# Patient Record
Sex: MALE | Race: WHITE | NOT HISPANIC OR LATINO
[De-identification: names, ages, dates, MRNs, and addresses within clinical notes are randomized per-mention and may not be internally consistent; named-entity substitution may affect disease eponyms.]

---

## 2017-01-24 ENCOUNTER — APPOINTMENT (OUTPATIENT)
Dept: RADIOLOGY | Facility: CLINIC | Age: 47
End: 2017-01-24

## 2017-01-24 ENCOUNTER — OUTPATIENT (OUTPATIENT)
Dept: OUTPATIENT SERVICES | Facility: HOSPITAL | Age: 47
LOS: 1 days | End: 2017-01-24
Payer: COMMERCIAL

## 2017-01-24 DIAGNOSIS — Z00.8 ENCOUNTER FOR OTHER GENERAL EXAMINATION: ICD-10-CM

## 2017-01-24 PROCEDURE — 72070 X-RAY EXAM THORAC SPINE 2VWS: CPT

## 2017-01-24 PROCEDURE — 72040 X-RAY EXAM NECK SPINE 2-3 VW: CPT

## 2018-01-08 ENCOUNTER — RX RENEWAL (OUTPATIENT)
Age: 48
End: 2018-01-08

## 2018-02-01 ENCOUNTER — LABORATORY RESULT (OUTPATIENT)
Age: 48
End: 2018-02-01

## 2018-02-01 ENCOUNTER — NON-APPOINTMENT (OUTPATIENT)
Age: 48
End: 2018-02-01

## 2018-02-01 ENCOUNTER — APPOINTMENT (OUTPATIENT)
Dept: INTERNAL MEDICINE | Facility: CLINIC | Age: 48
End: 2018-02-01
Payer: COMMERCIAL

## 2018-02-01 VITALS — SYSTOLIC BLOOD PRESSURE: 120 MMHG | DIASTOLIC BLOOD PRESSURE: 70 MMHG

## 2018-02-01 VITALS — WEIGHT: 202 LBS | HEIGHT: 67.5 IN | BODY MASS INDEX: 31.34 KG/M2

## 2018-02-01 PROCEDURE — 99396 PREV VISIT EST AGE 40-64: CPT | Mod: 25

## 2018-02-01 PROCEDURE — 36415 COLL VENOUS BLD VENIPUNCTURE: CPT

## 2018-02-01 PROCEDURE — 81003 URINALYSIS AUTO W/O SCOPE: CPT | Mod: QW

## 2018-02-01 PROCEDURE — 93000 ELECTROCARDIOGRAM COMPLETE: CPT

## 2018-02-02 LAB
25(OH)D3 SERPL-MCNC: 15.1 NG/ML
ALBUMIN SERPL ELPH-MCNC: 4.4 G/DL
ALP BLD-CCNC: 48 U/L
ALT SERPL-CCNC: 44 U/L
ANION GAP SERPL CALC-SCNC: 16 MMOL/L
AST SERPL-CCNC: 34 U/L
BASOPHILS # BLD AUTO: 0.05 K/UL
BASOPHILS NFR BLD AUTO: 1 %
BILIRUB SERPL-MCNC: 0.2 MG/DL
BILIRUB UR QL STRIP: NORMAL
BUN SERPL-MCNC: 14 MG/DL
CALCIUM SERPL-MCNC: 9.9 MG/DL
CARDIOLIPIN AB SER IA-ACNC: POSITIVE
CHLORIDE SERPL-SCNC: 101 MMOL/L
CHOLEST SERPL-MCNC: 188 MG/DL
CHOLEST/HDLC SERPL: 7 RATIO
CLARITY UR: CLEAR
CO2 SERPL-SCNC: 23 MMOL/L
COLLECTION METHOD: NORMAL
CREAT SERPL-MCNC: 0.82 MG/DL
EOSINOPHIL # BLD AUTO: 0.1 K/UL
EOSINOPHIL NFR BLD AUTO: 1.9
GLUCOSE SERPL-MCNC: 84 MG/DL
GLUCOSE UR-MCNC: NORMAL
HBA1C MFR BLD HPLC: 5.5 %
HCG UR QL: 0.2 EU/DL
HCT VFR BLD CALC: 38 %
HDLC SERPL-MCNC: 27 MG/DL
HGB BLD-MCNC: 12.2 G/DL
HGB UR QL STRIP.AUTO: NORMAL
INR PPP: 2.35 RATIO
KETONES UR-MCNC: NORMAL
LDLC SERPL CALC-MCNC: NORMAL
LEUKOCYTE ESTERASE UR QL STRIP: NORMAL
LYMPHOCYTES # BLD AUTO: 2.53 K/UL
LYMPHOCYTES NFR BLD AUTO: 50.5 %
MAN DIFF?: NORMAL
MCHC RBC-ENTMCNC: 20.1 PG
MCHC RBC-ENTMCNC: 32.1 GM/DL
MCV RBC AUTO: 62.5 FL
MONOCYTES # BLD AUTO: 0.25 K/UL
MONOCYTES NFR BLD AUTO: 4.9 %
NEUTROPHILS # BLD AUTO: 1.94 K/UL
NEUTROPHILS NFR BLD AUTO: 38.8 %
NITRITE UR QL STRIP: NORMAL
PH UR STRIP: 7
PLATELET # BLD AUTO: 180 K/UL
POTASSIUM SERPL-SCNC: 4.2 MMOL/L
PROT SERPL-MCNC: 8.1 G/DL
PROT UR STRIP-MCNC: NORMAL
PSA SERPL-MCNC: 1.43 NG/ML
PT BLD: 27 SEC
RBC # BLD: 6.08 M/UL
RBC # FLD: 16.5 %
SAVE SPECIMEN: NORMAL
SODIUM SERPL-SCNC: 140 MMOL/L
SP GR UR STRIP: 1.1
T3RU NFR SERPL: 1.07 INDEX
TRIGL SERPL-MCNC: 514 MG/DL
TSH SERPL-ACNC: 1.6 UIU/ML
URATE SERPL-MCNC: 7 MG/DL
WBC # FLD AUTO: 5.01 K/UL

## 2018-02-07 LAB
DNA PLOIDY SPEC FC-IMP: NORMAL
PTR INTERP: NORMAL

## 2018-02-08 ENCOUNTER — FORM ENCOUNTER (OUTPATIENT)
Age: 48
End: 2018-02-08

## 2018-02-09 ENCOUNTER — OUTPATIENT (OUTPATIENT)
Dept: OUTPATIENT SERVICES | Facility: HOSPITAL | Age: 48
LOS: 1 days | End: 2018-02-09
Payer: COMMERCIAL

## 2018-02-09 ENCOUNTER — APPOINTMENT (OUTPATIENT)
Dept: RADIOLOGY | Facility: CLINIC | Age: 48
End: 2018-02-09

## 2018-02-09 ENCOUNTER — APPOINTMENT (OUTPATIENT)
Dept: CT IMAGING | Facility: CLINIC | Age: 48
End: 2018-02-09
Payer: COMMERCIAL

## 2018-02-09 ENCOUNTER — CLINICAL ADVICE (OUTPATIENT)
Age: 48
End: 2018-02-09

## 2018-02-09 DIAGNOSIS — Z00.8 ENCOUNTER FOR OTHER GENERAL EXAMINATION: ICD-10-CM

## 2018-02-09 DIAGNOSIS — R91.1 SOLITARY PULMONARY NODULE: ICD-10-CM

## 2018-02-09 PROCEDURE — 71250 CT THORAX DX C-: CPT | Mod: 26

## 2018-02-09 PROCEDURE — 71046 X-RAY EXAM CHEST 2 VIEWS: CPT

## 2018-02-09 PROCEDURE — 71250 CT THORAX DX C-: CPT

## 2018-02-09 PROCEDURE — 71046 X-RAY EXAM CHEST 2 VIEWS: CPT | Mod: 26

## 2018-02-28 ENCOUNTER — APPOINTMENT (OUTPATIENT)
Dept: INTERNAL MEDICINE | Facility: CLINIC | Age: 48
End: 2018-02-28
Payer: COMMERCIAL

## 2018-02-28 VITALS
RESPIRATION RATE: 16 BRPM | BODY MASS INDEX: 32.8 KG/M2 | TEMPERATURE: 98 F | HEIGHT: 67 IN | SYSTOLIC BLOOD PRESSURE: 122 MMHG | OXYGEN SATURATION: 99 % | DIASTOLIC BLOOD PRESSURE: 82 MMHG | WEIGHT: 209 LBS | HEART RATE: 62 BPM

## 2018-02-28 PROCEDURE — ZZZZZ: CPT

## 2018-02-28 PROCEDURE — 99204 OFFICE O/P NEW MOD 45 MIN: CPT | Mod: 25

## 2018-02-28 PROCEDURE — 94727 GAS DIL/WSHOT DETER LNG VOL: CPT

## 2018-02-28 PROCEDURE — 94729 DIFFUSING CAPACITY: CPT

## 2018-02-28 PROCEDURE — 94060 EVALUATION OF WHEEZING: CPT

## 2018-02-28 RX ORDER — AZITHROMYCIN 250 MG/1
250 TABLET, FILM COATED ORAL
Qty: 1 | Refills: 0 | Status: DISCONTINUED | COMMUNITY
Start: 2018-01-08 | End: 2018-02-28

## 2018-04-15 ENCOUNTER — TRANSCRIPTION ENCOUNTER (OUTPATIENT)
Age: 48
End: 2018-04-15

## 2018-06-10 ENCOUNTER — TRANSCRIPTION ENCOUNTER (OUTPATIENT)
Age: 48
End: 2018-06-10

## 2019-03-05 ENCOUNTER — APPOINTMENT (OUTPATIENT)
Dept: INTERNAL MEDICINE | Facility: CLINIC | Age: 49
End: 2019-03-05

## 2020-04-16 ENCOUNTER — APPOINTMENT (OUTPATIENT)
Dept: INTERNAL MEDICINE | Facility: CLINIC | Age: 50
End: 2020-04-16
Payer: COMMERCIAL

## 2020-04-16 VITALS — WEIGHT: 195 LBS | BODY MASS INDEX: 30.54 KG/M2

## 2020-04-16 PROCEDURE — 99213 OFFICE O/P EST LOW 20 MIN: CPT | Mod: 95

## 2020-04-16 RX ORDER — METHYLPREDNISOLONE 4 MG/1
4 TABLET ORAL
Qty: 21 | Refills: 0 | Status: DISCONTINUED | COMMUNITY
Start: 2020-03-11

## 2020-04-16 NOTE — ASSESSMENT
[FreeTextEntry1] : The patient's symptoms are compatible with a left otitis externa. He was treated with Cortisporin Rx solution t.i.d. And his wife to call if he develops any fever

## 2020-07-02 ENCOUNTER — LABORATORY RESULT (OUTPATIENT)
Age: 50
End: 2020-07-02

## 2020-07-02 ENCOUNTER — APPOINTMENT (OUTPATIENT)
Dept: INTERNAL MEDICINE | Facility: CLINIC | Age: 50
End: 2020-07-02
Payer: COMMERCIAL

## 2020-07-02 ENCOUNTER — NON-APPOINTMENT (OUTPATIENT)
Age: 50
End: 2020-07-02

## 2020-07-02 VITALS — TEMPERATURE: 97.9 F | WEIGHT: 200 LBS | HEIGHT: 67 IN | BODY MASS INDEX: 31.39 KG/M2

## 2020-07-02 VITALS — SYSTOLIC BLOOD PRESSURE: 120 MMHG | DIASTOLIC BLOOD PRESSURE: 80 MMHG

## 2020-07-02 LAB
25(OH)D3 SERPL-MCNC: 19.6 NG/ML
ALBUMIN SERPL ELPH-MCNC: 4.7 G/DL
ALP BLD-CCNC: 50 U/L
ALT SERPL-CCNC: 28 U/L
ANION GAP SERPL CALC-SCNC: 14 MMOL/L
APPEARANCE: CLEAR
AST SERPL-CCNC: 28 U/L
BACTERIA: NEGATIVE
BASOPHILS # BLD AUTO: 0.07 K/UL
BASOPHILS NFR BLD AUTO: 1.7 %
BILIRUB SERPL-MCNC: 0.3 MG/DL
BILIRUBIN URINE: NEGATIVE
BLOOD URINE: NEGATIVE
BUN SERPL-MCNC: 14 MG/DL
CALCIUM SERPL-MCNC: 9.4 MG/DL
CHLORIDE SERPL-SCNC: 105 MMOL/L
CHOLEST SERPL-MCNC: 147 MG/DL
CHOLEST/HDLC SERPL: 5 RATIO
CO2 SERPL-SCNC: 21 MMOL/L
COLOR: NORMAL
CREAT SERPL-MCNC: 0.86 MG/DL
EOSINOPHIL # BLD AUTO: 0.11 K/UL
EOSINOPHIL NFR BLD AUTO: 2.6 %
FERRITIN SERPL-MCNC: 306 NG/ML
FOLATE SERPL-MCNC: 13.8 NG/ML
GLUCOSE QUALITATIVE U: NEGATIVE
GLUCOSE SERPL-MCNC: 95 MG/DL
HCT VFR BLD CALC: 41.4 %
HDLC SERPL-MCNC: 30 MG/DL
HGB BLD-MCNC: 12.4 G/DL
HYALINE CASTS: 0 /LPF
KETONES URINE: NEGATIVE
LDLC SERPL CALC-MCNC: 84 MG/DL
LEUKOCYTE ESTERASE URINE: NEGATIVE
LYMPHOCYTES # BLD AUTO: 1.08 K/UL
LYMPHOCYTES NFR BLD AUTO: 25 %
MAN DIFF?: NORMAL
MCHC RBC-ENTMCNC: 19.7 PG
MCHC RBC-ENTMCNC: 30 GM/DL
MCV RBC AUTO: 65.6 FL
MICROSCOPIC-UA: NORMAL
MONOCYTES # BLD AUTO: 0.26 K/UL
MONOCYTES NFR BLD AUTO: 6 %
NEUTROPHILS # BLD AUTO: 2.78 K/UL
NEUTROPHILS NFR BLD AUTO: 64.7 %
NITRITE URINE: NEGATIVE
PH URINE: 7
PLATELET # BLD AUTO: 167 K/UL
POTASSIUM SERPL-SCNC: 4.4 MMOL/L
PROT SERPL-MCNC: 7.4 G/DL
PROTEIN URINE: NEGATIVE
RBC # BLD: 6.31 M/UL
RBC # FLD: 17.7 %
RED BLOOD CELLS URINE: 1 /HPF
SAVE SPECIMEN: NORMAL
SODIUM SERPL-SCNC: 140 MMOL/L
SPECIFIC GRAVITY URINE: 1.02
SQUAMOUS EPITHELIAL CELLS: 0 /HPF
T3RU NFR SERPL: 1.1 TBI
T4 SERPL-MCNC: 7.8 UG/DL
TRIGL SERPL-MCNC: 172 MG/DL
TSH SERPL-ACNC: 1.32 UIU/ML
URATE SERPL-MCNC: 6.7 MG/DL
UROBILINOGEN URINE: NORMAL
VIT B12 SERPL-MCNC: 370 PG/ML
WBC # FLD AUTO: 4.3 K/UL
WHITE BLOOD CELLS URINE: 0 /HPF

## 2020-07-02 PROCEDURE — 36415 COLL VENOUS BLD VENIPUNCTURE: CPT

## 2020-07-02 PROCEDURE — 93000 ELECTROCARDIOGRAM COMPLETE: CPT

## 2020-07-02 PROCEDURE — 99396 PREV VISIT EST AGE 40-64: CPT | Mod: 25

## 2020-07-02 NOTE — PHYSICAL EXAM
[Declined Rectal Exam] : declined rectal exam [Scrotum] : the scrotum was normal [Rectal Exam - Seminal Vesicles] : the seminal vesicles were normal [Epididymis] : the epididymides were normal [Testes Tenderness] : no tenderness of the testes [Testes Mass (___cm)] : there were no testicular masses [Normal] : affect was normal and insight and judgment were intact

## 2020-07-03 LAB
ESTIMATED AVERAGE GLUCOSE: 108 MG/DL
HBA1C MFR BLD HPLC: 5.4 %

## 2020-08-14 ENCOUNTER — TRANSCRIPTION ENCOUNTER (OUTPATIENT)
Age: 50
End: 2020-08-14

## 2020-09-03 ENCOUNTER — TRANSCRIPTION ENCOUNTER (OUTPATIENT)
Age: 50
End: 2020-09-03

## 2020-11-06 NOTE — ASSESSMENT
[FreeTextEntry1] : The patient's vital signs were stable. Complete physical examination was normal. And blood were obtained .\par Advised to repeat CT chest

## 2020-11-06 NOTE — HISTORY OF PRESENT ILLNESS
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 The patient is on long-term anticoagulation and doing quite well he is followed periodically by hematology. He states that for his annual examination

## 2021-01-07 ENCOUNTER — LABORATORY RESULT (OUTPATIENT)
Age: 51
End: 2021-01-07

## 2021-01-07 ENCOUNTER — APPOINTMENT (OUTPATIENT)
Dept: INTERNAL MEDICINE | Facility: CLINIC | Age: 51
End: 2021-01-07
Payer: COMMERCIAL

## 2021-01-07 VITALS — DIASTOLIC BLOOD PRESSURE: 70 MMHG | SYSTOLIC BLOOD PRESSURE: 120 MMHG

## 2021-01-07 VITALS — TEMPERATURE: 96.4 F | BODY MASS INDEX: 29.92 KG/M2 | HEIGHT: 69 IN | WEIGHT: 202 LBS

## 2021-01-07 DIAGNOSIS — Z95.828 PRESENCE OF OTHER VASCULAR IMPLANTS AND GRAFTS: ICD-10-CM

## 2021-01-07 PROCEDURE — 99214 OFFICE O/P EST MOD 30 MIN: CPT | Mod: 25

## 2021-01-07 PROCEDURE — 99072 ADDL SUPL MATRL&STAF TM PHE: CPT

## 2021-01-07 PROCEDURE — 36415 COLL VENOUS BLD VENIPUNCTURE: CPT

## 2021-01-07 NOTE — ASSESSMENT
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  Who in addition has a history of pulmonary nodules and mediastinal lymph nodes.  He occasionally complains of occasional cough but denies any shortness of breath sputum or any fever.  In view of his previous history I ordered a noncontrast CT of the chest.  In addition the patient is complaining of severe pain in his left fifth toe and also pain in the posterior aspect of his left foot to the point that he has trouble walking on it.  He recently saw podiatrist who x-rayed the foot and did not see any abnormalities.  Presently on examination his pulses are intact sensation is intact.  Vibration sense is intact.  I scheduled the patient for noncontrast MRI of the left foot

## 2021-01-07 NOTE — PHYSICAL EXAM
[No Varicosities] : no varicosities [Pedal Pulses Present] : the pedal pulses are present [Normal] : soft, non-tender, non-distended, no masses palpated, no HSM and normal bowel sounds

## 2021-01-07 NOTE — REVIEW OF SYSTEMS
[Cough] : cough [Joint Pain] : joint pain [Joint Stiffness] : joint stiffness [Joint Swelling] : joint swelling [Negative] : Neurological

## 2021-01-07 NOTE — HISTORY OF PRESENT ILLNESS
[de-identified] : This is a 50-year-old gentleman with a history of anticardiolipin antibody syndrome status post pulmonary embolus status post Gladys filter placement who is complaining of occasional cough and also severe pain in his left little toe and also pain in the bottom of his left foot he denies any injury.  Denies any fever or trauma

## 2021-01-08 LAB
25(OH)D3 SERPL-MCNC: 19.8 NG/ML
ALBUMIN SERPL ELPH-MCNC: 5.1 G/DL
ALP BLD-CCNC: 60 U/L
ALT SERPL-CCNC: 38 U/L
ANION GAP SERPL CALC-SCNC: 18 MMOL/L
AST SERPL-CCNC: 35 U/L
B BURGDOR IGG+IGM SER QL IB: NORMAL
BASOPHILS # BLD AUTO: 0.01 K/UL
BASOPHILS NFR BLD AUTO: 0.2 %
BILIRUB SERPL-MCNC: 0.4 MG/DL
BUN SERPL-MCNC: 20 MG/DL
CALCIUM SERPL-MCNC: 10.1 MG/DL
CHLORIDE SERPL-SCNC: 104 MMOL/L
CHOLEST SERPL-MCNC: 212 MG/DL
CO2 SERPL-SCNC: 20 MMOL/L
CREAT SERPL-MCNC: 0.97 MG/DL
EOSINOPHIL # BLD AUTO: 0.03 K/UL
EOSINOPHIL NFR BLD AUTO: 0.6 %
FOLATE SERPL-MCNC: >20 NG/ML
GLUCOSE SERPL-MCNC: 103 MG/DL
HCT VFR BLD CALC: 41.9 %
HDLC SERPL-MCNC: 33 MG/DL
HGB BLD-MCNC: 12.7 G/DL
IMM GRANULOCYTES NFR BLD AUTO: 0.6 %
LDLC SERPL CALC-MCNC: 144 MG/DL
LYMPHOCYTES # BLD AUTO: 1.3 K/UL
LYMPHOCYTES NFR BLD AUTO: 27.9 %
MAN DIFF?: NORMAL
MCHC RBC-ENTMCNC: 19.5 PG
MCHC RBC-ENTMCNC: 30.3 GM/DL
MCV RBC AUTO: 64.5 FL
MONOCYTES # BLD AUTO: 0.3 K/UL
MONOCYTES NFR BLD AUTO: 6.4 %
NEUTROPHILS # BLD AUTO: 2.99 K/UL
NEUTROPHILS NFR BLD AUTO: 64.3 %
NONHDLC SERPL-MCNC: 178 MG/DL
PLATELET # BLD AUTO: 166 K/UL
POTASSIUM SERPL-SCNC: 4.4 MMOL/L
PROT SERPL-MCNC: 8.3 G/DL
RBC # BLD: 6.5 M/UL
RBC # FLD: 17.2 %
SAVE SPECIMEN: NORMAL
SODIUM SERPL-SCNC: 142 MMOL/L
T3RU NFR SERPL: 1.1 TBI
T4 SERPL-MCNC: 5.2 UG/DL
TRIGL SERPL-MCNC: 169 MG/DL
TSH SERPL-ACNC: 1.41 UIU/ML
URATE SERPL-MCNC: 8.2 MG/DL
VIT B12 SERPL-MCNC: 390 PG/ML
WBC # FLD AUTO: 4.66 K/UL

## 2021-01-12 ENCOUNTER — LABORATORY RESULT (OUTPATIENT)
Age: 51
End: 2021-01-12

## 2021-01-12 ENCOUNTER — APPOINTMENT (OUTPATIENT)
Dept: INTERNAL MEDICINE | Facility: CLINIC | Age: 51
End: 2021-01-12
Payer: COMMERCIAL

## 2021-01-12 VITALS — DIASTOLIC BLOOD PRESSURE: 70 MMHG | SYSTOLIC BLOOD PRESSURE: 120 MMHG

## 2021-01-12 VITALS
TEMPERATURE: 97.2 F | HEIGHT: 69 IN | WEIGHT: 202 LBS | OXYGEN SATURATION: 98 % | HEART RATE: 71 BPM | BODY MASS INDEX: 29.92 KG/M2

## 2021-01-12 DIAGNOSIS — J45.30 MILD PERSISTENT ASTHMA, UNCOMPLICATED: ICD-10-CM

## 2021-01-12 DIAGNOSIS — Z87.09 PERSONAL HISTORY OF OTHER DISEASES OF THE RESPIRATORY SYSTEM: ICD-10-CM

## 2021-01-12 DIAGNOSIS — R70.0 ELEVATED ERYTHROCYTE SEDIMENTATION RATE: ICD-10-CM

## 2021-01-12 DIAGNOSIS — Z86.711 PERSONAL HISTORY OF PULMONARY EMBOLISM: ICD-10-CM

## 2021-01-12 LAB
BILIRUB UR QL STRIP: NEGATIVE
CLARITY UR: CLEAR
COLLECTION METHOD: NORMAL
GLUCOSE UR-MCNC: NEGATIVE
HCG UR QL: 0.2 EU/DL
HGB UR QL STRIP.AUTO: NEGATIVE
KETONES UR-MCNC: NEGATIVE
LEUKOCYTE ESTERASE UR QL STRIP: NEGATIVE
NITRITE UR QL STRIP: NEGATIVE
PH UR STRIP: 7
PROT UR STRIP-MCNC: NEGATIVE
SP GR UR STRIP: 1.01

## 2021-01-12 PROCEDURE — 36415 COLL VENOUS BLD VENIPUNCTURE: CPT

## 2021-01-12 PROCEDURE — 81003 URINALYSIS AUTO W/O SCOPE: CPT | Mod: NC,QW

## 2021-01-12 PROCEDURE — 99072 ADDL SUPL MATRL&STAF TM PHE: CPT

## 2021-01-12 PROCEDURE — 99214 OFFICE O/P EST MOD 30 MIN: CPT | Mod: 25

## 2021-01-12 NOTE — HISTORY OF PRESENT ILLNESS
[de-identified] : This is a 50-year-old patient who was back for reevaluation of a markedly elevated sedimentation rate to 90.  This is accompanied by severe pain and tenderness in the lateral aspect of his left foot and pain in his pinky toe.  He is also complaining of a rash developing in his right palm

## 2021-01-12 NOTE — PHYSICAL EXAM
[Normal] : no posterior cervical lymphadenopathy and no anterior cervical lymphadenopathy [de-identified] : Tenderness lateral aspect of left foot

## 2021-01-13 ENCOUNTER — NON-APPOINTMENT (OUTPATIENT)
Age: 51
End: 2021-01-13

## 2021-01-13 ENCOUNTER — APPOINTMENT (OUTPATIENT)
Dept: CT IMAGING | Facility: IMAGING CENTER | Age: 51
End: 2021-01-13
Payer: COMMERCIAL

## 2021-01-13 ENCOUNTER — APPOINTMENT (OUTPATIENT)
Dept: MRI IMAGING | Facility: IMAGING CENTER | Age: 51
End: 2021-01-13
Payer: COMMERCIAL

## 2021-01-13 ENCOUNTER — OUTPATIENT (OUTPATIENT)
Dept: OUTPATIENT SERVICES | Facility: HOSPITAL | Age: 51
LOS: 1 days | End: 2021-01-13
Payer: COMMERCIAL

## 2021-01-13 DIAGNOSIS — G57.90 UNSPECIFIED MONONEUROPATHY OF UNSPECIFIED LOWER LIMB: ICD-10-CM

## 2021-01-13 DIAGNOSIS — M79.2 NEURALGIA AND NEURITIS, UNSPECIFIED: ICD-10-CM

## 2021-01-13 DIAGNOSIS — Z00.8 ENCOUNTER FOR OTHER GENERAL EXAMINATION: ICD-10-CM

## 2021-01-13 LAB
BASOPHILS # BLD AUTO: 0 K/UL
BASOPHILS NFR BLD AUTO: 0 %
CH50 SERPL-MCNC: 35 U/ML
CRP SERPL-MCNC: 0.49 MG/DL
EOSINOPHIL # BLD AUTO: 0.04 K/UL
EOSINOPHIL NFR BLD AUTO: 0.9 %
ERYTHROCYTE [SEDIMENTATION RATE] IN BLOOD BY WESTERGREN METHOD: 72 MM/HR
HCT VFR BLD CALC: 42.1 %
HGB BLD-MCNC: 12.6 G/DL
INR PPP: 1.66 RATIO
LYMPHOCYTES # BLD AUTO: 0.9 K/UL
LYMPHOCYTES NFR BLD AUTO: 21 %
MAN DIFF?: NORMAL
MCHC RBC-ENTMCNC: 19.7 PG
MCHC RBC-ENTMCNC: 29.9 GM/DL
MCV RBC AUTO: 65.9 FL
MONOCYTES # BLD AUTO: 0.23 K/UL
MONOCYTES NFR BLD AUTO: 5.3 %
NEUTROPHILS # BLD AUTO: 2.96 K/UL
NEUTROPHILS NFR BLD AUTO: 69.3 %
PLATELET # BLD AUTO: 122 K/UL
PT BLD: 19.1 SEC
RBC # BLD: 6.39 M/UL
RBC # FLD: 17.7 %
WBC # FLD AUTO: 4.27 K/UL

## 2021-01-13 PROCEDURE — 71250 CT THORAX DX C-: CPT | Mod: 26

## 2021-01-13 PROCEDURE — 73718 MRI LOWER EXTREMITY W/O DYE: CPT | Mod: 26,LT

## 2021-01-13 PROCEDURE — 71250 CT THORAX DX C-: CPT

## 2021-01-13 PROCEDURE — 73718 MRI LOWER EXTREMITY W/O DYE: CPT

## 2021-01-14 LAB
ALBUMIN MFR SERPL ELPH: 56.9 %
ALBUMIN SERPL-MCNC: 4.7 G/DL
ALBUMIN/GLOB SERPL: 1.3 RATIO
ALPHA1 GLOB MFR SERPL ELPH: 3.3 %
ALPHA1 GLOB SERPL ELPH-MCNC: 0.3 G/DL
ALPHA2 GLOB MFR SERPL ELPH: 8 %
ALPHA2 GLOB SERPL ELPH-MCNC: 0.7 G/DL
ANA PAT FLD IF-IMP: ABNORMAL
ANA SER IF-ACNC: ABNORMAL
B-GLOBULIN MFR SERPL ELPH: 11.7 %
B-GLOBULIN SERPL ELPH-MCNC: 1 G/DL
C3 SERPL-MCNC: 97 MG/DL
C4 SERPL-MCNC: 7 MG/DL
DEPRECATED KAPPA LC FREE/LAMBDA SER: 0.93 RATIO
DSDNA AB SER-ACNC: 476 IU/ML
GAMMA GLOB FLD ELPH-MCNC: 1.7 G/DL
GAMMA GLOB MFR SERPL ELPH: 20.1 %
IGA SER QL IEP: 315 MG/DL
IGG SER QL IEP: 1615 MG/DL
IGM SER QL IEP: 297 MG/DL
INTERPRETATION SERPL IEP-IMP: NORMAL
KAPPA LC CSF-MCNC: 2.45 MG/DL
KAPPA LC SERPL-MCNC: 2.28 MG/DL
M PROTEIN SPEC IFE-MCNC: NORMAL
PROT SERPL-MCNC: 8.3 G/DL
PROT SERPL-MCNC: 8.3 G/DL
THYROGLOB AB SERPL-ACNC: <20 IU/ML
THYROPEROXIDASE AB SERPL IA-ACNC: <10 IU/ML

## 2021-02-08 ENCOUNTER — APPOINTMENT (OUTPATIENT)
Dept: RHEUMATOLOGY | Facility: CLINIC | Age: 51
End: 2021-02-08

## 2021-05-03 ENCOUNTER — APPOINTMENT (OUTPATIENT)
Dept: INTERNAL MEDICINE | Facility: CLINIC | Age: 51
End: 2021-05-03

## 2021-05-20 ENCOUNTER — TRANSCRIPTION ENCOUNTER (OUTPATIENT)
Age: 51
End: 2021-05-20

## 2021-05-20 ENCOUNTER — APPOINTMENT (OUTPATIENT)
Dept: INTERNAL MEDICINE | Facility: CLINIC | Age: 51
End: 2021-05-20
Payer: COMMERCIAL

## 2021-05-20 VITALS — SYSTOLIC BLOOD PRESSURE: 120 MMHG | DIASTOLIC BLOOD PRESSURE: 70 MMHG

## 2021-05-20 VITALS — BODY MASS INDEX: 30.36 KG/M2 | HEIGHT: 69 IN | WEIGHT: 205 LBS | TEMPERATURE: 98 F

## 2021-05-20 DIAGNOSIS — D68.9 COAGULATION DEFECT, UNSPECIFIED: ICD-10-CM

## 2021-05-20 PROCEDURE — 99072 ADDL SUPL MATRL&STAF TM PHE: CPT

## 2021-05-20 PROCEDURE — 99214 OFFICE O/P EST MOD 30 MIN: CPT

## 2021-05-20 NOTE — HISTORY OF PRESENT ILLNESS
[de-identified] : This is a 50-year-old gentleman with a history of anticardiolipin antibodies, coagulopathy, pulmonary embolus, sarcoid, lupus who is here today for chronic cough.  Of note is that on his last CAT scan there was a new infiltrate for which I referred him to pulmonary and he has not gone as of yet.

## 2021-05-20 NOTE — ASSESSMENT
[FreeTextEntry1] : Problems\par Chronic cough\par Sarcoid\par New pulmonary infiltrates\par SLE\par Anticardiolipin antibodies\par Assessment\par 1-chronic cough-this may be either allergic or may be related to his sarcoidosis.  I started the patient on a Medrol pack, Zyrtec, Flovent inhaler and albuterol.\par 3-stohelacpkn-S am concerned that the patient has not seen the pulmonary specialist as of yet I did refer him to new pulmonary doctor and told him it is important to see him as soon as possibl3-\par 3-SLE-he was seen by rheumatology and was started on Plaquenil 200mg twice a day.  I informed the patient that is important to have his eyes examined twice yearly because it can affect color vision\par 4-anticardiolipin antibodies-the patient continues on Coumadin therapy and is due for an INR next week

## 2021-05-24 ENCOUNTER — APPOINTMENT (OUTPATIENT)
Dept: PULMONOLOGY | Facility: CLINIC | Age: 51
End: 2021-05-24
Payer: COMMERCIAL

## 2021-05-24 VITALS
OXYGEN SATURATION: 98 % | BODY MASS INDEX: 29.62 KG/M2 | SYSTOLIC BLOOD PRESSURE: 127 MMHG | DIASTOLIC BLOOD PRESSURE: 85 MMHG | HEIGHT: 69 IN | WEIGHT: 200 LBS | TEMPERATURE: 97.6 F | HEART RATE: 61 BPM

## 2021-05-24 DIAGNOSIS — M32.9 SYSTEMIC LUPUS ERYTHEMATOSUS, UNSPECIFIED: ICD-10-CM

## 2021-05-24 PROCEDURE — 99072 ADDL SUPL MATRL&STAF TM PHE: CPT

## 2021-05-24 PROCEDURE — 99203 OFFICE O/P NEW LOW 30 MIN: CPT

## 2021-05-24 NOTE — REVIEW OF SYSTEMS
[Cough] : cough [Arthralgias] : arthralgias [Clotting Disorder/ Frequent bleeding] : clotting disorder/ frequent bleeding [Negative] : Dermatologic

## 2021-05-24 NOTE — ASSESSMENT
[FreeTextEntry1] : the patient's chest CT demonstrates multiple solid nodules in the right upper lobe with one abutting the right upper lobe bronchus. This does not appear to be related to his sarcoid or to his history of pulmonary emboli. There is a concern for a pulmonary neoplasm. I will arrange for him to have bronchoscopy and for him to undergo PET scanning.

## 2021-05-24 NOTE — PHYSICAL EXAM
[No Acute Distress] : no acute distress [Normal Appearance] : normal appearance [No Neck Mass] : no neck mass [Normal Rate/Rhythm] : normal rate/rhythm [Normal S1, S2] : normal s1, s2 [No Resp Distress] : no resp distress [Clear to Auscultation Bilaterally] : clear to auscultation bilaterally [Normal Gait] : normal gait [No Clubbing] : no clubbing [No Edema] : no edema [Oriented x3] : oriented x3

## 2021-05-24 NOTE — HISTORY OF PRESENT ILLNESS
[TextBox_4] : 50-year-old male with an abnormal chest CAT scan. The patient has a complex medical history having had a diagnosis of sarcoid made in 2002 via a mediastinoscopy. At that time he was noted to have several pulmonary nodules but no treatment was required. He also was found to have an anti-Cardiolipin antibody and subsequently found to have pulmonary emboli. He was anticoagulated and a IVC filter was placed. He is also suspected of having lupus for which she is on hydroxychloroquine. He has a history of bronchial reactivity but is currently not on any inhaled bronchodilators. He is a lifelong nonsmoker without occupational exposures. Other than a dry cough, he feels well. He denies any fever or, chest discomfort, dyspnea or peripheral edema.

## 2021-05-28 ENCOUNTER — APPOINTMENT (OUTPATIENT)
Dept: NUCLEAR MEDICINE | Facility: CLINIC | Age: 51
End: 2021-05-28
Payer: COMMERCIAL

## 2021-05-28 ENCOUNTER — OUTPATIENT (OUTPATIENT)
Dept: OUTPATIENT SERVICES | Facility: HOSPITAL | Age: 51
LOS: 1 days | End: 2021-05-28
Payer: COMMERCIAL

## 2021-05-28 DIAGNOSIS — D49.1 NEOPLASM OF UNSPECIFIED BEHAVIOR OF RESPIRATORY SYSTEM: ICD-10-CM

## 2021-05-28 PROCEDURE — A9552: CPT

## 2021-05-28 PROCEDURE — 78815 PET IMAGE W/CT SKULL-THIGH: CPT | Mod: 26,PI

## 2021-05-28 PROCEDURE — 78815 PET IMAGE W/CT SKULL-THIGH: CPT

## 2021-06-04 ENCOUNTER — RESULT REVIEW (OUTPATIENT)
Age: 51
End: 2021-06-04

## 2021-06-04 ENCOUNTER — OUTPATIENT (OUTPATIENT)
Dept: OUTPATIENT SERVICES | Facility: HOSPITAL | Age: 51
LOS: 1 days | End: 2021-06-04
Payer: COMMERCIAL

## 2021-06-04 ENCOUNTER — APPOINTMENT (OUTPATIENT)
Dept: ULTRASOUND IMAGING | Facility: IMAGING CENTER | Age: 51
End: 2021-06-04
Payer: COMMERCIAL

## 2021-06-04 DIAGNOSIS — Z00.8 ENCOUNTER FOR OTHER GENERAL EXAMINATION: ICD-10-CM

## 2021-06-04 DIAGNOSIS — D49.1 NEOPLASM OF UNSPECIFIED BEHAVIOR OF RESPIRATORY SYSTEM: ICD-10-CM

## 2021-06-04 PROCEDURE — 88305 TISSUE EXAM BY PATHOLOGIST: CPT | Mod: 26

## 2021-06-04 PROCEDURE — 88341 IMHCHEM/IMCYTCHM EA ADD ANTB: CPT

## 2021-06-04 PROCEDURE — 88360 TUMOR IMMUNOHISTOCHEM/MANUAL: CPT

## 2021-06-04 PROCEDURE — 88173 CYTOPATH EVAL FNA REPORT: CPT | Mod: 26

## 2021-06-04 PROCEDURE — 88341 IMHCHEM/IMCYTCHM EA ADD ANTB: CPT | Mod: 26,59

## 2021-06-04 PROCEDURE — 88342 IMHCHEM/IMCYTCHM 1ST ANTB: CPT | Mod: 26,59

## 2021-06-04 PROCEDURE — 20206 BIOPSY MUSCLE PERQ NEEDLE: CPT

## 2021-06-04 PROCEDURE — 76942 ECHO GUIDE FOR BIOPSY: CPT

## 2021-06-04 PROCEDURE — 88360 TUMOR IMMUNOHISTOCHEM/MANUAL: CPT | Mod: 26

## 2021-06-04 PROCEDURE — 76942 ECHO GUIDE FOR BIOPSY: CPT | Mod: 26

## 2021-06-04 PROCEDURE — 88342 IMHCHEM/IMCYTCHM 1ST ANTB: CPT

## 2021-06-04 PROCEDURE — 88173 CYTOPATH EVAL FNA REPORT: CPT

## 2021-06-04 PROCEDURE — 88172 CYTP DX EVAL FNA 1ST EA SITE: CPT

## 2021-06-04 PROCEDURE — 88305 TISSUE EXAM BY PATHOLOGIST: CPT

## 2021-06-07 ENCOUNTER — APPOINTMENT (OUTPATIENT)
Dept: PULMONOLOGY | Facility: CLINIC | Age: 51
End: 2021-06-07

## 2021-06-08 ENCOUNTER — APPOINTMENT (OUTPATIENT)
Dept: ULTRASOUND IMAGING | Facility: IMAGING CENTER | Age: 51
End: 2021-06-08
Payer: COMMERCIAL

## 2021-06-09 ENCOUNTER — OUTPATIENT (OUTPATIENT)
Dept: OUTPATIENT SERVICES | Facility: HOSPITAL | Age: 51
LOS: 1 days | Discharge: HOME | End: 2021-06-09

## 2021-06-09 DIAGNOSIS — D64.9 ANEMIA, UNSPECIFIED: ICD-10-CM

## 2021-06-10 ENCOUNTER — RESULT REVIEW (OUTPATIENT)
Age: 51
End: 2021-06-10

## 2021-06-10 ENCOUNTER — APPOINTMENT (OUTPATIENT)
Dept: HEMATOLOGY ONCOLOGY | Facility: CLINIC | Age: 51
End: 2021-06-10
Payer: COMMERCIAL

## 2021-06-10 ENCOUNTER — NON-APPOINTMENT (OUTPATIENT)
Age: 51
End: 2021-06-10

## 2021-06-10 VITALS
TEMPERATURE: 97 F | OXYGEN SATURATION: 93 % | WEIGHT: 202.8 LBS | DIASTOLIC BLOOD PRESSURE: 97 MMHG | RESPIRATION RATE: 16 BRPM | HEIGHT: 68.94 IN | SYSTOLIC BLOOD PRESSURE: 148 MMHG | HEART RATE: 74 BPM | BODY MASS INDEX: 30.04 KG/M2

## 2021-06-10 LAB
BASOPHILS # BLD AUTO: 0.05 K/UL — SIGNIFICANT CHANGE UP (ref 0–0.2)
BASOPHILS NFR BLD AUTO: 1 % — SIGNIFICANT CHANGE UP (ref 0–2)
EOSINOPHIL # BLD AUTO: 0 K/UL — SIGNIFICANT CHANGE UP (ref 0–0.5)
EOSINOPHIL NFR BLD AUTO: 0 % — SIGNIFICANT CHANGE UP (ref 0–6)
HCT VFR BLD CALC: 40.6 % — SIGNIFICANT CHANGE UP (ref 39–50)
HGB BLD-MCNC: 12.4 G/DL — LOW (ref 13–17)
LYMPHOCYTES # BLD AUTO: 1.12 K/UL — SIGNIFICANT CHANGE UP (ref 1–3.3)
LYMPHOCYTES # BLD AUTO: 24 % — SIGNIFICANT CHANGE UP (ref 13–44)
MCHC RBC-ENTMCNC: 19.9 PG — LOW (ref 27–34)
MCHC RBC-ENTMCNC: 30.5 G/DL — LOW (ref 32–36)
MCV RBC AUTO: 65.2 FL — LOW (ref 80–100)
MONOCYTES # BLD AUTO: 0.42 K/UL — SIGNIFICANT CHANGE UP (ref 0–0.9)
MONOCYTES NFR BLD AUTO: 9 % — SIGNIFICANT CHANGE UP (ref 2–14)
NEUTROPHILS # BLD AUTO: 3.08 K/UL — SIGNIFICANT CHANGE UP (ref 1.8–7.4)
NEUTROPHILS NFR BLD AUTO: 66 % — SIGNIFICANT CHANGE UP (ref 43–77)
NRBC # BLD: 0 /100 — SIGNIFICANT CHANGE UP (ref 0–0)
NRBC # BLD: SIGNIFICANT CHANGE UP /100 WBCS (ref 0–0)
PLAT MORPH BLD: NORMAL — SIGNIFICANT CHANGE UP
PLATELET # BLD AUTO: 119 K/UL — LOW (ref 150–400)
RBC # BLD: 6.23 M/UL — HIGH (ref 4.2–5.8)
RBC # FLD: 17.2 % — HIGH (ref 10.3–14.5)
RBC BLD AUTO: SIGNIFICANT CHANGE UP
WBC # BLD: 4.67 K/UL — SIGNIFICANT CHANGE UP (ref 3.8–10.5)
WBC # FLD AUTO: 4.67 K/UL — SIGNIFICANT CHANGE UP (ref 3.8–10.5)

## 2021-06-10 PROCEDURE — 99244 OFF/OP CNSLTJ NEW/EST MOD 40: CPT

## 2021-06-10 PROCEDURE — 99072 ADDL SUPL MATRL&STAF TM PHE: CPT

## 2021-06-10 NOTE — ASSESSMENT
[FreeTextEntry1] : 51 yo m, never-smoker with stage III lung adeno ca\par NGS and PDL1 pending\par 'Multiple comorbidities\par Discussed staging  and need for additional info- NGS\par BW including liquid bx\par Brain MRi for staging\par OV in 10 days\par Rad onc consult

## 2021-06-10 NOTE — CONSULT LETTER
[Dear  ___] : Dear  [unfilled], [Consult Letter:] : I had the pleasure of evaluating your patient, [unfilled]. [Please see my note below.] : Please see my note below. [Consult Closing:] : Thank you very much for allowing me to participate in the care of this patient.  If you have any questions, please do not hesitate to contact me. [Sincerely,] : Sincerely, [FreeTextEntry2] : Dr. Esther Mo [FreeTextEntry3] : Kyle Hart MD\par  [DrAbbi  ___] : Dr. HORNER

## 2021-06-10 NOTE — PHYSICAL EXAM
[Restricted in physically strenuous activity but ambulatory and able to carry out work of a light or sedentary nature] : Status 1- Restricted in physically strenuous activity but ambulatory and able to carry out work of a light or sedentary nature, e.g., light house work, office work [Normal] : affect appropriate [de-identified] : rt neck adenopathy [de-identified] : decreased BS rt upper

## 2021-06-10 NOTE — HISTORY OF PRESENT ILLNESS
[Disease: _____________________] : Disease: [unfilled] [T: ___] : T[unfilled] [N: ___] : N[unfilled] [M: ___] : M[unfilled] [AJCC Stage: ____] : AJCC Stage: [unfilled] [de-identified] : Mr. Mclaughlin with hx of sarcoidosis is a pleasant 50-year-old male who started having allergy-like symptoms (cough) 5 months ago. He saw his PCP, Dr. Mo- referred to Dr. Trujillo after he was noted to have an abnormal chest CAT scan. He was referred for PET/CT which was done on 5/23 which showed  FDG avid right upper lobe masslike consolidation, considerably increased in size and coalesced with adjacent right upper lobe nodules and new FDG avid supraclavicular, mediastinal and hilar lymphadenopathy, as compared to CT chest January 13, 2021 concerning for malignancy although progression of sarcoidosis is also a consideration. rt supra clav bx was c/w lung adeno ca. \par \par Of note, the patient has a complex medical history having had a diagnosis of sarcoid made in 2002 via a mediastinoscopy. At that time he was noted to have several pulmonary nodules but no treatment was required. 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 He was started on anticoagulation and a IVC filter was placed. He is also suspected of having lupus for which she is on hydroxychloroquine. He has a history of bronchial reactivity but is currently not on any inhaled bronchodilators. \par He is a lifelong nonsmoker without occupational exposures. Other than a dry cough, he feels well. He denies any fever or, chest discomfort, dyspnea or peripheral edema. \par  [de-identified] : adeno ca

## 2021-06-10 NOTE — REVIEW OF SYSTEMS
[Cough] : cough [SOB on Exertion] : shortness of breath during exertion [Negative] : Allergic/Immunologic [FreeTextEntry4] : rt neck mass [de-identified] : r

## 2021-06-11 LAB
ALBUMIN SERPL ELPH-MCNC: 5 G/DL
ALP BLD-CCNC: 65 U/L
ALT SERPL-CCNC: 213 U/L
ANION GAP SERPL CALC-SCNC: 13 MMOL/L
AST SERPL-CCNC: 108 U/L
BILIRUB SERPL-MCNC: 0.3 MG/DL
BUN SERPL-MCNC: 15 MG/DL
CALCIUM SERPL-MCNC: 10.3 MG/DL
CEA SERPL-MCNC: 12.8 NG/ML
CHLORIDE SERPL-SCNC: 104 MMOL/L
CO2 SERPL-SCNC: 22 MMOL/L
CREAT SERPL-MCNC: 0.95 MG/DL
GLUCOSE SERPL-MCNC: 88 MG/DL
MAGNESIUM SERPL-MCNC: 2.2 MG/DL
POTASSIUM SERPL-SCNC: 4.5 MMOL/L
PROT SERPL-MCNC: 8.4 G/DL
SODIUM SERPL-SCNC: 139 MMOL/L
TSH SERPL-ACNC: 1.72 UIU/ML

## 2021-06-18 ENCOUNTER — OUTPATIENT (OUTPATIENT)
Dept: OUTPATIENT SERVICES | Facility: HOSPITAL | Age: 51
LOS: 1 days | Discharge: HOME | End: 2021-06-18
Payer: COMMERCIAL

## 2021-06-18 ENCOUNTER — APPOINTMENT (OUTPATIENT)
Dept: RADIATION ONCOLOGY | Facility: CLINIC | Age: 51
End: 2021-06-18
Payer: COMMERCIAL

## 2021-06-18 ENCOUNTER — OUTPATIENT (OUTPATIENT)
Dept: OUTPATIENT SERVICES | Facility: HOSPITAL | Age: 51
LOS: 1 days | End: 2021-06-18
Payer: COMMERCIAL

## 2021-06-18 ENCOUNTER — APPOINTMENT (OUTPATIENT)
Dept: MRI IMAGING | Facility: CLINIC | Age: 51
End: 2021-06-18
Payer: COMMERCIAL

## 2021-06-18 VITALS
WEIGHT: 207.23 LBS | HEART RATE: 68 BPM | TEMPERATURE: 36.4 F | OXYGEN SATURATION: 97 % | SYSTOLIC BLOOD PRESSURE: 146 MMHG | RESPIRATION RATE: 18 BRPM | DIASTOLIC BLOOD PRESSURE: 90 MMHG | BODY MASS INDEX: 30.66 KG/M2

## 2021-06-18 DIAGNOSIS — Z78.9 OTHER SPECIFIED HEALTH STATUS: ICD-10-CM

## 2021-06-18 DIAGNOSIS — C34.90 MALIGNANT NEOPLASM OF UNSPECIFIED PART OF UNSPECIFIED BRONCHUS OR LUNG: ICD-10-CM

## 2021-06-18 DIAGNOSIS — Z00.8 ENCOUNTER FOR OTHER GENERAL EXAMINATION: ICD-10-CM

## 2021-06-18 PROCEDURE — 99204 OFFICE O/P NEW MOD 45 MIN: CPT | Mod: GC,25

## 2021-06-18 PROCEDURE — 70553 MRI BRAIN STEM W/O & W/DYE: CPT

## 2021-06-18 PROCEDURE — 70553 MRI BRAIN STEM W/O & W/DYE: CPT | Mod: 26

## 2021-06-18 PROCEDURE — 99072 ADDL SUPL MATRL&STAF TM PHE: CPT

## 2021-06-18 PROCEDURE — 77263 THER RADIOLOGY TX PLNG CPLX: CPT

## 2021-06-18 RX ORDER — NEOMYCIN SULFATE, POLYMYXIN B SULFATE, HYDROCORTISONE 3.5; 10000; 1 MG/ML; [USP'U]/ML; MG/ML
1 SOLUTION/ DROPS AURICULAR (OTIC) 4 TIMES DAILY
Qty: 1 | Refills: 1 | Status: DISCONTINUED | COMMUNITY
Start: 2020-04-16 | End: 2021-06-18

## 2021-06-22 ENCOUNTER — RESULT REVIEW (OUTPATIENT)
Age: 51
End: 2021-06-22

## 2021-06-22 ENCOUNTER — APPOINTMENT (OUTPATIENT)
Dept: HEMATOLOGY ONCOLOGY | Facility: CLINIC | Age: 51
End: 2021-06-22
Payer: COMMERCIAL

## 2021-06-22 VITALS
HEART RATE: 68 BPM | WEIGHT: 205.03 LBS | OXYGEN SATURATION: 98 % | DIASTOLIC BLOOD PRESSURE: 79 MMHG | TEMPERATURE: 98 F | BODY MASS INDEX: 30.33 KG/M2 | RESPIRATION RATE: 16 BRPM | SYSTOLIC BLOOD PRESSURE: 148 MMHG

## 2021-06-22 LAB
BASOPHILS # BLD AUTO: 0.05 K/UL — SIGNIFICANT CHANGE UP (ref 0–0.2)
BASOPHILS NFR BLD AUTO: 0.8 % — SIGNIFICANT CHANGE UP (ref 0–2)
EOSINOPHIL # BLD AUTO: 0.06 K/UL — SIGNIFICANT CHANGE UP (ref 0–0.5)
EOSINOPHIL NFR BLD AUTO: 0.9 % — SIGNIFICANT CHANGE UP (ref 0–6)
HCT VFR BLD CALC: 40.6 % — SIGNIFICANT CHANGE UP (ref 39–50)
HGB BLD-MCNC: 12.2 G/DL — LOW (ref 13–17)
IMM GRANULOCYTES NFR BLD AUTO: 0.6 % — SIGNIFICANT CHANGE UP (ref 0–1.5)
LYMPHOCYTES # BLD AUTO: 1.37 K/UL — SIGNIFICANT CHANGE UP (ref 1–3.3)
LYMPHOCYTES # BLD AUTO: 21.5 % — SIGNIFICANT CHANGE UP (ref 13–44)
MCHC RBC-ENTMCNC: 19.6 PG — LOW (ref 27–34)
MCHC RBC-ENTMCNC: 30 G/DL — LOW (ref 32–36)
MCV RBC AUTO: 65.1 FL — LOW (ref 80–100)
MONOCYTES # BLD AUTO: 0.39 K/UL — SIGNIFICANT CHANGE UP (ref 0–0.9)
MONOCYTES NFR BLD AUTO: 6.1 % — SIGNIFICANT CHANGE UP (ref 2–14)
NEUTROPHILS # BLD AUTO: 4.45 K/UL — SIGNIFICANT CHANGE UP (ref 1.8–7.4)
NEUTROPHILS NFR BLD AUTO: 70.1 % — SIGNIFICANT CHANGE UP (ref 43–77)
NRBC # BLD: 0 /100 WBCS — SIGNIFICANT CHANGE UP (ref 0–0)
PLATELET # BLD AUTO: 267 K/UL — SIGNIFICANT CHANGE UP (ref 150–400)
RBC # BLD: 6.24 M/UL — HIGH (ref 4.2–5.8)
RBC # FLD: 17 % — HIGH (ref 10.3–14.5)
WBC # BLD: 6.36 K/UL — SIGNIFICANT CHANGE UP (ref 3.8–10.5)
WBC # FLD AUTO: 6.36 K/UL — SIGNIFICANT CHANGE UP (ref 3.8–10.5)

## 2021-06-22 PROCEDURE — 99072 ADDL SUPL MATRL&STAF TM PHE: CPT

## 2021-06-22 PROCEDURE — 99215 OFFICE O/P EST HI 40 MIN: CPT

## 2021-06-23 LAB
ALBUMIN SERPL ELPH-MCNC: 4.9 G/DL
ALP BLD-CCNC: 63 U/L
ALT SERPL-CCNC: 52 U/L
ANION GAP SERPL CALC-SCNC: 16 MMOL/L
AST SERPL-CCNC: 29 U/L
BILIRUB SERPL-MCNC: 0.3 MG/DL
BUN SERPL-MCNC: 17 MG/DL
CALCIUM SERPL-MCNC: 10.2 MG/DL
CEA SERPL-MCNC: 14.8 NG/ML
CHLORIDE SERPL-SCNC: 106 MMOL/L
CO2 SERPL-SCNC: 20 MMOL/L
CREAT SERPL-MCNC: 0.93 MG/DL
GLUCOSE SERPL-MCNC: 89 MG/DL
HAV IGM SER QL: NONREACTIVE
HBV CORE IGM SER QL: NONREACTIVE
HBV SURFACE AG SER QL: NONREACTIVE
HCV AB SER QL: NONREACTIVE
HCV S/CO RATIO: 0.12 S/CO
POTASSIUM SERPL-SCNC: 4.7 MMOL/L
PROT SERPL-MCNC: 8.5 G/DL
SODIUM SERPL-SCNC: 142 MMOL/L

## 2021-06-25 NOTE — REVIEW OF SYSTEMS
[Cough] : cough [Negative] : Heme/Lymph [Constipation: Grade 0] : Constipation: Grade 0 [Diarrhea: Grade 0] : Diarrhea: Grade 0 [Nausea: Grade 0] : Nausea: Grade 0 [Vomiting: Grade 0] : Vomiting: Grade 0 [Fatigue: Grade 0] : Fatigue: Grade 0 [Cough: Grade 1 - Mild symptoms; nonprescription intervention indicated] : Cough: Grade 1 - Mild symptoms; nonprescription intervention indicated [FreeTextEntry6] : dry

## 2021-06-25 NOTE — HISTORY OF PRESENT ILLNESS
[FreeTextEntry1] : Mr. Mclaughlin is a 50-year-old with sarcoid and possibly lupus hx who is presenting for Rad Onc consultation.\par \par His onc history began when he started having allergy-like symptoms (cough) 5 months ago. He saw his PCP, Dr. Mo- referred to Dr. Trujillo after he was noted to have an abnormal chest CAT scan. He was referred for PET/CT which was done on 5/23 which showed FDG avid right upper lobe masslike consolidation, considerably increased in size and coalesced with adjacent right upper lobe nodules and new FDG avid supraclavicular, mediastinal and hilar lymphadenopathy, as compared to CT chest January 13, 2021 concerning for malignancy although progression of sarcoidosis is also a consideration. rt supra clav bx was c/w lung adeno ca. \par \par He saw Dr. Hart 6/10/21. He has an MRI ordered by Dr. Hart for 6/18/21. no complaints of pain noted. No SOB but has a dry cough. Eating well,no dysphagia.\par \par Of note, the patient has a complex medical history having had a diagnosis of sarcoid and hypercoagulability due to anticardiolipin abs made in 2002.  At that time he was noted to have several pulmonary nodules but no treatment was required. 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 He was started on anticoagulation and a IVC filter was placed. \par \par Today in clinic he feels very well with no significant symptoms from disease, denying SOB, cough, weightloss. He has an excellent kps.

## 2021-06-25 NOTE — PHYSICAL EXAM
[Sclera] : the sclera and conjunctiva were normal [Normal] : oriented to person, place and time, the affect was normal, the mood was normal and not anxious [de-identified] : R scv bx site with residual swelling, no bleeding, nontender [de-identified] : R scv bx site with residual swelling, no bleeding, nontender

## 2021-06-29 ENCOUNTER — APPOINTMENT (OUTPATIENT)
Dept: PULMONOLOGY | Facility: CLINIC | Age: 51
End: 2021-06-29

## 2021-06-30 ENCOUNTER — NON-APPOINTMENT (OUTPATIENT)
Age: 51
End: 2021-06-30

## 2021-06-30 NOTE — REASON FOR VISIT
[Consideration for Non-Curative Therapy] : consideration for non-curative therapy for [Brain Metastasis] : brain metastasis [Lung Cancer] : lung cancer

## 2021-07-01 ENCOUNTER — APPOINTMENT (OUTPATIENT)
Dept: RADIATION ONCOLOGY | Facility: CLINIC | Age: 51
End: 2021-07-01
Payer: COMMERCIAL

## 2021-07-01 VITALS
HEART RATE: 63 BPM | OXYGEN SATURATION: 98 % | BODY MASS INDEX: 30.33 KG/M2 | DIASTOLIC BLOOD PRESSURE: 80 MMHG | RESPIRATION RATE: 18 BRPM | SYSTOLIC BLOOD PRESSURE: 141 MMHG | WEIGHT: 205.03 LBS

## 2021-07-01 PROCEDURE — 99215 OFFICE O/P EST HI 40 MIN: CPT

## 2021-07-01 PROCEDURE — 99072 ADDL SUPL MATRL&STAF TM PHE: CPT

## 2021-07-01 NOTE — VITALS
[Maximal Pain Intensity: 3/10] : 3/10 [Least Pain Intensity: 0/10] : 0/10 [90: Able to carry normal activity; minor signs or symptoms of disease.] : 90: Able to carry normal activity; minor signs or symptoms of disease.

## 2021-07-03 NOTE — PHYSICAL EXAM
[Restricted in physically strenuous activity but ambulatory and able to carry out work of a light or sedentary nature] : Status 1- Restricted in physically strenuous activity but ambulatory and able to carry out work of a light or sedentary nature, e.g., light house work, office work [Normal] : affect appropriate [de-identified] : rt neck adenopathy [de-identified] : decreased BS rt upper

## 2021-07-03 NOTE — ASSESSMENT
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 Findings are suspicious for the presence of intracranial metastases. Will refer to rad onc but favor watchful waiting given targetable mutation. \par 'Multiple comorbidities but excellent PS\par Discussed implications of EGFR mutation and favorable prognosis despite stage IV status\par Discussed that tx goal is no longer curative but rather disease control, reduction of tumor burden, prolonging PS and OS. \par Thoracic RT would not be considered at this time given presence of brain mets. \par Clinical phamacist, Dr. Rao and I educated patient about osimertinib which is what I would recommend based on FLAURA study. We discussed administration details, potential AEs, reviewed con meds and emphasized the importance of timely reporting of any AEs.

## 2021-07-03 NOTE — HISTORY OF PRESENT ILLNESS
[FreeTextEntry1] : Mr. Mclaughlin is a 50 year old man with sarcoid and possible lupus history who has been monitoring pulmonary nodules and mediastinal adenopathy since 12/2002. His onc history began when he started having allergy-like symptoms (cough) 5 months ago. He saw his PCP, Dr. Mo- referred to Dr. Trujillo after he was noted to have an abnormal chest CAT scan. He was referred for PET/CT which was done on 5/23 which showed FDG avid right upper lobe masslike consolidation, considerably increased in size and coalesced with adjacent right upper lobe nodules and new FDG avid supraclavicular, mediastinal and hilar lymphadenopathy, as compared to CT chest January 13, 2021 concerning for malignancy although progression of sarcoidosis is also a consideration.\par \par Right SCV node biopsy 6/4/21 demonstrated EGFR+ metastatic adenocarcinoma of pulmonary origin, PD-L1 TPS < 1%. He consulted with Dr. Hart 6/10 and Dr. Wedlon 6/18 for evaluation for definitive chemoradiation.\par \par However, brain MRI 6/18/21 demonstrated the following:\par 6 x 5 mm enhancing parenchymal lesion with surrounding edema in the left parietal lobe ().\par \par 4 x 4 mm enhancing parenchymal lesion with surrounding edema in the right frontal pericardium region (12-99).\par \par 6 x 4 mm enhancing parenchymal lesion with surrounding edema in the right superior cerebellar hemisphere (12-54).\par \par 2 mm enhancing parenchymal lesion with surrounding edema in the left cerebellar hemisphere (12-40).\par \par Today he is feeling well. Denies headaches, focal weakness, nausea, vomiting. Started taking Tagrisso 80mg 1 week ago and tolerating it well. \par \par Follows regularly with Dr. Magaña of rheumatology.

## 2021-07-03 NOTE — HISTORY OF PRESENT ILLNESS
[Disease: _____________________] : Disease: [unfilled] [T: ___] : T[unfilled] [N: ___] : N[unfilled] [M: ___] : M[unfilled] [AJCC Stage: ____] : AJCC Stage: [unfilled] [de-identified] : Mr. Mclaughlin with hx of sarcoidosis is a pleasant 50-year-old male who started having allergy-like symptoms (cough) 5 months ago. He saw his PCP, Dr. Mo- referred to Dr. Trujillo after he was noted to have an abnormal chest CAT scan. He was referred for PET/CT which was done on 5/23 which showed  FDG avid right upper lobe masslike consolidation, considerably increased in size and coalesced with adjacent right upper lobe nodules and new FDG avid supraclavicular, mediastinal and hilar lymphadenopathy, as compared to CT chest January 13, 2021 concerning for malignancy although progression of sarcoidosis is also a consideration. rt supra clav bx was c/w lung adeno ca. \par \par Of note, the patient has a complex medical history having had a diagnosis of sarcoid made in 2002 via a mediastinoscopy. At that time he was noted to have several pulmonary nodules but no treatment was required. 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 He was started on anticoagulation and a IVC filter was placed. He is also suspected of having lupus for which she is on hydroxychloroquine. He has a history of bronchial reactivity but is currently not on any inhaled bronchodilators. \par He is a lifelong nonsmoker without occupational exposures. Other than a dry cough, he feels well. He denies any fever or, chest discomfort, dyspnea or peripheral edema. \par \par 6/22/21: Here for follow up. No complaints. \par  [de-identified] : adeno ca [facility]

## 2021-07-03 NOTE — REVIEW OF SYSTEMS
[Cough] : cough [SOB on Exertion] : shortness of breath during exertion [Negative] : Allergic/Immunologic [FreeTextEntry4] : rt neck mass

## 2021-07-11 ENCOUNTER — OUTPATIENT (OUTPATIENT)
Dept: OUTPATIENT SERVICES | Facility: HOSPITAL | Age: 51
LOS: 1 days | Discharge: HOME | End: 2021-07-11

## 2021-07-11 DIAGNOSIS — D64.9 ANEMIA, UNSPECIFIED: ICD-10-CM

## 2021-07-14 ENCOUNTER — APPOINTMENT (OUTPATIENT)
Dept: HEMATOLOGY ONCOLOGY | Facility: CLINIC | Age: 51
End: 2021-07-14
Payer: COMMERCIAL

## 2021-07-14 ENCOUNTER — RESULT REVIEW (OUTPATIENT)
Age: 51
End: 2021-07-14

## 2021-07-14 VITALS
TEMPERATURE: 98 F | HEART RATE: 65 BPM | BODY MASS INDEX: 30 KG/M2 | WEIGHT: 202.83 LBS | OXYGEN SATURATION: 97 % | SYSTOLIC BLOOD PRESSURE: 135 MMHG | RESPIRATION RATE: 16 BRPM | DIASTOLIC BLOOD PRESSURE: 89 MMHG

## 2021-07-14 LAB
BASOPHILS # BLD AUTO: 0.03 K/UL — SIGNIFICANT CHANGE UP (ref 0–0.2)
BASOPHILS NFR BLD AUTO: 0.7 % — SIGNIFICANT CHANGE UP (ref 0–2)
EOSINOPHIL # BLD AUTO: 0.15 K/UL — SIGNIFICANT CHANGE UP (ref 0–0.5)
EOSINOPHIL NFR BLD AUTO: 3.3 % — SIGNIFICANT CHANGE UP (ref 0–6)
HCT VFR BLD CALC: 38 % — LOW (ref 39–50)
HGB BLD-MCNC: 11.7 G/DL — LOW (ref 13–17)
IMM GRANULOCYTES NFR BLD AUTO: 0.7 % — SIGNIFICANT CHANGE UP (ref 0–1.5)
LYMPHOCYTES # BLD AUTO: 0.99 K/UL — LOW (ref 1–3.3)
LYMPHOCYTES # BLD AUTO: 21.6 % — SIGNIFICANT CHANGE UP (ref 13–44)
MCHC RBC-ENTMCNC: 19.6 PG — LOW (ref 27–34)
MCHC RBC-ENTMCNC: 30.8 G/DL — LOW (ref 32–36)
MCV RBC AUTO: 63.7 FL — LOW (ref 80–100)
MONOCYTES # BLD AUTO: 0.43 K/UL — SIGNIFICANT CHANGE UP (ref 0–0.9)
MONOCYTES NFR BLD AUTO: 9.4 % — SIGNIFICANT CHANGE UP (ref 2–14)
NEUTROPHILS # BLD AUTO: 2.95 K/UL — SIGNIFICANT CHANGE UP (ref 1.8–7.4)
NEUTROPHILS NFR BLD AUTO: 64.3 % — SIGNIFICANT CHANGE UP (ref 43–77)
NRBC # BLD: 0 /100 WBCS — SIGNIFICANT CHANGE UP (ref 0–0)
PLATELET # BLD AUTO: 139 K/UL — LOW (ref 150–400)
RBC # BLD: 5.97 M/UL — HIGH (ref 4.2–5.8)
RBC # FLD: 16 % — HIGH (ref 10.3–14.5)
WBC # BLD: 4.58 K/UL — SIGNIFICANT CHANGE UP (ref 3.8–10.5)
WBC # FLD AUTO: 4.58 K/UL — SIGNIFICANT CHANGE UP (ref 3.8–10.5)

## 2021-07-14 PROCEDURE — 99214 OFFICE O/P EST MOD 30 MIN: CPT

## 2021-07-14 PROCEDURE — 99072 ADDL SUPL MATRL&STAF TM PHE: CPT

## 2021-07-14 NOTE — CONSULT LETTER
[Dear  ___] : Dear  [unfilled], [Consult Letter:] : I had the pleasure of evaluating your patient, [unfilled]. [Please see my note below.] : Please see my note below. [Consult Closing:] : Thank you very much for allowing me to participate in the care of this patient.  If you have any questions, please do not hesitate to contact me. [Sincerely,] : Sincerely, [DrAbbi  ___] : Dr. HORNER [FreeTextEntry2] : Dr. sEther Mo [FreeTextEntry3] : Kyle Hart MD\par

## 2021-07-14 NOTE — ASSESSMENT
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 Findings are suspicious for the presence of intracranial metastases. Will refer to rad onc but favor watchful waiting given targetable mutation. \par 'Multiple comorbidities but excellent PS\par Discussed implications of EGFR mutation and favorable prognosis despite stage IV status\par Discussed that tx goal is no longer curative but rather disease control, reduction of tumor burden, prolonging PS and OS. \par Thoracic RT would not be considered at this time given presence of brain mets. \par Clinical phamacist, Dr. Rao and I educated patient about osimertinib which we recommended based on FLAURA study. He started on 80mg daily\par -Discussed pain management regimen\par -Pt requested psy consult, information provided \par -B/W ordered\par -F/u with Dr. Boudreaux as scheduled for brain mets. \par -CT imaging ordered in 6 weeks in order to assess response to treatment regimen\par -OV in 6 weeks

## 2021-07-14 NOTE — HISTORY OF PRESENT ILLNESS
[Disease: _____________________] : Disease: [unfilled] [T: ___] : T[unfilled] [N: ___] : N[unfilled] [M: ___] : M[unfilled] [AJCC Stage: ____] : AJCC Stage: [unfilled] [de-identified] : Mr. Mclaughlin with hx of sarcoidosis is a pleasant 50-year-old male who started having allergy-like symptoms (cough) 5 months ago. He saw his PCP, Dr. Mo- referred to Dr. Trujillo after he was noted to have an abnormal chest CAT scan. He was referred for PET/CT which was done on 5/23 which showed  FDG avid right upper lobe masslike consolidation, considerably increased in size and coalesced with adjacent right upper lobe nodules and new FDG avid supraclavicular, mediastinal and hilar lymphadenopathy, as compared to CT chest January 13, 2021 concerning for malignancy although progression of sarcoidosis is also a consideration. rt supra clav bx was c/w lung adeno ca. \par \par Of note, the patient has a complex medical history having had a diagnosis of sarcoid made in 2002 via a mediastinoscopy. At that time he was noted to have several pulmonary nodules but no treatment was required. 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 He was started on anticoagulation and a IVC filter was placed. He is also suspected of having lupus for which she is on hydroxychloroquine. He has a history of bronchial reactivity but is currently not on any inhaled bronchodilators. \par He is a lifelong nonsmoker without occupational exposures. Other than a dry cough, he feels well. He denies any fever or, chest discomfort, dyspnea or peripheral edema. \par \par 6/22/21: Here for follow up. No complaints. \par \par  7/14/21: Patient seen for routine follow-up.  Patient is currently getting treatment for lupus and remains on hydrochorloquine and coumadin for anti-Cardiolipin antibod.\par Patient verbalized pain in the left foot that radiated from the small toe to the sole of feet, he takes advil with satisfactory pain control.  Patient also verbalized joint pain \par that is related to lupus flare up.  Pt recently started on Tagrisso 80mg daily which he is tolerating well. Denies rash/ diarrhea. Pt met with Dr. Boudreaux for consideration of GK to brain mets.  [de-identified] : adeno ca

## 2021-07-14 NOTE — PHYSICAL EXAM
[Restricted in physically strenuous activity but ambulatory and able to carry out work of a light or sedentary nature] : Status 1- Restricted in physically strenuous activity but ambulatory and able to carry out work of a light or sedentary nature, e.g., light house work, office work [Normal] : affect appropriate [de-identified] : rt neck adenopathy much smaller

## 2021-07-14 NOTE — REVIEW OF SYSTEMS
[Cough] : cough [SOB on Exertion] : shortness of breath during exertion [Negative] : Allergic/Immunologic [Night Sweats] : night sweats [FreeTextEntry4] : rt neck mass

## 2021-07-15 LAB
ALBUMIN SERPL ELPH-MCNC: 4.6 G/DL
ALP BLD-CCNC: 58 U/L
ALT SERPL-CCNC: 27 U/L
ANION GAP SERPL CALC-SCNC: 17 MMOL/L
AST SERPL-CCNC: 25 U/L
BILIRUB SERPL-MCNC: 0.3 MG/DL
BUN SERPL-MCNC: 17 MG/DL
CA-I SERPL-SCNC: 1.21 MMOL/L
CALCIUM SERPL-MCNC: 10 MG/DL
CEA SERPL-MCNC: 9 NG/ML
CHLORIDE SERPL-SCNC: 104 MMOL/L
CO2 SERPL-SCNC: 20 MMOL/L
CREAT SERPL-MCNC: 1.11 MG/DL
GLUCOSE SERPL-MCNC: 95 MG/DL
LDH SERPL-CCNC: 212 U/L
MAGNESIUM SERPL-MCNC: 2.3 MG/DL
POTASSIUM SERPL-SCNC: 5 MMOL/L
PROT SERPL-MCNC: 8 G/DL
SODIUM SERPL-SCNC: 141 MMOL/L

## 2021-08-02 ENCOUNTER — RESULT REVIEW (OUTPATIENT)
Age: 51
End: 2021-08-02

## 2021-08-02 ENCOUNTER — NON-APPOINTMENT (OUTPATIENT)
Age: 51
End: 2021-08-02

## 2021-09-01 ENCOUNTER — APPOINTMENT (OUTPATIENT)
Dept: CT IMAGING | Facility: IMAGING CENTER | Age: 51
End: 2021-09-01
Payer: COMMERCIAL

## 2021-09-01 ENCOUNTER — APPOINTMENT (OUTPATIENT)
Dept: MRI IMAGING | Facility: IMAGING CENTER | Age: 51
End: 2021-09-01
Payer: COMMERCIAL

## 2021-09-01 ENCOUNTER — OUTPATIENT (OUTPATIENT)
Dept: OUTPATIENT SERVICES | Facility: HOSPITAL | Age: 51
LOS: 1 days | End: 2021-09-01
Payer: COMMERCIAL

## 2021-09-01 ENCOUNTER — OUTPATIENT (OUTPATIENT)
Dept: OUTPATIENT SERVICES | Facility: HOSPITAL | Age: 51
LOS: 1 days | Discharge: HOME | End: 2021-09-01

## 2021-09-01 DIAGNOSIS — Z00.8 ENCOUNTER FOR OTHER GENERAL EXAMINATION: ICD-10-CM

## 2021-09-01 DIAGNOSIS — C34.90 MALIGNANT NEOPLASM OF UNSPECIFIED PART OF UNSPECIFIED BRONCHUS OR LUNG: ICD-10-CM

## 2021-09-01 DIAGNOSIS — D64.9 ANEMIA, UNSPECIFIED: ICD-10-CM

## 2021-09-01 PROCEDURE — 71260 CT THORAX DX C+: CPT

## 2021-09-01 PROCEDURE — 70553 MRI BRAIN STEM W/O & W/DYE: CPT

## 2021-09-01 PROCEDURE — 70553 MRI BRAIN STEM W/O & W/DYE: CPT | Mod: 26

## 2021-09-01 PROCEDURE — 74160 CT ABDOMEN W/CONTRAST: CPT

## 2021-09-01 PROCEDURE — 74160 CT ABDOMEN W/CONTRAST: CPT | Mod: 26

## 2021-09-01 PROCEDURE — 71260 CT THORAX DX C+: CPT | Mod: 26

## 2021-09-01 PROCEDURE — A9585: CPT

## 2021-09-03 ENCOUNTER — RESULT REVIEW (OUTPATIENT)
Age: 51
End: 2021-09-03

## 2021-09-03 ENCOUNTER — APPOINTMENT (OUTPATIENT)
Dept: HEMATOLOGY ONCOLOGY | Facility: CLINIC | Age: 51
End: 2021-09-03
Payer: COMMERCIAL

## 2021-09-03 VITALS
HEART RATE: 74 BPM | DIASTOLIC BLOOD PRESSURE: 75 MMHG | SYSTOLIC BLOOD PRESSURE: 155 MMHG | BODY MASS INDEX: 30.98 KG/M2 | TEMPERATURE: 98 F | WEIGHT: 209.44 LBS | OXYGEN SATURATION: 98 % | RESPIRATION RATE: 16 BRPM

## 2021-09-03 LAB
BASOPHILS # BLD AUTO: 0.02 K/UL — SIGNIFICANT CHANGE UP (ref 0–0.2)
BASOPHILS NFR BLD AUTO: 0.4 % — SIGNIFICANT CHANGE UP (ref 0–2)
EOSINOPHIL # BLD AUTO: 0.03 K/UL — SIGNIFICANT CHANGE UP (ref 0–0.5)
EOSINOPHIL NFR BLD AUTO: 0.6 % — SIGNIFICANT CHANGE UP (ref 0–6)
HCT VFR BLD CALC: 38.5 % — LOW (ref 39–50)
HGB BLD-MCNC: 11.6 G/DL — LOW (ref 13–17)
IMM GRANULOCYTES NFR BLD AUTO: 0.4 % — SIGNIFICANT CHANGE UP (ref 0–1.5)
LYMPHOCYTES # BLD AUTO: 1.04 K/UL — SIGNIFICANT CHANGE UP (ref 1–3.3)
LYMPHOCYTES # BLD AUTO: 21.5 % — SIGNIFICANT CHANGE UP (ref 13–44)
MCHC RBC-ENTMCNC: 19.6 PG — LOW (ref 27–34)
MCHC RBC-ENTMCNC: 30.1 G/DL — LOW (ref 32–36)
MCV RBC AUTO: 65 FL — LOW (ref 80–100)
MONOCYTES # BLD AUTO: 0.45 K/UL — SIGNIFICANT CHANGE UP (ref 0–0.9)
MONOCYTES NFR BLD AUTO: 9.3 % — SIGNIFICANT CHANGE UP (ref 2–14)
NEUTROPHILS # BLD AUTO: 3.27 K/UL — SIGNIFICANT CHANGE UP (ref 1.8–7.4)
NEUTROPHILS NFR BLD AUTO: 67.8 % — SIGNIFICANT CHANGE UP (ref 43–77)
NRBC # BLD: 0 /100 WBCS — SIGNIFICANT CHANGE UP (ref 0–0)
PLATELET # BLD AUTO: 146 K/UL — LOW (ref 150–400)
RBC # BLD: 5.92 M/UL — HIGH (ref 4.2–5.8)
RBC # FLD: 18.7 % — HIGH (ref 10.3–14.5)
WBC # BLD: 4.83 K/UL — SIGNIFICANT CHANGE UP (ref 3.8–10.5)
WBC # FLD AUTO: 4.83 K/UL — SIGNIFICANT CHANGE UP (ref 3.8–10.5)

## 2021-09-03 PROCEDURE — 99215 OFFICE O/P EST HI 40 MIN: CPT

## 2021-09-03 NOTE — HISTORY OF PRESENT ILLNESS
[Disease: _____________________] : Disease: [unfilled] [T: ___] : T[unfilled] [N: ___] : N[unfilled] [M: ___] : M[unfilled] [AJCC Stage: ____] : AJCC Stage: [unfilled] [de-identified] : Mr. Mclaughlin with hx of sarcoidosis is a pleasant 50-year-old male who started having allergy-like symptoms (cough) 5 months ago. He saw his PCP, Dr. Mo- referred to Dr. Trujillo after he was noted to have an abnormal chest CAT scan. He was referred for PET/CT which was done on 5/23 which showed  FDG avid right upper lobe masslike consolidation, considerably increased in size and coalesced with adjacent right upper lobe nodules and new FDG avid supraclavicular, mediastinal and hilar lymphadenopathy, as compared to CT chest January 13, 2021 concerning for malignancy although progression of sarcoidosis is also a consideration. rt supra clav bx was c/w lung adeno ca. \par \par Of note, the patient has a complex medical history having had a diagnosis of sarcoid made in 2002 via a mediastinoscopy. At that time he was noted to have several pulmonary nodules but no treatment was required. 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 He was started on anticoagulation and a IVC filter was placed. He is also suspected of having lupus for which she is on hydroxychloroquine. He has a history of bronchial reactivity but is currently not on any inhaled bronchodilators. \par He is a lifelong nonsmoker without occupational exposures. Other than a dry cough, he feels well. He denies any fever or, chest discomfort, dyspnea or peripheral edema. \par \par 6/22/21: Here for follow up. No complaints. \par \par  7/14/21: Patient seen for routine follow-up.  Patient is currently getting treatment for lupus and remains on hydrochorloquine and coumadin for anti-Cardiolipin antibod.\par Patient verbalized pain in the left foot that radiated from the small toe to the sole of feet, he takes advil with satisfactory pain control.  Patient also verbalized joint pain \par that is related to lupus flare up.  Pt recently started on Tagrisso 80mg daily which he is tolerating well. Denies rash/ diarrhea. Pt met with Dr. Boudreaux for consideration of GK to brain mets. \par \par 9/3/21: Facial rash, gained weight, migrating joint pains better after starting steroids (prednisone 5 mg daily). Has had CT scans and MRI yesterday.  [de-identified] : adeno ca

## 2021-09-03 NOTE — ASSESSMENT
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 Findings are suspicious for the presence of intracranial metastases. \par Started osimertinib 2 months ago. \par Scan personally reviewed- excellent response in the CNS as well as lungs. \par B/W ordered. CEA trending down\par F/u with Dr. Boudreaux as scheduled for brain mets. \par OV in 6 weeks

## 2021-09-03 NOTE — REVIEW OF SYSTEMS
[Negative] : Allergic/Immunologic [Night Sweats] : no night sweats [Cough] : no cough [FreeTextEntry4] : rt neck mass resolved

## 2021-09-03 NOTE — CONSULT LETTER
[Dear  ___] : Dear  [unfilled], [Consult Letter:] : I had the pleasure of evaluating your patient, [unfilled]. [Please see my note below.] : Please see my note below. [Consult Closing:] : Thank you very much for allowing me to participate in the care of this patient.  If you have any questions, please do not hesitate to contact me. [Sincerely,] : Sincerely, [DrAbbi  ___] : Dr. HORNER [FreeTextEntry2] : Dr. Esther Mo [FreeTextEntry3] : Kyle Hart MD\par

## 2021-09-03 NOTE — PHYSICAL EXAM
[Restricted in physically strenuous activity but ambulatory and able to carry out work of a light or sedentary nature] : Status 1- Restricted in physically strenuous activity but ambulatory and able to carry out work of a light or sedentary nature, e.g., light house work, office work [Normal] : affect appropriate [de-identified] : rt neck adenopathy resolved

## 2021-09-06 ENCOUNTER — TRANSCRIPTION ENCOUNTER (OUTPATIENT)
Age: 51
End: 2021-09-06

## 2021-09-07 ENCOUNTER — APPOINTMENT (OUTPATIENT)
Dept: HEMATOLOGY ONCOLOGY | Facility: CLINIC | Age: 51
End: 2021-09-07

## 2021-09-08 LAB
ALBUMIN SERPL ELPH-MCNC: 4.7 G/DL
ALP BLD-CCNC: 39 U/L
ALT SERPL-CCNC: 29 U/L
ANION GAP SERPL CALC-SCNC: 18 MMOL/L
AST SERPL-CCNC: 22 U/L
BILIRUB SERPL-MCNC: 0.2 MG/DL
BUN SERPL-MCNC: 20 MG/DL
CALCIUM SERPL-MCNC: 9.6 MG/DL
CEA SERPL-MCNC: 3.1 NG/ML
CHLORIDE SERPL-SCNC: 106 MMOL/L
CO2 SERPL-SCNC: 19 MMOL/L
CREAT SERPL-MCNC: 1.12 MG/DL
GLUCOSE SERPL-MCNC: 85 MG/DL
INR PPP: 2.08 RATIO
MAGNESIUM SERPL-MCNC: 2.1 MG/DL
POTASSIUM SERPL-SCNC: 4.3 MMOL/L
PROT SERPL-MCNC: 7.6 G/DL
PT BLD: 23.7 SEC
SODIUM SERPL-SCNC: 143 MMOL/L
TSH SERPL-ACNC: 1.95 UIU/ML

## 2021-09-23 RX ORDER — HYDROXYCHLOROQUINE SULFATE 200 MG/1
200 TABLET, FILM COATED ORAL
Qty: 180 | Refills: 1 | Status: DISCONTINUED | COMMUNITY
Start: 2021-02-04 | End: 2021-09-23

## 2021-10-05 ENCOUNTER — APPOINTMENT (OUTPATIENT)
Dept: RADIATION ONCOLOGY | Facility: CLINIC | Age: 51
End: 2021-10-05
Payer: COMMERCIAL

## 2021-10-05 PROCEDURE — 99213 OFFICE O/P EST LOW 20 MIN: CPT | Mod: 95

## 2021-10-05 RX ORDER — BENZONATATE 100 MG/1
100 CAPSULE ORAL 3 TIMES DAILY
Qty: 30 | Refills: 4 | Status: DISCONTINUED | COMMUNITY
Start: 2021-06-08 | End: 2021-10-05

## 2021-10-05 RX ORDER — BECLOMETHASONE DIPROPIONATE HFA 40 UG/1
40 AEROSOL, METERED RESPIRATORY (INHALATION) TWICE DAILY
Qty: 1 | Refills: 3 | Status: DISCONTINUED | COMMUNITY
Start: 2021-05-20 | End: 2021-10-05

## 2021-10-08 NOTE — PHYSICAL EXAM
[Normal] : well developed, well nourished, in no acute distress [Oriented To Time, Place, And Person] : oriented to person, place, and time [de-identified] : PHYSICAL EXAM LIMITED DUE TO TELEHEALTH

## 2021-10-08 NOTE — HISTORY OF PRESENT ILLNESS
[Home] : at home, [unfilled] , at the time of the visit. [Medical Office: ([facility])___] : at the medical office located in  [Verbal consent obtained from patient] : the patient, [unfilled] [FreeTextEntry1] : \par ONCOLOGY HISTORY\par Mr. Mclaughlin is a 50 year old man with sarcoid and possible lupus history who has been monitoring pulmonary nodules and mediastinal adenopathy since 12/2002. His onc history began when he started having allergy-like symptoms (cough) in February 2021. He saw his PCP, Dr. Mo- referred to Dr. Trujillo after he was noted to have an abnormal chest CAT scan. He was referred for PET/CT which was done on 5/23 which showed FDG avid right upper lobe masslike consolidation, considerably increased in size and coalesced with adjacent right upper lobe nodules and new FDG avid supraclavicular, mediastinal and hilar lymphadenopathy, as compared to CT chest January 13, 2021 concerning for malignancy although progression of sarcoidosis is also a consideration.\par \par Right SCV node biopsy 6/4/21 demonstrated EGFR+ metastatic adenocarcinoma of pulmonary origin, PD-L1 TPS < 1%. He consulted with Dr. Hart 6/10 and Dr. Weldon 6/18 for evaluation for definitive chemoradiation.\par \par However, brain MRI 6/18/21 demonstrated the following:\par 6 x 5 mm enhancing parenchymal lesion with surrounding edema in the left parietal lobe ().\par \par 4 x 4 mm enhancing parenchymal lesion with surrounding edema in the right frontal pericardium region (12-99).\par \par 6 x 4 mm enhancing parenchymal lesion with surrounding edema in the right superior cerebellar hemisphere (12-54).\par \par 2 mm enhancing parenchymal lesion with surrounding edema in the left cerebellar hemisphere (12-40).\par \par At the time of initial consult on 7/1/2021 he felt well. Denied headaches, focal weakness, nausea, vomiting. Started taking Tagrisso 80mg in late june and was tolerating it well. \par follows with Dr. Magaña of neurology\par \par \par 10/5/2021- Mr. Mclaughlin presents today for follow up. Seen through TELEHEALTH for which he provides verbal consent on 10/5/2021 at 3:11 PM.\par MRI brain done 9/1/2021 showed Previously noted lesions involving the posterior fossa and supratentorial region are either no longer seen or decrease in size. This is likely compatible with response to therapy. Continue close interval follow up is recommended.\par \par CT CAP 9/1/2021 showed Right upper lobe lung mass has significantly decreased in size since 5/28/2021.\par Overall he is feeling well. He denies headaches, nausea, vomiting, focal weakness. having some skin issues on his toes while on tagrisso. joint pain has been better recently. \par \par Continues on tagrisso with Dr. Hart.

## 2021-10-12 ENCOUNTER — APPOINTMENT (OUTPATIENT)
Dept: INTERNAL MEDICINE | Facility: CLINIC | Age: 51
End: 2021-10-12

## 2021-10-15 ENCOUNTER — NON-APPOINTMENT (OUTPATIENT)
Age: 51
End: 2021-10-15

## 2021-10-18 ENCOUNTER — OUTPATIENT (OUTPATIENT)
Dept: OUTPATIENT SERVICES | Facility: HOSPITAL | Age: 51
LOS: 1 days | Discharge: HOME | End: 2021-10-18

## 2021-10-18 DIAGNOSIS — D64.9 ANEMIA, UNSPECIFIED: ICD-10-CM

## 2021-10-19 ENCOUNTER — RESULT REVIEW (OUTPATIENT)
Age: 51
End: 2021-10-19

## 2021-10-19 ENCOUNTER — APPOINTMENT (OUTPATIENT)
Dept: HEMATOLOGY ONCOLOGY | Facility: CLINIC | Age: 51
End: 2021-10-19
Payer: COMMERCIAL

## 2021-10-19 VITALS
WEIGHT: 216.05 LBS | BODY MASS INDEX: 32 KG/M2 | SYSTOLIC BLOOD PRESSURE: 124 MMHG | DIASTOLIC BLOOD PRESSURE: 78 MMHG | TEMPERATURE: 97.8 F | HEIGHT: 68.98 IN | OXYGEN SATURATION: 95 % | HEART RATE: 86 BPM | RESPIRATION RATE: 16 BRPM

## 2021-10-19 LAB
BASOPHILS # BLD AUTO: 0.02 K/UL — SIGNIFICANT CHANGE UP (ref 0–0.2)
BASOPHILS NFR BLD AUTO: 0.4 % — SIGNIFICANT CHANGE UP (ref 0–2)
EOSINOPHIL # BLD AUTO: 0.09 K/UL — SIGNIFICANT CHANGE UP (ref 0–0.5)
EOSINOPHIL NFR BLD AUTO: 2 % — SIGNIFICANT CHANGE UP (ref 0–6)
HCT VFR BLD CALC: 40.7 % — SIGNIFICANT CHANGE UP (ref 39–50)
HGB BLD-MCNC: 12.1 G/DL — LOW (ref 13–17)
IMM GRANULOCYTES NFR BLD AUTO: 0.4 % — SIGNIFICANT CHANGE UP (ref 0–1.5)
LYMPHOCYTES # BLD AUTO: 1.07 K/UL — SIGNIFICANT CHANGE UP (ref 1–3.3)
LYMPHOCYTES # BLD AUTO: 24 % — SIGNIFICANT CHANGE UP (ref 13–44)
MCHC RBC-ENTMCNC: 19.5 PG — LOW (ref 27–34)
MCHC RBC-ENTMCNC: 29.7 G/DL — LOW (ref 32–36)
MCV RBC AUTO: 65.8 FL — LOW (ref 80–100)
MONOCYTES # BLD AUTO: 0.43 K/UL — SIGNIFICANT CHANGE UP (ref 0–0.9)
MONOCYTES NFR BLD AUTO: 9.6 % — SIGNIFICANT CHANGE UP (ref 2–14)
NEUTROPHILS # BLD AUTO: 2.83 K/UL — SIGNIFICANT CHANGE UP (ref 1.8–7.4)
NEUTROPHILS NFR BLD AUTO: 63.6 % — SIGNIFICANT CHANGE UP (ref 43–77)
NRBC # BLD: 0 /100 WBCS — SIGNIFICANT CHANGE UP (ref 0–0)
PLATELET # BLD AUTO: 152 K/UL — SIGNIFICANT CHANGE UP (ref 150–400)
RBC # BLD: 6.19 M/UL — HIGH (ref 4.2–5.8)
RBC # FLD: 18.5 % — HIGH (ref 10.3–14.5)
WBC # BLD: 4.46 K/UL — SIGNIFICANT CHANGE UP (ref 3.8–10.5)
WBC # FLD AUTO: 4.46 K/UL — SIGNIFICANT CHANGE UP (ref 3.8–10.5)

## 2021-10-19 PROCEDURE — 99214 OFFICE O/P EST MOD 30 MIN: CPT

## 2021-10-28 LAB
ALBUMIN SERPL ELPH-MCNC: 4.7 G/DL
ALP BLD-CCNC: 43 U/L
ALT SERPL-CCNC: 24 U/L
ANION GAP SERPL CALC-SCNC: 13 MMOL/L
AST SERPL-CCNC: 22 U/L
BILIRUB SERPL-MCNC: 0.2 MG/DL
BUN SERPL-MCNC: 17 MG/DL
CALCIUM SERPL-MCNC: 9.5 MG/DL
CEA SERPL-MCNC: 2 NG/ML
CHLORIDE SERPL-SCNC: 103 MMOL/L
CO2 SERPL-SCNC: 24 MMOL/L
CREAT SERPL-MCNC: 1.01 MG/DL
GLUCOSE SERPL-MCNC: 72 MG/DL
INR PPP: 2.09 RATIO
MAGNESIUM SERPL-MCNC: 2.2 MG/DL
POTASSIUM SERPL-SCNC: 4.6 MMOL/L
PROT SERPL-MCNC: 7.5 G/DL
PT BLD: 23.8 SEC
SODIUM SERPL-SCNC: 140 MMOL/L

## 2021-11-08 ENCOUNTER — RESULT REVIEW (OUTPATIENT)
Age: 51
End: 2021-11-08

## 2021-12-01 ENCOUNTER — OUTPATIENT (OUTPATIENT)
Dept: OUTPATIENT SERVICES | Facility: HOSPITAL | Age: 51
LOS: 1 days | End: 2021-12-01
Payer: COMMERCIAL

## 2021-12-01 ENCOUNTER — APPOINTMENT (OUTPATIENT)
Dept: MRI IMAGING | Facility: IMAGING CENTER | Age: 51
End: 2021-12-01
Payer: COMMERCIAL

## 2021-12-01 ENCOUNTER — APPOINTMENT (OUTPATIENT)
Dept: CT IMAGING | Facility: IMAGING CENTER | Age: 51
End: 2021-12-01
Payer: COMMERCIAL

## 2021-12-01 DIAGNOSIS — Z00.8 ENCOUNTER FOR OTHER GENERAL EXAMINATION: ICD-10-CM

## 2021-12-01 PROCEDURE — 74177 CT ABD & PELVIS W/CONTRAST: CPT

## 2021-12-01 PROCEDURE — 71260 CT THORAX DX C+: CPT

## 2021-12-01 PROCEDURE — 71260 CT THORAX DX C+: CPT | Mod: 26

## 2021-12-01 PROCEDURE — 74177 CT ABD & PELVIS W/CONTRAST: CPT | Mod: 26

## 2021-12-01 PROCEDURE — 70553 MRI BRAIN STEM W/O & W/DYE: CPT

## 2021-12-01 PROCEDURE — 70553 MRI BRAIN STEM W/O & W/DYE: CPT | Mod: 26

## 2021-12-02 ENCOUNTER — APPOINTMENT (OUTPATIENT)
Dept: RADIATION ONCOLOGY | Facility: CLINIC | Age: 51
End: 2021-12-02
Payer: COMMERCIAL

## 2021-12-02 PROCEDURE — 99024 POSTOP FOLLOW-UP VISIT: CPT

## 2021-12-05 ENCOUNTER — OUTPATIENT (OUTPATIENT)
Dept: OUTPATIENT SERVICES | Facility: HOSPITAL | Age: 51
LOS: 1 days | Discharge: HOME | End: 2021-12-05
Payer: COMMERCIAL

## 2021-12-05 DIAGNOSIS — D64.9 ANEMIA, UNSPECIFIED: ICD-10-CM

## 2021-12-07 ENCOUNTER — RESULT REVIEW (OUTPATIENT)
Age: 51
End: 2021-12-07

## 2021-12-07 ENCOUNTER — APPOINTMENT (OUTPATIENT)
Dept: HEMATOLOGY ONCOLOGY | Facility: CLINIC | Age: 51
End: 2021-12-07
Payer: COMMERCIAL

## 2021-12-07 VITALS
SYSTOLIC BLOOD PRESSURE: 125 MMHG | OXYGEN SATURATION: 99 % | BODY MASS INDEX: 31.93 KG/M2 | WEIGHT: 216.05 LBS | HEART RATE: 59 BPM | DIASTOLIC BLOOD PRESSURE: 79 MMHG | RESPIRATION RATE: 16 BRPM | TEMPERATURE: 97.9 F

## 2021-12-07 LAB
BASOPHILS # BLD AUTO: 0.03 K/UL — SIGNIFICANT CHANGE UP (ref 0–0.2)
BASOPHILS NFR BLD AUTO: 0.7 % — SIGNIFICANT CHANGE UP (ref 0–2)
EOSINOPHIL # BLD AUTO: 0.05 K/UL — SIGNIFICANT CHANGE UP (ref 0–0.5)
EOSINOPHIL NFR BLD AUTO: 1.1 % — SIGNIFICANT CHANGE UP (ref 0–6)
HCT VFR BLD CALC: 41.4 % — SIGNIFICANT CHANGE UP (ref 39–50)
HGB BLD-MCNC: 12.5 G/DL — LOW (ref 13–17)
IMM GRANULOCYTES NFR BLD AUTO: 0.2 % — SIGNIFICANT CHANGE UP (ref 0–1.5)
LYMPHOCYTES # BLD AUTO: 1.2 K/UL — SIGNIFICANT CHANGE UP (ref 1–3.3)
LYMPHOCYTES # BLD AUTO: 27.4 % — SIGNIFICANT CHANGE UP (ref 13–44)
MCHC RBC-ENTMCNC: 19.6 PG — LOW (ref 27–34)
MCHC RBC-ENTMCNC: 30.2 G/DL — LOW (ref 32–36)
MCV RBC AUTO: 64.8 FL — LOW (ref 80–100)
MONOCYTES # BLD AUTO: 0.37 K/UL — SIGNIFICANT CHANGE UP (ref 0–0.9)
MONOCYTES NFR BLD AUTO: 8.4 % — SIGNIFICANT CHANGE UP (ref 2–14)
NEUTROPHILS # BLD AUTO: 2.72 K/UL — SIGNIFICANT CHANGE UP (ref 1.8–7.4)
NEUTROPHILS NFR BLD AUTO: 62.2 % — SIGNIFICANT CHANGE UP (ref 43–77)
NRBC # BLD: 0 /100 WBCS — SIGNIFICANT CHANGE UP (ref 0–0)
PLATELET # BLD AUTO: 142 K/UL — LOW (ref 150–400)
RBC # BLD: 6.39 M/UL — HIGH (ref 4.2–5.8)
RBC # FLD: 17.6 % — HIGH (ref 10.3–14.5)
WBC # BLD: 4.38 K/UL — SIGNIFICANT CHANGE UP (ref 3.8–10.5)
WBC # FLD AUTO: 4.38 K/UL — SIGNIFICANT CHANGE UP (ref 3.8–10.5)

## 2021-12-07 PROCEDURE — 93010 ELECTROCARDIOGRAM REPORT: CPT

## 2021-12-07 PROCEDURE — 93000 ELECTROCARDIOGRAM COMPLETE: CPT

## 2021-12-07 NOTE — HISTORY OF PRESENT ILLNESS
[Disease: _____________________] : Disease: [unfilled] [T: ___] : T[unfilled] [N: ___] : N[unfilled] [M: ___] : M[unfilled] [AJCC Stage: ____] : AJCC Stage: [unfilled] [de-identified] : Mr. Mclaughlin with hx of sarcoidosis is a pleasant 50-year-old male who started having allergy-like symptoms (cough) 5 months ago. He saw his PCP, Dr. Mo- referred to Dr. Trujillo after he was noted to have an abnormal chest CAT scan. He was referred for PET/CT which was done on 5/23 which showed  FDG avid right upper lobe masslike consolidation, considerably increased in size and coalesced with adjacent right upper lobe nodules and new FDG avid supraclavicular, mediastinal and hilar lymphadenopathy, as compared to CT chest January 13, 2021 concerning for malignancy although progression of sarcoidosis is also a consideration. rt supra clav bx was c/w lung adeno ca. \par \par Of note, the patient has a complex medical history having had a diagnosis of sarcoid made in 2002 via a mediastinoscopy. At that time he was noted to have several pulmonary nodules but no treatment was required. 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 He was started on anticoagulation and a IVC filter was placed. He is also suspected of having lupus for which she is on hydroxychloroquine. He has a history of bronchial reactivity but is currently not on any inhaled bronchodilators. \par He is a lifelong nonsmoker without occupational exposures. Other than a dry cough, he feels well. He denies any fever or, chest discomfort, dyspnea or peripheral edema. \par \par 6/22/21: Here for follow up. No complaints. \par \par  7/14/21: Patient seen for routine follow-up.  Patient is currently getting treatment for lupus and remains on hydrochorloquine and coumadin for anti-Cardiolipin antibod.\par Patient verbalized pain in the left foot that radiated from the small toe to the sole of feet, he takes advil with satisfactory pain control.  Patient also verbalized joint pain \par that is related to lupus flare up.  Pt recently started on Tagrisso 80mg daily which he is tolerating well. Denies rash/ diarrhea. Pt met with Dr. Boudreaux for consideration of GK to brain mets. \par \par 9/3/21: Facial rash, gained weight, migrating joint pains better after starting steroids (prednisone 5 mg daily). Has had CT scans and MRI yesterday. \par \par 10/19/21: Feeling well. Mild facial rash, gained more weight. Restarted hydroxychloroquine for lupus with worsening joint pain in his wrists and fingers;  continues coumadin; plans f/u with his rheumatologist. Reports toe infection, on abx as per his podiatrist with improvement. Has f/u MRI Brain on 12/1.  [de-identified] : adeno ca [de-identified] : Since August has been having toenail infection of R and L 1st and 2nd toes. Infection is painful, non pruritic. Has seen podiatrist who resected some of the toenails/skin. Using topical antibiotics with some improvement. \par

## 2021-12-07 NOTE — PHYSICAL EXAM
[Restricted in physically strenuous activity but ambulatory and able to carry out work of a light or sedentary nature] : Status 1- Restricted in physically strenuous activity but ambulatory and able to carry out work of a light or sedentary nature, e.g., light house work, office work [Normal] : affect appropriate [de-identified] : rt neck adenopathy resolved [de-identified] : paronychia of 1st and 2nd R and L toes

## 2021-12-07 NOTE — ASSESSMENT
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 Findings are suspicious for the presence of intracranial metastases. \par - Started osimertinib in June 2021. \par - 2 month scan on osi with excellent response in the CNS as well as lungs. \par \par - CT C/A/P 12/02/21 with dec RUL mass size\par - Brain MRI read pending but per my read ANNALISE. F/u official report\par - c/w osimertinib\par - bloodwork and EKG (on osimertinib) today\par - f/u podiatrist for paronychia, consider doxycycline if no improvement\par - repeat imaging in 3mo

## 2021-12-07 NOTE — REVIEW OF SYSTEMS
[Negative] : Allergic/Immunologic [Night Sweats] : no night sweats [Cough] : no cough [FreeTextEntry4] : rt neck mass resolved [de-identified] : as above

## 2021-12-07 NOTE — CONSULT LETTER
[Please see my note below.] : Please see my note below. [Consult Closing:] : Thank you very much for allowing me to participate in the care of this patient.  If you have any questions, please do not hesitate to contact me. [Sincerely,] : Sincerely, [FreeTextEntry3] : Kyle Hart MD\par

## 2021-12-08 LAB
ALBUMIN SERPL ELPH-MCNC: 4.8 G/DL
ALP BLD-CCNC: 43 U/L
ALT SERPL-CCNC: 25 U/L
ANION GAP SERPL CALC-SCNC: 12 MMOL/L
AST SERPL-CCNC: 28 U/L
BILIRUB SERPL-MCNC: 0.3 MG/DL
BUN SERPL-MCNC: 15 MG/DL
CALCIUM SERPL-MCNC: 9.8 MG/DL
CEA SERPL-MCNC: 2.2 NG/ML
CHLORIDE SERPL-SCNC: 102 MMOL/L
CO2 SERPL-SCNC: 24 MMOL/L
CREAT SERPL-MCNC: 1.06 MG/DL
GLUCOSE SERPL-MCNC: 86 MG/DL
INR PPP: 1.94 RATIO
MAGNESIUM SERPL-MCNC: 2.1 MG/DL
POTASSIUM SERPL-SCNC: 4.4 MMOL/L
PROT SERPL-MCNC: 7.9 G/DL
PT BLD: 22.2 SEC
SODIUM SERPL-SCNC: 138 MMOL/L

## 2021-12-16 ENCOUNTER — APPOINTMENT (OUTPATIENT)
Dept: RADIATION ONCOLOGY | Facility: CLINIC | Age: 51
End: 2021-12-16
Payer: COMMERCIAL

## 2021-12-16 PROCEDURE — 99213 OFFICE O/P EST LOW 20 MIN: CPT | Mod: 95

## 2021-12-16 NOTE — VITALS
[Maximal Pain Intensity: 2/10] : 2/10 [NSAID/Non-Opioid] : NSAID/Non-Opioid [100: Normal, no complaints, no evidence of disease.] : 100: Normal, no complaints, no evidence of disease. [ECOG Performance Status: 0 - Fully active, able to carry on all pre-disease performance without restriction] : Performance Status: 0 - Fully active, able to carry on all pre-disease performance without restriction

## 2021-12-16 NOTE — REVIEW OF SYSTEMS
[Cognitive Disturbance: Grade 0] : Cognitive Disturbance: Grade 0 [Concentration Impairment: Grade 0] : Concentration Impairment: Grade 0 [Dizziness: Grade 0] : Dizziness: Grade 0  [Facial Muscle Weakness: Grade 0] : Facial Muscle Weakness: Grade 0 [Headache: Grade 0] : Headache: Grade 0 [Lethargy: Grade 0] : Lethargy: Grade 0 [Meningismus: Grade 0] : Meningismus: Grade 0 [Peripheral Motor Neuropathy: Grade 0] : Peripheral Motor Neuropathy: Grade 0 [Peripheral Sensory Neuropathy: Grade 0] : Peripheral Sensory Neuropathy: Grade 0 [Somnolence: Grade 0] : Somnolence: Grade 0 [Anxiety: Grade 0] : Anxiety: Grade 0  [Depression: Grade 0] : Depression: Grade 0

## 2021-12-16 NOTE — PHYSICAL EXAM
[General Appearance - Well Developed] : well developed [General Appearance - Well Nourished] : well nourished [Normal] : no focal deficits

## 2022-01-03 ENCOUNTER — TRANSCRIPTION ENCOUNTER (OUTPATIENT)
Age: 52
End: 2022-01-03

## 2022-01-05 ENCOUNTER — OUTPATIENT (OUTPATIENT)
Dept: OUTPATIENT SERVICES | Facility: HOSPITAL | Age: 52
LOS: 1 days | End: 2022-01-05
Payer: COMMERCIAL

## 2022-01-05 ENCOUNTER — APPOINTMENT (OUTPATIENT)
Dept: DISASTER EMERGENCY | Facility: HOSPITAL | Age: 52
End: 2022-01-05

## 2022-01-05 VITALS — WEIGHT: 216.05 LBS | HEIGHT: 70 IN

## 2022-01-05 VITALS
TEMPERATURE: 98 F | DIASTOLIC BLOOD PRESSURE: 80 MMHG | SYSTOLIC BLOOD PRESSURE: 132 MMHG | OXYGEN SATURATION: 96 % | RESPIRATION RATE: 18 BRPM | HEART RATE: 67 BPM

## 2022-01-05 DIAGNOSIS — U07.1 COVID-19: ICD-10-CM

## 2022-01-05 PROCEDURE — M0247: CPT

## 2022-01-05 RX ORDER — SOTROVIMAB 62.5 MG/ML
500 INJECTION, SOLUTION, CONCENTRATE INTRAVENOUS ONCE
Refills: 0 | Status: COMPLETED | OUTPATIENT
Start: 2022-01-05 | End: 2022-01-05

## 2022-01-05 RX ORDER — SODIUM CHLORIDE 9 MG/ML
250 INJECTION INTRAMUSCULAR; INTRAVENOUS; SUBCUTANEOUS
Refills: 0 | Status: DISCONTINUED | OUTPATIENT
Start: 2022-01-05 | End: 2022-01-19

## 2022-01-05 RX ADMIN — SODIUM CHLORIDE 25 MILLILITER(S): 9 INJECTION INTRAMUSCULAR; INTRAVENOUS; SUBCUTANEOUS at 11:37

## 2022-01-05 RX ADMIN — SOTROVIMAB 116 MILLIGRAM(S): 62.5 INJECTION, SOLUTION, CONCENTRATE INTRAVENOUS at 11:37

## 2022-01-05 NOTE — CHART NOTE - NSCHARTNOTEFT_GEN_A_CORE
CC: Monoclonal Antibody Infusion - COVID 19 Positive or significant COVID exposure       History: Patient presents for infusion of Sotrovimab monoclonal antibody infusion. Patient has been screened and was deemed to be a candidate.    Date tested COVID positive: 1/3/22      COVID Symptoms: Yes  Date of onset: 1/1/22  * Sore throat  * Headache-  * Taste / Smell changes-     Risk Profile includes:   * Cancer-  Lung   * Obesity-   * OTHER: Lupus, anticardiolipin antibody      Vaccination Status: Yes  Date received 2nd dose: Documented by RN  Booster Vaccine:       No Known Allergies      sodium chloride 0.9%. 250 milliLiter(s) IV Continuous <Continuous>        PMHx:  Infection due to severe acute respiratory syndrome coronavirus 2 (SARS-CoV-2)    MEWS Score          T(C): 36.4 (01-05-22 @ 12:05), Max: 36.4 (01-05-22 @ 11:18)  HR: 63 (01-05-22 @ 12:05) (63 - 68)  BP: 139/85 (01-05-22 @ 12:05) (132/83 - 139/85)  RR: 18 (01-05-22 @ 12:05) (18 - 18)  SpO2: 94% (01-05-22 @ 12:05) (94% - 96%)      PE- Well developed, well-nourish, resting comfortably in NAD.   Neuro- A&Ox3, no gross sensory deficits to light touch or motor weaknesses.   Vasc- No peripheral edema or venous stasis noted.  Skin- No ecchymosis or bleeding.  MS- No bony tenderness       ASSESSMENT:  Pt is a 51y year old Male COVID positive and symptomatic who was referred to the infusion center for Monoclonal antibody infusion (Sotrovimab).      PLAN:  - infusion procedure explained to patient   - Consent for monoclonal antibody infusion obtained   - Risk & benefits discussed/all questions answered  - infusion of Regeneron   - will observe patient for one hour post infusion  and then if stable discharge home with outpatient follow up as planned by PMD.    POST INFUSION ASSESSMENT:   DISCHARGE at approximately 1330  hours    - Patient tolerated infusion well denies complaints of chest pain, SOB, dizziness or palpitations  - VSS for discharge home  - D/C instructions given/ fact sheet included.  - Patient to follow-up with PCP as needed.

## 2022-02-01 ENCOUNTER — APPOINTMENT (OUTPATIENT)
Dept: CT IMAGING | Facility: CLINIC | Age: 52
End: 2022-02-01

## 2022-03-02 ENCOUNTER — RESULT REVIEW (OUTPATIENT)
Age: 52
End: 2022-03-02

## 2022-03-02 ENCOUNTER — OUTPATIENT (OUTPATIENT)
Dept: OUTPATIENT SERVICES | Facility: HOSPITAL | Age: 52
LOS: 1 days | End: 2022-03-02
Payer: COMMERCIAL

## 2022-03-02 ENCOUNTER — APPOINTMENT (OUTPATIENT)
Dept: CT IMAGING | Facility: CLINIC | Age: 52
End: 2022-03-02
Payer: COMMERCIAL

## 2022-03-02 DIAGNOSIS — C34.90 MALIGNANT NEOPLASM OF UNSPECIFIED PART OF UNSPECIFIED BRONCHUS OR LUNG: ICD-10-CM

## 2022-03-02 PROCEDURE — 71260 CT THORAX DX C+: CPT

## 2022-03-02 PROCEDURE — 74177 CT ABD & PELVIS W/CONTRAST: CPT

## 2022-03-02 PROCEDURE — 71260 CT THORAX DX C+: CPT | Mod: 26

## 2022-03-02 PROCEDURE — 74177 CT ABD & PELVIS W/CONTRAST: CPT | Mod: 26

## 2022-03-09 ENCOUNTER — OUTPATIENT (OUTPATIENT)
Dept: OUTPATIENT SERVICES | Facility: HOSPITAL | Age: 52
LOS: 1 days | Discharge: HOME | End: 2022-03-09

## 2022-03-09 DIAGNOSIS — D64.9 ANEMIA, UNSPECIFIED: ICD-10-CM

## 2022-03-10 ENCOUNTER — RESULT REVIEW (OUTPATIENT)
Age: 52
End: 2022-03-10

## 2022-03-10 ENCOUNTER — APPOINTMENT (OUTPATIENT)
Dept: HEMATOLOGY ONCOLOGY | Facility: CLINIC | Age: 52
End: 2022-03-10
Payer: COMMERCIAL

## 2022-03-10 VITALS
BODY MASS INDEX: 32 KG/M2 | DIASTOLIC BLOOD PRESSURE: 77 MMHG | HEIGHT: 68.98 IN | WEIGHT: 216.05 LBS | OXYGEN SATURATION: 97 % | RESPIRATION RATE: 16 BRPM | SYSTOLIC BLOOD PRESSURE: 131 MMHG | HEART RATE: 59 BPM | TEMPERATURE: 97 F

## 2022-03-10 LAB
BASOPHILS # BLD AUTO: 0.03 K/UL — SIGNIFICANT CHANGE UP (ref 0–0.2)
BASOPHILS NFR BLD AUTO: 0.6 % — SIGNIFICANT CHANGE UP (ref 0–2)
EOSINOPHIL # BLD AUTO: 0.06 K/UL — SIGNIFICANT CHANGE UP (ref 0–0.5)
EOSINOPHIL NFR BLD AUTO: 1.2 % — SIGNIFICANT CHANGE UP (ref 0–6)
HCT VFR BLD CALC: 42.1 % — SIGNIFICANT CHANGE UP (ref 39–50)
HGB BLD-MCNC: 12.6 G/DL — LOW (ref 13–17)
IMM GRANULOCYTES NFR BLD AUTO: 0.6 % — SIGNIFICANT CHANGE UP (ref 0–1.5)
LYMPHOCYTES # BLD AUTO: 1.3 K/UL — SIGNIFICANT CHANGE UP (ref 1–3.3)
LYMPHOCYTES # BLD AUTO: 25.4 % — SIGNIFICANT CHANGE UP (ref 13–44)
MCHC RBC-ENTMCNC: 19.6 PG — LOW (ref 27–34)
MCHC RBC-ENTMCNC: 29.9 G/DL — LOW (ref 32–36)
MCV RBC AUTO: 65.5 FL — LOW (ref 80–100)
MONOCYTES # BLD AUTO: 0.47 K/UL — SIGNIFICANT CHANGE UP (ref 0–0.9)
MONOCYTES NFR BLD AUTO: 9.2 % — SIGNIFICANT CHANGE UP (ref 2–14)
NEUTROPHILS # BLD AUTO: 3.23 K/UL — SIGNIFICANT CHANGE UP (ref 1.8–7.4)
NEUTROPHILS NFR BLD AUTO: 63 % — SIGNIFICANT CHANGE UP (ref 43–77)
NRBC # BLD: 0 /100 WBCS — SIGNIFICANT CHANGE UP (ref 0–0)
PLATELET # BLD AUTO: 169 K/UL — SIGNIFICANT CHANGE UP (ref 150–400)
RBC # BLD: 6.43 M/UL — HIGH (ref 4.2–5.8)
RBC # FLD: 19.1 % — HIGH (ref 10.3–14.5)
WBC # BLD: 5.12 K/UL — SIGNIFICANT CHANGE UP (ref 3.8–10.5)
WBC # FLD AUTO: 5.12 K/UL — SIGNIFICANT CHANGE UP (ref 3.8–10.5)

## 2022-03-10 PROCEDURE — 99214 OFFICE O/P EST MOD 30 MIN: CPT

## 2022-03-11 NOTE — PHYSICAL EXAM
[Restricted in physically strenuous activity but ambulatory and able to carry out work of a light or sedentary nature] : Status 1- Restricted in physically strenuous activity but ambulatory and able to carry out work of a light or sedentary nature, e.g., light house work, office work [Normal] : affect appropriate [de-identified] : rt neck adenopathy resolved [de-identified] : mild nail changes

## 2022-03-11 NOTE — ASSESSMENT
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 Findings were suspicious for the presence of intracranial metastases. \par -Started osimertinib in June 2021. 2 month scan on osi with excellent response in the CNS as well as lungs. \par CT C/A/P 12/02/21 with dec RUL mass size. Scans from March 2022, reviewed personally- completely stable findings- sustained SD. \par Brain MRI read pending ANNALISE in December 2021. Repeat scans scheduled on 3/16 after which pt will follow up with Dr. Boudreaux. \par c/w osimertinib at 80 mg daily\par Bloodwork from last visit reviewed\par Will get new set of labs today. Pt requests INR for coumadin dosing. Will place that order as well. \par Follow up with PCP for health care maintenance\par repeat imaging in 3mo. Orders placed today

## 2022-03-11 NOTE — REVIEW OF SYSTEMS
[Night Sweats] : no night sweats [Cough] : no cough [Negative] : Allergic/Immunologic [FreeTextEntry4] : rt neck mass resolved [de-identified] : as above

## 2022-03-11 NOTE — HISTORY OF PRESENT ILLNESS
[Disease: _____________________] : Disease: [unfilled] [T: ___] : T[unfilled] [N: ___] : N[unfilled] [M: ___] : M[unfilled] [AJCC Stage: ____] : AJCC Stage: [unfilled] [de-identified] : Mr. Mclaughlin with hx of sarcoidosis is a pleasant 50-year-old male who started having allergy-like symptoms (cough) 5 months ago. He saw his PCP, Dr. Mo- referred to Dr. Trujillo after he was noted to have an abnormal chest CAT scan. He was referred for PET/CT which was done on 5/23 which showed  FDG avid right upper lobe masslike consolidation, considerably increased in size and coalesced with adjacent right upper lobe nodules and new FDG avid supraclavicular, mediastinal and hilar lymphadenopathy, as compared to CT chest January 13, 2021 concerning for malignancy although progression of sarcoidosis is also a consideration. rt supra clav bx was c/w lung adeno ca. \par \par Of note, the patient has a complex medical history having had a diagnosis of sarcoid made in 2002 via a mediastinoscopy. At that time he was noted to have several pulmonary nodules but no treatment was required. 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 He was started on anticoagulation and a IVC filter was placed. He is also suspected of having lupus for which she is on hydroxychloroquine. He has a history of bronchial reactivity but is currently not on any inhaled bronchodilators. \par He is a lifelong nonsmoker without occupational exposures. Other than a dry cough, he feels well. He denies any fever or, chest discomfort, dyspnea or peripheral edema. \par \par 6/22/21: Here for follow up. No complaints. \par \par  7/14/21: Patient seen for routine follow-up.  Patient is currently getting treatment for lupus and remains on hydrochorloquine and coumadin for anti-Cardiolipin antibod.\par Patient verbalized pain in the left foot that radiated from the small toe to the sole of feet, he takes advil with satisfactory pain control.  Patient also verbalized joint pain \par that is related to lupus flare up.  Pt recently started on Tagrisso 80mg daily which he is tolerating well. Denies rash/ diarrhea. Pt met with Dr. Boudreaux for consideration of GK to brain mets. \par \par 9/3/21: Facial rash, gained weight, migrating joint pains better after starting steroids (prednisone 5 mg daily). Has had CT scans and MRI yesterday. \par \par 10/19/21: Feeling well. Mild facial rash, gained more weight. Restarted hydroxychloroquine for lupus with worsening joint pain in his wrists and fingers;  continues coumadin; plans f/u with his rheumatologist. Reports toe infection, on abx as per his podiatrist with improvement. Has f/u MRI Brain on 12/1. \par \par 12/7/21: Since August has been having toenail infection of R and L 1st and 2nd toes. Infection is painful, non pruritic. Has seen podiatrist who resected some of the toenails/skin. Using topical antibiotics with some improvement. \par \par 3/10/22: doing well. no new symptoms to report. Has some nail changes but all other AEs have improved. He does have episodic worsening of joint pains. Has gained some weight.  [de-identified] : adeno ca

## 2022-03-13 LAB
ALBUMIN SERPL ELPH-MCNC: 5 G/DL
ALP BLD-CCNC: 50 U/L
ALT SERPL-CCNC: 33 U/L
ANION GAP SERPL CALC-SCNC: 19 MMOL/L
AST SERPL-CCNC: 27 U/L
BILIRUB SERPL-MCNC: 0.2 MG/DL
BUN SERPL-MCNC: 13 MG/DL
CALCIUM SERPL-MCNC: 9.8 MG/DL
CEA SERPL-MCNC: 1.8 NG/ML
CHLORIDE SERPL-SCNC: 102 MMOL/L
CO2 SERPL-SCNC: 20 MMOL/L
CREAT SERPL-MCNC: 1.05 MG/DL
EGFR: 87 ML/MIN/1.73M2
GLUCOSE SERPL-MCNC: 93 MG/DL
INR PPP: 2.02 RATIO
MAGNESIUM SERPL-MCNC: 2.1 MG/DL
POTASSIUM SERPL-SCNC: 4.8 MMOL/L
PROT SERPL-MCNC: 7.9 G/DL
PT BLD: 23.6 SEC
SODIUM SERPL-SCNC: 141 MMOL/L
TSH SERPL-ACNC: 2.35 UIU/ML

## 2022-03-16 ENCOUNTER — OUTPATIENT (OUTPATIENT)
Dept: OUTPATIENT SERVICES | Facility: HOSPITAL | Age: 52
LOS: 1 days | End: 2022-03-16
Payer: COMMERCIAL

## 2022-03-16 ENCOUNTER — APPOINTMENT (OUTPATIENT)
Dept: MRI IMAGING | Facility: CLINIC | Age: 52
End: 2022-03-16
Payer: COMMERCIAL

## 2022-03-16 DIAGNOSIS — C79.31 SECONDARY MALIGNANT NEOPLASM OF BRAIN: ICD-10-CM

## 2022-03-16 DIAGNOSIS — Z00.8 ENCOUNTER FOR OTHER GENERAL EXAMINATION: ICD-10-CM

## 2022-03-16 PROCEDURE — 70553 MRI BRAIN STEM W/O & W/DYE: CPT | Mod: 26

## 2022-03-16 PROCEDURE — A9585: CPT

## 2022-03-16 PROCEDURE — 70553 MRI BRAIN STEM W/O & W/DYE: CPT

## 2022-03-22 ENCOUNTER — APPOINTMENT (OUTPATIENT)
Dept: RADIATION ONCOLOGY | Facility: CLINIC | Age: 52
End: 2022-03-22
Payer: COMMERCIAL

## 2022-03-22 PROCEDURE — 99213 OFFICE O/P EST LOW 20 MIN: CPT | Mod: 95

## 2022-03-22 NOTE — HISTORY OF PRESENT ILLNESS
[Home] : at home, [unfilled] , at the time of the visit. [Medical Office: ([facility])___] : at the medical office located in  [Verbal consent obtained from patient] : the patient, [unfilled] [FreeTextEntry1] : \par ONCOLOGY HISTORY\par Mr. Mclaughlin presents with h/o sarcoid and possible lupus who has been monitoring pulmonary nodules and mediastinal adenopathy since 12/2002. His onc history began when he started having allergy-like symptoms (cough) in February 2021. He saw his PCP, Dr. Mo- referred to Dr. Trujillo after he was noted to have an abnormal chest CAT scan. He was referred for PET/CT which was done on 5/23 which showed FDG avid right upper lobe masslike consolidation, considerably increased in size and coalesced with adjacent right upper lobe nodules and new FDG avid supraclavicular, mediastinal and hilar lymphadenopathy, as compared to CT chest January 13, 2021 concerning for malignancy although progression of sarcoidosis is also a consideration.\par \par Right SCV node biopsy 6/4/21 demonstrated EGFR+ metastatic adenocarcinoma of pulmonary origin, PD-L1 TPS < 1%. He consulted with Dr. Hart 6/10 and Dr. Weldon 6/18 for evaluation for definitive chemoradiation.\par \par However, brain MRI 6/18/21 demonstrated the following:\par 6 x 5 mm enhancing parenchymal lesion with surrounding edema in the left parietal lobe ().\par \par 4 x 4 mm enhancing parenchymal lesion with surrounding edema in the right frontal pericardium region (12-99).\par \par 6 x 4 mm enhancing parenchymal lesion with surrounding edema in the right superior cerebellar hemisphere (12-54).\par \par 2 mm enhancing parenchymal lesion with surrounding edema in the left cerebellar hemisphere (12-40).\par \par At the time of initial consult on 7/1/2021 he felt well. Denied headaches, focal weakness, nausea, vomiting. Started taking Tagrisso 80mg in late june and was tolerating it well. \par follows with Dr. Magaña of neurology\par \par \par 10/5/2021- Mr. Mclaughlin presents today for follow up. Seen through TELEHEALTH for which he provides verbal consent on 10/5/2021 at 3:11 PM.\par MRI brain done 9/1/2021 showed Previously noted lesions involving the posterior fossa and supratentorial region are either no longer seen or decrease in size. This is likely compatible with response to therapy. Continue close interval follow up is recommended.\par \par CT CAP 9/1/2021 showed Right upper lobe lung mass has significantly decreased in size since 5/28/2021.\par Overall he is feeling well. He denies headaches, nausea, vomiting, focal weakness. having some skin issues on his toes while on tagrisso. joint pain has been better recently. \par \par 12/01/21 MRI Head IMPRESSION:  Previously noted residual neoplastic lesion in the LEFT parietal lobe to be stable measuring 2 mm. Previously noted metastatic lesion in the RIGHT caudate nucleus has resolved. The previously noted metastatic lesion in the medial RIGHT cerebellum now measures 3 mm. Incidental developmental venous anomaly seen in the RIGHT parietal lobe.\par \par 03/22/22: Pt presents for follow-up. MR Head (3/16/22) showing 2 mm lesion now seen in lateral LEFT cerebellum. Feeling well overall. Denies HA, numbness, dizziness, hearing/vision changes, cough, or chest pain. Remains active in everyday life. Continues on Tagrisso 80 mg with Dr. Hart with some GI upset, but overall tolerating well.

## 2022-03-22 NOTE — VITALS
[90: Able to carry normal activity; minor signs or symptoms of disease.] : 90: Able to carry normal activity; minor signs or symptoms of disease.  [Maximal Pain Intensity: 0/10] : 0/10 [NoTreatment Scheduled] : no treatment scheduled [ECOG Performance Status: 0 - Fully active, able to carry on all pre-disease performance without restriction] : Performance Status: 0 - Fully active, able to carry on all pre-disease performance without restriction

## 2022-03-22 NOTE — PHYSICAL EXAM
[General Appearance - Well Developed] : well developed [Normal] : no focal deficits [de-identified] : Telehealth

## 2022-06-01 ENCOUNTER — APPOINTMENT (OUTPATIENT)
Dept: CT IMAGING | Facility: CLINIC | Age: 52
End: 2022-06-01
Payer: COMMERCIAL

## 2022-06-01 ENCOUNTER — RESULT REVIEW (OUTPATIENT)
Age: 52
End: 2022-06-01

## 2022-06-01 ENCOUNTER — EMERGENCY (EMERGENCY)
Facility: HOSPITAL | Age: 52
LOS: 1 days | Discharge: HOME | End: 2022-06-01
Attending: EMERGENCY MEDICINE
Payer: COMMERCIAL

## 2022-06-01 ENCOUNTER — OUTPATIENT (OUTPATIENT)
Dept: OUTPATIENT SERVICES | Facility: HOSPITAL | Age: 52
LOS: 1 days | End: 2022-06-01
Payer: COMMERCIAL

## 2022-06-01 VITALS
RESPIRATION RATE: 16 BRPM | TEMPERATURE: 98 F | HEART RATE: 71 BPM | DIASTOLIC BLOOD PRESSURE: 96 MMHG | OXYGEN SATURATION: 98 % | HEIGHT: 69 IN | SYSTOLIC BLOOD PRESSURE: 157 MMHG | WEIGHT: 160.06 LBS

## 2022-06-01 DIAGNOSIS — C79.31 SECONDARY MALIGNANT NEOPLASM OF BRAIN: ICD-10-CM

## 2022-06-01 DIAGNOSIS — C34.90 MALIGNANT NEOPLASM OF UNSPECIFIED PART OF UNSPECIFIED BRONCHUS OR LUNG: ICD-10-CM

## 2022-06-01 DIAGNOSIS — Z00.8 ENCOUNTER FOR OTHER GENERAL EXAMINATION: ICD-10-CM

## 2022-06-01 LAB
ALBUMIN SERPL ELPH-MCNC: 4.7 G/DL — SIGNIFICANT CHANGE UP (ref 3.3–5)
ALP SERPL-CCNC: 50 U/L — SIGNIFICANT CHANGE UP (ref 40–120)
ALT FLD-CCNC: 25 U/L — SIGNIFICANT CHANGE UP (ref 10–45)
ANION GAP SERPL CALC-SCNC: 14 MMOL/L — SIGNIFICANT CHANGE UP (ref 5–17)
ANISOCYTOSIS BLD QL: SLIGHT — SIGNIFICANT CHANGE UP
APTT BLD: 101.3 SEC — HIGH (ref 27.5–35.5)
AST SERPL-CCNC: 26 U/L — SIGNIFICANT CHANGE UP (ref 10–40)
BASOPHILS # BLD AUTO: 0.05 K/UL — SIGNIFICANT CHANGE UP (ref 0–0.2)
BASOPHILS NFR BLD AUTO: 0.9 % — SIGNIFICANT CHANGE UP (ref 0–2)
BILIRUB SERPL-MCNC: 0.2 MG/DL — SIGNIFICANT CHANGE UP (ref 0.2–1.2)
BUN SERPL-MCNC: 15 MG/DL — SIGNIFICANT CHANGE UP (ref 7–23)
CALCIUM SERPL-MCNC: 9.8 MG/DL — SIGNIFICANT CHANGE UP (ref 8.4–10.5)
CHLORIDE SERPL-SCNC: 104 MMOL/L — SIGNIFICANT CHANGE UP (ref 96–108)
CO2 SERPL-SCNC: 24 MMOL/L — SIGNIFICANT CHANGE UP (ref 22–31)
CREAT SERPL-MCNC: 1.09 MG/DL — SIGNIFICANT CHANGE UP (ref 0.5–1.3)
DACRYOCYTES BLD QL SMEAR: SLIGHT — SIGNIFICANT CHANGE UP
EGFR: 83 ML/MIN/1.73M2 — SIGNIFICANT CHANGE UP
ELLIPTOCYTES BLD QL SMEAR: SLIGHT — SIGNIFICANT CHANGE UP
EOSINOPHIL # BLD AUTO: 0 K/UL — SIGNIFICANT CHANGE UP (ref 0–0.5)
EOSINOPHIL NFR BLD AUTO: 0 % — SIGNIFICANT CHANGE UP (ref 0–6)
GIANT PLATELETS BLD QL SMEAR: PRESENT — SIGNIFICANT CHANGE UP
GLUCOSE SERPL-MCNC: 95 MG/DL — SIGNIFICANT CHANGE UP (ref 70–99)
HCT VFR BLD CALC: 40.4 % — SIGNIFICANT CHANGE UP (ref 39–50)
HGB BLD-MCNC: 11.9 G/DL — LOW (ref 13–17)
INR BLD: 2.23 RATIO — HIGH (ref 0.88–1.16)
LYMPHOCYTES # BLD AUTO: 1.02 K/UL — SIGNIFICANT CHANGE UP (ref 1–3.3)
LYMPHOCYTES # BLD AUTO: 19.1 % — SIGNIFICANT CHANGE UP (ref 13–44)
MACROCYTES BLD QL: SLIGHT — SIGNIFICANT CHANGE UP
MANUAL SMEAR VERIFICATION: SIGNIFICANT CHANGE UP
MCHC RBC-ENTMCNC: 19 PG — LOW (ref 27–34)
MCHC RBC-ENTMCNC: 29.5 GM/DL — LOW (ref 32–36)
MCV RBC AUTO: 64.5 FL — LOW (ref 80–100)
MICROCYTES BLD QL: SIGNIFICANT CHANGE UP
MONOCYTES # BLD AUTO: 0.19 K/UL — SIGNIFICANT CHANGE UP (ref 0–0.9)
MONOCYTES NFR BLD AUTO: 3.5 % — SIGNIFICANT CHANGE UP (ref 2–14)
NEUTROPHILS # BLD AUTO: 4.1 K/UL — SIGNIFICANT CHANGE UP (ref 1.8–7.4)
NEUTROPHILS NFR BLD AUTO: 76.5 % — SIGNIFICANT CHANGE UP (ref 43–77)
PLAT MORPH BLD: ABNORMAL
PLATELET # BLD AUTO: 146 K/UL — LOW (ref 150–400)
POLYCHROMASIA BLD QL SMEAR: SLIGHT — SIGNIFICANT CHANGE UP
POTASSIUM SERPL-MCNC: 4.2 MMOL/L — SIGNIFICANT CHANGE UP (ref 3.5–5.3)
POTASSIUM SERPL-SCNC: 4.2 MMOL/L — SIGNIFICANT CHANGE UP (ref 3.5–5.3)
PROT SERPL-MCNC: 7.5 G/DL — SIGNIFICANT CHANGE UP (ref 6–8.3)
PROTHROM AB SERPL-ACNC: 26.1 SEC — HIGH (ref 10.5–13.4)
RBC # BLD: 6.26 M/UL — HIGH (ref 4.2–5.8)
RBC # FLD: 17.4 % — HIGH (ref 10.3–14.5)
RBC BLD AUTO: ABNORMAL
SODIUM SERPL-SCNC: 142 MMOL/L — SIGNIFICANT CHANGE UP (ref 135–145)
TARGETS BLD QL SMEAR: SLIGHT — SIGNIFICANT CHANGE UP
WBC # BLD: 5.36 K/UL — SIGNIFICANT CHANGE UP (ref 3.8–10.5)
WBC # FLD AUTO: 5.36 K/UL — SIGNIFICANT CHANGE UP (ref 3.8–10.5)

## 2022-06-01 PROCEDURE — 93971 EXTREMITY STUDY: CPT

## 2022-06-01 PROCEDURE — 73610 X-RAY EXAM OF ANKLE: CPT

## 2022-06-01 PROCEDURE — 71260 CT THORAX DX C+: CPT | Mod: 26

## 2022-06-01 PROCEDURE — 85610 PROTHROMBIN TIME: CPT

## 2022-06-01 PROCEDURE — 85025 COMPLETE CBC W/AUTO DIFF WBC: CPT

## 2022-06-01 PROCEDURE — 73610 X-RAY EXAM OF ANKLE: CPT | Mod: 26,RT

## 2022-06-01 PROCEDURE — 99285 EMERGENCY DEPT VISIT HI MDM: CPT

## 2022-06-01 PROCEDURE — 80053 COMPREHEN METABOLIC PANEL: CPT

## 2022-06-01 PROCEDURE — 99284 EMERGENCY DEPT VISIT MOD MDM: CPT | Mod: 25

## 2022-06-01 PROCEDURE — 71260 CT THORAX DX C+: CPT

## 2022-06-01 PROCEDURE — 74160 CT ABDOMEN W/CONTRAST: CPT | Mod: 26

## 2022-06-01 PROCEDURE — 74160 CT ABDOMEN W/CONTRAST: CPT

## 2022-06-01 PROCEDURE — 85730 THROMBOPLASTIN TIME PARTIAL: CPT

## 2022-06-01 PROCEDURE — 93971 EXTREMITY STUDY: CPT | Mod: 26,RT

## 2022-06-01 NOTE — ED PROVIDER NOTE - PATIENT PORTAL LINK FT
You can access the FollowMyHealth Patient Portal offered by [facility] by registering at the following website: [email or web address]. By joining [employer] [employer]’s FollowMyHealth portal, you will also be able to view your health information using other applications (apps) compatible with our system.

## 2022-06-01 NOTE — ED PROVIDER NOTE - PROGRESS NOTE DETAILS
Nadir Purcell MD: pt found to have evidence of chronic DVT, but pt is therapeutic with INR. I discussed with Heme-Onc fellow who recommends continuing coumadin and f/u with Heme as outpatient and repeat US in 1 week to make sure no progression. Pending Vasc surgery recommendations. Nadir Purcell MD: spoke with Vascular surgery team, rec'd no acute interventions, agreed with Heme-Onc recs for continuing coumadin. No indication for heparin or admission at this time. Called Dr. Mo's office and spoke with physician, will be able to f/u as outpatient. Pt has appointment w/ Dr. Dereje Lubin (heme-onc) this week, and will call Dr. Vázquez (vasc surg), and f/u PMD and Rheum (already has appointment for tomorrow). Patient reassessed and reports improved symptoms. Repeat MSK examination shows normal ROM, soft compartments, and NVI. Ambulatory in the ED with (+) mild antalgic gait but no assistance. Stable for discharge with close follow-up and strict return precautions. The patient has been informed of all concerning signs and symptoms to return to Emergency Department, the necessity to follow up with PMD and Specialists within 3-5 days was explained, and the patient reports understanding of above with capacity and insight. Nadir Purcell MD: pt found to have evidence of chronic DVT, but pt is therapeutic with INR. I discussed with Heme-Onc fellow who recommends continuing coumadin and f/u with Heme as outpatient and repeat US in 1 week to make sure no progression. Pending Vasc surgery recommendations

## 2022-06-01 NOTE — ED ADULT NURSE NOTE - OBJECTIVE STATEMENT
52 y/o male PMH lung cancer s/p treatment, clotting disorder presents to ED from home c/o R calf pain since yesterday. Pt states he noticed calf to be tight, swollen, reddened, tender to touch. Denying SOB, chest pain, n/v/d. Pt is A&O x 4. Breathing even and unlabored. Skin, warm, dry, intact. Gross motor and neuro intact. R calf swollen and tender. Safety and comfort provided.

## 2022-06-01 NOTE — ED ADULT NURSE NOTE - NSIMPLEMENTINTERV_GEN_ALL_ED
Implemented All Fall with Harm Risk Interventions:  [place] to call system. Call bell, personal items and telephone within reach. Instruct patient to call for assistance. Room bathroom lighting operational. Non-slip footwear when patient is off stretcher. Physically safe environment: no spills, clutter or unnecessary equipment. Stretcher in lowest position, wheels locked, appropriate side rails in place. Provide visual cue, wrist band, yellow gown, etc. Monitor gait and stability. Monitor for mental status changes and reorient to person, place, and time. Review medications for side effects contributing to fall risk. Reinforce activity limits and safety measures with patient and family. Provide visual clues: red socks.

## 2022-06-01 NOTE — ED PROVIDER NOTE - PHYSICAL EXAMINATION
Physical Exam:   GEN: in no acute distress, AAOx3  HENT: NCAT, MMM, no stridor  EYES: PERRLA, EOMI  RESP: CTAB, no respiratory distress  CV: normal rate and regular rhythm, S1 S2  ABD: soft and NTND  EXT: (+) mild R calf tenderness, (+) R medial malleolus mild swelling but no warmth or erythema.   SKIN: No skin breaks, skin color normal for race  NEURO: CN grossly intact, No focal motor or sensory deficits.

## 2022-06-01 NOTE — ED ADULT NURSE NOTE - NSFALLRSKASSESSDT_ED_ALL_ED
Pt called stating that she is in severe pain. Its all over her body not in one generalized area. She stated that the pain is almost unbearable. Please advise. Thanks!   01-Jun-2022 21:45

## 2022-06-01 NOTE — ED PROVIDER NOTE - CLINICAL SUMMARY MEDICAL DECISION MAKING FREE TEXT BOX
Nadir Purcell MD: patient with atraumatic R calf and medial ankle pain and swelling. Elevated risk for being hypercoagulable. Will eval for DVT with US. Patient with no symptoms of PE, stable vitals. XR to eval for any acute bony pathology.

## 2022-06-01 NOTE — ED PROVIDER NOTE - OBJECTIVE STATEMENT
51 M w/ Hx of SCLC on immunotherapy, sarcoidosis, antiphospholipid syndrome, DVT 20 yrs ago, on Coumadin and s/p IVCF, who p/w R leg pain, tightness and swelling in the calf and medial ankle. Onset was 1-2 days ago. Denies any acute injuries. Pt without any numbness, weakness, CP, SOB, dizziness, fevers, or chills.

## 2022-06-01 NOTE — ED PROVIDER NOTE - NS ED ROS FT
CONST: no fevers, no chills  EYES: no redness, no vision changes   HENT: no throat pain, no epistaxis  CV: no chest pain, no palpitations     RESP: no shortness of breath, no cough  ABD: no abdominal pain, no vomiting     : no dysuria, no hematuria   MSK: (+) RLE swelling   NEURO: no headache, no focal weakness or loss of sensation     SKIN:  no rash, no lacerations.

## 2022-06-01 NOTE — ED PROVIDER NOTE - NSFOLLOWUPINSTRUCTIONS_ED_ALL_ED_FT
1) Please follow-up with your Primary Medical Doctor and Specialists in 3-5 days. If you do not have a primary doctor, please call the Physician Referral Service. If you have trouble making an appointment inform the office that you were recently seen in the Emergency Department and it is an urgent matter. Bring a copy of your results with you to your appointment.  2) Return to the Emergency Department for persistent, worsening, or new symptoms, or if you experience fever, chills, chest pain, shortness of breath, dizziness, fainting, abdominal pain, nausea or vomiting, inability to eat or drink, difficulty with urination, numbness, weakness, or inability to walk safely.   3) To alleviate the pain, please rest and take Tylenol 650 mg or Motrin 400 mg once every 6 hours as needed.

## 2022-06-02 ENCOUNTER — OUTPATIENT (OUTPATIENT)
Dept: OUTPATIENT SERVICES | Facility: HOSPITAL | Age: 52
LOS: 1 days | Discharge: HOME | End: 2022-06-02

## 2022-06-02 VITALS
RESPIRATION RATE: 15 BRPM | OXYGEN SATURATION: 100 % | SYSTOLIC BLOOD PRESSURE: 122 MMHG | HEART RATE: 59 BPM | DIASTOLIC BLOOD PRESSURE: 81 MMHG | TEMPERATURE: 98 F

## 2022-06-02 DIAGNOSIS — D64.9 ANEMIA, UNSPECIFIED: ICD-10-CM

## 2022-06-09 ENCOUNTER — APPOINTMENT (OUTPATIENT)
Dept: HEMATOLOGY ONCOLOGY | Facility: CLINIC | Age: 52
End: 2022-06-09
Payer: COMMERCIAL

## 2022-06-09 VITALS
BODY MASS INDEX: 31.67 KG/M2 | HEIGHT: 68.98 IN | RESPIRATION RATE: 16 BRPM | SYSTOLIC BLOOD PRESSURE: 124 MMHG | HEART RATE: 68 BPM | OXYGEN SATURATION: 96 % | WEIGHT: 213.85 LBS | TEMPERATURE: 97.6 F | DIASTOLIC BLOOD PRESSURE: 79 MMHG

## 2022-06-09 DIAGNOSIS — Z86.2 PERSONAL HISTORY OF DISEASES OF THE BLOOD AND BLOOD-FORMING ORGANS AND CERTAIN DISORDERS INVOLVING THE IMMUNE MECHANISM: ICD-10-CM

## 2022-06-09 PROCEDURE — 99215 OFFICE O/P EST HI 40 MIN: CPT

## 2022-06-14 PROBLEM — Z86.2 H/O THALASSEMIA MINOR: Status: ACTIVE | Noted: 2018-02-28

## 2022-06-14 NOTE — PHYSICAL EXAM
[Restricted in physically strenuous activity but ambulatory and able to carry out work of a light or sedentary nature] : Status 1- Restricted in physically strenuous activity but ambulatory and able to carry out work of a light or sedentary nature, e.g., light house work, office work [Normal] : affect appropriate [de-identified] : rt neck adenopathy resolved [de-identified] : mild nail changes

## 2022-06-14 NOTE — HISTORY OF PRESENT ILLNESS
[Disease: _____________________] : Disease: [unfilled] [T: ___] : T[unfilled] [N: ___] : N[unfilled] [M: ___] : M[unfilled] [AJCC Stage: ____] : AJCC Stage: [unfilled] [de-identified] : Mr. Mclaughlin with hx of sarcoidosis is a pleasant 51-year-old male who started having allergy-like symptoms (cough) 5 months ago. He saw his PCP, Dr. Mo- referred to Dr. Trujillo after he was noted to have an abnormal chest CAT scan. He was referred for PET/CT which was done on 5/23 which showed  FDG avid right upper lobe masslike consolidation, considerably increased in size and coalesced with adjacent right upper lobe nodules and new FDG avid supraclavicular, mediastinal and hilar lymphadenopathy, as compared to CT chest January 13, 2021 concerning for malignancy although progression of sarcoidosis is also a consideration. rt supra clav bx was c/w lung adeno ca. \par \par Of note, the patient has a complex medical history having had a diagnosis of sarcoid made in 2002 via a mediastinoscopy. At that time he was noted to have several pulmonary nodules but no treatment was required. 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 He was started on anticoagulation and a IVC filter was placed. He is also suspected of having lupus for which she is on hydroxychloroquine. He has a history of bronchial reactivity but is currently not on any inhaled bronchodilators. \par He is a lifelong nonsmoker without occupational exposures. Other than a dry cough, he feels well. He denies any fever or, chest discomfort, dyspnea or peripheral edema. \par \par 6/22/21: Here for follow up. No complaints. \par \par  7/14/21: Patient seen for routine follow-up.  Patient is currently getting treatment for lupus and remains on hydrochorloquine and coumadin for anti-Cardiolipin antibod.\par Patient verbalized pain in the left foot that radiated from the small toe to the sole of feet, he takes advil with satisfactory pain control.  Patient also verbalized joint pain \par that is related to lupus flare up.  Pt recently started on Tagrisso 80mg daily which he is tolerating well. Denies rash/ diarrhea. Pt met with Dr. Boudreaux for consideration of GK to brain mets. \par \par 9/3/21: Facial rash, gained weight, migrating joint pains better after starting steroids (prednisone 5 mg daily). Has had CT scans and MRI yesterday. \par \par 10/19/21: Feeling well. Mild facial rash, gained more weight. Restarted hydroxychloroquine for lupus with worsening joint pain in his wrists and fingers;  continues coumadin; plans f/u with his rheumatologist. Reports toe infection, on abx as per his podiatrist with improvement. Has f/u MRI Brain on 12/1. \par \par 12/7/21: Since August has been having toenail infection of R and L 1st and 2nd toes. Infection is painful, non pruritic. Has seen podiatrist who resected some of the toenails/skin. Using topical antibiotics with some improvement. \par \par 3/10/22: doing well. no new symptoms to report. Has some nail changes but all other AEs have improved. He does have episodic worsening of joint pains. Has gained some weight. \par \par 6/9/22: had a recent ER visit for acute swelling of rt knee and ankle- possibly related to occult trauma. Of note, pt is on Coumadin for VTE with optimal maintenance of INR. He has no other symptoms  [de-identified] : adeno ca

## 2022-06-14 NOTE — ASSESSMENT
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 Findings were suspicious for the presence of intracranial metastases. \par -Started osimertinib in June 2021. 2 month scan on osi with excellent response in the CNS as well as lungs. \par CT C/A/P 12/02/21 with decreased RUL mass size. Scans from March 2022, reviewed personally- completely stable findings- sustained ME. \par Brain MRI read pending ANNALISE in December 2021. Repeat scans scheduled on 3/16 after which pt will follow up with Dr. Boudreaux. \par c/w osimertinib at 80 mg daily\par Scans done on 6/1 reviewed independently- sustained ME noted. Stable 1.1 cm right upper lobe nodule along a bandlike opacity. Stable mild tree-in-bud opacity within the lingula. The lungs are otherwise clear. No new or enlarging nodule. Numerous subcentimeter retroperitoneal lymph nodes are unchanged. Previously mentioned enlarged external iliac lymph nodes are not imaged on this exam.\par I also reviewed US doppler of LE which shows chronic DVT in rt LE\par Xray was unremarkable\par ER notes reviewed as well as recent labs\par Knee swelling and pain has subsided- suggesting that this is likely trauma-related or possible RA. Pt was started on low dose prednsione (5 mg) by Dr. Magaña, his rheumatologist. \par repeat imaging in 3mo. Orders placed today\par CEA 12.8 to 1.8\par Of note, pt has microcytosis with mild anemia. He has a hx of thalassemia minor but is not sure what type. Will check hemoglobin electrophoresis next time. \par Plt count slightly low- and PS shows giant platelets. pt likely has chronic ITP which is commonly associated with lupus\par

## 2022-06-27 ENCOUNTER — APPOINTMENT (OUTPATIENT)
Dept: MRI IMAGING | Facility: CLINIC | Age: 52
End: 2022-06-27

## 2022-06-27 ENCOUNTER — OUTPATIENT (OUTPATIENT)
Dept: OUTPATIENT SERVICES | Facility: HOSPITAL | Age: 52
LOS: 1 days | End: 2022-06-27
Payer: COMMERCIAL

## 2022-06-27 ENCOUNTER — RESULT REVIEW (OUTPATIENT)
Age: 52
End: 2022-06-27

## 2022-06-27 DIAGNOSIS — C79.31 SECONDARY MALIGNANT NEOPLASM OF BRAIN: ICD-10-CM

## 2022-06-27 PROCEDURE — 70553 MRI BRAIN STEM W/O & W/DYE: CPT

## 2022-06-27 PROCEDURE — A9585: CPT

## 2022-06-27 PROCEDURE — 70553 MRI BRAIN STEM W/O & W/DYE: CPT | Mod: 26

## 2022-06-29 ENCOUNTER — APPOINTMENT (OUTPATIENT)
Dept: RADIATION ONCOLOGY | Facility: CLINIC | Age: 52
End: 2022-06-29

## 2022-06-29 VITALS
BODY MASS INDEX: 32.18 KG/M2 | HEIGHT: 68 IN | HEART RATE: 65 BPM | RESPIRATION RATE: 18 BRPM | SYSTOLIC BLOOD PRESSURE: 147 MMHG | OXYGEN SATURATION: 97 % | DIASTOLIC BLOOD PRESSURE: 84 MMHG | WEIGHT: 212.3 LBS | TEMPERATURE: 96.98 F

## 2022-06-29 PROCEDURE — 99215 OFFICE O/P EST HI 40 MIN: CPT

## 2022-06-29 NOTE — REVIEW OF SYSTEMS
[Negative] : Neurological [Confused] : no confusion [Dizziness] : no dizziness [Fainting] : no fainting [Difficulty Walking] : no difficulty walking

## 2022-06-29 NOTE — VITALS
[Maximal Pain Intensity: 0/10] : 0/10 [NoTreatment Scheduled] : no treatment scheduled [90: Able to carry normal activity; minor signs or symptoms of disease.] : 90: Able to carry normal activity; minor signs or symptoms of disease.  [ECOG Performance Status: 0 - Fully active, able to carry on all pre-disease performance without restriction] : Performance Status: 0 - Fully active, able to carry on all pre-disease performance without restriction

## 2022-06-29 NOTE — HISTORY OF PRESENT ILLNESS
[FreeTextEntry1] : \par ONCOLOGY HISTORY\par Mr. Mclaughlin presents with h/o sarcoid and possible lupus who has been monitoring pulmonary nodules and mediastinal adenopathy since 12/2002. His onc history began when he started having allergy-like symptoms (cough) in February 2021. He saw his PCP, Dr. Mo- referred to Dr. Trujillo after he was noted to have an abnormal chest CAT scan. He was referred for PET/CT which was done on 5/23 which showed FDG avid right upper lobe masslike consolidation, considerably increased in size and coalesced with adjacent right upper lobe nodules and new FDG avid supraclavicular, mediastinal and hilar lymphadenopathy, as compared to CT chest January 13, 2021 concerning for malignancy although progression of sarcoidosis is also a consideration.\par \par Right SCV node biopsy 6/4/21 demonstrated EGFR+ metastatic adenocarcinoma of pulmonary origin, PD-L1 TPS < 1%. He consulted with Dr. Hart 6/10 and Dr. Weldon 6/18 for evaluation for definitive chemoradiation.\par \par However, brain MRI 6/18/21 demonstrated the following:\par 6 x 5 mm enhancing parenchymal lesion with surrounding edema in the left parietal lobe ().\par 4 x 4 mm enhancing parenchymal lesion with surrounding edema in the right frontal pericardium region (12-99). \par 6 x 4 mm enhancing parenchymal lesion with surrounding edema in the right superior cerebellar hemisphere (12-54). \par 2 mm enhancing parenchymal lesion with surrounding edema in the left cerebellar hemisphere (12-40). \par At the time of initial consult on 7/1/2021 he felt well. Denied headaches, focal weakness, nausea, vomiting. Started taking Tagrisso 80mg in late june and was tolerating it well. Follows with Dr. Magaña of neurology\par \par 10/5/2021- Mr. Mclaughlin presents today for follow up. Seen through TELEHEALTH for which he provides verbal consent on 10/5/2021 at 3:11 PM.\par MRI brain done 9/1/2021 showed Previously noted lesions involving the posterior fossa and supratentorial region are either no longer seen or decrease in size. This is likely compatible with response to therapy. Continue close interval follow up is recommended.\par \par CT CAP 9/1/2021 showed Right upper lobe lung mass has significantly decreased in size since 5/28/2021.\par Overall he is feeling well. He denies headaches, nausea, vomiting, focal weakness. having some skin issues on his toes while on tagrisso. joint pain has been better recently. \par \par 12/01/21 MRI Head IMPRESSION:  Previously noted residual neoplastic lesion in the LEFT parietal lobe to be stable measuring 2 mm. Previously noted metastatic lesion in the RIGHT caudate nucleus has resolved. The previously noted metastatic lesion in the medial RIGHT cerebellum now measures 3 mm. Incidental developmental venous anomaly seen in the RIGHT parietal lobe.\par \par 03/22/22: Pt presents for follow-up. MR Head (3/16/22) showing 2 mm lesion now seen in lateral LEFT cerebellum. Feeling well overall. Denies HA, numbness, dizziness, hearing/vision changes, cough, or chest pain. Remains active in everyday life. Continues on Tagrisso 80 mg with Dr. Hart with some GI upset, but overall tolerating well. \par \par 6/29/2022- Mr. Mclaughlin presents today for follow up. \par MRI brain 6/27/2022 showed Multiple new metastatic lesions measuring approximately 3 to 4 mm predominantly at the gray-white junction in the BILATERAL frontal, parietal and occipital lobes as well as the LEFT caudate nucleus, RIGHT basal ganglia and BILATERAL cerebellum. No significant edema is noted.\par \par 6/1 body imaging with Dr. Hart was stable. continues on osimertinib. notes a flare in joint pain recently. following with rheumatology. denies headaches, nausea, vomiting, focal weakness, numbness, tingling.

## 2022-06-29 NOTE — PHYSICAL EXAM
[General Appearance - Well Developed] : well developed [] : no respiratory distress [Normal] : oriented to person, place and time, the affect was normal, the mood was normal and not anxious

## 2022-06-30 ENCOUNTER — RESULT REVIEW (OUTPATIENT)
Age: 52
End: 2022-06-30

## 2022-06-30 ENCOUNTER — APPOINTMENT (OUTPATIENT)
Dept: HEMATOLOGY ONCOLOGY | Facility: CLINIC | Age: 52
End: 2022-06-30

## 2022-06-30 LAB
BASOPHILS # BLD AUTO: 0.02 K/UL — SIGNIFICANT CHANGE UP (ref 0–0.2)
BASOPHILS NFR BLD AUTO: 0.5 % — SIGNIFICANT CHANGE UP (ref 0–2)
EOSINOPHIL # BLD AUTO: 0.04 K/UL — SIGNIFICANT CHANGE UP (ref 0–0.5)
EOSINOPHIL NFR BLD AUTO: 0.9 % — SIGNIFICANT CHANGE UP (ref 0–6)
HCT VFR BLD CALC: 40.7 % — SIGNIFICANT CHANGE UP (ref 39–50)
HGB BLD-MCNC: 12.3 G/DL — LOW (ref 13–17)
IMM GRANULOCYTES NFR BLD AUTO: 0.5 % — SIGNIFICANT CHANGE UP (ref 0–1.5)
LYMPHOCYTES # BLD AUTO: 1.11 K/UL — SIGNIFICANT CHANGE UP (ref 1–3.3)
LYMPHOCYTES # BLD AUTO: 25.5 % — SIGNIFICANT CHANGE UP (ref 13–44)
MCHC RBC-ENTMCNC: 19.6 PG — LOW (ref 27–34)
MCHC RBC-ENTMCNC: 30.2 G/DL — LOW (ref 32–36)
MCV RBC AUTO: 64.7 FL — LOW (ref 80–100)
MONOCYTES # BLD AUTO: 0.38 K/UL — SIGNIFICANT CHANGE UP (ref 0–0.9)
MONOCYTES NFR BLD AUTO: 8.7 % — SIGNIFICANT CHANGE UP (ref 2–14)
NEUTROPHILS # BLD AUTO: 2.78 K/UL — SIGNIFICANT CHANGE UP (ref 1.8–7.4)
NEUTROPHILS NFR BLD AUTO: 63.9 % — SIGNIFICANT CHANGE UP (ref 43–77)
NRBC # BLD: 0 /100 WBCS — SIGNIFICANT CHANGE UP (ref 0–0)
PLATELET # BLD AUTO: 152 K/UL — SIGNIFICANT CHANGE UP (ref 150–400)
RBC # BLD: 6.29 M/UL — HIGH (ref 4.2–5.8)
RBC # FLD: 18.3 % — HIGH (ref 10.3–14.5)
WBC # BLD: 4.35 K/UL — SIGNIFICANT CHANGE UP (ref 3.8–10.5)
WBC # FLD AUTO: 4.35 K/UL — SIGNIFICANT CHANGE UP (ref 3.8–10.5)

## 2022-07-05 ENCOUNTER — APPOINTMENT (OUTPATIENT)
Dept: VASCULAR SURGERY | Facility: CLINIC | Age: 52
End: 2022-07-05

## 2022-07-05 VITALS
BODY MASS INDEX: 29.62 KG/M2 | HEIGHT: 69 IN | DIASTOLIC BLOOD PRESSURE: 87 MMHG | SYSTOLIC BLOOD PRESSURE: 150 MMHG | HEART RATE: 60 BPM | TEMPERATURE: 96.8 F | WEIGHT: 200 LBS

## 2022-07-05 DIAGNOSIS — M79.604 PAIN IN RIGHT LEG: ICD-10-CM

## 2022-07-05 DIAGNOSIS — I87.2 VENOUS INSUFFICIENCY (CHRONIC) (PERIPHERAL): ICD-10-CM

## 2022-07-05 DIAGNOSIS — I83.893 VARICOSE VEINS OF BILATERAL LOWER EXTREMITIES WITH OTHER COMPLICATIONS: ICD-10-CM

## 2022-07-05 DIAGNOSIS — I77.1 STRICTURE OF ARTERY: ICD-10-CM

## 2022-07-05 DIAGNOSIS — M79.605 PAIN IN RIGHT LEG: ICD-10-CM

## 2022-07-05 LAB
ALBUMIN SERPL ELPH-MCNC: 4.8 G/DL
ALP BLD-CCNC: 50 U/L
ALT SERPL-CCNC: 28 U/L
ANION GAP SERPL CALC-SCNC: 15 MMOL/L
AST SERPL-CCNC: 25 U/L
BILIRUB SERPL-MCNC: 0.3 MG/DL
BUN SERPL-MCNC: 15 MG/DL
CALCIUM SERPL-MCNC: 10 MG/DL
CEA SERPL-MCNC: 1.9 NG/ML
CHLORIDE SERPL-SCNC: 103 MMOL/L
CO2 SERPL-SCNC: 21 MMOL/L
CREAT SERPL-MCNC: 0.96 MG/DL
EGFR: 97 ML/MIN/1.73M2
GLUCOSE SERPL-MCNC: 97 MG/DL
MAGNESIUM SERPL-MCNC: 2.2 MG/DL
POTASSIUM SERPL-SCNC: 4.9 MMOL/L
PROT SERPL-MCNC: 7.6 G/DL
SODIUM SERPL-SCNC: 140 MMOL/L
TSH SERPL-ACNC: 1.37 UIU/ML

## 2022-07-05 PROCEDURE — 93970 EXTREMITY STUDY: CPT

## 2022-07-05 PROCEDURE — 99204 OFFICE O/P NEW MOD 45 MIN: CPT

## 2022-07-05 PROCEDURE — 93979 VASCULAR STUDY: CPT

## 2022-07-05 NOTE — PHYSICAL EXAM
[1+] : left 1+ [2+] : left 2+ [Ankle Swelling (On Exam)] : present [Ankle Swelling Bilaterally] : bilaterally  [Varicose Veins Of Lower Extremities] : bilaterally [] : bilaterally [Ankle Swelling On The Right] : mild [No HSM] : no hepatosplenomegaly [No Rash or Lesion] : No rash or lesion [Alert] : alert [Oriented to Person] : oriented to person [Oriented to Time] : oriented to time [Oriented to Place] : oriented to place [Calm] : calm [JVD] : no jugular venous distention  [Abdomen Masses] : No abdominal masses [Tender] : was nontender [de-identified] : nad  [de-identified] : wnl [FreeTextEntry1] : Mild arterial insufficiency w mild  trophic skin changes \par Mild bilateral  leg venous insufficiency \par w mild  bilateral leg stasis dermatitis\par and mild  bilateral leg edema \par Multiple  bilateral  leg small  varicose  veins and spider veins  ant post medial calf and shin \par RLE Varicose veins measuring 1-2  mm in size on the calf /shin \par LLE Varicose veins measuring 1-2  mm in size on the calf /shin \par no wounds/ulcers\par  [de-identified] : wnl [de-identified] : Raj Cranial nerves 2-12 raj grossly intact [de-identified] : cooperative

## 2022-07-05 NOTE — PHYSICAL EXAM
[1+] : left 1+ [2+] : left 2+ [Ankle Swelling (On Exam)] : present [Ankle Swelling Bilaterally] : bilaterally  [Varicose Veins Of Lower Extremities] : bilaterally [] : bilaterally [Ankle Swelling On The Right] : mild [No HSM] : no hepatosplenomegaly [No Rash or Lesion] : No rash or lesion [Alert] : alert [Oriented to Person] : oriented to person [Oriented to Place] : oriented to place [Oriented to Time] : oriented to time [Calm] : calm [JVD] : no jugular venous distention  [Abdomen Masses] : No abdominal masses [Tender] : was nontender [de-identified] : nad  [de-identified] : wnl [FreeTextEntry1] : Mild arterial insufficiency w mild  trophic skin changes \par Mild bilateral  leg venous insufficiency \par w mild  bilateral leg stasis dermatitis\par and mild  bilateral leg edema \par Multiple  bilateral  leg small  varicose  veins and spider veins  ant post medial calf and shin \par RLE Varicose veins measuring 1-2  mm in size on the calf /shin \par LLE Varicose veins measuring 1-2  mm in size on the calf /shin \par no wounds/ulcers\par  [de-identified] : wnl [de-identified] : Raj Cranial nerves 2-12 raj grossly intact [de-identified] : cooperative

## 2022-07-05 NOTE — ASSESSMENT
[Arterial/Venous Disease] : arterial/venous disease [Other: _____] : [unfilled] [FreeTextEntry1] : Impression symptomatic arterial insuff , symptomatic venous insuff  and rle post phlebitic syndrome \par \par \par Plan Med Conservative management leg elevation, knee high compression stockings 15-20mm Hg (rx given), wt loss, diet control, exercise program, protective measures\par rto next avail for lucina/pvr s/o art insuff then telehealth \par d/w pt nutritional supplements \par

## 2022-07-05 NOTE — DATA REVIEWED
[FreeTextEntry1] : 7/5/2022 Venous Doppler Raj LE  no acute dvt svt \par                            RLE  chronic dvt  recannalized pop v \par                            LLE GSV  insuff at  [facility]  only \par \par 7/5/2022  Abdominal Venous Duplex   Bilateral Iliac veins   no s/o  ivc or ivc filter thrombosis based \par                                on flow, ivc not viz due to  bowel gas \par

## 2022-07-05 NOTE — DATA REVIEWED
Progress Notes by Kolb-Phalen, Laurie, RN at 02/01/17 03:50 PM     Author:  Kolb-Phalen, Laurie, RN Service:  (none) Author Type:  Registered Nurse     Filed:  02/01/17 03:50 PM Encounter Date:  2/1/2017 Status:  Signed     :  Kolb-Phalen, Laurie, RN (Registered Nurse)              Mady Funez received Hydroxyprogesterone Caproate  mg/ml IM given in[LK1.1T] left gluteus[LK1.1M] which was administered uneventfully.    Lot #[LK1.1T] [id number][LK1.1M].   Expires[LK1.1T] 3/2019[LK1.1M].[LK1.1T]      [facility][LK1.1M] pharmacy.    Electronically Signed by:    Laurie Kolb-Phalen, RN , 2/1/2017[LK1.1T]         Revision History        User Key Date/Time User Provider Type Action    > LK1.1 02/01/17 03:50 PM Kolb-Phalen, Laurie, RN Registered Nurse Sign    M - Manual, T - Template             [FreeTextEntry1] : 7/5/2022 Venous Doppler Raj LE  no acute dvt svt \par                            RLE  chronic dvt  recannalized pop v \par                            LLE GSV  insuff at  [facility]  only \par \par 7/5/2022  Abdominal Venous Duplex   Bilateral Iliac veins   no s/o  ivc or ivc filter thrombosis based \par                                on flow, ivc not viz due to  bowel gas \par

## 2022-07-05 NOTE — HISTORY OF PRESENT ILLNESS
[FreeTextEntry1] : as per pt 2002  pt is s/p ivc filter for  pe\par pt dx 2021 w lung  ca non small cell lung ca and is currently on  chemo \par \par Pt c/o rbilateral leg pain swelling heaviness and discomfort worse w activity\par Onset sev weeks ago \par Intensity mild to moderate \par Pt denies recent injury, travel or thrombophilic event\par \par Pt c/o nightly jo leg and foot nocturnal cramps\par Onset sev weeks ago \par Intensity mild to moderate \par \par Pt denies any recent  cardiac c/o , SOB, Chest Pain or recent heart attacks \par \par Pt c/o  sudden onset right leg swelling \par pt was eval at [facility]  [place] \par dx w rle chronic dvt\par pt is on coumadin since 2002\par pt was dx in 2002 w anticardiolipin ab and sarcoidosis\par \par \par \par \par

## 2022-07-05 NOTE — HISTORY OF PRESENT ILLNESS
[FreeTextEntry1] : as per pt 2002  pt is s/p ivc filter for  pe\par pt dx 2021 w lung  ca non small cell lung ca and is currently on  chemo \par \par Pt c/o rbilateral leg pain swelling heaviness and discomfort worse w activity\par Onset sev weeks ago \par Intensity mild to moderate \par Pt denies recent injury, travel or thrombophilic event\par \par Pt c/o nightly jo leg and foot nocturnal cramps\par Onset sev weeks ago \par Intensity mild to moderate \par \par Pt denies any recent  cardiac c/o , SOB, Chest Pain or recent heart attacks \par \par Pt c/o  sudden onset right leg swelling \par pt was eval at [facility]  [place] \par dx w rle chronic dvt\par pt is on coumadin since 2002\par pt was dx in 2002 w anticardiolipin ab and sarcoidosis\par \par \par \par \par

## 2022-08-16 ENCOUNTER — RESULT REVIEW (OUTPATIENT)
Age: 52
End: 2022-08-16

## 2022-08-25 ENCOUNTER — APPOINTMENT (OUTPATIENT)
Dept: MRI IMAGING | Facility: CLINIC | Age: 52
End: 2022-08-25

## 2022-08-25 ENCOUNTER — OUTPATIENT (OUTPATIENT)
Dept: OUTPATIENT SERVICES | Facility: HOSPITAL | Age: 52
LOS: 1 days | End: 2022-08-25
Payer: COMMERCIAL

## 2022-08-25 DIAGNOSIS — C79.31 SECONDARY MALIGNANT NEOPLASM OF BRAIN: ICD-10-CM

## 2022-08-25 PROCEDURE — 70553 MRI BRAIN STEM W/O & W/DYE: CPT

## 2022-08-31 ENCOUNTER — APPOINTMENT (OUTPATIENT)
Dept: RADIATION ONCOLOGY | Facility: CLINIC | Age: 52
End: 2022-08-31

## 2022-08-31 VITALS
HEIGHT: 69 IN | TEMPERATURE: 96.98 F | OXYGEN SATURATION: 97 % | RESPIRATION RATE: 18 BRPM | SYSTOLIC BLOOD PRESSURE: 149 MMHG | DIASTOLIC BLOOD PRESSURE: 80 MMHG | HEART RATE: 62 BPM | WEIGHT: 209.99 LBS | BODY MASS INDEX: 31.1 KG/M2

## 2022-08-31 PROCEDURE — 99213 OFFICE O/P EST LOW 20 MIN: CPT

## 2022-08-31 RX ORDER — PREDNISONE 5 MG/1
5 TABLET ORAL
Refills: 0 | Status: DISCONTINUED | COMMUNITY
Start: 2022-06-29 | End: 2022-08-31

## 2022-08-31 RX ORDER — HYDROXYCHLOROQUINE SULFATE 200 MG/1
200 TABLET, FILM COATED ORAL TWICE DAILY
Refills: 0 | Status: DISCONTINUED | COMMUNITY
Start: 2021-12-07 | End: 2022-08-31

## 2022-08-31 NOTE — HISTORY OF PRESENT ILLNESS
[FreeTextEntry1] : \par ONCOLOGY HISTORY\par Mr. Mclaughlin presents with h/o sarcoid and possible lupus who has been monitoring pulmonary nodules and mediastinal adenopathy since 12/2002. His onc history began when he started having allergy-like symptoms (cough) in February 2021. He saw his PCP, Dr. Mo- referred to Dr. Trujillo after he was noted to have an abnormal chest CAT scan. He was referred for PET/CT which was done on 5/23 which showed FDG avid right upper lobe masslike consolidation, considerably increased in size and coalesced with adjacent right upper lobe nodules and new FDG avid supraclavicular, mediastinal and hilar lymphadenopathy, as compared to CT chest January 13, 2021 concerning for malignancy although progression of sarcoidosis is also a consideration.\par \par Right SCV node biopsy 6/4/21 demonstrated EGFR+ metastatic adenocarcinoma of pulmonary origin, PD-L1 TPS < 1%. He consulted with Dr. Hart 6/10 and Dr. Weldon 6/18 for evaluation for definitive chemoradiation.\par \par However, brain MRI 6/18/21 demonstrated the following:\par 6 x 5 mm enhancing parenchymal lesion with surrounding edema in the left parietal lobe ().\par 4 x 4 mm enhancing parenchymal lesion with surrounding edema in the right frontal pericardium region (12-99). \par 6 x 4 mm enhancing parenchymal lesion with surrounding edema in the right superior cerebellar hemisphere (12-54). \par 2 mm enhancing parenchymal lesion with surrounding edema in the left cerebellar hemisphere (12-40). \par At the time of initial consult on 7/1/2021 he felt well. Denied headaches, focal weakness, nausea, vomiting. Started taking Tagrisso 80mg in late june and was tolerating it well. Follows with Dr. Magaña of neurology\par \par 10/5/2021- Mr. Mclaughlin presents today for follow up. Seen through TELEHEALTH for which he provides verbal consent on 10/5/2021 at 3:11 PM.\par MRI brain done 9/1/2021 showed Previously noted lesions involving the posterior fossa and supratentorial region are either no longer seen or decrease in size. This is likely compatible with response to therapy. Continue close interval follow up is recommended.\par \par CT CAP 9/1/2021 showed Right upper lobe lung mass has significantly decreased in size since 5/28/2021.\par Overall he is feeling well. He denies headaches, nausea, vomiting, focal weakness. having some skin issues on his toes while on tagrisso. joint pain has been better recently. \par \par 12/01/21 MRI Head IMPRESSION:  Previously noted residual neoplastic lesion in the LEFT parietal lobe to be stable measuring 2 mm. Previously noted metastatic lesion in the RIGHT caudate nucleus has resolved. The previously noted metastatic lesion in the medial RIGHT cerebellum now measures 3 mm. Incidental developmental venous anomaly seen in the RIGHT parietal lobe.\par \par 03/22/22: Pt presents for follow-up. MR Head (3/16/22) showing 2 mm lesion now seen in lateral LEFT cerebellum. Feeling well overall. Denies HA, numbness, dizziness, hearing/vision changes, cough, or chest pain. Remains active in everyday life. Continues on Tagrisso 80 mg with Dr. Hart with some GI upset, but overall tolerating well. \par \par 6/29/2022- Mr. Mclaughlin presents today for follow up. \par MRI brain 6/27/2022 showed Multiple new metastatic lesions measuring approximately 3 to 4 mm predominantly at the gray-white junction in the BILATERAL frontal, parietal and occipital lobes as well as the LEFT caudate nucleus, RIGHT basal ganglia and BILATERAL cerebellum. No significant edema is noted.\par 6/1 body imaging with Dr. Hart was stable. continues on osimertinib. notes a flare in joint pain recently. following with rheumatology. denies headaches, nausea, vomiting, focal weakness, numbness, tingling.\par \par 8/31/2022: Mr. Mclaughlin presents for follow up today. Repeat MRI head completed on 8/25/2022. No official read yet, but overall looks stable/improved. He continues to follow with Dr. Hart and continues on osimertinib. Feeling well overall. Tolerating Tagrisso without complaint. Denies HA, N/V, dizziness, loss of balance, or changes to hearing/vision.

## 2022-09-12 ENCOUNTER — OUTPATIENT (OUTPATIENT)
Dept: OUTPATIENT SERVICES | Facility: HOSPITAL | Age: 52
LOS: 1 days | Discharge: HOME | End: 2022-09-12

## 2022-09-12 ENCOUNTER — OUTPATIENT (OUTPATIENT)
Dept: OUTPATIENT SERVICES | Facility: HOSPITAL | Age: 52
LOS: 1 days | End: 2022-09-12
Payer: COMMERCIAL

## 2022-09-12 ENCOUNTER — APPOINTMENT (OUTPATIENT)
Dept: CT IMAGING | Facility: CLINIC | Age: 52
End: 2022-09-12

## 2022-09-12 DIAGNOSIS — C34.90 MALIGNANT NEOPLASM OF UNSPECIFIED PART OF UNSPECIFIED BRONCHUS OR LUNG: ICD-10-CM

## 2022-09-12 DIAGNOSIS — D64.9 ANEMIA, UNSPECIFIED: ICD-10-CM

## 2022-09-12 PROCEDURE — 74177 CT ABD & PELVIS W/CONTRAST: CPT

## 2022-09-12 PROCEDURE — 71260 CT THORAX DX C+: CPT

## 2022-09-12 PROCEDURE — 74177 CT ABD & PELVIS W/CONTRAST: CPT | Mod: 26

## 2022-09-12 PROCEDURE — 71260 CT THORAX DX C+: CPT | Mod: 26

## 2022-09-15 ENCOUNTER — RESULT REVIEW (OUTPATIENT)
Age: 52
End: 2022-09-15

## 2022-09-15 ENCOUNTER — APPOINTMENT (OUTPATIENT)
Dept: HEMATOLOGY ONCOLOGY | Facility: CLINIC | Age: 52
End: 2022-09-15

## 2022-09-15 VITALS
OXYGEN SATURATION: 99 % | BODY MASS INDEX: 31.22 KG/M2 | DIASTOLIC BLOOD PRESSURE: 83 MMHG | TEMPERATURE: 97.2 F | RESPIRATION RATE: 16 BRPM | SYSTOLIC BLOOD PRESSURE: 147 MMHG | WEIGHT: 210.76 LBS | HEART RATE: 75 BPM | HEIGHT: 69.02 IN

## 2022-09-15 LAB
BASOPHILS # BLD AUTO: 0.02 K/UL — SIGNIFICANT CHANGE UP (ref 0–0.2)
BASOPHILS NFR BLD AUTO: 0.4 % — SIGNIFICANT CHANGE UP (ref 0–2)
EOSINOPHIL # BLD AUTO: 0.04 K/UL — SIGNIFICANT CHANGE UP (ref 0–0.5)
EOSINOPHIL NFR BLD AUTO: 0.9 % — SIGNIFICANT CHANGE UP (ref 0–6)
HCT VFR BLD CALC: 39 % — SIGNIFICANT CHANGE UP (ref 39–50)
HGB BLD-MCNC: 11.7 G/DL — LOW (ref 13–17)
IMM GRANULOCYTES NFR BLD AUTO: 0.4 % — SIGNIFICANT CHANGE UP (ref 0–0.9)
LYMPHOCYTES # BLD AUTO: 1.03 K/UL — SIGNIFICANT CHANGE UP (ref 1–3.3)
LYMPHOCYTES # BLD AUTO: 22.8 % — SIGNIFICANT CHANGE UP (ref 13–44)
MCHC RBC-ENTMCNC: 19.4 PG — LOW (ref 27–34)
MCHC RBC-ENTMCNC: 30 G/DL — LOW (ref 32–36)
MCV RBC AUTO: 64.8 FL — LOW (ref 80–100)
MONOCYTES # BLD AUTO: 0.42 K/UL — SIGNIFICANT CHANGE UP (ref 0–0.9)
MONOCYTES NFR BLD AUTO: 9.3 % — SIGNIFICANT CHANGE UP (ref 2–14)
NEUTROPHILS # BLD AUTO: 2.98 K/UL — SIGNIFICANT CHANGE UP (ref 1.8–7.4)
NEUTROPHILS NFR BLD AUTO: 66.2 % — SIGNIFICANT CHANGE UP (ref 43–77)
NRBC # BLD: 0 /100 WBCS — SIGNIFICANT CHANGE UP (ref 0–0)
PLATELET # BLD AUTO: 151 K/UL — SIGNIFICANT CHANGE UP (ref 150–400)
RBC # BLD: 6.02 M/UL — HIGH (ref 4.2–5.8)
RBC # FLD: 18.2 % — HIGH (ref 10.3–14.5)
WBC # BLD: 4.51 K/UL — SIGNIFICANT CHANGE UP (ref 3.8–10.5)
WBC # FLD AUTO: 4.51 K/UL — SIGNIFICANT CHANGE UP (ref 3.8–10.5)

## 2022-09-15 PROCEDURE — 99215 OFFICE O/P EST HI 40 MIN: CPT

## 2022-09-16 LAB
ALBUMIN SERPL ELPH-MCNC: 4.7 G/DL
ALP BLD-CCNC: 46 U/L
ALT SERPL-CCNC: 27 U/L
ANION GAP SERPL CALC-SCNC: 16 MMOL/L
AST SERPL-CCNC: 29 U/L
BILIRUB SERPL-MCNC: 0.2 MG/DL
BUN SERPL-MCNC: 16 MG/DL
CALCIUM SERPL-MCNC: 9.7 MG/DL
CEA SERPL-MCNC: 1.5 NG/ML
CHLORIDE SERPL-SCNC: 106 MMOL/L
CO2 SERPL-SCNC: 20 MMOL/L
CREAT SERPL-MCNC: 0.95 MG/DL
EGFR: 98 ML/MIN/1.73M2
GLUCOSE SERPL-MCNC: 85 MG/DL
INR PPP: 2.63 RATIO
MAGNESIUM SERPL-MCNC: 2.2 MG/DL
POTASSIUM SERPL-SCNC: 4.6 MMOL/L
PROT SERPL-MCNC: 7.5 G/DL
PT BLD: 30.8 SEC
SODIUM SERPL-SCNC: 143 MMOL/L
TSH SERPL-ACNC: 1.96 UIU/ML

## 2022-09-17 NOTE — PHYSICAL EXAM
[Restricted in physically strenuous activity but ambulatory and able to carry out work of a light or sedentary nature] : Status 1- Restricted in physically strenuous activity but ambulatory and able to carry out work of a light or sedentary nature, e.g., light house work, office work [Normal] : affect appropriate [de-identified] : paronychia on left 2nd toe. Inflammation on skin surrounding nails and toenails

## 2022-09-17 NOTE — HISTORY OF PRESENT ILLNESS
[Disease: _____________________] : Disease: [unfilled] [T: ___] : T[unfilled] [N: ___] : N[unfilled] [M: ___] : M[unfilled] [AJCC Stage: ____] : AJCC Stage: [unfilled] [de-identified] : Mr. Mclaughlin with hx of sarcoidosis is a pleasant 51-year-old male who started having allergy-like symptoms (cough) 5 months ago. He saw his PCP, Dr. Mo- referred to Dr. Trujillo after he was noted to have an abnormal chest CAT scan. He was referred for PET/CT which was done on 5/23 which showed  FDG avid right upper lobe masslike consolidation, considerably increased in size and coalesced with adjacent right upper lobe nodules and new FDG avid supraclavicular, mediastinal and hilar lymphadenopathy, as compared to CT chest January 13, 2021 concerning for malignancy although progression of sarcoidosis is also a consideration. rt supra clav bx was c/w lung adeno ca. \par \par Of note, the patient has a complex medical history having had a diagnosis of sarcoid made in 2002 via a mediastinoscopy. At that time he was noted to have several pulmonary nodules but no treatment was required. 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 He was started on anticoagulation and a IVC filter was placed. He is also suspected of having lupus for which she is on hydroxychloroquine. He has a history of bronchial reactivity but is currently not on any inhaled bronchodilators. \par He is a lifelong nonsmoker without occupational exposures. Other than a dry cough, he feels well. He denies any fever or, chest discomfort, dyspnea or peripheral edema. \par \par 6/22/21: Here for follow up. No complaints. \par \par  7/14/21: Patient seen for routine follow-up.  Patient is currently getting treatment for lupus and remains on hydrochorloquine and coumadin for anti-Cardiolipin antibod.\par Patient verbalized pain in the left foot that radiated from the small toe to the sole of feet, he takes advil with satisfactory pain control.  Patient also verbalized joint pain \par that is related to lupus flare up.  Pt recently started on Tagrisso 80mg daily which he is tolerating well. Denies rash/ diarrhea. Pt met with Dr. Boudreaux for consideration of GK to brain mets. \par \par 9/3/21: Facial rash, gained weight, migrating joint pains better after starting steroids (prednisone 5 mg daily). Has had CT scans and MRI yesterday. \par \par 10/19/21: Feeling well. Mild facial rash, gained more weight. Restarted hydroxychloroquine for lupus with worsening joint pain in his wrists and fingers;  continues coumadin; plans f/u with his rheumatologist. Reports toe infection, on abx as per his podiatrist with improvement. Has f/u MRI Brain on 12/1. \par \par 12/7/21: Since August has been having toenail infection of R and L 1st and 2nd toes. Infection is painful, non pruritic. Has seen podiatrist who resected some of the toenails/skin. Using topical antibiotics with some improvement. \par \par 3/10/22: doing well. no new symptoms to report. Has some nail changes but all other AEs have improved. He does have episodic worsening of joint pains. Has gained some weight. \par \par 6/9/22: had a recent ER visit for acute swelling of rt knee and ankle- possibly related to occult trauma. Of note, pt is on Coumadin for VTE with optimal maintenance of INR. He has no other symptoms \par \par 9/15/22: Pt seem today for follow up. Continues on Tagrisso. Noted to have inflammation of the skin around nails and toe nails. Otherwise not having any additional AEs. Had CT scans which showed stable right upper lobe scarring at site of primary neoplasm. Stable 1.2 cm lingular nodule which could be scarring/atelectasis however as it is larger compared to more remote studies, 3 month follow-up is recommended. New subcentimeter right lower lobe groundglass nodule which can be reassessed on follow-up. Unchanged numerous predominantly subcentimeter retroperitoneal/pelvic lymph nodes.\par \par \par  [de-identified] : adeno ca

## 2022-09-17 NOTE — REVIEW OF SYSTEMS
[Negative] : Allergic/Immunologic [Fever] : no fever [Night Sweats] : no night sweats [Cough] : no cough [de-identified] : changes to skin around nails as above

## 2022-09-17 NOTE — ASSESSMENT
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 MRI at baseline shows four subcentimeter parenchymal enhancing lesions in the cerebral and cerebellar hemispheres as detailed in the body the report. Findings were suspicious for the presence of intracranial metastases. Started osimertinib in June 2021. 2 month scan on osi with excellent response in the CNS as well as lungs. CT C/A/P 12/02/21 with decreased RUL mass size. Scans from March 2022, reviewed personally- completely stable findings- sustained ND. Brain MRI read pending ANNALISE in December 2021. \par Scans done on 6/1 reviewed independently- sustained ND noted. Stable 1.1 cm right upper lobe nodule along a bandlike opacity. Stable mild tree-in-bud opacity within the lingula. The lungs are otherwise clear. No new or enlarging nodule. Numerous subcentimeter retroperitoneal lymph nodes are unchanged. Previously mentioned enlarged external iliac lymph nodes are not imaged on this exam. I also reviewed US doppler of LE which shows chronic DVT in rt LE. \par Of note, pt has microcytosis with mild anemia. He has a hx of thalassemia minor but is not sure what type. Plt count slightly low and PS shows giant platelets. pt likely has chronic ITP which is commonly associated with lupus. \par \par Scans from September 2022 reviewed and showed stable right upper lobe scarring, stable 1.2 cm lingular nodule which could be scarring/atelectasis. Unchanged retroperitoneal/pelvic lymph nodes.New subcentimeter right lower lobe groundglass nodule which can be reassessed on follow-up\par Educated on proper care of nails and skin surrounding nails. Informed to soak hands and feet in one to one ratio of water to vinegar.  Instructed to follow up with podiatry to manage left 2nd toe paronychia and other nail changes. \par Noted to have elevated blood pressure. Echo ordered to further evaluate. \par Labs today \par \par follow up in 6 weeks

## 2022-09-22 ENCOUNTER — NON-APPOINTMENT (OUTPATIENT)
Age: 52
End: 2022-09-22

## 2022-09-27 ENCOUNTER — APPOINTMENT (OUTPATIENT)
Dept: CARDIOLOGY | Facility: CLINIC | Age: 52
End: 2022-09-27

## 2022-09-27 PROCEDURE — 93356 MYOCRD STRAIN IMG SPCKL TRCK: CPT

## 2022-09-27 PROCEDURE — 93306 TTE W/DOPPLER COMPLETE: CPT

## 2022-10-17 ENCOUNTER — NON-APPOINTMENT (OUTPATIENT)
Age: 52
End: 2022-10-17

## 2022-11-02 ENCOUNTER — RX RENEWAL (OUTPATIENT)
Age: 52
End: 2022-11-02

## 2022-11-02 RX ORDER — SILDENAFIL 100 MG/1
100 TABLET, FILM COATED ORAL
Qty: 6 | Refills: 2 | Status: ACTIVE | COMMUNITY
Start: 2021-05-20 | End: 1900-01-01

## 2022-11-22 ENCOUNTER — OFFICE (OUTPATIENT)
Dept: URBAN - METROPOLITAN AREA CLINIC 109 | Facility: CLINIC | Age: 52
Setting detail: OPHTHALMOLOGY
End: 2022-11-22
Payer: COMMERCIAL

## 2022-11-22 DIAGNOSIS — H43.392: ICD-10-CM

## 2022-11-22 DIAGNOSIS — H02.401: ICD-10-CM

## 2022-11-22 DIAGNOSIS — Z79.899: ICD-10-CM

## 2022-11-22 PROCEDURE — 92083 EXTENDED VISUAL FIELD XM: CPT | Performed by: OPHTHALMOLOGY

## 2022-11-22 PROCEDURE — 92014 COMPRE OPH EXAM EST PT 1/>: CPT | Performed by: OPHTHALMOLOGY

## 2022-11-22 ASSESSMENT — SPHEQUIV_DERIVED
OS_SPHEQUIV: -2.375
OS_SPHEQUIV: -2.125
OD_SPHEQUIV: -2

## 2022-11-22 ASSESSMENT — CONFRONTATIONAL VISUAL FIELD TEST (CVF)
OS_FINDINGS: FULL
OD_FINDINGS: FULL

## 2022-11-22 ASSESSMENT — REFRACTION_MANIFEST
OS_SPHERE: -0.25
OS_CYLINDER: -3.75
OS_VA1: 20/20
OD_AXIS: 90
OD_SPHERE: PLANO
OD_VA1: 20/20
OS_AXIS: 85
OD_CYLINDER: -4.00

## 2022-11-22 ASSESSMENT — VISUAL ACUITY
OD_BCVA: 20/25-1
OS_BCVA: 20/25-1

## 2022-11-22 ASSESSMENT — REFRACTION_AUTOREFRACTION
OS_SPHERE: -0.50
OD_SPHERE: 0.00
OS_CYLINDER: -3.75
OS_AXIS: 085
OD_CYLINDER: -4.00
OD_AXIS: 085

## 2022-11-22 ASSESSMENT — TONOMETRY
OD_IOP_MMHG: 12
OS_IOP_MMHG: 14

## 2022-12-02 ENCOUNTER — OUTPATIENT (OUTPATIENT)
Dept: OUTPATIENT SERVICES | Facility: HOSPITAL | Age: 52
LOS: 1 days | End: 2022-12-02
Payer: COMMERCIAL

## 2022-12-02 ENCOUNTER — APPOINTMENT (OUTPATIENT)
Dept: MRI IMAGING | Facility: CLINIC | Age: 52
End: 2022-12-02

## 2022-12-02 DIAGNOSIS — C79.31 SECONDARY MALIGNANT NEOPLASM OF BRAIN: ICD-10-CM

## 2022-12-02 PROCEDURE — A9585: CPT

## 2022-12-02 PROCEDURE — 70553 MRI BRAIN STEM W/O & W/DYE: CPT | Mod: 26

## 2022-12-02 PROCEDURE — 70553 MRI BRAIN STEM W/O & W/DYE: CPT

## 2022-12-07 ENCOUNTER — APPOINTMENT (OUTPATIENT)
Dept: RADIATION ONCOLOGY | Facility: CLINIC | Age: 52
End: 2022-12-07

## 2022-12-07 PROCEDURE — 99443: CPT | Mod: 95

## 2022-12-07 NOTE — PHYSICAL EXAM
[Normal] : normoactive bowel sounds, soft and nontender, no hepatosplenomegaly or masses appreciated [de-identified] : seen through TELEPHONE, no exam

## 2022-12-07 NOTE — REVIEW OF SYSTEMS
[Negative] : Heme/Lymph [Confused] : no confusion [Dizziness] : no dizziness [Fainting] : no fainting [Difficulty Walking] : no difficulty walking

## 2022-12-09 ENCOUNTER — RESULT REVIEW (OUTPATIENT)
Age: 52
End: 2022-12-09

## 2022-12-09 ENCOUNTER — OUTPATIENT (OUTPATIENT)
Dept: OUTPATIENT SERVICES | Facility: HOSPITAL | Age: 52
LOS: 1 days | End: 2022-12-09
Payer: COMMERCIAL

## 2022-12-09 ENCOUNTER — APPOINTMENT (OUTPATIENT)
Dept: CT IMAGING | Facility: CLINIC | Age: 52
End: 2022-12-09

## 2022-12-09 DIAGNOSIS — C34.90 MALIGNANT NEOPLASM OF UNSPECIFIED PART OF UNSPECIFIED BRONCHUS OR LUNG: ICD-10-CM

## 2022-12-09 DIAGNOSIS — Z00.8 ENCOUNTER FOR OTHER GENERAL EXAMINATION: ICD-10-CM

## 2022-12-09 PROCEDURE — 74177 CT ABD & PELVIS W/CONTRAST: CPT | Mod: 26

## 2022-12-09 PROCEDURE — 74177 CT ABD & PELVIS W/CONTRAST: CPT

## 2022-12-09 PROCEDURE — 71260 CT THORAX DX C+: CPT

## 2022-12-09 PROCEDURE — 71260 CT THORAX DX C+: CPT | Mod: 26

## 2022-12-12 ENCOUNTER — OUTPATIENT (OUTPATIENT)
Dept: OUTPATIENT SERVICES | Facility: HOSPITAL | Age: 52
LOS: 1 days | Discharge: HOME | End: 2022-12-12

## 2022-12-12 ENCOUNTER — RX RENEWAL (OUTPATIENT)
Age: 52
End: 2022-12-12

## 2022-12-12 DIAGNOSIS — D64.9 ANEMIA, UNSPECIFIED: ICD-10-CM

## 2022-12-15 ENCOUNTER — APPOINTMENT (OUTPATIENT)
Dept: HEMATOLOGY ONCOLOGY | Facility: CLINIC | Age: 52
End: 2022-12-15
Payer: COMMERCIAL

## 2022-12-15 ENCOUNTER — RESULT REVIEW (OUTPATIENT)
Age: 52
End: 2022-12-15

## 2022-12-15 VITALS
RESPIRATION RATE: 16 BRPM | OXYGEN SATURATION: 98 % | BODY MASS INDEX: 30.95 KG/M2 | DIASTOLIC BLOOD PRESSURE: 82 MMHG | SYSTOLIC BLOOD PRESSURE: 141 MMHG | WEIGHT: 208.98 LBS | TEMPERATURE: 97.4 F | HEIGHT: 69 IN | HEART RATE: 76 BPM

## 2022-12-15 LAB
BASOPHILS # BLD AUTO: 0.02 K/UL — SIGNIFICANT CHANGE UP (ref 0–0.2)
BASOPHILS NFR BLD AUTO: 0.5 % — SIGNIFICANT CHANGE UP (ref 0–2)
CEA SERPL-MCNC: 1.7 NG/ML
EOSINOPHIL # BLD AUTO: 0.08 K/UL — SIGNIFICANT CHANGE UP (ref 0–0.5)
EOSINOPHIL NFR BLD AUTO: 2 % — SIGNIFICANT CHANGE UP (ref 0–6)
HCT VFR BLD CALC: 39 % — SIGNIFICANT CHANGE UP (ref 39–50)
HGB BLD-MCNC: 11.7 G/DL — LOW (ref 13–17)
IMM GRANULOCYTES NFR BLD AUTO: 0.2 % — SIGNIFICANT CHANGE UP (ref 0–0.9)
INR PPP: 2.63 RATIO
LYMPHOCYTES # BLD AUTO: 0.84 K/UL — LOW (ref 1–3.3)
LYMPHOCYTES # BLD AUTO: 20.6 % — SIGNIFICANT CHANGE UP (ref 13–44)
MCHC RBC-ENTMCNC: 19.3 PG — LOW (ref 27–34)
MCHC RBC-ENTMCNC: 30 G/DL — LOW (ref 32–36)
MCV RBC AUTO: 64.3 FL — LOW (ref 80–100)
MONOCYTES # BLD AUTO: 0.53 K/UL — SIGNIFICANT CHANGE UP (ref 0–0.9)
MONOCYTES NFR BLD AUTO: 13 % — SIGNIFICANT CHANGE UP (ref 2–14)
NEUTROPHILS # BLD AUTO: 2.59 K/UL — SIGNIFICANT CHANGE UP (ref 1.8–7.4)
NEUTROPHILS NFR BLD AUTO: 63.7 % — SIGNIFICANT CHANGE UP (ref 43–77)
NRBC # BLD: 0 /100 WBCS — SIGNIFICANT CHANGE UP (ref 0–0)
PLATELET # BLD AUTO: 154 K/UL — SIGNIFICANT CHANGE UP (ref 150–400)
PT BLD: 30.8 SEC
RBC # BLD: 6.07 M/UL — HIGH (ref 4.2–5.8)
RBC # FLD: 18.5 % — HIGH (ref 10.3–14.5)
WBC # BLD: 4.07 K/UL — SIGNIFICANT CHANGE UP (ref 3.8–10.5)
WBC # FLD AUTO: 4.07 K/UL — SIGNIFICANT CHANGE UP (ref 3.8–10.5)

## 2022-12-15 PROCEDURE — 99215 OFFICE O/P EST HI 40 MIN: CPT

## 2022-12-15 NOTE — PHYSICAL EXAM
[Restricted in physically strenuous activity but ambulatory and able to carry out work of a light or sedentary nature] : Status 1- Restricted in physically strenuous activity but ambulatory and able to carry out work of a light or sedentary nature, e.g., light house work, office work [Normal] : affect appropriate [de-identified] : paronychia on left 2nd toe. Inflammation on skin surrounding nails and toenails

## 2022-12-15 NOTE — HISTORY OF PRESENT ILLNESS
[Disease: _____________________] : Disease: [unfilled] [T: ___] : T[unfilled] [N: ___] : N[unfilled] [M: ___] : M[unfilled] [AJCC Stage: ____] : AJCC Stage: [unfilled] [de-identified] : Mr. Mclaughlin with hx of sarcoidosis is a pleasant 52-year-old male who started having allergy-like symptoms (cough) 5 months ago. He saw his PCP, Dr. Mo- referred to Dr. Trujillo after he was noted to have an abnormal chest CAT scan. He was referred for PET/CT which was done on 5/23 which showed  FDG avid right upper lobe masslike consolidation, considerably increased in size and coalesced with adjacent right upper lobe nodules and new FDG avid supraclavicular, mediastinal and hilar lymphadenopathy, as compared to CT chest January 13, 2021 concerning for malignancy although progression of sarcoidosis is also a consideration. rt supra clav bx was c/w lung adeno ca. \par \par Of note, the patient has a complex medical history having had a diagnosis of sarcoid made in 2002 via a mediastinoscopy. At that time he was noted to have several pulmonary nodules but no treatment was required. 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 He was started on anticoagulation and a IVC filter was placed. He is also suspected of having lupus for which she is on hydroxychloroquine. He has a history of bronchial reactivity but is currently not on any inhaled bronchodilators. \par He is a lifelong nonsmoker without occupational exposures. Other than a dry cough, he feels well. He denies any fever or, chest discomfort, dyspnea or peripheral edema. \par \par 6/22/21: Here for follow up. No complaints. \par \par  7/14/21: Patient seen for routine follow-up.  Patient is currently getting treatment for lupus and remains on hydrochorloquine and coumadin for anti-Cardiolipin antibod.\par Patient verbalized pain in the left foot that radiated from the small toe to the sole of feet, he takes advil with satisfactory pain control.  Patient also verbalized joint pain \par that is related to lupus flare up.  Pt recently started on Tagrisso 80mg daily which he is tolerating well. Denies rash/ diarrhea. Pt met with Dr. Boudreaux for consideration of GK to brain mets. \par \par 9/3/21: Facial rash, gained weight, migrating joint pains better after starting steroids (prednisone 5 mg daily). Has had CT scans and MRI yesterday. \par \par 10/19/21: Feeling well. Mild facial rash, gained more weight. Restarted hydroxychloroquine for lupus with worsening joint pain in his wrists and fingers;  continues coumadin; plans f/u with his rheumatologist. Reports toe infection, on abx as per his podiatrist with improvement. Has f/u MRI Brain on 12/1. \par \par 12/7/21: Since August has been having toenail infection of R and L 1st and 2nd toes. Infection is painful, non pruritic. Has seen podiatrist who resected some of the toenails/skin. Using topical antibiotics with some improvement. \par \par 3/10/22: doing well. no new symptoms to report. Has some nail changes but all other AEs have improved. He does have episodic worsening of joint pains. Has gained some weight. \par \par 6/9/22: had a recent ER visit for acute swelling of rt knee and ankle- possibly related to occult trauma. Of note, pt is on Coumadin for VTE with optimal maintenance of INR. He has no other symptoms \par \par 9/15/22: Pt seem today for follow up. Continues on Tagrisso. Noted to have inflammation of the skin around nails and toe nails. Otherwise not having any additional AEs. Had CT scans which showed stable right upper lobe scarring at site of primary neoplasm. Stable 1.2 cm lingular nodule which could be scarring/atelectasis however as it is larger compared to more remote studies, 3 month follow-up is recommended. New subcentimeter right lower lobe groundglass nodule which can be reassessed on follow-up. Unchanged numerous predominantly subcentimeter retroperitoneal/pelvic lymph nodes.\par \par 12/15/22: Here for follow up.Continues on Tagrisso at 160 mg. Feet improved. cracked skin around finger nails. no diarrhea. Taking coumadin \par \par  [de-identified] : adeno ca

## 2022-12-15 NOTE — ASSESSMENT
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 MRI at baseline shows four subcentimeter parenchymal enhancing lesions in the cerebral and cerebellar hemispheres as detailed in the body the report. Findings were suspicious for the presence of intracranial metastases. Started Osimertinib in June 2021. 2 month scan on osi with excellent response in the CNS as well as lungs. CT C/A/P 12/02/21 with decreased RUL mass size. Scans from March 2022, reviewed personally- completely stable findings- sustained TN. Brain MRI read pending ANNALISE in December 2021. \par Scans done on 6/1 reviewed independently- sustained TN noted. Stable 1.1 cm right upper lobe nodule along a bandlike opacity. Stable mild tree-in-bud opacity within the lingula. The lungs are otherwise clear. No new or enlarging nodule. Numerous subcentimeter retroperitoneal lymph nodes are unchanged. Previously mentioned enlarged external iliac lymph nodes are not imaged on this exam. I also reviewed US doppler of LE which shows chronic DVT in rt LE. \par Of note, pt has microcytosis with mild anemia. He has a hx of thalassemia minor but is not sure what type. Plt count slightly low and PS shows giant platelets. pt likely has chronic ITP which is commonly associated with lupus. \par \par Scans from September 2022 reviewed and showed stable right upper lobe scarring, stable 1.2 cm lingular nodule which could be scarring/atelectasis. Unchanged retroperitoneal/pelvic lymph nodes.New subcentimeter right lower lobe ground glass nodule which can be reassessed on follow-up\par I reviewed the scans from December 2022 which no new or enlarging lung nodule. Mild patchy ground glass opacity within lateral segment of right middle lobe likely inflammatory. Stable scarring within right upper lobe.Stable subcentimeter retroperitoneal lymph nodes. Pt states he had URI symptoms and other members sick at home. will check COVID swab. No tx needed\par MRI brain also reviewed. Excellent response. \par Educated on proper care of nails and skin surrounding nails. Informed to soak hands and feet in one to one ratio of water to vinegar.  Instructed to follow up with podiatry to manage left 2nd toe paronychia and other nail changes. \par Noted to have elevated blood pressure. Echo ordered to further evaluate. \par Labs today \par follow up in 6 weeks

## 2022-12-15 NOTE — REVIEW OF SYSTEMS
[Fever] : no fever [Night Sweats] : no night sweats [Cough] : no cough [Negative] : Allergic/Immunologic [de-identified] : changes to skin around nails as above

## 2022-12-16 ENCOUNTER — NON-APPOINTMENT (OUTPATIENT)
Age: 52
End: 2022-12-16

## 2022-12-16 LAB
ALBUMIN SERPL ELPH-MCNC: 4.7 G/DL
ALP BLD-CCNC: 47 U/L
ALT SERPL-CCNC: 32 U/L
ANION GAP SERPL CALC-SCNC: 13 MMOL/L
AST SERPL-CCNC: 29 U/L
BILIRUB SERPL-MCNC: 0.2 MG/DL
BUN SERPL-MCNC: 16 MG/DL
CALCIUM SERPL-MCNC: 9.8 MG/DL
CHLORIDE SERPL-SCNC: 105 MMOL/L
CO2 SERPL-SCNC: 22 MMOL/L
CREAT SERPL-MCNC: 0.99 MG/DL
EGFR: 93 ML/MIN/1.73M2
GLUCOSE SERPL-MCNC: 91 MG/DL
MAGNESIUM SERPL-MCNC: 2.2 MG/DL
POTASSIUM SERPL-SCNC: 4.7 MMOL/L
PROT SERPL-MCNC: 7.5 G/DL
SARS-COV-2 N GENE NPH QL NAA+PROBE: DETECTED
SODIUM SERPL-SCNC: 140 MMOL/L
TSH SERPL-ACNC: 1.76 UIU/ML

## 2023-01-02 ENCOUNTER — NON-APPOINTMENT (OUTPATIENT)
Age: 53
End: 2023-01-02

## 2023-01-30 RX ORDER — OSIMERTINIB 80 1/1
80 TABLET, FILM COATED ORAL DAILY
Qty: 60 | Refills: 8 | Status: DISCONTINUED | COMMUNITY
Start: 2022-06-29 | End: 2023-01-30

## 2023-02-01 ENCOUNTER — RX RENEWAL (OUTPATIENT)
Age: 53
End: 2023-02-01

## 2023-02-02 ENCOUNTER — RX RENEWAL (OUTPATIENT)
Age: 53
End: 2023-02-02

## 2023-03-01 ENCOUNTER — RESULT REVIEW (OUTPATIENT)
Age: 53
End: 2023-03-01

## 2023-03-01 ENCOUNTER — OUTPATIENT (OUTPATIENT)
Dept: OUTPATIENT SERVICES | Facility: HOSPITAL | Age: 53
LOS: 1 days | Discharge: HOME | End: 2023-03-01

## 2023-03-01 DIAGNOSIS — D64.9 ANEMIA, UNSPECIFIED: ICD-10-CM

## 2023-03-06 ENCOUNTER — OUTPATIENT (OUTPATIENT)
Dept: OUTPATIENT SERVICES | Facility: HOSPITAL | Age: 53
LOS: 1 days | End: 2023-03-06
Payer: COMMERCIAL

## 2023-03-06 ENCOUNTER — APPOINTMENT (OUTPATIENT)
Dept: CT IMAGING | Facility: CLINIC | Age: 53
End: 2023-03-06
Payer: COMMERCIAL

## 2023-03-06 DIAGNOSIS — C34.90 MALIGNANT NEOPLASM OF UNSPECIFIED PART OF UNSPECIFIED BRONCHUS OR LUNG: ICD-10-CM

## 2023-03-06 DIAGNOSIS — C79.31 SECONDARY MALIGNANT NEOPLASM OF BRAIN: ICD-10-CM

## 2023-03-06 PROCEDURE — 74160 CT ABDOMEN W/CONTRAST: CPT | Mod: 26

## 2023-03-06 PROCEDURE — 71260 CT THORAX DX C+: CPT | Mod: 26

## 2023-03-06 PROCEDURE — 74160 CT ABDOMEN W/CONTRAST: CPT

## 2023-03-06 PROCEDURE — 71260 CT THORAX DX C+: CPT

## 2023-03-09 ENCOUNTER — RESULT REVIEW (OUTPATIENT)
Age: 53
End: 2023-03-09

## 2023-03-09 ENCOUNTER — APPOINTMENT (OUTPATIENT)
Dept: HEMATOLOGY ONCOLOGY | Facility: CLINIC | Age: 53
End: 2023-03-09
Payer: COMMERCIAL

## 2023-03-09 ENCOUNTER — APPOINTMENT (OUTPATIENT)
Dept: HEMATOLOGY ONCOLOGY | Facility: CLINIC | Age: 53
End: 2023-03-09

## 2023-03-09 VITALS
TEMPERATURE: 97.7 F | OXYGEN SATURATION: 98 % | BODY MASS INDEX: 30.34 KG/M2 | RESPIRATION RATE: 16 BRPM | SYSTOLIC BLOOD PRESSURE: 123 MMHG | DIASTOLIC BLOOD PRESSURE: 85 MMHG | WEIGHT: 205.47 LBS | HEART RATE: 68 BPM

## 2023-03-09 DIAGNOSIS — R59.9 ENLARGED LYMPH NODES, UNSPECIFIED: ICD-10-CM

## 2023-03-09 LAB
BASOPHILS # BLD AUTO: 0.03 K/UL — SIGNIFICANT CHANGE UP (ref 0–0.2)
BASOPHILS NFR BLD AUTO: 0.5 % — SIGNIFICANT CHANGE UP (ref 0–2)
EOSINOPHIL # BLD AUTO: 0.09 K/UL — SIGNIFICANT CHANGE UP (ref 0–0.5)
EOSINOPHIL NFR BLD AUTO: 1.5 % — SIGNIFICANT CHANGE UP (ref 0–6)
HCT VFR BLD CALC: 41.2 % — SIGNIFICANT CHANGE UP (ref 39–50)
HGB BLD-MCNC: 12.3 G/DL — LOW (ref 13–17)
IMM GRANULOCYTES NFR BLD AUTO: 0.7 % — SIGNIFICANT CHANGE UP (ref 0–0.9)
LYMPHOCYTES # BLD AUTO: 1.18 K/UL — SIGNIFICANT CHANGE UP (ref 1–3.3)
LYMPHOCYTES # BLD AUTO: 19.3 % — SIGNIFICANT CHANGE UP (ref 13–44)
MCHC RBC-ENTMCNC: 19.2 PG — LOW (ref 27–34)
MCHC RBC-ENTMCNC: 29.9 G/DL — LOW (ref 32–36)
MCV RBC AUTO: 64.5 FL — LOW (ref 80–100)
MONOCYTES # BLD AUTO: 0.6 K/UL — SIGNIFICANT CHANGE UP (ref 0–0.9)
MONOCYTES NFR BLD AUTO: 9.8 % — SIGNIFICANT CHANGE UP (ref 2–14)
NEUTROPHILS # BLD AUTO: 4.16 K/UL — SIGNIFICANT CHANGE UP (ref 1.8–7.4)
NEUTROPHILS NFR BLD AUTO: 68.2 % — SIGNIFICANT CHANGE UP (ref 43–77)
NRBC # BLD: 0 /100 WBCS — SIGNIFICANT CHANGE UP (ref 0–0)
PLATELET # BLD AUTO: 174 K/UL — SIGNIFICANT CHANGE UP (ref 150–400)
RBC # BLD: 6.39 M/UL — HIGH (ref 4.2–5.8)
RBC # FLD: 19.9 % — HIGH (ref 10.3–14.5)
WBC # BLD: 6.1 K/UL — SIGNIFICANT CHANGE UP (ref 3.8–10.5)
WBC # FLD AUTO: 6.1 K/UL — SIGNIFICANT CHANGE UP (ref 3.8–10.5)

## 2023-03-09 PROCEDURE — 99215 OFFICE O/P EST HI 40 MIN: CPT

## 2023-03-09 NOTE — PHYSICAL EXAM
[Restricted in physically strenuous activity but ambulatory and able to carry out work of a light or sedentary nature] : Status 1- Restricted in physically strenuous activity but ambulatory and able to carry out work of a light or sedentary nature, e.g., light house work, office work [Normal] : full range of motion and no deformities appreciated [de-identified] : paronychia on left 2nd toe. Inflammation on skin surrounding nails and toenails

## 2023-03-09 NOTE — ASSESSMENT
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 MRI at baseline shows four subcentimeter parenchymal enhancing lesions in the cerebral and cerebellar hemispheres as detailed in the body the report. Findings were suspicious for the presence of intracranial metastases. Started Osimertinib in June 2021. 2 month scan on osi with excellent response in the CNS as well as lungs. CT C/A/P 12/02/21 with decreased RUL mass size. Scans from March 2022, reviewed personally- completely stable findings- sustained WA. Brain MRI read pending ANNALISE in December 2021. \par Scans done on 6/1 reviewed independently- sustained WA noted. Stable 1.1 cm right upper lobe nodule along a bandlike opacity. Stable mild tree-in-bud opacity within the lingula. The lungs are otherwise clear. No new or enlarging nodule. Numerous subcentimeter retroperitoneal lymph nodes are unchanged. Previously mentioned enlarged external iliac lymph nodes are not imaged on this exam. I also reviewed US doppler of LE which shows chronic DVT in rt LE. \par Of note, pt has microcytosis with mild anemia. He has a hx of thalassemia minor but is not sure what type. Plt count slightly low and PS shows giant platelets. pt likely has chronic ITP which is commonly associated with lupus. \par \par Scans from September 2022 reviewed and showed stable right upper lobe scarring, stable 1.2 cm lingular nodule which could be scarring/atelectasis. Unchanged retroperitoneal/pelvic lymph nodes.New subcentimeter right lower lobe ground glass nodule which can be reassessed on follow-up\par I reviewed the scans from December 2022 which no new or enlarging lung nodule. Mild patchy ground glass opacity within lateral segment of right middle lobe likely inflammatory. Stable scarring within right upper lobe.Stable subcentimeter retroperitoneal lymph nodes. Pt states he had URI symptoms and other members sick at home. will check COVID swab. No tx needed\par MRI brain also reviewed. Excellent response. \par Educated on proper care of nails and skin surrounding nails. Informed to soak hands and feet in one to one ratio of water to vinegar.  Instructed to follow up with podiatry to manage left 2nd toe paronychia and other nail changes. \par Noted to have elevated blood pressure. Echo ordered to further evaluate. \par Labs today \par follow up in 6 weeks

## 2023-03-09 NOTE — HISTORY OF PRESENT ILLNESS
[Disease: _____________________] : Disease: [unfilled] [T: ___] : T[unfilled] [N: ___] : N[unfilled] [M: ___] : M[unfilled] [AJCC Stage: ____] : AJCC Stage: [unfilled] [de-identified] : Mr. Mclaughlin with hx of sarcoidosis is a pleasant 52-year-old male who started having allergy-like symptoms (cough) 5 months ago. He saw his PCP, Dr. Mo- referred to Dr. Trujillo after he was noted to have an abnormal chest CAT scan. He was referred for PET/CT which was done on 5/23 which showed  FDG avid right upper lobe masslike consolidation, considerably increased in size and coalesced with adjacent right upper lobe nodules and new FDG avid supraclavicular, mediastinal and hilar lymphadenopathy, as compared to CT chest January 13, 2021 concerning for malignancy although progression of sarcoidosis is also a consideration. rt supra clav bx was c/w lung adeno ca. \par \par Of note, the patient has a complex medical history having had a diagnosis of sarcoid made in 2002 via a mediastinoscopy. At that time he was noted to have several pulmonary nodules but no treatment was required. 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 He was started on anticoagulation and a IVC filter was placed. He is also suspected of having lupus for which she is on hydroxychloroquine. He has a history of bronchial reactivity but is currently not on any inhaled bronchodilators. \par He is a lifelong nonsmoker without occupational exposures. Other than a dry cough, he feels well. He denies any fever or, chest discomfort, dyspnea or peripheral edema. \par \par 6/22/21: Here for follow up. No complaints. \par \par  7/14/21: Patient seen for routine follow-up.  Patient is currently getting treatment for lupus and remains on hydrochorloquine and coumadin for anti-Cardiolipin antibod.\par Patient verbalized pain in the left foot that radiated from the small toe to the sole of feet, he takes advil with satisfactory pain control.  Patient also verbalized joint pain \par that is related to lupus flare up.  Pt recently started on Tagrisso 80mg daily which he is tolerating well. Denies rash/ diarrhea. Pt met with Dr. Boudreaux for consideration of GK to brain mets. \par \par 9/3/21: Facial rash, gained weight, migrating joint pains better after starting steroids (prednisone 5 mg daily). Has had CT scans and MRI yesterday. \par \par 10/19/21: Feeling well. Mild facial rash, gained more weight. Restarted hydroxychloroquine for lupus with worsening joint pain in his wrists and fingers;  continues coumadin; plans f/u with his rheumatologist. Reports toe infection, on abx as per his podiatrist with improvement. Has f/u MRI Brain on 12/1. \par \par 12/7/21: Since August has been having toenail infection of R and L 1st and 2nd toes. Infection is painful, non pruritic. Has seen podiatrist who resected some of the toenails/skin. Using topical antibiotics with some improvement. \par \par 3/10/22: doing well. no new symptoms to report. Has some nail changes but all other AEs have improved. He does have episodic worsening of joint pains. Has gained some weight. \par \par 6/9/22: had a recent ER visit for acute swelling of rt knee and ankle- possibly related to occult trauma. Of note, pt is on Coumadin for VTE with optimal maintenance of INR. He has no other symptoms \par \par 9/15/22: Pt seem today for follow up. Continues on Tagrisso. Noted to have inflammation of the skin around nails and toe nails. Otherwise not having any additional AEs. Had CT scans which showed stable right upper lobe scarring at site of primary neoplasm. Stable 1.2 cm lingular nodule which could be scarring/atelectasis however as it is larger compared to more remote studies, 3 month follow-up is recommended. New subcentimeter right lower lobe groundglass nodule which can be reassessed on follow-up. Unchanged numerous predominantly subcentimeter retroperitoneal/pelvic lymph nodes.\par \par 12/15/22: Here for follow up.Continues on Tagrisso at 160 mg. Feet improved. cracked skin around finger nails. no diarrhea. Taking coumadin \par \par  [de-identified] : adeno ca

## 2023-03-09 NOTE — ASSESSMENT
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 MRI at baseline shows four subcentimeter parenchymal enhancing lesions in the cerebral and cerebellar hemispheres as detailed in the body the report. Findings were suspicious for the presence of intracranial metastases. Started Osimertinib in June 2021. 2 month scan on osi with excellent response in the CNS as well as lungs. CT C/A/P 12/02/21 with decreased RUL mass size. Scans from March 2022, reviewed personally- completely stable findings- sustained KY. Brain MRI read pending ANNALISE in December 2021. \par Scans done on 6/1 reviewed independently- sustained KY noted. Stable 1.1 cm right upper lobe nodule along a bandlike opacity. Stable mild tree-in-bud opacity within the lingula. The lungs are otherwise clear. No new or enlarging nodule. Numerous subcentimeter retroperitoneal lymph nodes are unchanged. Previously mentioned enlarged external iliac lymph nodes are not imaged on this exam. I also reviewed US doppler of LE which shows chronic DVT in rt LE. \par Of note, pt has microcytosis with mild anemia. He has a hx of thalassemia minor but is not sure what type. Plt count slightly low and PS shows giant platelets. pt likely has chronic ITP which is commonly associated with lupus. \par \par Scans from September 2022 reviewed and showed stable right upper lobe scarring, stable 1.2 cm lingular nodule which could be scarring/atelectasis. Unchanged retroperitoneal/pelvic lymph nodes.New subcentimeter right lower lobe ground glass nodule which can be reassessed on follow-up\par I reviewed the scans from December 2022 which no new or enlarging lung nodule. Mild patchy ground glass opacity within lateral segment of right middle lobe likely inflammatory. Stable scarring within right upper lobe.Stable subcentimeter retroperitoneal lymph nodes. Pt states he had URI symptoms and other members sick at home. will check COVID swab. No tx needed\par MRI brain also reviewed. Excellent response. \par Educated on proper care of nails and skin surrounding nails. Informed to soak hands and feet in one to one ratio of water to vinegar.  Instructed to follow up with podiatry to manage left 2nd toe paronychia and other nail changes. \par Noted to have elevated blood pressure. Echo ordered to further evaluate. \par Labs today \par follow up in 6 weeks

## 2023-03-09 NOTE — HISTORY OF PRESENT ILLNESS
[Disease: _____________________] : Disease: [unfilled] [T: ___] : T[unfilled] [N: ___] : N[unfilled] [M: ___] : M[unfilled] [AJCC Stage: ____] : AJCC Stage: [unfilled] [de-identified] : Mr. Mclaughlin with hx of sarcoidosis is a pleasant 52-year-old male who started having allergy-like symptoms (cough) 5 months ago. He saw his PCP, Dr. Mo- referred to Dr. Trujillo after he was noted to have an abnormal chest CAT scan. He was referred for PET/CT which was done on 5/23 which showed  FDG avid right upper lobe masslike consolidation, considerably increased in size and coalesced with adjacent right upper lobe nodules and new FDG avid supraclavicular, mediastinal and hilar lymphadenopathy, as compared to CT chest January 13, 2021 concerning for malignancy although progression of sarcoidosis is also a consideration. rt supra clav bx was c/w lung adeno ca. \par \par Of note, the patient has a complex medical history having had a diagnosis of sarcoid made in 2002 via a mediastinoscopy. At that time he was noted to have several pulmonary nodules but no treatment was required. 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 He was started on anticoagulation and a IVC filter was placed. He is also suspected of having lupus for which she is on hydroxychloroquine. He has a history of bronchial reactivity but is currently not on any inhaled bronchodilators. \par He is a lifelong nonsmoker without occupational exposures. Other than a dry cough, he feels well. He denies any fever or, chest discomfort, dyspnea or peripheral edema. \par \par 6/22/21: Here for follow up. No complaints. \par \par  7/14/21: Patient seen for routine follow-up.  Patient is currently getting treatment for lupus and remains on hydrochorloquine and coumadin for anti-Cardiolipin antibod.\par Patient verbalized pain in the left foot that radiated from the small toe to the sole of feet, he takes advil with satisfactory pain control.  Patient also verbalized joint pain \par that is related to lupus flare up.  Pt recently started on Tagrisso 80mg daily which he is tolerating well. Denies rash/ diarrhea. Pt met with Dr. Boudreaux for consideration of GK to brain mets. \par \par 9/3/21: Facial rash, gained weight, migrating joint pains better after starting steroids (prednisone 5 mg daily). Has had CT scans and MRI yesterday. \par \par 10/19/21: Feeling well. Mild facial rash, gained more weight. Restarted hydroxychloroquine for lupus with worsening joint pain in his wrists and fingers;  continues coumadin; plans f/u with his rheumatologist. Reports toe infection, on abx as per his podiatrist with improvement. Has f/u MRI Brain on 12/1. \par \par 12/7/21: Since August has been having toenail infection of R and L 1st and 2nd toes. Infection is painful, non pruritic. Has seen podiatrist who resected some of the toenails/skin. Using topical antibiotics with some improvement. \par \par 3/10/22: doing well. no new symptoms to report. Has some nail changes but all other AEs have improved. He does have episodic worsening of joint pains. Has gained some weight. \par \par 6/9/22: had a recent ER visit for acute swelling of rt knee and ankle- possibly related to occult trauma. Of note, pt is on Coumadin for VTE with optimal maintenance of INR. He has no other symptoms \par \par 9/15/22: Pt seem today for follow up. Continues on Tagrisso. Noted to have inflammation of the skin around nails and toe nails. Otherwise not having any additional AEs. Had CT scans which showed stable right upper lobe scarring at site of primary neoplasm. Stable 1.2 cm lingular nodule which could be scarring/atelectasis however as it is larger compared to more remote studies, 3 month follow-up is recommended. New subcentimeter right lower lobe groundglass nodule which can be reassessed on follow-up. Unchanged numerous predominantly subcentimeter retroperitoneal/pelvic lymph nodes.\par \par 12/15/22: Here for follow up.Continues on Tagrisso at 160 mg. Feet improved. cracked skin around finger nails. no diarrhea. Taking coumadin \par \par  [de-identified] : adeno ca

## 2023-03-09 NOTE — REVIEW OF SYSTEMS
[Negative] : Allergic/Immunologic [FreeTextEntry9] : as above [Fever] : no fever [Night Sweats] : no night sweats [Cough] : no cough [de-identified] : changes to skin around nails as above

## 2023-03-09 NOTE — PHYSICAL EXAM
[Restricted in physically strenuous activity but ambulatory and able to carry out work of a light or sedentary nature] : Status 1- Restricted in physically strenuous activity but ambulatory and able to carry out work of a light or sedentary nature, e.g., light house work, office work [Normal] : full range of motion and no deformities appreciated [de-identified] : paronychia on left 2nd toe. Inflammation on skin surrounding nails and toenails

## 2023-03-09 NOTE — REVIEW OF SYSTEMS
[Negative] : Allergic/Immunologic [FreeTextEntry9] : as above [Fever] : no fever [Night Sweats] : no night sweats [Cough] : no cough [de-identified] : changes to skin around nails as above

## 2023-03-10 LAB
ALBUMIN SERPL ELPH-MCNC: 4.9 G/DL
ALP BLD-CCNC: 45 U/L
ALT SERPL-CCNC: 27 U/L
ANION GAP SERPL CALC-SCNC: 20 MMOL/L
AST SERPL-CCNC: 22 U/L
BILIRUB SERPL-MCNC: 0.2 MG/DL
BUN SERPL-MCNC: 23 MG/DL
CALCIUM SERPL-MCNC: 10.1 MG/DL
CEA SERPL-MCNC: 1.5 NG/ML
CHLORIDE SERPL-SCNC: 105 MMOL/L
CO2 SERPL-SCNC: 18 MMOL/L
CREAT SERPL-MCNC: 1.07 MG/DL
EGFR: 85 ML/MIN/1.73M2
GLUCOSE SERPL-MCNC: 94 MG/DL
INR PPP: 2.66 RATIO
MAGNESIUM SERPL-MCNC: 2.3 MG/DL
POTASSIUM SERPL-SCNC: 4.6 MMOL/L
PROT SERPL-MCNC: 7.6 G/DL
PT BLD: 31.2 SEC
SODIUM SERPL-SCNC: 143 MMOL/L
TSH SERPL-ACNC: 2.09 UIU/ML

## 2023-03-11 ENCOUNTER — APPOINTMENT (OUTPATIENT)
Dept: MRI IMAGING | Facility: IMAGING CENTER | Age: 53
End: 2023-03-11

## 2023-03-13 ENCOUNTER — APPOINTMENT (OUTPATIENT)
Dept: ORTHOPEDIC SURGERY | Facility: CLINIC | Age: 53
End: 2023-03-13
Payer: COMMERCIAL

## 2023-03-13 VITALS — WEIGHT: 200 LBS | BODY MASS INDEX: 29.62 KG/M2 | HEIGHT: 69 IN

## 2023-03-13 DIAGNOSIS — M51.36 OTHER INTERVERTEBRAL DISC DEGENERATION, LUMBAR REGION: ICD-10-CM

## 2023-03-13 PROCEDURE — 99204 OFFICE O/P NEW MOD 45 MIN: CPT

## 2023-03-13 PROCEDURE — 72100 X-RAY EXAM L-S SPINE 2/3 VWS: CPT

## 2023-03-13 NOTE — PHYSICAL EXAM
[Normal] : Gait: normal [Heller's Sign] : negative Heller's sign [Pronator Drift] : negative pronator drift [SLR] : negative straight leg raise [de-identified] : 5 out of 5 motor strength,sensation is intact and symmetrical full range of motion flexion extension and rotation, no palpatory tenderness full range of motion of hips knees shoulders and elbows (all four extremities), no atrophy, negative straight leg raise, no pathological reflexes, no swelling, normal ambulation, no apparent distress skin intact, no palpable lymph nodes, no upper or lower extremity instability, alert and oriented x 3 and normal mood. Normal finger-to nose test.\par Pain over bilateral SI joints. [de-identified] : XR AP Lat Lumbar 03/13/2023 -Bilateral hip arthritis R>L, mild lumbar disc degenerative disease- reviewed with patient.

## 2023-03-13 NOTE — HISTORY OF PRESENT ILLNESS
[Improving] : improving [de-identified] : Pt is a 52 year old male who presents for evaluation of lower back pain x 2 weeks. \par He was pushing snow with a shovel and felt the pain.\par Denies radiation of pain down the legs.\par Denies numbness/tingling. \par Standing, walking, sitting and bending aggravates the pain.\par Had taken advil and was prescribed MDP by his PCP and had some relief. \par Had also tried chiropractic care for the past two weeks which also helped up until this past weekend. \par Had received trigger point injections last week by his neurologist Dr. Nissenbaum which helped but pain returned this past weekend. \par History of lung cancer, on tagrisso. \par Also on coumadin for antiphospholipid syndrome. \par On Coumadin.\par Going to [place] to play golf.\par No fever, chills, sweats, nausea/vomiting. No bowel or bladder dysfunction, no recent weight loss or gain. No night pain. This history is in addition to the intake form that I personally reviewed.\par

## 2023-03-13 NOTE — DISCUSSION/SUMMARY
[de-identified] : Left sacroiliitis.\par Bilateral hip arthritis, R>L.\par Getting better.\par On warfarin.\par Discussed all options. \par Cyclobenzaprine PRN.\par On Coumadin.\par Voltaren gel. \par If no better MRI. \par All options discussed including rest,medicine,home exercise, acupuncture, Chiropractic care, physical therapy, pain management and last resort surgery. All questions were answered, all alternatives discussed and the patient is in complete agreement with the plan. Follow up appointment as instructed. If any issues arise, the patient will call or come in sooner.\par

## 2023-03-13 NOTE — ADDENDUM
[FreeTextEntry1] : This note was written by Satnam Fernando on 03/13/2023 acting as scribe for Dr. Lonnie Morales M.D.\par \par I, Lonnie Morales MD, have read and attest that all the information, medical decision making and discharge instructions within are true and accurate.

## 2023-03-14 ENCOUNTER — NON-APPOINTMENT (OUTPATIENT)
Age: 53
End: 2023-03-14

## 2023-04-10 ENCOUNTER — RESULT REVIEW (OUTPATIENT)
Age: 53
End: 2023-04-10

## 2023-04-10 ENCOUNTER — OUTPATIENT (OUTPATIENT)
Dept: OUTPATIENT SERVICES | Facility: HOSPITAL | Age: 53
LOS: 1 days | End: 2023-04-10
Payer: COMMERCIAL

## 2023-04-10 ENCOUNTER — APPOINTMENT (OUTPATIENT)
Dept: MRI IMAGING | Facility: CLINIC | Age: 53
End: 2023-04-10
Payer: COMMERCIAL

## 2023-04-10 ENCOUNTER — RX RENEWAL (OUTPATIENT)
Age: 53
End: 2023-04-10

## 2023-04-10 DIAGNOSIS — C79.31 SECONDARY MALIGNANT NEOPLASM OF BRAIN: ICD-10-CM

## 2023-04-10 PROCEDURE — 70553 MRI BRAIN STEM W/O & W/DYE: CPT | Mod: 26

## 2023-04-10 PROCEDURE — 70553 MRI BRAIN STEM W/O & W/DYE: CPT

## 2023-04-14 ENCOUNTER — EMERGENCY (EMERGENCY)
Facility: HOSPITAL | Age: 53
LOS: 0 days | Discharge: HOME | End: 2023-04-14
Attending: STUDENT IN AN ORGANIZED HEALTH CARE EDUCATION/TRAINING PROGRAM
Payer: COMMERCIAL

## 2023-04-14 VITALS
SYSTOLIC BLOOD PRESSURE: 133 MMHG | WEIGHT: 199.96 LBS | RESPIRATION RATE: 18 BRPM | HEART RATE: 73 BPM | TEMPERATURE: 98 F | HEIGHT: 69 IN | OXYGEN SATURATION: 98 % | DIASTOLIC BLOOD PRESSURE: 80 MMHG

## 2023-04-14 VITALS
RESPIRATION RATE: 18 BRPM | DIASTOLIC BLOOD PRESSURE: 79 MMHG | SYSTOLIC BLOOD PRESSURE: 131 MMHG | OXYGEN SATURATION: 99 % | TEMPERATURE: 98 F | HEART RATE: 77 BPM
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
## 2023-04-14 DIAGNOSIS — S20.219A CONTUSION OF UNSPECIFIED FRONT WALL OF THORAX, INITIAL ENCOUNTER: ICD-10-CM

## 2023-04-14 DIAGNOSIS — Z79.01 LONG TERM (CURRENT) USE OF ANTICOAGULANTS: ICD-10-CM

## 2023-04-14 DIAGNOSIS — Z85.118 PERSONAL HISTORY OF OTHER MALIGNANT NEOPLASM OF BRONCHUS AND LUNG: ICD-10-CM

## 2023-04-14 DIAGNOSIS — M54.9 DORSALGIA, UNSPECIFIED: ICD-10-CM
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
## 2023-04-14 DIAGNOSIS — Z20.822 CONTACT WITH AND (SUSPECTED) EXPOSURE TO COVID-19: ICD-10-CM

## 2023-04-14 DIAGNOSIS — D68.61 ANTIPHOSPHOLIPID SYNDROME: ICD-10-CM

## 2023-04-14 LAB
ABO RH CONFIRMATION: SIGNIFICANT CHANGE UP
ALBUMIN SERPL ELPH-MCNC: 4.4 G/DL — SIGNIFICANT CHANGE UP (ref 3.3–5)
ALP SERPL-CCNC: 43 U/L — SIGNIFICANT CHANGE UP (ref 40–120)
ALT FLD-CCNC: 38 U/L — SIGNIFICANT CHANGE UP (ref 12–78)
ANION GAP SERPL CALC-SCNC: 2 MMOL/L — LOW (ref 5–17)
ANISOCYTOSIS BLD QL: SLIGHT — SIGNIFICANT CHANGE UP
APTT BLD: 95.8 SEC — HIGH (ref 27.5–35.5)
AST SERPL-CCNC: 25 U/L — SIGNIFICANT CHANGE UP (ref 15–37)
BASO STIPL BLD QL SMEAR: PRESENT — SIGNIFICANT CHANGE UP
BASOPHILS # BLD AUTO: 0.02 K/UL — SIGNIFICANT CHANGE UP (ref 0–0.2)
BASOPHILS NFR BLD AUTO: 0.4 % — SIGNIFICANT CHANGE UP (ref 0–2)
BILIRUB SERPL-MCNC: 0.4 MG/DL — SIGNIFICANT CHANGE UP (ref 0.2–1.2)
BLD GP AB SCN SERPL QL: SIGNIFICANT CHANGE UP
BUN SERPL-MCNC: 18 MG/DL — SIGNIFICANT CHANGE UP (ref 7–23)
CALCIUM SERPL-MCNC: 9.5 MG/DL — SIGNIFICANT CHANGE UP (ref 8.5–10.1)
CHLORIDE SERPL-SCNC: 110 MMOL/L — HIGH (ref 96–108)
CK SERPL-CCNC: 150 U/L — SIGNIFICANT CHANGE UP (ref 26–308)
CO2 SERPL-SCNC: 25 MMOL/L — SIGNIFICANT CHANGE UP (ref 22–31)
CREAT SERPL-MCNC: 0.97 MG/DL — SIGNIFICANT CHANGE UP (ref 0.5–1.3)
EGFR: 95 ML/MIN/1.73M2 — SIGNIFICANT CHANGE UP
ELLIPTOCYTES BLD QL SMEAR: SLIGHT — SIGNIFICANT CHANGE UP
EOSINOPHIL # BLD AUTO: 0.08 K/UL — SIGNIFICANT CHANGE UP (ref 0–0.5)
EOSINOPHIL NFR BLD AUTO: 1.6 % — SIGNIFICANT CHANGE UP (ref 0–6)
FLUAV AG NPH QL: SIGNIFICANT CHANGE UP
FLUBV AG NPH QL: SIGNIFICANT CHANGE UP
GLUCOSE BLDC GLUCOMTR-MCNC: 87 MG/DL — SIGNIFICANT CHANGE UP (ref 70–99)
GLUCOSE SERPL-MCNC: 106 MG/DL — HIGH (ref 70–99)
HCT VFR BLD CALC: 39.6 % — SIGNIFICANT CHANGE UP (ref 39–50)
HGB BLD-MCNC: 12 G/DL — LOW (ref 13–17)
HYPOCHROMIA BLD QL: SLIGHT — SIGNIFICANT CHANGE UP
IMM GRANULOCYTES NFR BLD AUTO: 0.6 % — SIGNIFICANT CHANGE UP (ref 0–0.9)
INR BLD: 2.32 RATIO — HIGH (ref 0.88–1.16)
LYMPHOCYTES # BLD AUTO: 0.76 K/UL — LOW (ref 1–3.3)
LYMPHOCYTES # BLD AUTO: 15.1 % — SIGNIFICANT CHANGE UP (ref 13–44)
MANUAL SMEAR VERIFICATION: SIGNIFICANT CHANGE UP
MCHC RBC-ENTMCNC: 19.5 PG — LOW (ref 27–34)
MCHC RBC-ENTMCNC: 30.3 GM/DL — LOW (ref 32–36)
MCV RBC AUTO: 64.3 FL — LOW (ref 80–100)
MICROCYTES BLD QL: SIGNIFICANT CHANGE UP
MONOCYTES # BLD AUTO: 0.44 K/UL — SIGNIFICANT CHANGE UP (ref 0–0.9)
MONOCYTES NFR BLD AUTO: 8.8 % — SIGNIFICANT CHANGE UP (ref 2–14)
NEUTROPHILS # BLD AUTO: 3.69 K/UL — SIGNIFICANT CHANGE UP (ref 1.8–7.4)
NEUTROPHILS NFR BLD AUTO: 73.5 % — SIGNIFICANT CHANGE UP (ref 43–77)
PLAT MORPH BLD: NORMAL — SIGNIFICANT CHANGE UP
PLATELET # BLD AUTO: 173 K/UL — SIGNIFICANT CHANGE UP (ref 150–400)
POIKILOCYTOSIS BLD QL AUTO: SIGNIFICANT CHANGE UP
POTASSIUM SERPL-MCNC: 4.3 MMOL/L — SIGNIFICANT CHANGE UP (ref 3.5–5.3)
POTASSIUM SERPL-SCNC: 4.3 MMOL/L — SIGNIFICANT CHANGE UP (ref 3.5–5.3)
PROT SERPL-MCNC: 8.1 GM/DL — SIGNIFICANT CHANGE UP (ref 6–8.3)
PROTHROM AB SERPL-ACNC: 27.2 SEC — HIGH (ref 10.5–13.4)
RBC # BLD: 6.16 M/UL — HIGH (ref 4.2–5.8)
RBC # FLD: 18.7 % — HIGH (ref 10.3–14.5)
RBC BLD AUTO: ABNORMAL
RSV RNA NPH QL NAA+NON-PROBE: SIGNIFICANT CHANGE UP
SARS-COV-2 RNA SPEC QL NAA+PROBE: SIGNIFICANT CHANGE UP
SCHISTOCYTES BLD QL AUTO: SLIGHT — SIGNIFICANT CHANGE UP
SODIUM SERPL-SCNC: 137 MMOL/L — SIGNIFICANT CHANGE UP (ref 135–145)
WBC # BLD: 5.02 K/UL — SIGNIFICANT CHANGE UP (ref 3.8–10.5)
WBC # FLD AUTO: 5.02 K/UL — SIGNIFICANT CHANGE UP (ref 3.8–10.5)

## 2023-04-14 PROCEDURE — 74177 CT ABD & PELVIS W/CONTRAST: CPT | Mod: 26,MA

## 2023-04-14 PROCEDURE — 96374 THER/PROPH/DIAG INJ IV PUSH: CPT | Mod: XU

## 2023-04-14 PROCEDURE — 99284 EMERGENCY DEPT VISIT MOD MDM: CPT | Mod: 25

## 2023-04-14 PROCEDURE — 71260 CT THORAX DX C+: CPT | Mod: MA

## 2023-04-14 PROCEDURE — 86901 BLOOD TYPING SEROLOGIC RH(D): CPT

## 2023-04-14 PROCEDURE — 70450 CT HEAD/BRAIN W/O DYE: CPT | Mod: MA

## 2023-04-14 PROCEDURE — 85730 THROMBOPLASTIN TIME PARTIAL: CPT

## 2023-04-14 PROCEDURE — 99284 EMERGENCY DEPT VISIT MOD MDM: CPT

## 2023-04-14 PROCEDURE — 72125 CT NECK SPINE W/O DYE: CPT | Mod: MA

## 2023-04-14 PROCEDURE — 85025 COMPLETE CBC W/AUTO DIFF WBC: CPT

## 2023-04-14 PROCEDURE — 86900 BLOOD TYPING SEROLOGIC ABO: CPT

## 2023-04-14 PROCEDURE — 70450 CT HEAD/BRAIN W/O DYE: CPT | Mod: 26,MA

## 2023-04-14 PROCEDURE — 0241U: CPT

## 2023-04-14 PROCEDURE — 82962 GLUCOSE BLOOD TEST: CPT

## 2023-04-14 PROCEDURE — 86850 RBC ANTIBODY SCREEN: CPT

## 2023-04-14 PROCEDURE — 36415 COLL VENOUS BLD VENIPUNCTURE: CPT

## 2023-04-14 PROCEDURE — 80053 COMPREHEN METABOLIC PANEL: CPT

## 2023-04-14 PROCEDURE — 85610 PROTHROMBIN TIME: CPT

## 2023-04-14 PROCEDURE — 72125 CT NECK SPINE W/O DYE: CPT | Mod: 26,MA

## 2023-04-14 PROCEDURE — 74177 CT ABD & PELVIS W/CONTRAST: CPT | Mod: MA

## 2023-04-14 PROCEDURE — 82550 ASSAY OF CK (CPK): CPT

## 2023-04-14 PROCEDURE — 71260 CT THORAX DX C+: CPT | Mod: 26,MA

## 2023-04-14 RX ORDER — ACETAMINOPHEN 500 MG
1000 TABLET ORAL ONCE
Refills: 0 | Status: COMPLETED | OUTPATIENT
Start: 2023-04-14 | End: 2023-04-14

## 2023-04-14 RX ADMIN — Medication 400 MILLIGRAM(S): at 12:02

## 2023-04-14 NOTE — ED PROVIDER NOTE - PROGRESS NOTE DETAILS
Patient reevaluated, no new trauma seen on exam.  Patient knows about the linear finding in his lungs from prior lung cancer.  Will discharge.  Plan to discharge patient. Return to ED precautions were discussed with the patient/family. All questions were answered. Wisam Mendoza MD.

## 2023-04-14 NOTE — ED PROVIDER NOTE - NSFOLLOWUPINSTRUCTIONS_ED_ALL_ED_FT
Rib Contusion    WHAT YOU NEED TO KNOW:    A rib contusion is a bruise on one or more of your ribs.  Rib Cage    DISCHARGE INSTRUCTIONS:    Return to the emergency department if:    You have increased chest pain.    You have shortness of breath.    You start to cough up blood.    Your pain does not improve with pain medicine.  Contact your healthcare provider if:    You have a cough.    You have a fever.    You have questions or concerns about your condition or care.  Medicines: You may need any of the following:    NSAIDs, such as ibuprofen, help decrease swelling, pain, and fever. This medicine is available with or without a doctor's order. NSAIDs can cause stomach bleeding or kidney problems in certain people. If you take blood thinner medicine, always ask if NSAIDs are safe for you. Always read the medicine label and follow directions. Do not give these medicines to children younger than 6 months without direction from a healthcare provider.    Prescription pain medicine may be given. Ask how to take this medicine safely.    Take your medicine as directed. Contact your healthcare provider if you think your medicine is not helping or if you have side effects. Tell your provider if you are allergic to any medicine. Keep a list of the medicines, vitamins, and herbs you take. Include the amounts, and when and why you take them. Bring the list or the pill bottles to follow-up visits. Carry your medicine list with you in case of an emergency.  Deep breathing:    To help prevent pneumonia, take 10 deep breaths every hour, even when you wake up during the night. Brace your ribs with your hands or a pillow while you take deep breaths or cough. This will help decrease your pain.    You may need to use an incentive spirometer to help you take deeper breaths. Put the plastic piece into your mouth and take a very deep breath. Hold your breath as long as you can. Then let out your breath. Do this 10 times in a row every hour while you are awake.  Rest: Rest your ribs to decrease swelling and allow the injury to heal faster. Avoid activities that may cause more pain or damage to your ribs. As your pain decreases, begin movements slowly.    Ice: Ice helps decrease swelling and pain. Ice may also help prevent tissue damage. Use an ice pack or put crushed ice in a plastic bag. Cover it with a towel and place it on your bruised area for 15 to 20 minutes every hour as directed.    Follow up with your doctor as directed: Write down your questions so you remember to ask them during your visits.

## 2023-04-14 NOTE — ED PROVIDER NOTE - PATIENT PORTAL LINK FT
You can access the FollowMyHealth Patient Portal offered by [facility] by registering at the following website: [email or web address]. By joining [facility]’s FollowMyHealth portal, you will also be able to view your health information using other applications (apps) compatible with our system.

## 2023-04-14 NOTE — ED PROVIDER NOTE - IV ALTEPLASE EXCL ABS HIDDEN
[facility]  [address], [address]  [place] [address]  Phone: [phone]                  Carlos Rivera   2017 9:00 AM   Office Visit    Description:  Male : 1986   Provider:  Sarath Wheat Jr., MD   Department:  [facility]           Reason for Visit     Headache           Diagnoses this Visit        Comments    Nonintractable episodic headache, unspecified headache type    -  Primary     History of migraine         Obesity, unspecified obesity severity, unspecified obesity type         Weight gain         Encounter for routine eye and vision examination                To Do List           Future Appointments        Provider Department Dept Phone    2017 10:40 AM Shira Marti, OD [facility] [phone]      Goals (5 Years of Data)     None       These Medications        Disp Refills Start End    butalbital-acetaminophen-caffeine -40 mg (FIORICET, ESGIC) -40 mg per tablet 20 tablet 0 2017     Take 1 tablet by mouth every 6 (six) hours as needed for Headaches. - Oral    Pharmacy: [id number] - [place] [address] AT [facility] [place] Ph #: [phone]         [facility] On Call     [facility] On Call Nurse Care Line - 24/7 Assistance  Unless otherwise directed by your provider, please contact [facility], our nurse care line that is available for 24/7 assistance.     Registered nurses in the [facility] On Call Center provide: appointment scheduling, clinical advisement, health education, and other advisory services.  Call: [phone] (toll free)               Medications           Message regarding Medications     Verify the changes and/or additions to your medication regime listed below are the same as discussed with your clinician today.  If any of these changes or additions are incorrect, please notify your healthcare provider.        START taking these NEW  "medications        Refills    butalbital-acetaminophen-caffeine -40 mg (FIORICET, ESGIC) -40 mg per tablet 0    Sig: Take 1 tablet by mouth every 6 (six) hours as needed for Headaches.    Class: Normal    Route: Oral           Verify that the below list of medications is an accurate representation of the medications you are currently taking.  If none reported, the list may be blank. If incorrect, please contact your healthcare provider. Carry this list with you in case of emergency.           Current Medications     butalbital-acetaminophen-caffeine -40 mg (FIORICET, ESGIC) -40 mg per tablet Take 1 tablet by mouth every 6 (six) hours as needed for Headaches.           Clinical Reference Information           Your Vitals Were     BP Pulse Height Weight SpO2 BMI    106/66 (BP Location: Left arm, Patient Position: Sitting, BP Method: Manual) 99 5' 11" (1.803 m) 123.4 kg (272 lb) 98% 37.94 kg/m2      Blood Pressure          Most Recent Value    BP  106/66      Allergies as of 5/17/2017     No Known Allergies      Immunizations Administered on Date of Encounter - 5/17/2017     Name Date Dose VIS Date Route    TDAP 5/17/2017 0.5 mL 2/24/2015 Intramuscular      Orders Placed During Today's Visit      Normal Orders This Visit    Ambulatory referral to Ophthalmology     Tdap Vaccine     Future Labs/Procedures Expected by Expires    Insulin, random  5/17/2017 5/17/2018    T3, free  5/17/2017 7/16/2018    T3  5/17/2017 7/16/2018    T4, free  5/17/2017 5/17/2018    T4  5/17/2017 7/16/2018    TSH  5/17/2017 5/17/2018      Language Assistance Services     ATTENTION: Language assistance services are available, free of charge. Please call [phone].      ATENCIÓN: Si habla sonidoañol, tiene a polk disposición servicios gratuitos de asistencia lingüística. Llame al [phone].     CHÚ Ý: N?u b?n nói Ti?ng Vi?t, có các d?ch v? h? tr? ngôn ng? mi?n phí dành cho b?n. G?i s? [phone].         [facility] complies with applicable Federal civil rights laws and does not discriminate on the basis of race, color, national origin, age, disability, or sex.         show

## 2023-04-14 NOTE — ED PROVIDER NOTE - CLINICAL SUMMARY MEDICAL DECISION MAKING FREE TEXT BOX
Concern for retroperitoneal bleed in the setting of trauma on Warfarin. Possible rib fracture. No head trauma though on blood thinners so will CT head. Pt declines pain meds. Will check labs and scan.

## 2023-04-14 NOTE — ED PROVIDER NOTE - PHYSICAL EXAMINATION
Constitutional: Awake, Alert, non-toxic. No acute distress.  HEAD: No hematomas, contusions, lacerations, or signs of trauma seen on head/face/scalp.   EYES: PERRL, EOM intact, conjunctiva and sclera are clear bilaterally.  ENT: External ears normal. No rhinorrhea, no tracheal deviation   NECK: Supple, non-tender  CARDIOVASCULAR: regular rate and rhythm.  RESPIRATORY: Normal respiratory effort; breath sounds CTAB, no wheezes, rhonchi, or rales. Speaking in full sentences. No accessory muscle use.   ABDOMEN: Soft; non-tender, non-distended. No rebound or guarding.   MSK:  no lower extremity edema, no deformities. Left flank tenderness with no hematoma noted. No C, T, or L spine tenderness or obvious step-offs.   SKIN: Warm, dry  NEURO: A&O x3. Sensory and motor functions are grossly intact. Speech is normal. No facial droop.  PSYCH: Appearance and judgement seem appropriate for gender and age.

## 2023-04-14 NOTE — ED PROVIDER NOTE - OBJECTIVE STATEMENT
51 y/o male with a PMHx of antiphospholipid syndrome on Coumadin, non small cell lung cancer presents to the ED s/p fall this morning. Pt was walking down the stairs, slipped and fell backwards. Pt landed on his back. No head trauma. No LOC. Pt c/o left sided back pain. Denies n/v, neck pain, abd pain. NKDA. No other complaints at this time.

## 2023-04-14 NOTE — ED ADULT TRIAGE NOTE - CHIEF COMPLAINT QUOTE
pt presents to the ED after a fall down 2 stairs this AM where pt landed on his back. on coumadin. large red monica noted to back. no other complaints at this time. A&Ox4. ambulatory in triage.

## 2023-04-14 NOTE — ED ADULT NURSE NOTE - NSIMPLEMENTINTERV_GEN_ALL_ED
Implemented All Fall with Harm Risk Interventions:  [place] to call system. Call bell, personal items and telephone within reach. Instruct patient to call for assistance. Room bathroom lighting operational. Non-slip footwear when patient is off stretcher. Physically safe environment: no spills, clutter or unnecessary equipment. Stretcher in lowest position, wheels locked, appropriate side rails in place. Provide visual cue, wrist band, yellow gown, etc. Monitor gait and stability. Monitor for mental status changes and reorient to person, place, and time. Review medications for side effects contributing to fall risk. Reinforce activity limits and safety measures with patient and family. Provide visual clues: red socks.

## 2023-04-20 ENCOUNTER — APPOINTMENT (OUTPATIENT)
Dept: RADIATION ONCOLOGY | Facility: CLINIC | Age: 53
End: 2023-04-20
Payer: COMMERCIAL

## 2023-04-20 PROCEDURE — 99213 OFFICE O/P EST LOW 20 MIN: CPT | Mod: 95

## 2023-04-23 ENCOUNTER — NON-APPOINTMENT (OUTPATIENT)
Age: 53
End: 2023-04-23

## 2023-04-25 NOTE — PHYSICAL EXAM
[Normal] : normoactive bowel sounds, soft and nontender, no hepatosplenomegaly or masses appreciated [de-identified] : seen through TELEPHONE, no exam

## 2023-04-25 NOTE — HISTORY OF PRESENT ILLNESS
[Home] : at home, [unfilled] , at the time of the visit. [Medical Office: ([facility])___] : at the medical office located in  [Verbal consent obtained from patient] : the patient, [unfilled] [FreeTextEntry1] : \par ONCOLOGY HISTORY\par Mr. Mclaughlin presents with h/o sarcoid and possible lupus who has been monitoring pulmonary nodules and mediastinal adenopathy since 12/2002. His onc history began when he started having allergy-like symptoms (cough) in February 2021. He saw his PCP, Dr. Mo- referred to Dr. Trujillo after he was noted to have an abnormal chest CAT scan. He was referred for PET/CT which was done on 5/23 which showed FDG avid right upper lobe masslike consolidation, considerably increased in size and coalesced with adjacent right upper lobe nodules and new FDG avid supraclavicular, mediastinal and hilar lymphadenopathy, as compared to CT chest January 13, 2021 concerning for malignancy although progression of sarcoidosis is also a consideration.\par \par Right SCV node biopsy 6/4/21 demonstrated EGFR+ metastatic adenocarcinoma of pulmonary origin, PD-L1 TPS < 1%. He consulted with Dr. Hart 6/10 and Dr. Weldon 6/18 for evaluation for definitive chemoradiation.\par \par However, brain MRI 6/18/21 demonstrated the following:\par 6 x 5 mm enhancing parenchymal lesion with surrounding edema in the left parietal lobe ().\par 4 x 4 mm enhancing parenchymal lesion with surrounding edema in the right frontal pericardium region (12-99). \par 6 x 4 mm enhancing parenchymal lesion with surrounding edema in the right superior cerebellar hemisphere (12-54). \par 2 mm enhancing parenchymal lesion with surrounding edema in the left cerebellar hemisphere (12-40). \par At the time of initial consult on 7/1/2021 he felt well. Denied headaches, focal weakness, nausea, vomiting. Started taking Tagrisso 80mg in late june and was tolerating it well. Follows with Dr. Magaña of neurology\par \par 10/5/2021- Mr. Mclaughlin presents today for follow up. Seen through TELEHEALTH \par MRI brain done 9/1/2021 showed Previously noted lesions involving the posterior fossa and supratentorial region are either no longer seen or decrease in size. This is likely compatible with response to therapy. Continue close interval follow up is recommended.\par \par CT CAP 9/1/2021 showed Right upper lobe lung mass has significantly decreased in size since 5/28/2021.\par Overall he is feeling well. He denies headaches, nausea, vomiting, focal weakness. having some skin issues on his toes while on tagrisso. joint pain has been better recently. \par \par 12/01/21 MRI Head IMPRESSION:  Previously noted residual neoplastic lesion in the LEFT parietal lobe to be stable measuring 2 mm. Previously noted metastatic lesion in the RIGHT caudate nucleus has resolved. The previously noted metastatic lesion in the medial RIGHT cerebellum now measures 3 mm. Incidental developmental venous anomaly seen in the RIGHT parietal lobe.\par \par 03/22/22: Pt presents for follow-up. MR Head (3/16/22) showing 2 mm lesion now seen in lateral LEFT cerebellum. Feeling well overall. Denies HA, numbness, dizziness, hearing/vision changes, cough, or chest pain. Remains active in everyday life. Continues on Tagrisso 80 mg with Dr. Hart with some GI upset, but overall tolerating well. \par \par 6/29/2022- Mr. Mclaughlin presents today for follow up. \par MRI brain 6/27/2022 showed Multiple new metastatic lesions measuring approximately 3 to 4 mm predominantly at the gray-white junction in the BILATERAL frontal, parietal and occipital lobes as well as the LEFT caudate nucleus, RIGHT basal ganglia and BILATERAL cerebellum. No significant edema is noted.\par 6/1 body imaging with Dr. Hart was stable. continues on osimertinib. notes a flare in joint pain recently. following with rheumatology. denies headaches, nausea, vomiting, focal weakness, numbness, tingling.\par \par 8/31/2022: Mr. Mclaughlin presents for follow up today. Repeat MRI head completed on 8/25/2022. No official read yet, but overall looks stable/improved. He continues to follow with Dr. Hart and continues on osimertinib. Feeling well overall. Tolerating Tagrisso without complaint. Denies HA, N/V, dizziness, loss of balance, or changes to hearing/vision.\par \par 12/7/2022- Mr. Mclaughlin presents today for follow up. seen through telephone, for which he consents.\par MRI brain 12/2/2022 showed \par Multiple punctate enhancing lesions when compared to 8/25/2022. For example, the left anterior insular region lesion is unchanged (12:87), and the right cerebellar lesion (12:67) is slightly more conspicuous. It is unclear whether this is due to differences in technique or progression of patient's underlying disease process. No new or enlarging lesions are identified.\par \par CT CAP 9/12/2022 showed Since 6/1/2022:\par Stable right upper lobe scarring at site of primary neoplasm.\par Stable 1.2 cm lingular nodule which could be scarring/atelectasis however\par as it is larger compared to more remote studies, 3 month follow-up is recommended.\par New subcentimeter right lower lobe groundglass nodule which can be reassessed on follow-up.\par Unchanged numerous predominantly subcentimeter retroperitoneal/pelvic lymph nodes.\par \par continues to follow with Dr. Hart. \par \par feeling well overall today. no headaches, no nausea, no vomiting, no focal weakness, no numbness or tingling. \par \par 4/20/2023- Mr. Mclaughlin presents today for follow up. Continues to follow with Dr. Hart. continues on osimertinib 160 mg daily. \par \par Clem MRI 4/10/2023 showed IMPRESSION:\par No new or worsening metastases.\par Slightly decreased size of subcentimeter right cerebellar lesion. Stable subcentimeter left cerebellar lesion.\par \par CT CAP 3/6/2023 showed Stable ill-defined linear opacity in the right upper lobe when compared to previous exam.\par Previously noted patchy groundglass opacity in the right middle lobe is no longer present and has resolved.\par \par Today no neuro or respiratory symptoms. He has history of clotting disorder and continues on Coumadin, also has inflammatory autoimmune disorder, denies recent flares with joint pains. Over all feeling well.

## 2023-05-17 ENCOUNTER — APPOINTMENT (OUTPATIENT)
Dept: GERIATRICS | Facility: CLINIC | Age: 53
End: 2023-05-17
Payer: COMMERCIAL

## 2023-05-17 VITALS
HEIGHT: 68.19 IN | DIASTOLIC BLOOD PRESSURE: 83 MMHG | SYSTOLIC BLOOD PRESSURE: 134 MMHG | TEMPERATURE: 97.2 F | HEART RATE: 74 BPM | WEIGHT: 206.13 LBS | RESPIRATION RATE: 16 BRPM | BODY MASS INDEX: 31.24 KG/M2 | OXYGEN SATURATION: 98 %

## 2023-05-17 DIAGNOSIS — G47.62 SLEEP RELATED LEG CRAMPS: ICD-10-CM

## 2023-05-17 DIAGNOSIS — Z01.818 ENCOUNTER FOR OTHER PREPROCEDURAL EXAMINATION: ICD-10-CM

## 2023-05-17 DIAGNOSIS — R25.2 CRAMP AND SPASM: ICD-10-CM

## 2023-05-17 DIAGNOSIS — S99.929A UNSPECIFIED INJURY OF UNSPECIFIED FOOT, INITIAL ENCOUNTER: ICD-10-CM

## 2023-05-17 DIAGNOSIS — Z87.2 PERSONAL HISTORY OF DISEASES OF THE SKIN AND SUBCUTANEOUS TISSUE: ICD-10-CM

## 2023-05-17 DIAGNOSIS — R09.82 POSTNASAL DRIP: ICD-10-CM

## 2023-05-17 DIAGNOSIS — Z86.19 PERSONAL HISTORY OF OTHER INFECTIOUS AND PARASITIC DISEASES: ICD-10-CM

## 2023-05-17 DIAGNOSIS — H60.312 DIFFUSE OTITIS EXTERNA, LEFT EAR: ICD-10-CM

## 2023-05-17 DIAGNOSIS — J98.4 OTHER DISORDERS OF LUNG: ICD-10-CM

## 2023-05-17 DIAGNOSIS — Z87.898 PERSONAL HISTORY OF OTHER SPECIFIED CONDITIONS: ICD-10-CM

## 2023-05-17 DIAGNOSIS — R91.1 SOLITARY PULMONARY NODULE: ICD-10-CM

## 2023-05-17 DIAGNOSIS — M79.2 NEURALGIA AND NEURITIS, UNSPECIFIED: ICD-10-CM

## 2023-05-17 DIAGNOSIS — Z51.5 ENCOUNTER FOR PALLIATIVE CARE: ICD-10-CM

## 2023-05-17 DIAGNOSIS — I87.009 POSTTHROMBOTIC SYNDROME W/OUT COMPLICATIONS OF UNSPECIFIED EXTREMITY: ICD-10-CM

## 2023-05-17 DIAGNOSIS — M25.50 PAIN IN UNSPECIFIED JOINT: ICD-10-CM

## 2023-05-17 DIAGNOSIS — Z86.69 PERSONAL HISTORY OF OTHER DISEASES OF THE NERVOUS SYSTEM AND SENSE ORGANS: ICD-10-CM

## 2023-05-17 DIAGNOSIS — R93.89 ABNORMAL FINDINGS ON DIAGNOSTIC IMAGING OF OTHER SPECIFIED BODY STRUCTURES: ICD-10-CM

## 2023-05-17 DIAGNOSIS — Z20.822 CONTACT WITH AND (SUSPECTED) EXPOSURE TO COVID-19: ICD-10-CM

## 2023-05-17 DIAGNOSIS — G57.90 UNSPECIFIED MONONEUROPATHY OF UNSPECIFIED LOWER LIMB: ICD-10-CM

## 2023-05-17 DIAGNOSIS — Z91.81 HISTORY OF FALLING: ICD-10-CM

## 2023-05-17 DIAGNOSIS — E78.1 PURE HYPERGLYCERIDEMIA: ICD-10-CM

## 2023-05-17 DIAGNOSIS — Z87.39 PERSONAL HISTORY OF OTHER DISEASES OF THE MUSCULOSKELETAL SYSTEM AND CONNECTIVE TISSUE: ICD-10-CM

## 2023-05-17 DIAGNOSIS — R09.89 OTHER SPECIFIED SYMPTOMS AND SIGNS INVOLVING THE CIRCULATORY AND RESPIRATORY SYSTEMS: ICD-10-CM

## 2023-05-17 PROCEDURE — 99203 OFFICE O/P NEW LOW 30 MIN: CPT

## 2023-05-17 RX ORDER — CYCLOBENZAPRINE HYDROCHLORIDE 5 MG/1
5 TABLET, FILM COATED ORAL 3 TIMES DAILY
Qty: 90 | Refills: 0 | Status: DISCONTINUED | COMMUNITY
Start: 2023-03-09 | End: 2023-05-17

## 2023-05-17 RX ORDER — METHYLPREDNISOLONE 4 MG/1
4 TABLET ORAL
Qty: 1 | Refills: 0 | Status: DISCONTINUED | COMMUNITY
Start: 2023-03-01 | End: 2023-05-17

## 2023-05-17 RX ORDER — AZITHROMYCIN 250 MG/1
250 TABLET, FILM COATED ORAL
Qty: 1 | Refills: 0 | Status: DISCONTINUED | COMMUNITY
Start: 2023-01-09 | End: 2023-05-17

## 2023-05-30 PROBLEM — M25.50 ARTHRALGIA OF MULTIPLE JOINTS: Status: ACTIVE | Noted: 2023-05-30

## 2023-05-30 PROBLEM — G57.90 NEUROPATHY OF FOOT: Status: ACTIVE | Noted: 2021-01-07

## 2023-05-30 PROBLEM — Z51.5 ENCOUNTER FOR PALLIATIVE CARE: Status: ACTIVE | Noted: 2023-05-30

## 2023-05-30 NOTE — DATA REVIEWED
[FreeTextEntry1] : MR BRAIN WAW    (04/10/2023): \par \par COMPARISON: MRI brain 12/2/2022.\par \par FINDINGS:\par Subcentimeter lesion in the right superior cerebellum is slightly decreased in size since comparison study (12-62). Subcentimeter lesion in the left cerebellum is grossly stable (12-59). No new enhancing lesions.\par \par No acute infarction or hemorrhage.\par \par No hydrocephalus. No extra-axial fluid collections. The skull base flow voids are present.\par \par The visualized intraorbital contents are normal. The imaged portions of the paranasal sinuses are clear. The mastoid air cells are clear. The visualized osseous structures, soft tissues and partially visualized parotid glands appear normal.\par \par IMPRESSION:\par No new or worsening metastases.\par \par Slightly decreased size of subcentimeter right cerebellar lesion. Stable subcentimeter left cerebellar lesion.\par -----\par CT C/A/P  (03/06/2023): \par \par INTERPRETATION:  Clinical information: Lung cancer. Exam is compared to previous study of 12/9/2022.\par \par CT scan of the chest and abdomen was obtained following administration of both oral and intravenous contrast. Approximately 90 cc of Omnipaque 350 was administered and 10 cc was discarded.\par \par No hilar and or mediastinal adenopathy is noted.\par \par Heart is enlarged in size. No pericardial effusion is noted.\par \par No endobronchial lesions are noted. Once again, ill-defined linear opacity is noted in the right upper lobe. Appearance is unchanged when compared to previous exam. No nodules are noted. Previously noted patchy groundglass opacity in the right middle lobe is no longer present. No pleural effusions are noted.\par \par Liver, spleen, pancreas and both adrenal glands are unremarkable. Stone is noted within the gallbladder.\par \par Both kidneys enhance with contrast. No hydronephrosis is noted. Low-attenuation lesions in both kidneys are too small to be adequately characterized on this exam.\par \par No retroperitoneal adenopathy is noted. An IVC filter is noted in place.\par \par Visualized bowel demonstrates stool within the large bowel.\par \par Degenerative changes of the spine are noted.\par \par IMPRESSION: Stable ill-defined linear opacity in the right upper lobe when compared to previous exam.\par \par Previously noted patchy groundglass opacity in the right middle lobe is no longer present and has resolved.

## 2023-05-30 NOTE — ASSESSMENT
[FreeTextEntry1] : 52yoM with:\par \par # Metastatic lung cancer - On Osimertinib. Med onc follow up. \par \par # Neuropathy - Medical cannabis certification completed today. Provided cannabis education, overview of state program, and discussed adverse effects in detail. Counseled that vaporized cannabis is not the preferred route of administration due to the fact that both short-term and long-term risks associated with vaporizing oils are not yet fully understood. Recommend starting with 1:1 THC:CBD formulation at low dose of THC (~2mg/dose). \par Explained that medical cannabis can affect warfarin metabolism, should have INR checked  on more frequent basis while he is titrating up the dose and then try to keep consistent dosing thereafter. \par \par # Chronic arhtralgias 2/2 SLE - Discussed how medicinal cannabis may be of benefit. \par \par # Encounter for palliative care- Emotional support provided \par Explained the role of palliative care in enhancing quality of life in the setting of serious illness.\par \par Follow up as needed, call with questions/issues

## 2023-05-30 NOTE — HISTORY OF PRESENT ILLNESS
[FreeTextEntry1] : 52yoM with metastatic lung cancer presents for initial palliative care visit, referred by oncology.\par PMH significant for sarcoidosis, anti-cardiolipin on Coumadin 5mg daily. \par \par Patient started having having allergy-like symptoms (cough) early 2021. He saw his PCP, Dr. Mo- referred to Dr. Trujillo after he was noted to have an abnormal chest CAT scan. He was referred for PET/CT which was done on 5/23 which showed FDG avid right upper lobe masslike consolidation, considerably increased in size and coalesced with adjacent right upper lobe nodules and new FDG avid supraclavicular, mediastinal and hilar lymphadenopathy, as compared to CT chest January 13, 2021 concerning for malignancy although progression of sarcoidosis is also a consideration. rt supra clav bx was c/w lung adeno ca. \par \par Of note, the patient has a complex medical history having had a diagnosis of sarcoid made in 2002 via a mediastinoscopy. At that time he was noted to have several pulmonary nodules but no treatment was required. 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 He was started on anticoagulation and a IVC filter was placed. \par \par Main reason for which patient is referred is symptom management. \par \par He reports shooting pains in his feet, the nail beds are particularly affected. \par His hands will cramp up at times and he experiences shooting pains. Is interested in using medicinal cannabis for his pain and neuropathy. \par \par ROS:\par +fatigue \par +chronic joint pains - uses advil PRN\par Denies diarrhea, weight loss\par Remainder of ROS non-contributory\par \par Patient is , lives with his wife. \par He works from home. Drives himself, independent in ADLs. \par \par PCP: Dr. Palomo Mo \par \par I-STOP Ref#: [id number]

## 2023-06-01 ENCOUNTER — OFFICE (OUTPATIENT)
Dept: URBAN - METROPOLITAN AREA CLINIC 109 | Facility: CLINIC | Age: 53
Setting detail: OPHTHALMOLOGY
End: 2023-06-01
Payer: COMMERCIAL

## 2023-06-01 DIAGNOSIS — H00.024: ICD-10-CM

## 2023-06-01 PROCEDURE — 99213 OFFICE O/P EST LOW 20 MIN: CPT | Performed by: OPHTHALMOLOGY

## 2023-06-01 ASSESSMENT — REFRACTION_MANIFEST
OS_SPHERE: -0.25
OS_VA1: 20/20
OD_SPHERE: PLANO
OD_CYLINDER: -4.00
OD_AXIS: 90
OS_CYLINDER: -3.75
OD_VA1: 20/20
OS_AXIS: 85

## 2023-06-01 ASSESSMENT — SPHEQUIV_DERIVED
OS_SPHEQUIV: -2.125
OS_SPHEQUIV: -2.375
OD_SPHEQUIV: -2

## 2023-06-01 ASSESSMENT — REFRACTION_AUTOREFRACTION
OD_CYLINDER: -4.00
OS_AXIS: 085
OS_SPHERE: -0.50
OS_CYLINDER: -3.75
OD_SPHERE: 0.00
OD_AXIS: 085

## 2023-06-01 ASSESSMENT — VISUAL ACUITY
OS_BCVA: 20/25-1
OD_BCVA: 20/30-3

## 2023-06-02 ENCOUNTER — OUTPATIENT (OUTPATIENT)
Dept: OUTPATIENT SERVICES | Facility: HOSPITAL | Age: 53
LOS: 1 days | Discharge: HOME | End: 2023-06-02

## 2023-06-02 ENCOUNTER — OFFICE (OUTPATIENT)
Dept: URBAN - METROPOLITAN AREA CLINIC 109 | Facility: CLINIC | Age: 53
Setting detail: OPHTHALMOLOGY
End: 2023-06-02
Payer: COMMERCIAL

## 2023-06-02 ENCOUNTER — RX ONLY (RX ONLY)
Age: 53
End: 2023-06-02

## 2023-06-02 DIAGNOSIS — H00.024: ICD-10-CM

## 2023-06-02 DIAGNOSIS — D64.9 ANEMIA, UNSPECIFIED: ICD-10-CM

## 2023-06-02 PROCEDURE — 99213 OFFICE O/P EST LOW 20 MIN: CPT | Performed by: OPHTHALMOLOGY

## 2023-06-02 ASSESSMENT — TONOMETRY: OS_IOP_MMHG: 10

## 2023-06-02 ASSESSMENT — REFRACTION_AUTOREFRACTION
OS_SPHERE: -0.50
OD_SPHERE: 0.00
OD_AXIS: 085
OD_CYLINDER: -4.00
OS_AXIS: 085
OS_CYLINDER: -3.75

## 2023-06-02 ASSESSMENT — CONFRONTATIONAL VISUAL FIELD TEST (CVF)
OD_FINDINGS: FULL
OS_FINDINGS: FULL

## 2023-06-02 ASSESSMENT — SPHEQUIV_DERIVED
OS_SPHEQUIV: -2.375
OD_SPHEQUIV: -2
OS_SPHEQUIV: -2.125

## 2023-06-02 ASSESSMENT — VISUAL ACUITY
OD_BCVA: 20/30-3
OS_BCVA: 20/25-1

## 2023-06-02 ASSESSMENT — REFRACTION_MANIFEST
OS_CYLINDER: -3.75
OS_SPHERE: -0.25
OS_VA1: 20/20
OD_CYLINDER: -4.00
OD_VA1: 20/20
OD_SPHERE: PLANO
OS_AXIS: 85
OD_AXIS: 90

## 2023-06-02 ASSESSMENT — LID EXAM ASSESSMENTS: OS_COMMENTS: NO FOREIGN BODY WITH LID EVERTED

## 2023-06-03 PROBLEM — D68.61 ANTIPHOSPHOLIPID SYNDROME: Chronic | Status: ACTIVE | Noted: 2023-04-14

## 2023-06-04 ENCOUNTER — RX RENEWAL (OUTPATIENT)
Age: 53
End: 2023-06-04

## 2023-06-08 ENCOUNTER — OFFICE (OUTPATIENT)
Dept: URBAN - METROPOLITAN AREA CLINIC 109 | Facility: CLINIC | Age: 53
Setting detail: OPHTHALMOLOGY
End: 2023-06-08
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
## 2023-06-08 PROCEDURE — NO SHOW FE NO SHOW FEE: Performed by: OPHTHALMOLOGY

## 2023-06-12 ENCOUNTER — OUTPATIENT (OUTPATIENT)
Dept: OUTPATIENT SERVICES | Facility: HOSPITAL | Age: 53
LOS: 1 days | End: 2023-06-12
Payer: COMMERCIAL

## 2023-06-12 ENCOUNTER — APPOINTMENT (OUTPATIENT)
Dept: CT IMAGING | Facility: CLINIC | Age: 53
End: 2023-06-12
Payer: COMMERCIAL

## 2023-06-12 DIAGNOSIS — C34.90 MALIGNANT NEOPLASM OF UNSPECIFIED PART OF UNSPECIFIED BRONCHUS OR LUNG: ICD-10-CM

## 2023-06-12 PROCEDURE — 74177 CT ABD & PELVIS W/CONTRAST: CPT | Mod: 26

## 2023-06-12 PROCEDURE — 71260 CT THORAX DX C+: CPT | Mod: 26

## 2023-06-12 PROCEDURE — 74177 CT ABD & PELVIS W/CONTRAST: CPT

## 2023-06-12 PROCEDURE — 71260 CT THORAX DX C+: CPT

## 2023-06-15 ENCOUNTER — RESULT REVIEW (OUTPATIENT)
Age: 53
End: 2023-06-15

## 2023-06-15 ENCOUNTER — APPOINTMENT (OUTPATIENT)
Dept: HEMATOLOGY ONCOLOGY | Facility: CLINIC | Age: 53
End: 2023-06-15
Payer: COMMERCIAL

## 2023-06-15 VITALS
TEMPERATURE: 98.2 F | SYSTOLIC BLOOD PRESSURE: 126 MMHG | DIASTOLIC BLOOD PRESSURE: 77 MMHG | OXYGEN SATURATION: 97 % | HEIGHT: 68.19 IN | BODY MASS INDEX: 30.74 KG/M2 | HEART RATE: 66 BPM | WEIGHT: 202.82 LBS | RESPIRATION RATE: 16 BRPM

## 2023-06-15 LAB
BASOPHILS # BLD AUTO: 0.02 K/UL — SIGNIFICANT CHANGE UP (ref 0–0.2)
BASOPHILS NFR BLD AUTO: 0.5 % — SIGNIFICANT CHANGE UP (ref 0–2)
EOSINOPHIL # BLD AUTO: 0.06 K/UL — SIGNIFICANT CHANGE UP (ref 0–0.5)
EOSINOPHIL NFR BLD AUTO: 1.5 % — SIGNIFICANT CHANGE UP (ref 0–6)
HCT VFR BLD CALC: 37.1 % — LOW (ref 39–50)
HGB BLD-MCNC: 11.4 G/DL — LOW (ref 13–17)
IMM GRANULOCYTES NFR BLD AUTO: 0.3 % — SIGNIFICANT CHANGE UP (ref 0–0.9)
LYMPHOCYTES # BLD AUTO: 0.84 K/UL — LOW (ref 1–3.3)
LYMPHOCYTES # BLD AUTO: 21.3 % — SIGNIFICANT CHANGE UP (ref 13–44)
MCHC RBC-ENTMCNC: 19.9 PG — LOW (ref 27–34)
MCHC RBC-ENTMCNC: 30.7 G/DL — LOW (ref 32–36)
MCV RBC AUTO: 64.7 FL — LOW (ref 80–100)
MONOCYTES # BLD AUTO: 0.39 K/UL — SIGNIFICANT CHANGE UP (ref 0–0.9)
MONOCYTES NFR BLD AUTO: 9.9 % — SIGNIFICANT CHANGE UP (ref 2–14)
NEUTROPHILS # BLD AUTO: 2.62 K/UL — SIGNIFICANT CHANGE UP (ref 1.8–7.4)
NEUTROPHILS NFR BLD AUTO: 66.5 % — SIGNIFICANT CHANGE UP (ref 43–77)
NRBC # BLD: 0 /100 WBCS — SIGNIFICANT CHANGE UP (ref 0–0)
PLATELET # BLD AUTO: 157 K/UL — SIGNIFICANT CHANGE UP (ref 150–400)
RBC # BLD: 5.73 M/UL — SIGNIFICANT CHANGE UP (ref 4.2–5.8)
RBC # FLD: 18.4 % — HIGH (ref 10.3–14.5)
WBC # BLD: 3.94 K/UL — SIGNIFICANT CHANGE UP (ref 3.8–10.5)
WBC # FLD AUTO: 3.94 K/UL — SIGNIFICANT CHANGE UP (ref 3.8–10.5)

## 2023-06-15 PROCEDURE — 99215 OFFICE O/P EST HI 40 MIN: CPT

## 2023-06-16 LAB
ALBUMIN SERPL ELPH-MCNC: 4.8 G/DL
ALP BLD-CCNC: 59 U/L
ALT SERPL-CCNC: 29 U/L
ANION GAP SERPL CALC-SCNC: 15 MMOL/L
APTT BLD: 88.3 SEC
AST SERPL-CCNC: 24 U/L
BILIRUB SERPL-MCNC: 0.2 MG/DL
BUN SERPL-MCNC: 15 MG/DL
CALCIUM SERPL-MCNC: 9.6 MG/DL
CEA SERPL-MCNC: 1.5 NG/ML
CHLORIDE SERPL-SCNC: 106 MMOL/L
CO2 SERPL-SCNC: 23 MMOL/L
CREAT SERPL-MCNC: 0.97 MG/DL
EGFR: 95 ML/MIN/1.73M2
GLUCOSE SERPL-MCNC: 93 MG/DL
INR PPP: 2.71 RATIO
MAGNESIUM SERPL-MCNC: 2.2 MG/DL
POTASSIUM SERPL-SCNC: 4.5 MMOL/L
PROT SERPL-MCNC: 7.3 G/DL
PT BLD: 31.8 SEC
SODIUM SERPL-SCNC: 144 MMOL/L
TSH SERPL-ACNC: 1.31 UIU/ML

## 2023-06-29 NOTE — ASSESSMENT
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 MRI at baseline shows four subcentimeter parenchymal enhancing lesions in the cerebral and cerebellar hemispheres as detailed in the body the report. Findings were suspicious for the presence of intracranial metastases. Started Osimertinib in June 2021. 2 month scan on osi with excellent response in the CNS as well as lungs. CT C/A/P 12/02/21 with decreased RUL mass size. Scans from March 2022, reviewed personally- completely stable findings- sustained ME. Brain MRI read pending ANNALISE in December 2021. \par Scans done on 6/1/22 reviewed independently- sustained ME noted. Stable 1.1 cm right upper lobe nodule along a bandlike opacity. Stable mild tree-in-bud opacity within the lingula. The lungs are otherwise clear. No new or enlarging nodule. Numerous subcentimeter retroperitoneal lymph nodes are unchanged. Previously mentioned enlarged external iliac lymph nodes are not imaged on this exam. US doppler of LE which showed chronic DVT in rt LE. While systemic scans were stable as noted above, brain MRI showed POD. Therefore, decision was made to increase osimertinib dose to BID (160 mg) since this dosing is more effective in CNS/leptomeningeal disease. \par Scans from September 2022 reviewed and showed stable right upper lobe scarring, stable 1.2 cm lingular nodule which could be scarring/atelectasis. Unchanged retroperitoneal/pelvic lymph nodes.New subcentimeter right lower lobe ground glass nodule which was thought to be inflammatory.\par Scans from December 2022 which no new or enlarging lung nodule. Mild patchy ground glass opacity within lateral segment of right middle lobe likely inflammatory. Stable scarring within right upper lobe.Stable subcentimeter retroperitoneal lymph nodes. Pt had COVID around this time which can explain these changes. \par MRI brain at this time showed an excellent response.\par Reviewed scans done since last visit independently\par MRI brain and C spine (done for tingling and cramps UE)- MRI brain showed continued further decrease decrease in previously noted lesions. MRI C spine showed no acute pathology\par CT scans done in June has not been read by radiology yet, but to my eye- looks great with no new findings and resolution of GGOs. Will update pt if official read is different than what was discussed today\par Of note, pt has microcytosis with mild anemia. He has a hx of thalassemia minor. Plt count slightly low and PS shows giant platelets. pt likely has chronic ITP which is commonly associated with lupus anticoagulant. He has not needed any treatment for this since his plt count is normal\par Educated on proper care of nails and skin surrounding nails. Informed to soak hands and feet in one to one ratio of water to vinegar.  Instructed to follow up with podiatry to manage left 2nd toe paronychia and other nail changes. \par Labs today including CBC, CMP, mag (for muscle cramps), CEA (previously elevated and now decreased to normal range), and INR (for coumadin monitoring- this has been controlled perfectly in the 2.5-3 range)\par All questions answered\par OV in 3 months after repeat scans

## 2023-06-29 NOTE — HISTORY OF PRESENT ILLNESS
[Disease: _____________________] : Disease: [unfilled] [T: ___] : T[unfilled] [N: ___] : N[unfilled] [M: ___] : M[unfilled] [AJCC Stage: ____] : AJCC Stage: [unfilled] [de-identified] : Mr. Mclaughlin with hx of sarcoidosis is a pleasant 52-year-old male who started having allergy-like symptoms (cough) 5 months ago. He saw his PCP, Dr. Mo- referred to Dr. Trujillo after he was noted to have an abnormal chest CAT scan. He was referred for PET/CT which was done on 5/23 which showed  FDG avid right upper lobe masslike consolidation, considerably increased in size and coalesced with adjacent right upper lobe nodules and new FDG avid supraclavicular, mediastinal and hilar lymphadenopathy, as compared to CT chest January 13, 2021 concerning for malignancy although progression of sarcoidosis is also a consideration. rt supra clav bx was c/w lung adeno ca. \par \par Of note, the patient has a complex medical history having had a diagnosis of sarcoid made in 2002 via a mediastinoscopy. At that time he was noted to have several pulmonary nodules but no treatment was required. 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 He was started on anticoagulation and a IVC filter was placed. He is also suspected of having lupus for which she is on hydroxychloroquine. He has a history of bronchial reactivity but is currently not on any inhaled bronchodilators. \par He is a lifelong nonsmoker without occupational exposures. Other than a dry cough, he feels well. He denies any fever or, chest discomfort, dyspnea or peripheral edema. \par \par 6/22/21: Here for follow up. No complaints. \par \par  7/14/21: Patient seen for routine follow-up.  Patient is currently getting treatment for lupus and remains on hydrochorloquine and coumadin for anti-Cardiolipin antibod.\par Patient verbalized pain in the left foot that radiated from the small toe to the sole of feet, he takes advil with satisfactory pain control.  Patient also verbalized joint pain \par that is related to lupus flare up.  Pt recently started on Tagrisso 80mg daily which he is tolerating well. Denies rash/ diarrhea. Pt met with Dr. Boudreaux for consideration of GK to brain mets. \par \par 9/3/21: Facial rash, gained weight, migrating joint pains better after starting steroids (prednisone 5 mg daily). Has had CT scans and MRI yesterday. \par \par 10/19/21: Feeling well. Mild facial rash, gained more weight. Restarted hydroxychloroquine for lupus with worsening joint pain in his wrists and fingers;  continues coumadin; plans f/u with his rheumatologist. Reports toe infection, on abx as per his podiatrist with improvement. Has f/u MRI Brain on 12/1. \par \par 12/7/21: Since August has been having toenail infection of R and L 1st and 2nd toes. Infection is painful, non pruritic. Has seen podiatrist who resected some of the toenails/skin. Using topical antibiotics with some improvement. \par \par 3/10/22: doing well. no new symptoms to report. Has some nail changes but all other AEs have improved. He does have episodic worsening of joint pains. Has gained some weight. \par \par 6/9/22: had a recent ER visit for acute swelling of rt knee and ankle- possibly related to occult trauma. Of note, pt is on Coumadin for VTE with optimal maintenance of INR. He has no other symptoms \par \par 9/15/22: Pt seem today for follow up. Continues on Tagrisso. Noted to have inflammation of the skin around nails and toe nails. Otherwise not having any additional AEs. Had CT scans which showed stable right upper lobe scarring at site of primary neoplasm. Stable 1.2 cm lingular nodule which could be scarring/atelectasis however as it is larger compared to more remote studies, 3 month follow-up is recommended. New subcentimeter right lower lobe groundglass nodule which can be reassessed on follow-up. Unchanged numerous predominantly subcentimeter retroperitoneal/pelvic lymph nodes.\par \par 12/15/22: Here for follow up.Continues on Tagrisso at 160 mg. Feet improved. cracked skin around finger nails. no diarrhea. Taking coumadin \par \par 6/15/23: Patient seen in clinic to follow up on his Osimertinib therapy. Patient is experiencing some nail toxicity on and off and muscle cramps, which he is using tonic water for, Denies diarrhea. He has fatigue. He notes that the texture of his scalp hair is different but he is not bothered by it. He has no mucositis. Pt denies any neurological symptoms. \par \par  [de-identified] : adeno ca

## 2023-06-29 NOTE — REVIEW OF SYSTEMS
[Negative] : Allergic/Immunologic [Fever] : no fever [Night Sweats] : no night sweats [Cough] : no cough [de-identified] : changes to skin around nails as above

## 2023-06-29 NOTE — PHYSICAL EXAM
[Restricted in physically strenuous activity but ambulatory and able to carry out work of a light or sedentary nature] : Status 1- Restricted in physically strenuous activity but ambulatory and able to carry out work of a light or sedentary nature, e.g., light house work, office work [Normal] : affect appropriate [de-identified] : paronychia on left 2nd toe. Inflammation on skin surrounding nails and toenails

## 2023-07-19 ENCOUNTER — APPOINTMENT (OUTPATIENT)
Dept: MRI IMAGING | Facility: CLINIC | Age: 53
End: 2023-07-19
Payer: COMMERCIAL

## 2023-07-19 ENCOUNTER — OUTPATIENT (OUTPATIENT)
Dept: OUTPATIENT SERVICES | Facility: HOSPITAL | Age: 53
LOS: 1 days | End: 2023-07-19
Payer: COMMERCIAL

## 2023-07-19 DIAGNOSIS — Z00.8 ENCOUNTER FOR OTHER GENERAL EXAMINATION: ICD-10-CM

## 2023-07-19 DIAGNOSIS — C79.31 SECONDARY MALIGNANT NEOPLASM OF BRAIN: ICD-10-CM

## 2023-07-19 PROCEDURE — A9585: CPT

## 2023-07-19 PROCEDURE — 70553 MRI BRAIN STEM W/O & W/DYE: CPT | Mod: 26

## 2023-07-19 PROCEDURE — 70553 MRI BRAIN STEM W/O & W/DYE: CPT

## 2023-07-26 NOTE — END OF VISIT
Problem: Respiratory Impairment - Respiratory Therapy 253  Intervention: Inhaled medication delivery  Intervention Status  Done         [Time Spent: ___ minutes] : I have spent [unfilled] minutes of time on the encounter.

## 2023-07-26 NOTE — ASSESSMENT
[No evidence of disease] : No evidence of disease Diabetes mellitus of other type without complication

## 2023-08-01 ENCOUNTER — APPOINTMENT (OUTPATIENT)
Dept: RADIATION ONCOLOGY | Facility: CLINIC | Age: 53
End: 2023-08-01
Payer: COMMERCIAL

## 2023-08-01 PROCEDURE — 99213 OFFICE O/P EST LOW 20 MIN: CPT | Mod: 95

## 2023-08-02 NOTE — PHYSICAL EXAM
[Normal] : normoactive bowel sounds, soft and nontender, no hepatosplenomegaly or masses appreciated [de-identified] : seen through TELEPHONE, no exam

## 2023-08-02 NOTE — HISTORY OF PRESENT ILLNESS
[Home] : at home, [unfilled] , at the time of the visit. [Medical Office: ([facility])___] : at the medical office located in  [Verbal consent obtained from patient] : the patient, [unfilled] [FreeTextEntry1] : ONCOLOGY HISTORY Mr. Mclaughlin presents with h/o sarcoid and possible lupus who has been monitoring pulmonary nodules and mediastinal adenopathy since 12/2002. His onc history began when he started having allergy-like symptoms (cough) in February 2021. He saw his PCP, Dr. Mo- referred to Dr. Trujillo after he was noted to have an abnormal chest CAT scan. He was referred for PET/CT which was done on 5/23 which showed FDG avid right upper lobe masslike consolidation, considerably increased in size and coalesced with adjacent right upper lobe nodules and new FDG avid supraclavicular, mediastinal and hilar lymphadenopathy, as compared to CT chest January 13, 2021 concerning for malignancy although progression of sarcoidosis is also a consideration.  Right SCV node biopsy 6/4/21 demonstrated EGFR+ metastatic adenocarcinoma of pulmonary origin, PD-L1 TPS < 1%. He consulted with Dr. Hart 6/10 and Dr. Weldon 6/18 for evaluation for definitive chemoradiation.  However, brain MRI 6/18/21 demonstrated the following: 6 x 5 mm enhancing parenchymal lesion with surrounding edema in the left parietal lobe (). 4 x 4 mm enhancing parenchymal lesion with surrounding edema in the right frontal pericardium region (12-99).  6 x 4 mm enhancing parenchymal lesion with surrounding edema in the right superior cerebellar hemisphere (12-54).  2 mm enhancing parenchymal lesion with surrounding edema in the left cerebellar hemisphere (12-40).  At the time of initial consult on 7/1/2021 he felt well. Denied headaches, focal weakness, nausea, vomiting. Started taking Tagrisso 80mg in late june and was tolerating it well. Follows with Dr. Magaña of neurology  10/5/2021- Mr. Mclaughlin presents today for follow up. Seen through TELEHEALTH  MRI brain done 9/1/2021 showed Previously noted lesions involving the posterior fossa and supratentorial region are either no longer seen or decrease in size. This is likely compatible with response to therapy. Continue close interval follow up is recommended.  CT CAP 9/1/2021 showed Right upper lobe lung mass has significantly decreased in size since 5/28/2021. Overall he is feeling well. He denies headaches, nausea, vomiting, focal weakness. having some skin issues on his toes while on tagrisso. joint pain has been better recently.   12/01/21 MRI Head IMPRESSION:  Previously noted residual neoplastic lesion in the LEFT parietal lobe to be stable measuring 2 mm. Previously noted metastatic lesion in the RIGHT caudate nucleus has resolved. The previously noted metastatic lesion in the medial RIGHT cerebellum now measures 3 mm. Incidental developmental venous anomaly seen in the RIGHT parietal lobe.  03/22/22: Pt presents for follow-up. MR Head (3/16/22) showing 2 mm lesion now seen in lateral LEFT cerebellum. Feeling well overall. Denies HA, numbness, dizziness, hearing/vision changes, cough, or chest pain. Remains active in everyday life. Continues on Tagrisso 80 mg with Dr. Hart with some GI upset, but overall tolerating well.   6/29/2022- Mr. Mclaughlin presents today for follow up.  MRI brain 6/27/2022 showed Multiple new metastatic lesions measuring approximately 3 to 4 mm predominantly at the gray-white junction in the BILATERAL frontal, parietal and occipital lobes as well as the LEFT caudate nucleus, RIGHT basal ganglia and BILATERAL cerebellum. No significant edema is noted. 6/1 body imaging with Dr. Hart was stable. continues on osimertinib. notes a flare in joint pain recently. following with rheumatology. denies headaches, nausea, vomiting, focal weakness, numbness, tingling.  8/31/2022: Mr. Mclaughlin presents for follow up today. Repeat MRI head completed on 8/25/2022. No official read yet, but overall looks stable/improved. He continues to follow with Dr. Hart and continues on osimertinib. Feeling well overall. Tolerating Tagrisso without complaint. Denies HA, N/V, dizziness, loss of balance, or changes to hearing/vision.  12/7/2022- Mr. Mclaughlin presents today for follow up. seen through telephone, for which he consents. MRI brain 12/2/2022 showed  Multiple punctate enhancing lesions when compared to 8/25/2022. For example, the left anterior insular region lesion is unchanged (12:87), and the right cerebellar lesion (12:67) is slightly more conspicuous. It is unclear whether this is due to differences in technique or progression of patient's underlying disease process. No new or enlarging lesions are identified.  CT CAP 9/12/2022 showed Since 6/1/2022: Stable right upper lobe scarring at site of primary neoplasm. Stable 1.2 cm lingular nodule which could be scarring/atelectasis however as it is larger compared to more remote studies, 3 month follow-up is recommended. New subcentimeter right lower lobe groundglass nodule which can be reassessed on follow-up. Unchanged numerous predominantly subcentimeter retroperitoneal/pelvic lymph nodes.  continues to follow with Dr. Hart.   feeling well overall today. no headaches, no nausea, no vomiting, no focal weakness, no numbness or tingling.   4/20/2023- Mr. Mclaughlin presents today for follow up. Continues to follow with Dr. Hart. continues on osimertinib 160 mg daily.   Clem MRI 4/10/2023 showed IMPRESSION: No new or worsening metastases. Slightly decreased size of subcentimeter right cerebellar lesion. Stable subcentimeter left cerebellar lesion.  CT CAP 3/6/2023 showed Stable ill-defined linear opacity in the right upper lobe when compared to previous exam. Previously noted patchy groundglass opacity in the right middle lobe is no longer present and has resolved.  Today no neuro or respiratory symptoms. He has history of clotting disorder and continues on Coumadin, also has inflammatory autoimmune disorder, denies recent flares with joint pains. Over all feeling well.   VISIT dated 8/1/2023 Patient returns for routine f/u with images for review Denies any weakness, numbness,tingling, nausea, vomiting, headaches or other neurologic symptoms. No issues with speech or comprehension. Endorses painful sensation in digits and fatigue but this does not limit his participation in activity.  Continues to follow with Dr. Hart on Osimertinib 160mg Established care with Dr. Louis on medical Cannibis for neuropathy/arthralgias  MRI brain w w/o contrast 7/19/2023 IMPRESSION: 1. Redemonstrated nodules of enhancement in the bilateral cerebellar hemispheres, generally unchanged in size and appearance compared to prior imaging. 2. No evidence of new enhancing lesions in the brain parenchyma.  CT C/A/P 6/12/2023 IMPRESSION: No new disease in the chest, abdomen, or pelvis.

## 2023-08-04 NOTE — END OF VISIT
[Time Spent: ___ minutes] : I have spent [unfilled] minutes of time on the encounter. Cr 1.46 on admission, up from baseline of 0.8. FeNa 0.2% suggested pre-renal etiology, likely secondary to sepsis and poor PO intake. S/p 1L NS, now back to baseline (0.98 7/31, 0.89 on 8/1).  - continue to monitor Cr  - avoid nephrotoxins, renally dose meds Patient with hx of chronic hyponatremia. Na 124 previously due to polydipsia. Also recently on bactrim, now discontinued. S/p 1 L NS in ED. Na 132 on 8/1 and patient reports poor PO intake over the past week. Uosm 342, Farrukh <20.  - Continue to monitor

## 2023-08-05 ENCOUNTER — RX RENEWAL (OUTPATIENT)
Age: 53
End: 2023-08-05

## 2023-09-01 ENCOUNTER — RESULT REVIEW (OUTPATIENT)
Age: 53
End: 2023-09-01

## 2023-09-12 ENCOUNTER — APPOINTMENT (OUTPATIENT)
Dept: CT IMAGING | Facility: CLINIC | Age: 53
End: 2023-09-12
Payer: COMMERCIAL

## 2023-09-12 ENCOUNTER — OUTPATIENT (OUTPATIENT)
Dept: OUTPATIENT SERVICES | Facility: HOSPITAL | Age: 53
LOS: 1 days | Discharge: HOME | End: 2023-09-12

## 2023-09-12 ENCOUNTER — OUTPATIENT (OUTPATIENT)
Dept: OUTPATIENT SERVICES | Facility: HOSPITAL | Age: 53
LOS: 1 days | End: 2023-09-12
Payer: COMMERCIAL

## 2023-09-12 DIAGNOSIS — D64.9 ANEMIA, UNSPECIFIED: ICD-10-CM

## 2023-09-12 DIAGNOSIS — C34.90 MALIGNANT NEOPLASM OF UNSPECIFIED PART OF UNSPECIFIED BRONCHUS OR LUNG: ICD-10-CM

## 2023-09-12 PROCEDURE — 71260 CT THORAX DX C+: CPT | Mod: 26

## 2023-09-12 PROCEDURE — 74177 CT ABD & PELVIS W/CONTRAST: CPT

## 2023-09-12 PROCEDURE — 71260 CT THORAX DX C+: CPT

## 2023-09-12 PROCEDURE — 74177 CT ABD & PELVIS W/CONTRAST: CPT | Mod: 26

## 2023-09-20 ENCOUNTER — APPOINTMENT (OUTPATIENT)
Dept: HEMATOLOGY ONCOLOGY | Facility: CLINIC | Age: 53
End: 2023-09-20
Payer: COMMERCIAL

## 2023-09-20 ENCOUNTER — RESULT REVIEW (OUTPATIENT)
Age: 53
End: 2023-09-20

## 2023-09-20 VITALS
BODY MASS INDEX: 29.7 KG/M2 | HEART RATE: 66 BPM | RESPIRATION RATE: 16 BRPM | SYSTOLIC BLOOD PRESSURE: 133 MMHG | TEMPERATURE: 97.3 F | HEIGHT: 68.19 IN | OXYGEN SATURATION: 97 % | DIASTOLIC BLOOD PRESSURE: 86 MMHG | WEIGHT: 196 LBS

## 2023-09-20 LAB
BASOPHILS # BLD AUTO: 0.02 K/UL — SIGNIFICANT CHANGE UP (ref 0–0.2)
BASOPHILS NFR BLD AUTO: 0.4 % — SIGNIFICANT CHANGE UP (ref 0–2)
EOSINOPHIL # BLD AUTO: 0.06 K/UL — SIGNIFICANT CHANGE UP (ref 0–0.5)
EOSINOPHIL NFR BLD AUTO: 1.3 % — SIGNIFICANT CHANGE UP (ref 0–6)
HCT VFR BLD CALC: 39.1 % — SIGNIFICANT CHANGE UP (ref 39–50)
HGB BLD-MCNC: 11.7 G/DL — LOW (ref 13–17)
IMM GRANULOCYTES NFR BLD AUTO: 0.4 % — SIGNIFICANT CHANGE UP (ref 0–0.9)
LYMPHOCYTES # BLD AUTO: 0.89 K/UL — LOW (ref 1–3.3)
LYMPHOCYTES # BLD AUTO: 19.8 % — SIGNIFICANT CHANGE UP (ref 13–44)
MCHC RBC-ENTMCNC: 19.3 PG — LOW (ref 27–34)
MCHC RBC-ENTMCNC: 29.9 G/DL — LOW (ref 32–36)
MCV RBC AUTO: 64.6 FL — LOW (ref 80–100)
MONOCYTES # BLD AUTO: 0.43 K/UL — SIGNIFICANT CHANGE UP (ref 0–0.9)
MONOCYTES NFR BLD AUTO: 9.6 % — SIGNIFICANT CHANGE UP (ref 2–14)
NEUTROPHILS # BLD AUTO: 3.08 K/UL — SIGNIFICANT CHANGE UP (ref 1.8–7.4)
NEUTROPHILS NFR BLD AUTO: 68.5 % — SIGNIFICANT CHANGE UP (ref 43–77)
NRBC # BLD: 0 /100 WBCS — SIGNIFICANT CHANGE UP (ref 0–0)
PLATELET # BLD AUTO: 170 K/UL — SIGNIFICANT CHANGE UP (ref 150–400)
RBC # BLD: 6.05 M/UL — HIGH (ref 4.2–5.8)
RBC # FLD: 18.6 % — HIGH (ref 10.3–14.5)
WBC # BLD: 4.5 K/UL — SIGNIFICANT CHANGE UP (ref 3.8–10.5)
WBC # FLD AUTO: 4.5 K/UL — SIGNIFICANT CHANGE UP (ref 3.8–10.5)

## 2023-09-20 PROCEDURE — 99214 OFFICE O/P EST MOD 30 MIN: CPT

## 2023-09-22 ENCOUNTER — RX RENEWAL (OUTPATIENT)
Age: 53
End: 2023-09-22

## 2023-09-22 LAB
ALBUMIN SERPL ELPH-MCNC: 4.7 G/DL
ALP BLD-CCNC: 56 U/L
ALT SERPL-CCNC: 28 U/L
ANION GAP SERPL CALC-SCNC: 12 MMOL/L
AST SERPL-CCNC: 23 U/L
BILIRUB SERPL-MCNC: 0.3 MG/DL
BUN SERPL-MCNC: 13 MG/DL
CALCIUM SERPL-MCNC: 9.9 MG/DL
CEA SERPL-MCNC: 1.5 NG/ML
CHLORIDE SERPL-SCNC: 103 MMOL/L
CO2 SERPL-SCNC: 24 MMOL/L
CREAT SERPL-MCNC: 0.93 MG/DL
EGFR: 100 ML/MIN/1.73M2
GLUCOSE SERPL-MCNC: 97 MG/DL
INR PPP: 1.83 RATIO
MAGNESIUM SERPL-MCNC: 2.1 MG/DL
POTASSIUM SERPL-SCNC: 4.4 MMOL/L
PROT SERPL-MCNC: 7.5 G/DL
PT BLD: 20.5 SEC
SODIUM SERPL-SCNC: 139 MMOL/L
TSH SERPL-ACNC: 1.99 UIU/ML

## 2023-09-28 ENCOUNTER — NON-APPOINTMENT (OUTPATIENT)
Age: 53
End: 2023-09-28

## 2023-10-03 ENCOUNTER — APPOINTMENT (OUTPATIENT)
Dept: UROLOGY | Facility: CLINIC | Age: 53
End: 2023-10-03
Payer: COMMERCIAL

## 2023-10-03 DIAGNOSIS — R35.1 NOCTURIA: ICD-10-CM

## 2023-10-03 PROCEDURE — 99204 OFFICE O/P NEW MOD 45 MIN: CPT

## 2023-10-03 RX ORDER — TADALAFIL 10 MG/1
10 TABLET, FILM COATED ORAL
Qty: 30 | Refills: 1 | Status: ACTIVE | COMMUNITY
Start: 2023-10-03 | End: 1900-01-01

## 2023-10-05 DIAGNOSIS — Z12.5 ENCOUNTER FOR SCREENING FOR MALIGNANT NEOPLASM OF PROSTATE: ICD-10-CM

## 2023-10-05 DIAGNOSIS — N52.9 MALE ERECTILE DYSFUNCTION, UNSPECIFIED: ICD-10-CM

## 2023-10-30 ENCOUNTER — OUTPATIENT (OUTPATIENT)
Dept: OUTPATIENT SERVICES | Facility: HOSPITAL | Age: 53
LOS: 1 days | End: 2023-10-30
Payer: COMMERCIAL

## 2023-10-30 ENCOUNTER — APPOINTMENT (OUTPATIENT)
Dept: MRI IMAGING | Facility: CLINIC | Age: 53
End: 2023-10-30
Payer: COMMERCIAL

## 2023-10-30 DIAGNOSIS — Z00.8 ENCOUNTER FOR OTHER GENERAL EXAMINATION: ICD-10-CM

## 2023-10-30 PROCEDURE — 70553 MRI BRAIN STEM W/O & W/DYE: CPT | Mod: 26

## 2023-10-30 PROCEDURE — A9585: CPT

## 2023-10-30 PROCEDURE — 70553 MRI BRAIN STEM W/O & W/DYE: CPT

## 2023-11-07 ENCOUNTER — OUTPATIENT (OUTPATIENT)
Dept: OUTPATIENT SERVICES | Facility: HOSPITAL | Age: 53
LOS: 1 days | Discharge: HOME | End: 2023-11-07
Payer: COMMERCIAL

## 2023-11-07 ENCOUNTER — APPOINTMENT (OUTPATIENT)
Dept: RADIATION ONCOLOGY | Facility: CLINIC | Age: 53
End: 2023-11-07
Payer: COMMERCIAL

## 2023-11-07 PROCEDURE — 77263 THER RADIOLOGY TX PLNG CPLX: CPT

## 2023-11-07 PROCEDURE — 99214 OFFICE O/P EST MOD 30 MIN: CPT | Mod: 95,25

## 2023-11-16 NOTE — VITALS
Outpatient Medications Marked as Taking for the 11/19/19 encounter (Telephone) with Harjinder Mojica DO   Medication Sig Dispense Refill   • amphetamine-dextroamphetamine (ADDERALL) 20 MG tablet Do not start before October 28, 2019. Take 2 tablets by mouth every morning and 1 tablet by mouth every afternoon. 90 tablet 0     Current Facility-Administered Medications for the 11/19/19 encounter (Telephone) with Harjinder Mojica DO   Medication Dose Route Frequency Provider Last Rate Last Dose   • [COMPLETED] triamcinolone acetonide (KENALOG-40) 40 MG/ML injection 80 mg  80 mg Intramuscular Once Jesus Sutton PA-C   80 mg at 11/19/19 1212        Ok to leave detailed Message: Yes  Informed caller of refill policy- 24-48 hours: Yes  No call back needed unless nurse has questions.     Pharmacy: [facility] on [address]   Pt stating would like it early because it falls on [facility], pt is going  Out of town   Problem: Pain  Goal: Verbalizes/displays adequate comfort level or baseline comfort level  11/16/2023 0809 by Simona Arias RN  Outcome: Progressing   Patient educated on acute pain. Taught patient to use call light to ask for pain medication. PRN pain medication given for acute pain. Will continue to monitor pain per unit protocol. Problem: Safety - Adult  Goal: Free from fall injury  11/16/2023 0809 by Simona Arias RN  Outcome: Progressing   Will remain free from falls. Fall precautions are in place. Call light, telephone and bedside table are within reach. Problem: Skin/Tissue Integrity  Goal: Absence of new skin breakdown  Description: 1. Monitor for areas of redness and/or skin breakdown  2. Assess vascular access sites hourly  3. Every 4-6 hours minimum:  Change oxygen saturation probe site  4. Every 4-6 hours:  If on nasal continuous positive airway pressure, respiratory therapy assess nares and determine need for appliance change or resting period. 11/16/2023 0809 by Simona Arias RN  Outcome: Progressing   Will monitor skin and mucous members. Will turn patient every 2 hours, monitor for friction and sheering, and change dressings as needed. Will preform skin assessment every shift. Problem: Discharge Planning  Goal: Discharge to home or other facility with appropriate resources  11/16/2023 0809 by Simona Arias RN  Outcome: Progressing   Assessed patients knowledge of discharge. Will continue to work with patient on discharge planning and answer patient questions. Will consult case management and  as necessary.    Problem: ABCDS Injury Assessment  Goal: Absence of physical injury  11/16/2023 0809 by Simona Arias RN  Outcome: Progressing [Maximal Pain Intensity: 0/10] : 0/10 [NoTreatment Scheduled] : no treatment scheduled [90: Able to carry normal activity; minor signs or symptoms of disease.] : 90: Able to carry normal activity; minor signs or symptoms of disease.  [ECOG Performance Status: 0 - Fully active, able to carry on all pre-disease performance without restriction] : Performance Status: 0 - Fully active, able to carry on all pre-disease performance without restriction

## 2023-11-22 ENCOUNTER — NON-APPOINTMENT (OUTPATIENT)
Age: 53
End: 2023-11-22

## 2023-11-27 ENCOUNTER — APPOINTMENT (OUTPATIENT)
Dept: MRI IMAGING | Facility: IMAGING CENTER | Age: 53
End: 2023-11-27

## 2023-11-27 ENCOUNTER — OUTPATIENT (OUTPATIENT)
Dept: OUTPATIENT SERVICES | Facility: HOSPITAL | Age: 53
LOS: 1 days | End: 2023-11-27
Payer: COMMERCIAL

## 2023-11-27 DIAGNOSIS — C79.31 SECONDARY MALIGNANT NEOPLASM OF BRAIN: ICD-10-CM

## 2023-11-27 DIAGNOSIS — Z00.8 ENCOUNTER FOR OTHER GENERAL EXAMINATION: ICD-10-CM

## 2023-11-27 PROCEDURE — 77300 RADIATION THERAPY DOSE PLAN: CPT | Mod: 26

## 2023-11-27 PROCEDURE — 77295 3-D RADIOTHERAPY PLAN: CPT | Mod: 26

## 2023-11-27 PROCEDURE — 77290 THER RAD SIMULAJ FIELD CPLX: CPT | Mod: 26

## 2023-11-27 PROCEDURE — 76498 UNLISTED MR PROCEDURE: CPT

## 2023-11-27 PROCEDURE — 77334 RADIATION TREATMENT AID(S): CPT | Mod: 26

## 2023-11-27 PROCEDURE — A9585: CPT

## 2023-11-28 ENCOUNTER — OFFICE (OUTPATIENT)
Dept: URBAN - METROPOLITAN AREA CLINIC 109 | Facility: CLINIC | Age: 53
Setting detail: OPHTHALMOLOGY
End: 2023-11-28
Payer: COMMERCIAL

## 2023-11-28 ENCOUNTER — RX RENEWAL (OUTPATIENT)
Age: 53
End: 2023-11-28

## 2023-11-28 DIAGNOSIS — H43.392: ICD-10-CM

## 2023-11-28 DIAGNOSIS — H02.401: ICD-10-CM

## 2023-11-28 PROCEDURE — 92201 OPSCPY EXTND RTA DRAW UNI/BI: CPT | Performed by: OPHTHALMOLOGY

## 2023-11-28 PROCEDURE — 92014 COMPRE OPH EXAM EST PT 1/>: CPT | Performed by: OPHTHALMOLOGY

## 2023-11-28 ASSESSMENT — REFRACTION_MANIFEST
OD_CYLINDER: -4.00
OS_CYLINDER: -3.75
OS_AXIS: 85
OS_SPHERE: PLANO
OD_AXIS: 90
OD_VA1: 20/20
OS_VA1: 20/20
OD_SPHERE: +0.25

## 2023-11-28 ASSESSMENT — SPHEQUIV_DERIVED
OD_SPHEQUIV: -1.75
OD_SPHEQUIV: -1.75
OS_SPHEQUIV: -1.875

## 2023-11-28 ASSESSMENT — REFRACTION_AUTOREFRACTION
OD_CYLINDER: -4.00
OS_SPHERE: 0.00
OS_CYLINDER: -3.75
OD_AXIS: 88
OS_AXIS: 085
OD_SPHERE: +0.25

## 2023-11-28 ASSESSMENT — CONFRONTATIONAL VISUAL FIELD TEST (CVF)
OD_FINDINGS: FULL
OS_FINDINGS: FULL

## 2023-11-29 ENCOUNTER — APPOINTMENT (OUTPATIENT)
Dept: NEUROSURGERY | Facility: CLINIC | Age: 53
End: 2023-11-29
Payer: COMMERCIAL

## 2023-11-29 PROCEDURE — 77432 STEREOTACTIC RADIATION TRMT: CPT

## 2023-11-29 PROCEDURE — 61796 SRS CRANIAL LESION SIMPLE: CPT

## 2023-12-04 RX ORDER — CLOTRIMAZOLE AND BETAMETHASONE DIPROPIONATE 10; .5 MG/G; MG/G
1-0.05 CREAM TOPICAL TWICE DAILY
Qty: 1 | Refills: 2 | Status: ACTIVE | COMMUNITY
Start: 2023-06-15 | End: 1900-01-01

## 2023-12-07 ENCOUNTER — OUTPATIENT (OUTPATIENT)
Dept: OUTPATIENT SERVICES | Facility: HOSPITAL | Age: 53
LOS: 1 days | Discharge: HOME | End: 2023-12-07

## 2023-12-07 DIAGNOSIS — D64.9 ANEMIA, UNSPECIFIED: ICD-10-CM

## 2023-12-14 ENCOUNTER — RESULT REVIEW (OUTPATIENT)
Age: 53
End: 2023-12-14

## 2023-12-18 ENCOUNTER — OUTPATIENT (OUTPATIENT)
Dept: OUTPATIENT SERVICES | Facility: HOSPITAL | Age: 53
LOS: 1 days | End: 2023-12-18
Payer: COMMERCIAL

## 2023-12-18 ENCOUNTER — APPOINTMENT (OUTPATIENT)
Dept: CT IMAGING | Facility: CLINIC | Age: 53
End: 2023-12-18
Payer: COMMERCIAL

## 2023-12-18 DIAGNOSIS — C34.90 MALIGNANT NEOPLASM OF UNSPECIFIED PART OF UNSPECIFIED BRONCHUS OR LUNG: ICD-10-CM

## 2023-12-18 PROCEDURE — 74177 CT ABD & PELVIS W/CONTRAST: CPT

## 2023-12-18 PROCEDURE — 71260 CT THORAX DX C+: CPT | Mod: 26

## 2023-12-18 PROCEDURE — 74177 CT ABD & PELVIS W/CONTRAST: CPT | Mod: 26

## 2023-12-18 PROCEDURE — 71260 CT THORAX DX C+: CPT

## 2023-12-22 ENCOUNTER — APPOINTMENT (OUTPATIENT)
Dept: HEMATOLOGY ONCOLOGY | Facility: CLINIC | Age: 53
End: 2023-12-22
Payer: COMMERCIAL

## 2023-12-22 ENCOUNTER — RESULT REVIEW (OUTPATIENT)
Age: 53
End: 2023-12-22

## 2023-12-22 ENCOUNTER — OUTPATIENT (OUTPATIENT)
Dept: OUTPATIENT SERVICES | Facility: HOSPITAL | Age: 53
LOS: 1 days | End: 2023-12-22
Payer: COMMERCIAL

## 2023-12-22 VITALS
HEIGHT: 68.19 IN | RESPIRATION RATE: 16 BRPM | HEART RATE: 64 BPM | OXYGEN SATURATION: 98 % | TEMPERATURE: 98.4 F | DIASTOLIC BLOOD PRESSURE: 83 MMHG | WEIGHT: 198 LBS | BODY MASS INDEX: 30.01 KG/M2 | SYSTOLIC BLOOD PRESSURE: 130 MMHG

## 2023-12-22 DIAGNOSIS — C34.90 MALIGNANT NEOPLASM OF UNSPECIFIED PART OF UNSPECIFIED BRONCHUS OR LUNG: ICD-10-CM

## 2023-12-22 LAB
ANISOCYTOSIS BLD QL: SLIGHT — SIGNIFICANT CHANGE UP
ANISOCYTOSIS BLD QL: SLIGHT — SIGNIFICANT CHANGE UP
BASOPHILS # BLD AUTO: 0 K/UL — SIGNIFICANT CHANGE UP (ref 0–0.2)
BASOPHILS # BLD AUTO: 0 K/UL — SIGNIFICANT CHANGE UP (ref 0–0.2)
BASOPHILS NFR BLD AUTO: 0 % — SIGNIFICANT CHANGE UP (ref 0–2)
BASOPHILS NFR BLD AUTO: 0 % — SIGNIFICANT CHANGE UP (ref 0–2)
ELLIPTOCYTES BLD QL SMEAR: SLIGHT — SIGNIFICANT CHANGE UP
ELLIPTOCYTES BLD QL SMEAR: SLIGHT — SIGNIFICANT CHANGE UP
EOSINOPHIL # BLD AUTO: 0.14 K/UL — SIGNIFICANT CHANGE UP (ref 0–0.5)
EOSINOPHIL # BLD AUTO: 0.14 K/UL — SIGNIFICANT CHANGE UP (ref 0–0.5)
EOSINOPHIL NFR BLD AUTO: 3 % — SIGNIFICANT CHANGE UP (ref 0–6)
EOSINOPHIL NFR BLD AUTO: 3 % — SIGNIFICANT CHANGE UP (ref 0–6)
HCT VFR BLD CALC: 37.9 % — LOW (ref 39–50)
HCT VFR BLD CALC: 37.9 % — LOW (ref 39–50)
HGB BLD-MCNC: 11.7 G/DL — LOW (ref 13–17)
HGB BLD-MCNC: 11.7 G/DL — LOW (ref 13–17)
LG PLATELETS BLD QL AUTO: SLIGHT — SIGNIFICANT CHANGE UP
LG PLATELETS BLD QL AUTO: SLIGHT — SIGNIFICANT CHANGE UP
LYMPHOCYTES # BLD AUTO: 1.08 K/UL — SIGNIFICANT CHANGE UP (ref 1–3.3)
LYMPHOCYTES # BLD AUTO: 1.08 K/UL — SIGNIFICANT CHANGE UP (ref 1–3.3)
LYMPHOCYTES # BLD AUTO: 24 % — SIGNIFICANT CHANGE UP (ref 13–44)
LYMPHOCYTES # BLD AUTO: 24 % — SIGNIFICANT CHANGE UP (ref 13–44)
MCHC RBC-ENTMCNC: 19.9 PG — LOW (ref 27–34)
MCHC RBC-ENTMCNC: 19.9 PG — LOW (ref 27–34)
MCHC RBC-ENTMCNC: 30.9 G/DL — LOW (ref 32–36)
MCHC RBC-ENTMCNC: 30.9 G/DL — LOW (ref 32–36)
MCV RBC AUTO: 64.6 FL — LOW (ref 80–100)
MCV RBC AUTO: 64.6 FL — LOW (ref 80–100)
MICROCYTES BLD QL: SLIGHT — SIGNIFICANT CHANGE UP
MICROCYTES BLD QL: SLIGHT — SIGNIFICANT CHANGE UP
MONOCYTES # BLD AUTO: 0.32 K/UL — SIGNIFICANT CHANGE UP (ref 0–0.9)
MONOCYTES # BLD AUTO: 0.32 K/UL — SIGNIFICANT CHANGE UP (ref 0–0.9)
MONOCYTES NFR BLD AUTO: 7 % — SIGNIFICANT CHANGE UP (ref 2–14)
MONOCYTES NFR BLD AUTO: 7 % — SIGNIFICANT CHANGE UP (ref 2–14)
NEUTROPHILS # BLD AUTO: 2.98 K/UL — SIGNIFICANT CHANGE UP (ref 1.8–7.4)
NEUTROPHILS # BLD AUTO: 2.98 K/UL — SIGNIFICANT CHANGE UP (ref 1.8–7.4)
NEUTROPHILS NFR BLD AUTO: 66 % — SIGNIFICANT CHANGE UP (ref 43–77)
NEUTROPHILS NFR BLD AUTO: 66 % — SIGNIFICANT CHANGE UP (ref 43–77)
NRBC # BLD: 0 /100 — SIGNIFICANT CHANGE UP (ref 0–0)
NRBC # BLD: 0 /100 — SIGNIFICANT CHANGE UP (ref 0–0)
NRBC # BLD: SIGNIFICANT CHANGE UP /100 WBCS (ref 0–0)
NRBC # BLD: SIGNIFICANT CHANGE UP /100 WBCS (ref 0–0)
PLAT MORPH BLD: ABNORMAL
PLAT MORPH BLD: ABNORMAL
PLATELET # BLD AUTO: 202 K/UL — SIGNIFICANT CHANGE UP (ref 150–400)
PLATELET # BLD AUTO: 202 K/UL — SIGNIFICANT CHANGE UP (ref 150–400)
POIKILOCYTOSIS BLD QL AUTO: SLIGHT — SIGNIFICANT CHANGE UP
POIKILOCYTOSIS BLD QL AUTO: SLIGHT — SIGNIFICANT CHANGE UP
RBC # BLD: 5.87 M/UL — HIGH (ref 4.2–5.8)
RBC # BLD: 5.87 M/UL — HIGH (ref 4.2–5.8)
RBC # FLD: 17.9 % — HIGH (ref 10.3–14.5)
RBC # FLD: 17.9 % — HIGH (ref 10.3–14.5)
RBC BLD AUTO: ABNORMAL
RBC BLD AUTO: ABNORMAL
TARGETS BLD QL SMEAR: SLIGHT — SIGNIFICANT CHANGE UP
TARGETS BLD QL SMEAR: SLIGHT — SIGNIFICANT CHANGE UP
WBC # BLD: 4.51 K/UL — SIGNIFICANT CHANGE UP (ref 3.8–10.5)
WBC # BLD: 4.51 K/UL — SIGNIFICANT CHANGE UP (ref 3.8–10.5)
WBC # FLD AUTO: 4.51 K/UL — SIGNIFICANT CHANGE UP (ref 3.8–10.5)
WBC # FLD AUTO: 4.51 K/UL — SIGNIFICANT CHANGE UP (ref 3.8–10.5)

## 2023-12-22 PROCEDURE — 86850 RBC ANTIBODY SCREEN: CPT

## 2023-12-22 PROCEDURE — 99214 OFFICE O/P EST MOD 30 MIN: CPT

## 2023-12-22 PROCEDURE — 86901 BLOOD TYPING SEROLOGIC RH(D): CPT

## 2023-12-22 PROCEDURE — 86900 BLOOD TYPING SEROLOGIC ABO: CPT

## 2023-12-23 LAB — MAGNESIUM SERPL-MCNC: 2.3 MG/DL

## 2023-12-26 LAB
ALBUMIN SERPL ELPH-MCNC: 5 G/DL
ALP BLD-CCNC: 71 U/L
ALT SERPL-CCNC: 29 U/L
ANION GAP SERPL CALC-SCNC: 18 MMOL/L
AST SERPL-CCNC: 28 U/L
BILIRUB SERPL-MCNC: 0.2 MG/DL
BUN SERPL-MCNC: 14 MG/DL
CALCIUM SERPL-MCNC: 9.7 MG/DL
CEA SERPL-MCNC: 2.2 NG/ML
CHLORIDE SERPL-SCNC: 103 MMOL/L
CO2 SERPL-SCNC: 20 MMOL/L
CREAT SERPL-MCNC: 0.95 MG/DL
EGFR: 97 ML/MIN/1.73M2
GLUCOSE SERPL-MCNC: 89 MG/DL
INR PPP: 2.22 RATIO
POTASSIUM SERPL-SCNC: 4.5 MMOL/L
PROT SERPL-MCNC: 7.7 G/DL
PT BLD: 24.5 SEC
SODIUM SERPL-SCNC: 142 MMOL/L
TSH SERPL-ACNC: 1.5 UIU/ML

## 2024-01-08 NOTE — PHYSICAL EXAM
[Restricted in physically strenuous activity but ambulatory and able to carry out work of a light or sedentary nature] : Status 1- Restricted in physically strenuous activity but ambulatory and able to carry out work of a light or sedentary nature, e.g., light house work, office work [Normal] : affect appropriate [de-identified] : paronychia on left 2nd toe. Inflammation on skin surrounding nails and toenails

## 2024-01-08 NOTE — HISTORY OF PRESENT ILLNESS
[Disease: _____________________] : Disease: [unfilled] [T: ___] : T[unfilled] [N: ___] : N[unfilled] [M: ___] : M[unfilled] [AJCC Stage: ____] : AJCC Stage: [unfilled] [de-identified] : Mr. Mclaughlin with hx of sarcoidosis is a pleasant 52-year-old male who started having allergy-like symptoms (cough) 5 months ago. He saw his PCP, Dr. Mo- referred to Dr. Trujillo after he was noted to have an abnormal chest CAT scan. He was referred for PET/CT which was done on 5/23 which showed  FDG avid right upper lobe masslike consolidation, considerably increased in size and coalesced with adjacent right upper lobe nodules and new FDG avid supraclavicular, mediastinal and hilar lymphadenopathy, as compared to CT chest January 13, 2021 concerning for malignancy although progression of sarcoidosis is also a consideration. rt supra clav bx was c/w lung adeno ca.   Of note, the patient has a complex medical history having had a diagnosis of sarcoid made in 2002 via a mediastinoscopy. At that time he was noted to have several pulmonary nodules but no treatment was required. 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 He was started on anticoagulation and a IVC filter was placed. He is also suspected of having lupus for which she is on hydroxychloroquine. He has a history of bronchial reactivity but is currently not on any inhaled bronchodilators.  He is a lifelong nonsmoker without occupational exposures. Other than a dry cough, he feels well. He denies any fever or, chest discomfort, dyspnea or peripheral edema.   6/22/21: Here for follow up. No complaints.    7/14/21: Patient seen for routine follow-up.  Patient is currently getting treatment for lupus and remains on hydrochorloquine and coumadin for anti-Cardiolipin antibod. Patient verbalized pain in the left foot that radiated from the small toe to the sole of feet, he takes advil with satisfactory pain control.  Patient also verbalized joint pain  that is related to lupus flare up.  Pt recently started on Tagrisso 80mg daily which he is tolerating well. Denies rash/ diarrhea. Pt met with Dr. Boudreaux for consideration of GK to brain mets.   9/3/21: Facial rash, gained weight, migrating joint pains better after starting steroids (prednisone 5 mg daily). Has had CT scans and MRI yesterday.   10/19/21: Feeling well. Mild facial rash, gained more weight. Restarted hydroxychloroquine for lupus with worsening joint pain in his wrists and fingers;  continues coumadin; plans f/u with his rheumatologist. Reports toe infection, on abx as per his podiatrist with improvement. Has f/u MRI Brain on 12/1.   12/7/21: Since August has been having toenail infection of R and L 1st and 2nd toes. Infection is painful, non pruritic. Has seen podiatrist who resected some of the toenails/skin. Using topical antibiotics with some improvement.   3/10/22: doing well. no new symptoms to report. Has some nail changes but all other AEs have improved. He does have episodic worsening of joint pains. Has gained some weight.   6/9/22: had a recent ER visit for acute swelling of rt knee and ankle- possibly related to occult trauma. Of note, pt is on Coumadin for VTE with optimal maintenance of INR. He has no other symptoms   9/15/22: Pt seem today for follow up. Continues on Tagrisso. Noted to have inflammation of the skin around nails and toe nails. Otherwise not having any additional AEs. Had CT scans which showed stable right upper lobe scarring at site of primary neoplasm. Stable 1.2 cm lingular nodule which could be scarring/atelectasis however as it is larger compared to more remote studies, 3 month follow-up is recommended. New subcentimeter right lower lobe groundglass nodule which can be reassessed on follow-up. Unchanged numerous predominantly subcentimeter retroperitoneal/pelvic lymph nodes.  12/15/22: Here for follow up.Continues on Tagrisso at 160 mg. Feet improved. cracked skin around finger nails. no diarrhea. Taking coumadin   6/15/23: Patient seen in clinic to follow up on his Osimertinib therapy. Patient is experiencing some nail toxicity on and off and muscle cramps, which he is using tonic water for, Denies diarrhea. He has fatigue. He notes that the texture of his scalp hair is different, but he is not bothered by it. He has no mucositis. Pt denies any neurological symptoms.   9/20/23: Skin, nail changes-was really bad, now subsided. Also needed abx as prescribed by podiatrist. Continues on Tagrisso at 160 mg  12/22/23: Patient accompanied by wife, here for follow up visit s/p mask gammaknife RT to cerebellar met 11/27/23, continues on tagrisso and has no complaints.  [de-identified] : adeno ca

## 2024-01-08 NOTE — ASSESSMENT
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 MRI at baseline shows four subcentimeter parenchymal enhancing lesions in the cerebral and cerebellar hemispheres as detailed in the body the report. Findings were suspicious for the presence of intracranial metastases. Started Osimertinib in June 2021. 2 month scan on osi with excellent response in the CNS as well as lungs. CT C/A/P 12/02/21 with decreased RUL mass size. Scans from March 2022, reviewed personally- completely stable findings- sustained KS. Brain MRI read pending ANNALISE in December 2021. Scans done on 6/1/22 reviewed independently- sustained KS noted. Stable 1.1 cm right upper lobe nodule along a bandlike opacity. Stable mild tree-in-bud opacity within the lingula. The lungs are otherwise clear. No new or enlarging nodule. Numerous subcentimeter retroperitoneal lymph nodes are unchanged. Previously mentioned enlarged external iliac lymph nodes are not imaged on this exam. US doppler of LE which showed chronic DVT in rt LE. While systemic scans were stable as noted above, brain MRI showed POD. Therefore, decision was made to increase osimertinib dose to BID (160 mg) since this dosing is more effective in CNS/leptomeningeal disease. Scans from September 2022 reviewed and showed stable right upper lobe scarring, stable 1.2 cm lingular nodule which could be scarring/atelectasis. Unchanged retroperitoneal/pelvic lymph nodes.New subcentimeter right lower lobe ground glass nodule which was thought to be inflammatory. Scans from December 2022 which no new or enlarging lung nodule. Mild patchy ground glass opacity within lateral segment of right middle lobe likely inflammatory. Stable scarring within right upper lobe.Stable subcentimeter retroperitoneal lymph nodes. Pt had COVID around this time which can explain these changes.MRI brain at this time showed an excellent response. Reviewed scans done since last visit independently CT scans done in September 2023 shows excellent response- CR. Scans from Dec 18 (no report as yet) shows continued CR based on my review. Recent MR 10/30/23, + increase in left cerebellum lesion  now 6 mm (16:53), previously 3 mm sen by Dr. Boudreaux s/p mask gammaknife 11/27/23 Continues on tagrisso daily, tolerating well Of note, pt has microcytosis with mild anemia. He has a hx of thalassemia minor. Plt count slightly low and PS shows giant platelets. pt likely has chronic ITP which is commonly associated with lupus anticoagulant. He has not needed any treatment for this since his plt count is normal. Educated on proper care of nails and skin surrounding nails. Informed to soak hands and feet in one-to-one ratio of water to vinegar. Instructed to follow up with podiatry to manage left 2nd toe paronychia and other nail changes. Labs today including CBC, CMP, mag (for muscle cramps), CEA (previously elevated and now decreased to normal range), and INR (for coumadin monitoring- this has been controlled perfectly in the 2.5-3 range) All questions answered. OV in 3 months after repeat scans Labs today: CBC, CMP, CEA, TSH. will also add INR per pt request  I was with the hematology/ oncology fellow, Dr. Chaparro for the entire visit and agree with above documentation

## 2024-01-08 NOTE — REVIEW OF SYSTEMS
[Negative] : Allergic/Immunologic [de-identified] : changes to skin around nails as above  [Fever] : no fever [Night Sweats] : no night sweats [Cough] : no cough

## 2024-01-23 ENCOUNTER — APPOINTMENT (OUTPATIENT)
Dept: MRI IMAGING | Facility: CLINIC | Age: 54
End: 2024-01-23
Payer: COMMERCIAL

## 2024-01-23 ENCOUNTER — OUTPATIENT (OUTPATIENT)
Dept: OUTPATIENT SERVICES | Facility: HOSPITAL | Age: 54
LOS: 1 days | End: 2024-01-23
Payer: COMMERCIAL

## 2024-01-23 DIAGNOSIS — C79.31 SECONDARY MALIGNANT NEOPLASM OF BRAIN: ICD-10-CM

## 2024-01-23 DIAGNOSIS — Z00.8 ENCOUNTER FOR OTHER GENERAL EXAMINATION: ICD-10-CM

## 2024-01-23 PROCEDURE — 70553 MRI BRAIN STEM W/O & W/DYE: CPT | Mod: 26

## 2024-01-23 PROCEDURE — 70553 MRI BRAIN STEM W/O & W/DYE: CPT

## 2024-01-25 ENCOUNTER — NON-APPOINTMENT (OUTPATIENT)
Age: 54
End: 2024-01-25

## 2024-01-28 ENCOUNTER — RX RENEWAL (OUTPATIENT)
Age: 54
End: 2024-01-28

## 2024-01-29 ENCOUNTER — APPOINTMENT (OUTPATIENT)
Dept: PAIN MANAGEMENT | Facility: CLINIC | Age: 54
End: 2024-01-29
Payer: COMMERCIAL

## 2024-01-29 VITALS — HEIGHT: 69 IN | BODY MASS INDEX: 28.88 KG/M2 | WEIGHT: 195 LBS

## 2024-01-29 DIAGNOSIS — M25.552 PAIN IN LEFT HIP: ICD-10-CM

## 2024-01-29 DIAGNOSIS — G89.29 PAIN IN LEFT HIP: ICD-10-CM

## 2024-01-29 DIAGNOSIS — M70.62 TROCHANTERIC BURSITIS, LEFT HIP: ICD-10-CM

## 2024-01-29 DIAGNOSIS — M16.12 UNILATERAL PRIMARY OSTEOARTHRITIS, LEFT HIP: ICD-10-CM

## 2024-01-29 PROCEDURE — 99203 OFFICE O/P NEW LOW 30 MIN: CPT

## 2024-01-29 NOTE — PHYSICAL EXAM
[de-identified] : Constitutional:   - No acute distress   - Well developed; well nourished    Neurological:   - normal mood and affect   - alert and oriented x 3     Cardiovascular:   - grossly normal  [Left] : left hip [Right] : right knee [] : ligamentously stable

## 2024-01-29 NOTE — ASSESSMENT
[FreeTextEntry1] : A discussion regarding available pain management treatment options occurred with the patient.  These included interventional, rehabilitative, pharmacological, and alternative modalities. We will proceed with the following:    Interventional treatment options:   - Proceed with left hip joint CSI with fluoroscopic guidance - Patient may remain on Coumadin for indicated procedure - Will defer interventional therapy for the right knee to his orthopedic surgeon - No relief with reported left GTB injection; would not repeat at this point - see additional instructions below    Rehabilitative options:   - Advised trial of physical therapy (left ITB stretching, hip mobility) - participation in active HEP was discussed    Medication based treatment options:   - Not candidate for oral NSAIDs secondary to chronic anticoagulation - continue Voltaren gel up to 4 times daily on as-needed basis - Continue Tylenol 500-1000 mg up to TID as needed - see additional instructions below    Complementary treatment options:   - Weight management and lifestyle modifications discussed   Additional treatment recommendations as follows:   - patient will follow-up with orthopedic surgery as directed  The risks, benefits and alternatives of the proposed procedure were explained in detail with the patient. The risks outlined include but are not limited to infection, bleeding, nerve injury, a temporary increase in pain, failure to resolve symptoms, need for future interventions, allergic reaction, and possible elevation of blood sugar in diabetics if using corticosteroid.  All questions were answered to patient's apparent satisfaction, and he/she verbalized an understanding.  The documentation recorded by the scribe, in my presence, accurately reflects the service I personally performed and the decisions made by me with my edits as appropriate.   I, Duncan King acting as scribe, attest that this documentation has been prepared under the direction and in the presence of Provider Jack Chisholm DO.

## 2024-01-29 NOTE — HISTORY OF PRESENT ILLNESS
[Sudden] : sudden [5] : 5 [Dull/Aching] : dull/aching [Constant] : constant [] : Patient is currently injured and not playing sports: no [FreeTextEntry1] : left hip

## 2024-02-06 ENCOUNTER — APPOINTMENT (OUTPATIENT)
Dept: RADIATION ONCOLOGY | Facility: CLINIC | Age: 54
End: 2024-02-06
Payer: COMMERCIAL

## 2024-02-06 PROCEDURE — 99024 POSTOP FOLLOW-UP VISIT: CPT

## 2024-02-06 NOTE — REVIEW OF SYSTEMS
[Negative] : Heme/Lymph [Confused] : no confusion [Dizziness] : no dizziness [Difficulty Walking] : no difficulty walking [FreeTextEntry3] : denies [de-identified] : denies HAs or vision difficulties.

## 2024-02-06 NOTE — HISTORY OF PRESENT ILLNESS
[Home] : at home, [unfilled] , at the time of the visit. [Medical Office: ([facility])___] : at the medical office located in  [Verbal consent obtained from patient] : the patient, [unfilled] [FreeTextEntry1] : RT hx: GKRS 20Gy over 1 Fx to cerebellum  11/27/2023  ONCOLOGY HISTORY Mr. Mclaughlin presents with h/o sarcoid and possible lupus who has been monitoring pulmonary nodules and mediastinal adenopathy since 12/2002. His onc history began when he started having allergy-like symptoms (cough) in February 2021. He saw his PCP, Dr. Mo- referred to Dr. Trujillo after he was noted to have an abnormal chest CAT scan. He was referred for PET/CT which was done on 5/23 which showed FDG avid right upper lobe masslike consolidation, considerably increased in size and coalesced with adjacent right upper lobe nodules and new FDG avid supraclavicular, mediastinal and hilar lymphadenopathy, as compared to CT chest January 13, 2021 concerning for malignancy although progression of sarcoidosis is also a consideration.  Right SCV node biopsy 6/4/21 demonstrated EGFR+ metastatic adenocarcinoma of pulmonary origin, PD-L1 TPS < 1%. He consulted with Dr. Hart 6/10 and Dr. Weldon 6/18 for evaluation for definitive chemoradiation.  However, brain MRI 6/18/21 demonstrated the following: 6 x 5 mm enhancing parenchymal lesion with surrounding edema in the left parietal lobe (). 4 x 4 mm enhancing parenchymal lesion with surrounding edema in the right frontal pericardium region (12-99).  6 x 4 mm enhancing parenchymal lesion with surrounding edema in the right superior cerebellar hemisphere (12-54).  2 mm enhancing parenchymal lesion with surrounding edema in the left cerebellar hemisphere (12-40).  At the time of initial consult on 7/1/2021 he felt well. Denied headaches, focal weakness, nausea, vomiting. Started taking Tagrisso 80mg in late june and was tolerating it well. Follows with Dr. Magaña of neurology  10/5/2021- Mr. Mclaughlin presents today for follow up. Seen through TELEHEALTH  MRI brain done 9/1/2021 showed Previously noted lesions involving the posterior fossa and supratentorial region are either no longer seen or decrease in size. This is likely compatible with response to therapy. Continue close interval follow up is recommended.  CT CAP 9/1/2021 showed Right upper lobe lung mass has significantly decreased in size since 5/28/2021. Overall he is feeling well. He denies headaches, nausea, vomiting, focal weakness. having some skin issues on his toes while on tagrisso. joint pain has been better recently.   12/01/21 MRI Head IMPRESSION:  Previously noted residual neoplastic lesion in the LEFT parietal lobe to be stable measuring 2 mm. Previously noted metastatic lesion in the RIGHT caudate nucleus has resolved. The previously noted metastatic lesion in the medial RIGHT cerebellum now measures 3 mm. Incidental developmental venous anomaly seen in the RIGHT parietal lobe.  03/22/22: Pt presents for follow-up. MR Head (3/16/22) showing 2 mm lesion now seen in lateral LEFT cerebellum. Feeling well overall. Denies HA, numbness, dizziness, hearing/vision changes, cough, or chest pain. Remains active in everyday life. Continues on Tagrisso 80 mg with Dr. Hart with some GI upset, but overall tolerating well.   6/29/2022- Mr. Mclaughlin presents today for follow up.  MRI brain 6/27/2022 showed Multiple new metastatic lesions measuring approximately 3 to 4 mm predominantly at the gray-white junction in the BILATERAL frontal, parietal and occipital lobes as well as the LEFT caudate nucleus, RIGHT basal ganglia and BILATERAL cerebellum. No significant edema is noted. 6/1 body imaging with Dr. Hart was stable. continues on osimertinib. notes a flare in joint pain recently. following with rheumatology. denies headaches, nausea, vomiting, focal weakness, numbness, tingling.  8/31/2022: Mr. Mclaughlin presents for follow up today. Repeat MRI head completed on 8/25/2022. No official read yet, but overall looks stable/improved. He continues to follow with Dr. Hart and continues on osimertinib. Feeling well overall. Tolerating Tagrisso without complaint. Denies HA, N/V, dizziness, loss of balance, or changes to hearing/vision.  12/7/2022- Mr. Mclaughlin presents today for follow up. seen through telephone, for which he consents. MRI brain 12/2/2022 showed  Multiple punctate enhancing lesions when compared to 8/25/2022. For example, the left anterior insular region lesion is unchanged (12:87), and the right cerebellar lesion (12:67) is slightly more conspicuous. It is unclear whether this is due to differences in technique or progression of patient's underlying disease process. No new or enlarging lesions are identified.  CT CAP 9/12/2022 showed Since 6/1/2022: Stable right upper lobe scarring at site of primary neoplasm. Stable 1.2 cm lingular nodule which could be scarring/atelectasis however as it is larger compared to more remote studies, 3 month follow-up is recommended. New subcentimeter right lower lobe groundglass nodule which can be reassessed on follow-up. Unchanged numerous predominantly subcentimeter retroperitoneal/pelvic lymph nodes.  continues to follow with Dr. Hart.   feeling well overall today. no headaches, no nausea, no vomiting, no focal weakness, no numbness or tingling.   4/20/2023- Mr. Mclaughlin presents today for follow up. Continues to follow with Dr. Hart. continues on osimertinib 160 mg daily.   Clem MRI 4/10/2023 showed IMPRESSION: No new or worsening metastases. Slightly decreased size of subcentimeter right cerebellar lesion. Stable subcentimeter left cerebellar lesion.  CT CAP 3/6/2023 showed Stable ill-defined linear opacity in the right upper lobe when compared to previous exam. Previously noted patchy groundglass opacity in the right middle lobe is no longer present and has resolved.  Today no neuro or respiratory symptoms. He has history of clotting disorder and continues on Coumadin, also has inflammatory autoimmune disorder, denies recent flares with joint pains. Over all feeling well.   VISIT dated 8/1/2023 Patient returns for routine f/u with images for review Denies any weakness, numbness,tingling, nausea, vomiting, headaches or other neurologic symptoms. No issues with speech or comprehension. Endorses painful sensation in digits and fatigue but this does not limit his participation in activity.  Continues to follow with Dr. Hart on Osimertinib 160mg Established care with Dr. Louis on medical Cannibis for neuropathy/arthralgias  MRI brain w w/o contrast 7/19/2023 IMPRESSION: 1. Redemonstrated nodules of enhancement in the bilateral cerebellar hemispheres, generally unchanged in size and appearance compared to prior imaging. 2. No evidence of new enhancing lesions in the brain parenchyma.  CT C/A/P 6/12/2023 IMPRESSION: No new disease in the chest, abdomen, or pelvis.  Visit dated 11/7/2023 Patient returns for routine follow up to include progress check and review of completed cranial images. Denies N/V, HA/unilateral extremity weakness/memory changes/gait disturbance/bowel/bladder dysfunction or other neurologic symptoms. No issues with speech or comprehension. Participates in activity w/o difficulty  Continues to follow with Dr. Hart on Osimertinib 160mg BID increased after noted d/t POD on cranial images 6/2022  MRI brain w w/o contrast 10/30/2023 IMPRESSION: Enhancing lesion in the left cerebellum is increased in size, now 6 mm (16:53), previously 3 mm. Enhancing lesion in the right cerebellum measures 2 mm (16:53), slightly decreased since prior exam. A 2 mm enhancing lesion in the right occipital lobe (16:98), new since prior exam. A 3 mm enhancing lesion in the left Meckel[place] cave (16:58) is unchanged since 11/19/2023 but new/increased in size since 6/18/2021. Please note that there is a typographical error in the report. A sentence in the body and impression of the report should read: A 3 mm enhancing lesion in the left [place]ckel[place] (16:58) is unchanged since 7/19/2023 but new/increased in size since 6/18/2021.  CT C/A/P 9/12/2023 IMPRESSION: No new disease in the chest, abdomen, or pelvis.  Visit dated 2/6/2024 Patient presents for routine follow-up and progress check with cranial images for review. Underwent GKRS to left cerebellum lesion which was noted for increased size in cranial images 10/2023. (GKRS 20GY over 1 Fx to cerebellum 11/27/2023) Reports overall doing no new neurological deficits.  Denies N/V, HA/unilateral extremity weakness/memory changes/gait disturbance/bowel/bladder dysfunction or other neurologic symptoms. No issues with speech or comprehension. Continues to follow with Dr. Hart on Tagrisso 160mg. but with ingrown nails for which he will be seeing podiatry.  MRI brain w w/o contrast 1/23/2024 Impression: Previously noted lesions has either decreased in size or no longer seen which is likely due to response to therapy. Continued close interval follow-up is recommended.  CT C/A/P 12/18/2023 IMPRESSION: No evidence of disease progression

## 2024-02-06 NOTE — PHYSICAL EXAM
[General Appearance - Well Developed] : well developed [Exaggerated Use Of Accessory Muscles For Inspiration] : no accessory muscle use [Normal] : normoactive bowel sounds, soft and nontender, no hepatosplenomegaly or masses appreciated [Oriented To Time, Place, And Person] : oriented to person, place, and time [de-identified] : seen through  TELEHEALTH vsit with limitations for exam

## 2024-02-08 ENCOUNTER — LABORATORY RESULT (OUTPATIENT)
Age: 54
End: 2024-02-08

## 2024-02-08 ENCOUNTER — APPOINTMENT (OUTPATIENT)
Dept: INTERNAL MEDICINE | Facility: CLINIC | Age: 54
End: 2024-02-08
Payer: COMMERCIAL

## 2024-02-08 ENCOUNTER — NON-APPOINTMENT (OUTPATIENT)
Age: 54
End: 2024-02-08

## 2024-02-08 VITALS
BODY MASS INDEX: 29.03 KG/M2 | HEART RATE: 66 BPM | SYSTOLIC BLOOD PRESSURE: 128 MMHG | DIASTOLIC BLOOD PRESSURE: 78 MMHG | HEIGHT: 69 IN | WEIGHT: 196 LBS

## 2024-02-08 DIAGNOSIS — Z00.00 ENCOUNTER FOR GENERAL ADULT MEDICAL EXAMINATION W/OUT ABNORMAL FINDINGS: ICD-10-CM

## 2024-02-08 PROCEDURE — 93000 ELECTROCARDIOGRAM COMPLETE: CPT

## 2024-02-08 PROCEDURE — 36415 COLL VENOUS BLD VENIPUNCTURE: CPT

## 2024-02-08 PROCEDURE — 99396 PREV VISIT EST AGE 40-64: CPT

## 2024-02-08 NOTE — PHYSICAL EXAM
[No Mass] : no mass [Urethral Meatus] : meatus normal [Urinary Bladder Findings] : the bladder was normal on palpation [Scrotum] : the scrotum was normal [Testes Mass (___cm)] : there were no testicular masses [No Prostate Nodules] : no prostate nodules [Normal] : no rash

## 2024-02-08 NOTE — HISTORY OF PRESENT ILLNESS
[de-identified] : This is a 53-year-old gentleman with a history of metastatic adenocarcinoma, pulmonary embolus, clotting disorder being treated with long-term Coumadin therapy and also being monitored for his carcinoma which seems to be in remission

## 2024-02-08 NOTE — HEALTH RISK ASSESSMENT
[No] : No [Never (0 pts)] : Never (0 points) [No falls in past year] : Patient reported no falls in the past year [0] : 2) Feeling down, depressed, or hopeless: Not at all (0) [PHQ-2 Negative - No further assessment needed] : PHQ-2 Negative - No further assessment needed [[id number]] : 0

## 2024-02-08 NOTE — ASSESSMENT
[FreeTextEntry1] : Problems Adenocarcinoma of the lung with metastases Coagulopathy Long-term Coumadin therapy Assessment His blood pressure and physical examination were normal his EKG was normal.  The patient is due for a colonoscopy.

## 2024-02-10 LAB
ALBUMIN SERPL ELPH-MCNC: 4.8 G/DL
ALP BLD-CCNC: 67 U/L
ALT SERPL-CCNC: 31 U/L
ANION GAP SERPL CALC-SCNC: 11 MMOL/L
APPEARANCE: CLEAR
AST SERPL-CCNC: 27 U/L
BACTERIA: NEGATIVE /HPF
BASOPHILS # BLD AUTO: 0.03 K/UL
BASOPHILS NFR BLD AUTO: 0.9 %
BILIRUB SERPL-MCNC: 0.4 MG/DL
BILIRUBIN URINE: ABNORMAL
BLOOD URINE: NEGATIVE
BUN SERPL-MCNC: 15 MG/DL
CALCIUM SERPL-MCNC: 9.6 MG/DL
CAST: 2 /LPF
CHLORIDE SERPL-SCNC: 105 MMOL/L
CHOLEST SERPL-MCNC: 174 MG/DL
CO2 SERPL-SCNC: 24 MMOL/L
COLOR: NORMAL
CREAT SERPL-MCNC: 0.89 MG/DL
EGFR: 104 ML/MIN/1.73M2
EOSINOPHIL # BLD AUTO: 0.13 K/UL
EOSINOPHIL NFR BLD AUTO: 3.5 %
EPITHELIAL CELLS: 0 /HPF
ESTIMATED AVERAGE GLUCOSE: 111 MG/DL
FERRITIN SERPL-MCNC: 257 NG/ML
FOLATE SERPL-MCNC: 17 NG/ML
GLUCOSE QUALITATIVE U: NEGATIVE MG/DL
GLUCOSE SERPL-MCNC: 91 MG/DL
HBA1C MFR BLD HPLC: 5.5 %
HCT VFR BLD CALC: 37.3 %
HDLC SERPL-MCNC: 40 MG/DL
HGB BLD-MCNC: 11.4 G/DL
INR PPP: 2.48 RATIO
KETONES URINE: NEGATIVE MG/DL
LDLC SERPL CALC-MCNC: 99 MG/DL
LEUKOCYTE ESTERASE URINE: NEGATIVE
LYMPHOCYTES # BLD AUTO: 1.03 K/UL
LYMPHOCYTES NFR BLD AUTO: 27.2 %
MAN DIFF?: NORMAL
MCHC RBC-ENTMCNC: 19.8 PG
MCHC RBC-ENTMCNC: 30.6 GM/DL
MCV RBC AUTO: 64.9 FL
MICROSCOPIC-UA: NORMAL
MONOCYTES # BLD AUTO: 0.2 K/UL
MONOCYTES NFR BLD AUTO: 5.3 %
NEUTROPHILS # BLD AUTO: 2.4 K/UL
NEUTROPHILS NFR BLD AUTO: 63.1 %
NITRITE URINE: NEGATIVE
NONHDLC SERPL-MCNC: 134 MG/DL
PH URINE: 5.5
PLATELET # BLD AUTO: 171 K/UL
POTASSIUM SERPL-SCNC: 4.2 MMOL/L
PROT SERPL-MCNC: 7.1 G/DL
PROTEIN URINE: NORMAL MG/DL
PSA SERPL-MCNC: 2.34 NG/ML
PT BLD: 27.3 SEC
RBC # BLD: 5.75 M/UL
RBC # FLD: 19.9 %
RED BLOOD CELLS URINE: 1 /HPF
SODIUM SERPL-SCNC: 140 MMOL/L
SPECIFIC GRAVITY URINE: 1.02
T3RU NFR SERPL: 1.1 TBI
T4 SERPL-MCNC: 7.7 UG/DL
TRIGL SERPL-MCNC: 203 MG/DL
TSH SERPL-ACNC: 1.54 UIU/ML
URATE SERPL-MCNC: 6.3 MG/DL
UROBILINOGEN URINE: 0.2 MG/DL
VIT B12 SERPL-MCNC: 398 PG/ML
WBC # FLD AUTO: 3.8 K/UL
WHITE BLOOD CELLS URINE: 0 /HPF

## 2024-02-16 ENCOUNTER — RX RENEWAL (OUTPATIENT)
Age: 54
End: 2024-02-16

## 2024-02-21 ENCOUNTER — EMERGENCY (EMERGENCY)
Facility: HOSPITAL | Age: 54
LOS: 0 days | Discharge: HOME | End: 2024-02-22
Attending: EMERGENCY MEDICINE
Payer: COMMERCIAL

## 2024-02-21 VITALS — HEIGHT: 69 IN | WEIGHT: 195.11 LBS

## 2024-02-21 DIAGNOSIS — R10.13 EPIGASTRIC PAIN: ICD-10-CM

## 2024-02-21 DIAGNOSIS — C34.90 MALIGNANT NEOPLASM OF UNSPECIFIED PART OF UNSPECIFIED BRONCHUS OR LUNG: ICD-10-CM

## 2024-02-21 DIAGNOSIS — D68.61 ANTIPHOSPHOLIPID SYNDROME: ICD-10-CM

## 2024-02-21 DIAGNOSIS — Z79.01 LONG TERM (CURRENT) USE OF ANTICOAGULANTS: ICD-10-CM

## 2024-02-21 DIAGNOSIS — M54.89 OTHER DORSALGIA: ICD-10-CM

## 2024-02-21 DIAGNOSIS — K80.20 CALCULUS OF GALLBLADDER WITHOUT CHOLECYSTITIS WITHOUT OBSTRUCTION: ICD-10-CM

## 2024-02-21 LAB
ADD ON TEST-SPECIMEN IN LAB: SIGNIFICANT CHANGE UP
ALBUMIN SERPL ELPH-MCNC: 4.4 G/DL — SIGNIFICANT CHANGE UP (ref 3.3–5)
ALP SERPL-CCNC: 65 U/L — SIGNIFICANT CHANGE UP (ref 40–120)
ALT FLD-CCNC: 87 U/L — HIGH (ref 12–78)
ANION GAP SERPL CALC-SCNC: 2 MMOL/L — LOW (ref 5–17)
ANISOCYTOSIS BLD QL: SLIGHT — SIGNIFICANT CHANGE UP
AST SERPL-CCNC: 55 U/L — HIGH (ref 15–37)
BASOPHILS # BLD AUTO: 0.05 K/UL — SIGNIFICANT CHANGE UP (ref 0–0.2)
BASOPHILS NFR BLD AUTO: 1 % — SIGNIFICANT CHANGE UP (ref 0–2)
BILIRUB SERPL-MCNC: 0.3 MG/DL — SIGNIFICANT CHANGE UP (ref 0.2–1.2)
BUN SERPL-MCNC: 16 MG/DL — SIGNIFICANT CHANGE UP (ref 7–23)
CALCIUM SERPL-MCNC: 9.3 MG/DL — SIGNIFICANT CHANGE UP (ref 8.5–10.1)
CHLORIDE SERPL-SCNC: 109 MMOL/L — HIGH (ref 96–108)
CO2 SERPL-SCNC: 29 MMOL/L — SIGNIFICANT CHANGE UP (ref 22–31)
CREAT SERPL-MCNC: 1.06 MG/DL — SIGNIFICANT CHANGE UP (ref 0.5–1.3)
EGFR: 85 ML/MIN/1.73M2 — SIGNIFICANT CHANGE UP
ELLIPTOCYTES BLD QL SMEAR: SLIGHT — SIGNIFICANT CHANGE UP
EOSINOPHIL # BLD AUTO: 0 K/UL — SIGNIFICANT CHANGE UP (ref 0–0.5)
EOSINOPHIL NFR BLD AUTO: 0 % — SIGNIFICANT CHANGE UP (ref 0–6)
GLUCOSE SERPL-MCNC: 128 MG/DL — HIGH (ref 70–99)
HCT VFR BLD CALC: 37.8 % — LOW (ref 39–50)
HGB BLD-MCNC: 11.8 G/DL — LOW (ref 13–17)
LG PLATELETS BLD QL AUTO: SLIGHT — SIGNIFICANT CHANGE UP
LIDOCAIN IGE QN: 26 U/L — SIGNIFICANT CHANGE UP (ref 13–75)
LYMPHOCYTES # BLD AUTO: 1.4 K/UL — SIGNIFICANT CHANGE UP (ref 1–3.3)
LYMPHOCYTES # BLD AUTO: 30 % — SIGNIFICANT CHANGE UP (ref 13–44)
MANUAL SMEAR VERIFICATION: SIGNIFICANT CHANGE UP
MCHC RBC-ENTMCNC: 20.3 PG — LOW (ref 27–34)
MCHC RBC-ENTMCNC: 31.2 GM/DL — LOW (ref 32–36)
MCV RBC AUTO: 65.1 FL — LOW (ref 80–100)
MICROCYTES BLD QL: SIGNIFICANT CHANGE UP
MONOCYTES # BLD AUTO: 0.28 K/UL — SIGNIFICANT CHANGE UP (ref 0–0.9)
MONOCYTES NFR BLD AUTO: 6 % — SIGNIFICANT CHANGE UP (ref 2–14)
NEUTROPHILS # BLD AUTO: 2.81 K/UL — SIGNIFICANT CHANGE UP (ref 1.8–7.4)
NEUTROPHILS NFR BLD AUTO: 58 % — SIGNIFICANT CHANGE UP (ref 43–77)
NEUTS BAND # BLD: 2 % — SIGNIFICANT CHANGE UP (ref 0–8)
NRBC # BLD: 0 /100 WBCS — SIGNIFICANT CHANGE UP (ref 0–0)
NRBC # BLD: SIGNIFICANT CHANGE UP /100 WBCS (ref 0–0)
PLAT MORPH BLD: ABNORMAL
PLATELET # BLD AUTO: 173 K/UL — SIGNIFICANT CHANGE UP (ref 150–400)
PLATELET COUNT - ESTIMATE: NORMAL — SIGNIFICANT CHANGE UP
POIKILOCYTOSIS BLD QL AUTO: SLIGHT — SIGNIFICANT CHANGE UP
POTASSIUM SERPL-MCNC: 4.4 MMOL/L — SIGNIFICANT CHANGE UP (ref 3.5–5.3)
POTASSIUM SERPL-SCNC: 4.4 MMOL/L — SIGNIFICANT CHANGE UP (ref 3.5–5.3)
PROT SERPL-MCNC: 8 GM/DL — SIGNIFICANT CHANGE UP (ref 6–8.3)
RBC # BLD: 5.81 M/UL — HIGH (ref 4.2–5.8)
RBC # FLD: 19.1 % — HIGH (ref 10.3–14.5)
RBC BLD AUTO: ABNORMAL
SODIUM SERPL-SCNC: 140 MMOL/L — SIGNIFICANT CHANGE UP (ref 135–145)
TROPONIN I, HIGH SENSITIVITY RESULT: 3.53 NG/L — SIGNIFICANT CHANGE UP
VARIANT LYMPHS # BLD: 3 % — SIGNIFICANT CHANGE UP (ref 0–6)
WBC # BLD: 4.68 K/UL — SIGNIFICANT CHANGE UP (ref 3.8–10.5)
WBC # FLD AUTO: 4.68 K/UL — SIGNIFICANT CHANGE UP (ref 3.8–10.5)

## 2024-02-21 PROCEDURE — 36415 COLL VENOUS BLD VENIPUNCTURE: CPT

## 2024-02-21 PROCEDURE — 85025 COMPLETE CBC W/AUTO DIFF WBC: CPT

## 2024-02-21 PROCEDURE — 84484 ASSAY OF TROPONIN QUANT: CPT

## 2024-02-21 PROCEDURE — 71045 X-RAY EXAM CHEST 1 VIEW: CPT

## 2024-02-21 PROCEDURE — 93010 ELECTROCARDIOGRAM REPORT: CPT

## 2024-02-21 PROCEDURE — 85730 THROMBOPLASTIN TIME PARTIAL: CPT

## 2024-02-21 PROCEDURE — 83690 ASSAY OF LIPASE: CPT

## 2024-02-21 PROCEDURE — 96375 TX/PRO/DX INJ NEW DRUG ADDON: CPT

## 2024-02-21 PROCEDURE — 71045 X-RAY EXAM CHEST 1 VIEW: CPT | Mod: 26

## 2024-02-21 PROCEDURE — 99285 EMERGENCY DEPT VISIT HI MDM: CPT

## 2024-02-21 PROCEDURE — 99285 EMERGENCY DEPT VISIT HI MDM: CPT | Mod: 25

## 2024-02-21 PROCEDURE — 74177 CT ABD & PELVIS W/CONTRAST: CPT

## 2024-02-21 PROCEDURE — 85610 PROTHROMBIN TIME: CPT

## 2024-02-21 PROCEDURE — 76705 ECHO EXAM OF ABDOMEN: CPT

## 2024-02-21 PROCEDURE — 93005 ELECTROCARDIOGRAM TRACING: CPT

## 2024-02-21 PROCEDURE — 96374 THER/PROPH/DIAG INJ IV PUSH: CPT

## 2024-02-21 PROCEDURE — 80053 COMPREHEN METABOLIC PANEL: CPT

## 2024-02-21 RX ORDER — HYDROMORPHONE HYDROCHLORIDE 2 MG/ML
1 INJECTION INTRAMUSCULAR; INTRAVENOUS; SUBCUTANEOUS ONCE
Refills: 0 | Status: DISCONTINUED | OUTPATIENT
Start: 2024-02-21 | End: 2024-02-21

## 2024-02-21 RX ORDER — ONDANSETRON 8 MG/1
4 TABLET, FILM COATED ORAL ONCE
Refills: 0 | Status: COMPLETED | OUTPATIENT
Start: 2024-02-21 | End: 2024-02-21

## 2024-02-21 RX ADMIN — HYDROMORPHONE HYDROCHLORIDE 1 MILLIGRAM(S): 2 INJECTION INTRAMUSCULAR; INTRAVENOUS; SUBCUTANEOUS at 23:43

## 2024-02-21 RX ADMIN — ONDANSETRON 4 MILLIGRAM(S): 8 TABLET, FILM COATED ORAL at 23:44

## 2024-02-21 NOTE — ED PROVIDER NOTE - CLINICAL SUMMARY MEDICAL DECISION MAKING FREE TEXT BOX
53-year-old male with history of antiphospholipid syndrome on Coumadin, non-small cell lung cancer on oral chemotherapy presents for evaluation of sudden onset of severe epigastric pain radiating to the right upper quadrant and the mid to upper back that started approximately 1 hour prior to arrival shortly after he ate dinner.  The patient states that the pain is currently about 10 out of 10 but he is unable to describe it as burning versus dull versus cramping.  Patient states that the pain is not worse with movement or taking deep breaths.  He denies any chest pain or shortness of breath.  He denies any recent urinary symptoms to include gross hematuria, frequency or urgency.  Patient is afebrile.   differential diagnosis includes ACS versus gastritis versus pancreatitis versus cholecystitis.  Plan: Pain control as needed, IV fluids, CBC, CMP, lipase, EKG to rule out ACS, right upper quadrant ultrasound to rule out cholelithiasis/cholecystitis and CT of the abdomen pelvis with IV contrast.

## 2024-02-21 NOTE — ED ADULT TRIAGE NOTE - CHIEF COMPLAINT QUOTE
pt presents to ER c/o epigastric pain x1hr PTA. reports pain radiating on his R side to his back. denies cp, sob, n/v. -allergies. hx lung CA, on immunotherapy. on Coumadin. MD Mo told him to come in for further evaluation. pt presents to ER c/o epigastric pain x1hr PTA. reports pain radiating on his R side to his back. denies cp, sob, n/v. -allergies. hx lung CA, on immunotherapy. on Coumadin. MD Mo told him to come in for further evaluation. sent for STAT EKG.

## 2024-02-21 NOTE — ED PROVIDER NOTE - CARE PROVIDER_API CALL
Duncan Hayden (DO)  Surgery  [address] [place] [address]  Phone: [phone]  Fax: [phone]  Follow Up Time: 4-6 Days

## 2024-02-21 NOTE — ED ADULT NURSE NOTE - CHIEF COMPLAINT QUOTE
pt presents to ER c/o epigastric pain x1hr PTA. reports pain radiating on his R side to his back. denies cp, sob, n/v. -allergies. hx lung CA, on immunotherapy. on Coumadin. MD Mo told him to come in for further evaluation. sent for STAT EKG.

## 2024-02-21 NOTE — ED PROVIDER NOTE - PATIENT PORTAL LINK FT
You can access the FollowMyHealth Patient Portal offered by [facility] by registering at the following website: [email or web address]. By joining [employer]’s FollowMyHealth portal, you will also be able to view your health information using other applications (apps) compatible with our system.

## 2024-02-21 NOTE — ED ADULT NURSE NOTE - OBJECTIVE STATEMENT
pt. came in with c/o of severe epigastric pain radiating to his right back started an hour ago, denies chest pain, vomiting and diarrhea, known with Lung CA, on immunotherapy, on Coumadin, alert and oriented, safety maintained applied.

## 2024-02-21 NOTE — ED PROVIDER NOTE - OBJECTIVE STATEMENT
53-year-old male with history of antiphospholipid syndrome on Coumadin, non-small cell lung cancer on oral chemotherapy presents for evaluation of sudden onset of severe epigastric pain radiating to the right upper quadrant and the mid to upper back that started approximately 1 hour prior to arrival shortly after he ate dinner.  The patient states that the pain is currently about 10 out of 10 but he is unable to describe it as burning versus dull versus cramping.  Patient states that the pain is not worse with movement or taking deep breaths.  He denies any chest pain or shortness of breath.  He denies any recent urinary symptoms to include gross hematuria, frequency or urgency.  Patient is afebrile.

## 2024-02-21 NOTE — ED PROVIDER NOTE - NSFOLLOWUPINSTRUCTIONS_ED_ALL_ED_FT
Gallstones    WHAT YOU NEED TO KNOW:    Gallstones are hard substances that form in your gallbladder or bile duct. Your gallbladder and bile duct are located on the right side of your abdomen, near your liver. Your gallbladder stores bile. Bile helps break down the fat that you eat. Your gallbladder also helps remove certain chemicals from your body.Gallstones         DISCHARGE INSTRUCTIONS:    Return to the emergency department if:     You have a fever and chills.      Your skin or eyes turn yellow.      You have severe pain in your upper abdomen, just below the right ribcage.    Contact your healthcare provider if:     You have nausea and are vomiting.      Your urine is dark.      You have claire-colored bowel movements.      You have questions or concerns about your condition or care.    Medicines:     Prescription pain medicine may be given. Ask your healthcare provider how to take this medicine safely.      Take your medicine as directed. Contact your healthcare provider if you think your medicine is not helping or if you have side effects. Tell him or her if you are allergic to any medicine. Keep a list of the medicines, vitamins, and herbs you take. Include the amounts, and when and why you take them. Bring the list or the pill bottles to follow-up visits. Carry your medicine list with you in case of an emergency.    What you can do to manage or prevent gallstones:     Eat a variety of healthy foods. This may help you have more energy and heal faster. Healthy foods include fruits, vegetables, whole-grain breads, low-fat dairy products, beans, lean meat, and fish. Ask if you need to be on a special diet. Try to eat regular meals during the day. This will help your gallbladder empty.      Exercise as directed. Talk to your healthcare provider about the best exercise plan for you. Exercise can help you lose weight and improve your health.      Manage your weight. If you are overweight, it is important to reach a healthy weight. You will need to lose weight slowly because rapid weight loss can increase your risk for gallstones. Talk to your healthcare provider about your weight. He or she can help you create a safe weight loss plan if you need to lose weight.    Follow up with your healthcare provider as directed: Write down your questions so you remember to ask them during your visits.

## 2024-02-21 NOTE — ED PROVIDER NOTE - PROGRESS NOTE DETAILS
Patient is feeling better and on repeat exam he does not have any tenderness to palpation, rebound or guarding.  The patient has a negative Kowalski sign.  I explained the results to the patient and his wife.  I advised him that he would need prompt follow-up with general surgery and gave strict return precautions.  Kenneth Ybarra, DO

## 2024-02-21 NOTE — ED ADULT NURSE NOTE - CHPI ED NUR QUALITY2
Quality 110: Preventive Care And Screening: Influenza Immunization: Influenza Immunization Administered during Influenza season Detail Level: Detailed Quality 130: Documentation Of Current Medications In The Medical Record: Current Medications Documented Quality 131: Pain Assessment And Follow-Up: Pain assessment using a standardized tool is documented as negative, no follow-up plan required Quality 431: Preventive Care And Screening: Unhealthy Alcohol Use - Screening: Patient screened for unhealthy alcohol use using a single question and scores less than 2 times per year Quality 226: Preventive Care And Screening: Tobacco Use: Screening And Cessation Intervention: Patient screened for tobacco use and is an ex/non-smoker aching

## 2024-02-21 NOTE — ED PROVIDER NOTE - PHYSICAL EXAMINATION
CONSTITUTIONAL: Well appearing, awake, alert, oriented to person, place, time/situation and in no apparent distress.  · ENMT: Airway patent, Nasal mucosa clear. Mouth with normal mucosa. Throat has no vesicles, no oropharyngeal exudates and uvula is midline.  · EYES: Clear bilaterally, pupils equal, round and reactive to light.  · CARDIAC: Normal rate, regular rhythm.  Heart sounds S1, S2.  No murmurs, rubs or gallops.  · RESPIRATORY: Breath sounds clear and equal bilaterally.  · GASTROINTESTINAL: Abdomen soft,   Epigastric tenderness and right upper quadrant tenderness without rebound  · MUSCULOSKELETAL: Spine appears normal, range of motion is not limited, no muscle or joint tenderness  · NEUROLOGICAL: Alert and oriented, no focal deficits, no motor or sensory deficits.  · SKIN: Skin normal color for race, warm, dry and intact. No evidence of rash

## 2024-02-21 NOTE — ED ADULT NURSE NOTE - NSFALLHARMRISKINTERV_ED_ALL_ED

## 2024-02-22 VITALS
DIASTOLIC BLOOD PRESSURE: 69 MMHG | RESPIRATION RATE: 16 BRPM | OXYGEN SATURATION: 100 % | HEART RATE: 67 BPM | SYSTOLIC BLOOD PRESSURE: 124 MMHG | TEMPERATURE: 98 F

## 2024-02-22 LAB
APTT BLD: 98.9 SEC — HIGH (ref 24.5–35.6)
INR BLD: 3.23 RATIO — HIGH (ref 0.85–1.18)
PROTHROM AB SERPL-ACNC: 35.3 SEC — HIGH (ref 9.5–13)

## 2024-02-22 PROCEDURE — 76705 ECHO EXAM OF ABDOMEN: CPT | Mod: 26

## 2024-02-22 PROCEDURE — 74177 CT ABD & PELVIS W/CONTRAST: CPT | Mod: 26

## 2024-02-27 DIAGNOSIS — I26.99 OTHER PULMONARY EMBOLISM W/OUT ACUTE COR PULMONALE: ICD-10-CM

## 2024-02-27 RX ORDER — ENOXAPARIN SODIUM 80 MG/.8ML
80 INJECTION, SOLUTION SUBCUTANEOUS
Qty: 14 | Refills: 0 | Status: ACTIVE | COMMUNITY
Start: 2024-02-27 | End: 1900-01-01

## 2024-02-29 ENCOUNTER — OUTPATIENT (OUTPATIENT)
Dept: OUTPATIENT SERVICES | Facility: HOSPITAL | Age: 54
LOS: 1 days | End: 2024-02-29

## 2024-02-29 ENCOUNTER — INPATIENT (INPATIENT)
Facility: HOSPITAL | Age: 54
LOS: 0 days | Discharge: HOME | DRG: 445 | End: 2024-03-01
Attending: STUDENT IN AN ORGANIZED HEALTH CARE EDUCATION/TRAINING PROGRAM | Admitting: STUDENT IN AN ORGANIZED HEALTH CARE EDUCATION/TRAINING PROGRAM
Payer: COMMERCIAL

## 2024-02-29 ENCOUNTER — APPOINTMENT (OUTPATIENT)
Dept: INTERNAL MEDICINE | Facility: CLINIC | Age: 54
End: 2024-02-29
Payer: COMMERCIAL

## 2024-02-29 ENCOUNTER — NON-APPOINTMENT (OUTPATIENT)
Age: 54
End: 2024-02-29

## 2024-02-29 VITALS
OXYGEN SATURATION: 97 % | RESPIRATION RATE: 16 BRPM | TEMPERATURE: 98 F | WEIGHT: 195.11 LBS | SYSTOLIC BLOOD PRESSURE: 139 MMHG | HEART RATE: 76 BPM | DIASTOLIC BLOOD PRESSURE: 88 MMHG | HEIGHT: 69 IN

## 2024-02-29 VITALS
OXYGEN SATURATION: 99 % | HEART RATE: 62 BPM | HEIGHT: 69 IN | WEIGHT: 195.11 LBS | RESPIRATION RATE: 18 BRPM | TEMPERATURE: 98 F | SYSTOLIC BLOOD PRESSURE: 161 MMHG | DIASTOLIC BLOOD PRESSURE: 89 MMHG

## 2024-02-29 VITALS — BODY MASS INDEX: 29.62 KG/M2 | HEIGHT: 69 IN | WEIGHT: 200 LBS

## 2024-02-29 VITALS — SYSTOLIC BLOOD PRESSURE: 120 MMHG | DIASTOLIC BLOOD PRESSURE: 78 MMHG

## 2024-02-29 DIAGNOSIS — K81.0 ACUTE CHOLECYSTITIS: ICD-10-CM

## 2024-02-29 DIAGNOSIS — K80.20 CALCULUS OF GALLBLADDER WITHOUT CHOLECYSTITIS WITHOUT OBSTRUCTION: ICD-10-CM

## 2024-02-29 DIAGNOSIS — Z98.890 OTHER SPECIFIED POSTPROCEDURAL STATES: Chronic | ICD-10-CM

## 2024-02-29 DIAGNOSIS — D49.1 NEOPLASM OF UNSPECIFIED BEHAVIOR OF RESPIRATORY SYSTEM: ICD-10-CM

## 2024-02-29 DIAGNOSIS — Z95.828 PRESENCE OF OTHER VASCULAR IMPLANTS AND GRAFTS: Chronic | ICD-10-CM

## 2024-02-29 DIAGNOSIS — I26.99 OTHER PULMONARY EMBOLISM WITHOUT ACUTE COR PULMONALE: ICD-10-CM

## 2024-02-29 DIAGNOSIS — C34.90 MALIGNANT NEOPLASM OF UNSPECIFIED PART OF UNSPECIFIED BRONCHUS OR LUNG: ICD-10-CM

## 2024-02-29 DIAGNOSIS — D86.9 SARCOIDOSIS, UNSPECIFIED: ICD-10-CM

## 2024-02-29 LAB
INR PPP: 2.9 RATIO
POCT-PROTHROMBIN TIME: 34.5 SECS
QUALITY CONTROL: YES

## 2024-02-29 PROCEDURE — 85610 PROTHROMBIN TIME: CPT | Mod: QW

## 2024-02-29 PROCEDURE — 99214 OFFICE O/P EST MOD 30 MIN: CPT

## 2024-02-29 PROCEDURE — 99285 EMERGENCY DEPT VISIT HI MDM: CPT

## 2024-02-29 PROCEDURE — G2211 COMPLEX E/M VISIT ADD ON: CPT | Mod: NC,1L

## 2024-02-29 PROCEDURE — 93000 ELECTROCARDIOGRAM COMPLETE: CPT
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
## 2024-02-29 NOTE — H&P PST ADULT - HISTORY OF PRESENT ILLNESS
53 yr old male with PMH of PE ( 2002, s/p IVC filter, on warfarin), Lung cancer (2021, no chemo/radiation, On Tagrisso), antiphospholipid syndrome, presents to [facility] for preop evaluation. Pt is diagnosed with Calculus of gallbladder without cholecystitis without obstruction. Patient tentatively scheduled for Laparoscopic cholecystectomy.

## 2024-02-29 NOTE — H&P PST ADULT - PROBLEM SELECTOR PLAN 1
Patient tentatively scheduled for Laparoscopic cholecystectomy for 3/4/2024. Pre-op instructions provided. Pt given verbal and written instructions with teach back on chlorhexidine shampoo and pepcid. Pt verbalized understanding with return demonstration.     CBC, BMP. PT/INR in chart from 2/21/24, INR goal 2.5-3, INR was 3.23, dosing remained at 5mg.    EKG in chart from 2/21/24, NSR    Echo in the chart from 9/2022, EF 59%, Normal LV function.

## 2024-02-29 NOTE — H&P PST ADULT - NSICDXPASTMEDICALHX_GEN_ALL_CORE_FT
PAST MEDICAL HISTORY:  Antiphospholipid syndrome     Calculus of gallbladder without cholecystitis without obstruction     Lung cancer     Pulmonary embolism     Sarcoidosis     Thalassemia minor

## 2024-02-29 NOTE — HISTORY OF PRESENT ILLNESS
[No Pertinent Cardiac History] : no history of aortic stenosis, atrial fibrillation, coronary artery disease, recent myocardial infarction, or implantable device/pacemaker [No Adverse Anesthesia Reaction] : no adverse anesthesia reaction in self or family member [FreeTextEntry1] : Cholecystectomy [FreeTextEntry4] : This is a 53-year-old gentleman scheduled for a cholecystectomy.  The patient has a past medical history of adenocarcinoma of the lungs with metastatic disease which presently is in remission in addition he has a coagulopathy status post DVT and pulmonary embolus and presently is still on Coumadin therapy. [FreeTextEntry3] : Dr Jovel [FreeTextEntry5] : History of pulmonary embolus and DVT

## 2024-02-29 NOTE — ASSESSMENT
[Patient Optimized for Surgery] : Patient optimized for surgery [FreeTextEntry4] : The patient's blood pressure and physical examination is normal.  As he is on long-term Coumadin therapy and is at high risk for DVT and pulmonary embolus I advised the patient to hold his Coumadin 5 days prior to his surgery and he will be bridged with Lovenox 80 mg twice a day.  His last dose of Lovenox will be in the morning prior to surgery and Coumadin should be restarted the night after surgery I will leave it up to surgery whether or not he can also receive a dose of Lovenox at night.  And he has continued Lovenox and Coumadin for the next 2 days and a stat INR performed the morning of 3 days he has a surgery [Continue medications as is] : Continue current medications [FreeTextEntry5] : Please see above note

## 2024-02-29 NOTE — H&P PST ADULT - PROBLEM SELECTOR PLAN 2
As per PCP (note in chart), last dose of Warfarin on 2/27/24, Started on Lovenox 80mg sc BID, last dose will be 3/324. Pt verbalized understanding.

## 2024-03-01 ENCOUNTER — RESULT REVIEW (OUTPATIENT)
Age: 54
End: 2024-03-01

## 2024-03-01 ENCOUNTER — TRANSCRIPTION ENCOUNTER (OUTPATIENT)
Age: 54
End: 2024-03-01

## 2024-03-01 VITALS
TEMPERATURE: 98 F | RESPIRATION RATE: 18 BRPM | SYSTOLIC BLOOD PRESSURE: 142 MMHG | HEART RATE: 69 BPM | DIASTOLIC BLOOD PRESSURE: 76 MMHG | OXYGEN SATURATION: 97 %

## 2024-03-01 DIAGNOSIS — K80.20 CALCULUS OF GALLBLADDER WITHOUT CHOLECYSTITIS WITHOUT OBSTRUCTION: ICD-10-CM

## 2024-03-01 PROBLEM — D56.3 THALASSEMIA MINOR: Chronic | Status: ACTIVE | Noted: 2024-02-29

## 2024-03-01 PROBLEM — I26.99 OTHER PULMONARY EMBOLISM WITHOUT ACUTE COR PULMONALE: Chronic | Status: ACTIVE | Noted: 2024-02-29

## 2024-03-01 PROBLEM — D86.9 SARCOIDOSIS, UNSPECIFIED: Chronic | Status: ACTIVE | Noted: 2024-02-29

## 2024-03-01 PROBLEM — C34.90 MALIGNANT NEOPLASM OF UNSPECIFIED PART OF UNSPECIFIED BRONCHUS OR LUNG: Chronic | Status: ACTIVE | Noted: 2024-02-29

## 2024-03-01 LAB
ALBUMIN SERPL ELPH-MCNC: 4.5 G/DL — SIGNIFICANT CHANGE UP (ref 3.3–5)
ALP SERPL-CCNC: 76 U/L — SIGNIFICANT CHANGE UP (ref 40–120)
ALT FLD-CCNC: 147 U/L — HIGH (ref 10–45)
ANION GAP SERPL CALC-SCNC: 13 MMOL/L — SIGNIFICANT CHANGE UP (ref 5–17)
ANISOCYTOSIS BLD QL: SLIGHT — SIGNIFICANT CHANGE UP
APPEARANCE UR: CLEAR — SIGNIFICANT CHANGE UP
APTT BLD: 120.5 SEC — CRITICAL HIGH (ref 24.5–35.6)
AST SERPL-CCNC: 182 U/L — HIGH (ref 10–40)
BASE EXCESS BLDV CALC-SCNC: 3.1 MMOL/L — HIGH (ref -2–3)
BASOPHILS # BLD AUTO: 0.04 K/UL — SIGNIFICANT CHANGE UP (ref 0–0.2)
BASOPHILS NFR BLD AUTO: 0.9 % — SIGNIFICANT CHANGE UP (ref 0–2)
BILIRUB SERPL-MCNC: 1.1 MG/DL — SIGNIFICANT CHANGE UP (ref 0.2–1.2)
BILIRUB UR-MCNC: NEGATIVE — SIGNIFICANT CHANGE UP
BLD GP AB SCN SERPL QL: NEGATIVE — SIGNIFICANT CHANGE UP
BUN SERPL-MCNC: 15 MG/DL — SIGNIFICANT CHANGE UP (ref 7–23)
CA-I SERPL-SCNC: 1.27 MMOL/L — SIGNIFICANT CHANGE UP (ref 1.15–1.33)
CALCIUM SERPL-MCNC: 9.9 MG/DL — SIGNIFICANT CHANGE UP (ref 8.4–10.5)
CHLORIDE BLDV-SCNC: 106 MMOL/L — SIGNIFICANT CHANGE UP (ref 96–108)
CHLORIDE SERPL-SCNC: 103 MMOL/L — SIGNIFICANT CHANGE UP (ref 96–108)
CO2 BLDV-SCNC: 30 MMOL/L — HIGH (ref 22–26)
CO2 SERPL-SCNC: 23 MMOL/L — SIGNIFICANT CHANGE UP (ref 22–31)
COLOR SPEC: YELLOW — SIGNIFICANT CHANGE UP
CREAT SERPL-MCNC: 0.88 MG/DL — SIGNIFICANT CHANGE UP (ref 0.5–1.3)
DIFF PNL FLD: NEGATIVE — SIGNIFICANT CHANGE UP
EGFR: 104 ML/MIN/1.73M2 — SIGNIFICANT CHANGE UP
ELLIPTOCYTES BLD QL SMEAR: SLIGHT — SIGNIFICANT CHANGE UP
EOSINOPHIL # BLD AUTO: 0.04 K/UL — SIGNIFICANT CHANGE UP (ref 0–0.5)
EOSINOPHIL NFR BLD AUTO: 0.9 % — SIGNIFICANT CHANGE UP (ref 0–6)
GAS PNL BLDV: 136 MMOL/L — SIGNIFICANT CHANGE UP (ref 136–145)
GAS PNL BLDV: SIGNIFICANT CHANGE UP
GAS PNL BLDV: SIGNIFICANT CHANGE UP
GIANT PLATELETS BLD QL SMEAR: PRESENT — SIGNIFICANT CHANGE UP
GLUCOSE BLDV-MCNC: 112 MG/DL — HIGH (ref 70–99)
GLUCOSE SERPL-MCNC: 110 MG/DL — HIGH (ref 70–99)
GLUCOSE UR QL: NEGATIVE MG/DL — SIGNIFICANT CHANGE UP
HCO3 BLDV-SCNC: 28 MMOL/L — SIGNIFICANT CHANGE UP (ref 22–29)
HCT VFR BLD CALC: 36.3 % — LOW (ref 39–50)
HCT VFR BLDA CALC: 35 % — LOW (ref 39–51)
HGB BLD CALC-MCNC: 11.7 G/DL — LOW (ref 12.6–17.4)
HGB BLD-MCNC: 11.1 G/DL — LOW (ref 13–17)
INR BLD: 2.4 RATIO — HIGH (ref 0.85–1.18)
KETONES UR-MCNC: NEGATIVE MG/DL — SIGNIFICANT CHANGE UP
LACTATE BLDV-MCNC: 1.1 MMOL/L — SIGNIFICANT CHANGE UP (ref 0.5–2)
LEUKOCYTE ESTERASE UR-ACNC: NEGATIVE — SIGNIFICANT CHANGE UP
LIDOCAIN IGE QN: 28 U/L — SIGNIFICANT CHANGE UP (ref 7–60)
LYMPHOCYTES # BLD AUTO: 0.65 K/UL — LOW (ref 1–3.3)
LYMPHOCYTES # BLD AUTO: 15 % — SIGNIFICANT CHANGE UP (ref 13–44)
MANUAL SMEAR VERIFICATION: SIGNIFICANT CHANGE UP
MCHC RBC-ENTMCNC: 19.8 PG — LOW (ref 27–34)
MCHC RBC-ENTMCNC: 30.6 GM/DL — LOW (ref 32–36)
MCV RBC AUTO: 64.7 FL — LOW (ref 80–100)
MICROCYTES BLD QL: SIGNIFICANT CHANGE UP
MONOCYTES # BLD AUTO: 0.42 K/UL — SIGNIFICANT CHANGE UP (ref 0–0.9)
MONOCYTES NFR BLD AUTO: 9.7 % — SIGNIFICANT CHANGE UP (ref 2–14)
NEUTROPHILS # BLD AUTO: 3.18 K/UL — SIGNIFICANT CHANGE UP (ref 1.8–7.4)
NEUTROPHILS NFR BLD AUTO: 73.5 % — SIGNIFICANT CHANGE UP (ref 43–77)
NITRITE UR-MCNC: NEGATIVE — SIGNIFICANT CHANGE UP
OVALOCYTES BLD QL SMEAR: SLIGHT — SIGNIFICANT CHANGE UP
PCO2 BLDV: 46 MMHG — SIGNIFICANT CHANGE UP (ref 42–55)
PH BLDV: 7.4 — SIGNIFICANT CHANGE UP (ref 7.32–7.43)
PH UR: 7.5 — SIGNIFICANT CHANGE UP (ref 5–8)
PLAT MORPH BLD: NORMAL — SIGNIFICANT CHANGE UP
PLATELET # BLD AUTO: 145 K/UL — LOW (ref 150–400)
PO2 BLDV: 34 MMHG — SIGNIFICANT CHANGE UP (ref 25–45)
POIKILOCYTOSIS BLD QL AUTO: SLIGHT — SIGNIFICANT CHANGE UP
POTASSIUM BLDV-SCNC: 4.2 MMOL/L — SIGNIFICANT CHANGE UP (ref 3.5–5.1)
POTASSIUM SERPL-MCNC: 4.2 MMOL/L — SIGNIFICANT CHANGE UP (ref 3.5–5.3)
POTASSIUM SERPL-SCNC: 4.2 MMOL/L — SIGNIFICANT CHANGE UP (ref 3.5–5.3)
PROT SERPL-MCNC: 7.5 G/DL — SIGNIFICANT CHANGE UP (ref 6–8.3)
PROT UR-MCNC: NEGATIVE MG/DL — SIGNIFICANT CHANGE UP
PROTHROM AB SERPL-ACNC: 25.7 SEC — HIGH (ref 9.5–13)
RBC # BLD: 5.61 M/UL — SIGNIFICANT CHANGE UP (ref 4.2–5.8)
RBC # FLD: 17.6 % — HIGH (ref 10.3–14.5)
RBC BLD AUTO: ABNORMAL
RH IG SCN BLD-IMP: POSITIVE — SIGNIFICANT CHANGE UP
SAO2 % BLDV: 56.2 % — LOW (ref 67–88)
SCHISTOCYTES BLD QL AUTO: SLIGHT — SIGNIFICANT CHANGE UP
SODIUM SERPL-SCNC: 139 MMOL/L — SIGNIFICANT CHANGE UP (ref 135–145)
SP GR SPEC: 1.01 — SIGNIFICANT CHANGE UP (ref 1–1.03)
TARGETS BLD QL SMEAR: SLIGHT — SIGNIFICANT CHANGE UP
UROBILINOGEN FLD QL: 1 MG/DL — SIGNIFICANT CHANGE UP (ref 0.2–1)
WBC # BLD: 4.32 K/UL — SIGNIFICANT CHANGE UP (ref 3.8–10.5)
WBC # FLD AUTO: 4.32 K/UL — SIGNIFICANT CHANGE UP (ref 3.8–10.5)

## 2024-03-01 PROCEDURE — 81003 URINALYSIS AUTO W/O SCOPE: CPT

## 2024-03-01 PROCEDURE — 99285 EMERGENCY DEPT VISIT HI MDM: CPT | Mod: 25

## 2024-03-01 PROCEDURE — 74183 MRI ABD W/O CNTR FLWD CNTR: CPT | Mod: 26

## 2024-03-01 PROCEDURE — 83690 ASSAY OF LIPASE: CPT

## 2024-03-01 PROCEDURE — 85014 HEMATOCRIT: CPT

## 2024-03-01 PROCEDURE — 80053 COMPREHEN METABOLIC PANEL: CPT

## 2024-03-01 PROCEDURE — 82947 ASSAY GLUCOSE BLOOD QUANT: CPT

## 2024-03-01 PROCEDURE — 85025 COMPLETE CBC W/AUTO DIFF WBC: CPT

## 2024-03-01 PROCEDURE — 87086 URINE CULTURE/COLONY COUNT: CPT

## 2024-03-01 PROCEDURE — 86850 RBC ANTIBODY SCREEN: CPT

## 2024-03-01 PROCEDURE — 96375 TX/PRO/DX INJ NEW DRUG ADDON: CPT

## 2024-03-01 PROCEDURE — 85610 PROTHROMBIN TIME: CPT

## 2024-03-01 PROCEDURE — 74183 MRI ABD W/O CNTR FLWD CNTR: CPT | Mod: MC

## 2024-03-01 PROCEDURE — 82435 ASSAY OF BLOOD CHLORIDE: CPT

## 2024-03-01 PROCEDURE — 82803 BLOOD GASES ANY COMBINATION: CPT

## 2024-03-01 PROCEDURE — 85018 HEMOGLOBIN: CPT

## 2024-03-01 PROCEDURE — 76705 ECHO EXAM OF ABDOMEN: CPT | Mod: 26

## 2024-03-01 PROCEDURE — 86900 BLOOD TYPING SEROLOGIC ABO: CPT

## 2024-03-01 PROCEDURE — 96374 THER/PROPH/DIAG INJ IV PUSH: CPT

## 2024-03-01 PROCEDURE — 82330 ASSAY OF CALCIUM: CPT

## 2024-03-01 PROCEDURE — 84295 ASSAY OF SERUM SODIUM: CPT

## 2024-03-01 PROCEDURE — 85730 THROMBOPLASTIN TIME PARTIAL: CPT

## 2024-03-01 PROCEDURE — 84132 ASSAY OF SERUM POTASSIUM: CPT

## 2024-03-01 PROCEDURE — A9585: CPT

## 2024-03-01 PROCEDURE — 76705 ECHO EXAM OF ABDOMEN: CPT

## 2024-03-01 PROCEDURE — 86901 BLOOD TYPING SEROLOGIC RH(D): CPT

## 2024-03-01 PROCEDURE — 83605 ASSAY OF LACTIC ACID: CPT

## 2024-03-01 RX ORDER — WARFARIN SODIUM 2.5 MG/1
1 TABLET ORAL
Refills: 0 | DISCHARGE

## 2024-03-01 RX ORDER — ACETAMINOPHEN 500 MG
1000 TABLET ORAL ONCE
Refills: 0 | Status: COMPLETED | OUTPATIENT
Start: 2024-03-01 | End: 2024-03-01

## 2024-03-01 RX ORDER — ENOXAPARIN SODIUM 100 MG/ML
40 INJECTION SUBCUTANEOUS EVERY 24 HOURS
Refills: 0 | Status: DISCONTINUED | OUTPATIENT
Start: 2024-03-01 | End: 2024-03-01

## 2024-03-01 RX ORDER — OXYCODONE HYDROCHLORIDE 5 MG/1
2.5 TABLET ORAL EVERY 4 HOURS
Refills: 0 | Status: DISCONTINUED | OUTPATIENT
Start: 2024-03-01 | End: 2024-03-01

## 2024-03-01 RX ORDER — PIPERACILLIN AND TAZOBACTAM 4; .5 G/20ML; G/20ML
3.38 INJECTION, POWDER, LYOPHILIZED, FOR SOLUTION INTRAVENOUS EVERY 8 HOURS
Refills: 0 | Status: DISCONTINUED | OUTPATIENT
Start: 2024-03-01 | End: 2024-03-01

## 2024-03-01 RX ORDER — ACETAMINOPHEN 500 MG
2 TABLET ORAL
Qty: 0 | Refills: 0 | DISCHARGE
Start: 2024-03-01

## 2024-03-01 RX ORDER — PIPERACILLIN AND TAZOBACTAM 4; .5 G/20ML; G/20ML
3.38 INJECTION, POWDER, LYOPHILIZED, FOR SOLUTION INTRAVENOUS ONCE
Refills: 0 | Status: COMPLETED | OUTPATIENT
Start: 2024-03-01 | End: 2024-03-01

## 2024-03-01 RX ORDER — DEXTROSE MONOHYDRATE, SODIUM CHLORIDE, AND POTASSIUM CHLORIDE 50; .745; 4.5 G/1000ML; G/1000ML; G/1000ML
1000 INJECTION, SOLUTION INTRAVENOUS
Refills: 0 | Status: DISCONTINUED | OUTPATIENT
Start: 2024-03-01 | End: 2024-03-01

## 2024-03-01 RX ORDER — SODIUM CHLORIDE 9 MG/ML
500 INJECTION INTRAMUSCULAR; INTRAVENOUS; SUBCUTANEOUS ONCE
Refills: 0 | Status: COMPLETED | OUTPATIENT
Start: 2024-03-01 | End: 2024-03-01

## 2024-03-01 RX ORDER — OXYCODONE HYDROCHLORIDE 5 MG/1
5 TABLET ORAL EVERY 4 HOURS
Refills: 0 | Status: DISCONTINUED | OUTPATIENT
Start: 2024-03-01 | End: 2024-03-01

## 2024-03-01 RX ORDER — INFLUENZA VIRUS VACCINE 15; 15; 15; 15 UG/.5ML; UG/.5ML; UG/.5ML; UG/.5ML
0.5 SUSPENSION INTRAMUSCULAR ONCE
Refills: 0 | Status: DISCONTINUED | OUTPATIENT
Start: 2024-03-01 | End: 2024-03-01

## 2024-03-01 RX ORDER — ACETAMINOPHEN 500 MG
1000 TABLET ORAL EVERY 6 HOURS
Refills: 0 | Status: DISCONTINUED | OUTPATIENT
Start: 2024-03-01 | End: 2024-03-01

## 2024-03-01 RX ADMIN — PIPERACILLIN AND TAZOBACTAM 25 GRAM(S): 4; .5 INJECTION, POWDER, LYOPHILIZED, FOR SOLUTION INTRAVENOUS at 12:29

## 2024-03-01 RX ADMIN — ENOXAPARIN SODIUM 40 MILLIGRAM(S): 100 INJECTION SUBCUTANEOUS at 09:23

## 2024-03-01 RX ADMIN — DEXTROSE MONOHYDRATE, SODIUM CHLORIDE, AND POTASSIUM CHLORIDE 120 MILLILITER(S): 50; .745; 4.5 INJECTION, SOLUTION INTRAVENOUS at 09:23

## 2024-03-01 RX ADMIN — Medication 400 MILLIGRAM(S): at 02:09

## 2024-03-01 RX ADMIN — PIPERACILLIN AND TAZOBACTAM 200 GRAM(S): 4; .5 INJECTION, POWDER, LYOPHILIZED, FOR SOLUTION INTRAVENOUS at 08:49

## 2024-03-01 RX ADMIN — SODIUM CHLORIDE 500 MILLILITER(S): 9 INJECTION INTRAMUSCULAR; INTRAVENOUS; SUBCUTANEOUS at 02:08

## 2024-03-01 RX ADMIN — Medication 1000 MILLIGRAM(S): at 08:34

## 2024-03-01 NOTE — H&P ADULT - HISTORY OF PRESENT ILLNESS
52yo M w PMHx of APLS (on warfarin- now transitioned to Lovenox) and cholelithiasis p/w RUQ pain.  Reports during dinner he started to develop dull RUQ pain and immediately came to the hospital. Reports the pain is a 4/10. Patient has a cholecystectomy scheduled for Monday at [facility] Yesterday he had presurgical planning done. Denies fever/chills, SOB, nausea/vomiting, chest pain, headache, dysuria.     Patient was seen and examined in the ED. Reports pain has improved. Reports PCP instructed him to come to [facility] to eval this pain. Physical exam w/o abdominal tenderness. US shows a gallstone in the neck of the GB w/o secondary signs of acute cholecystitis. Labs remarkable for elevated AST (182) and ALT (147).

## 2024-03-01 NOTE — ED PROVIDER NOTE - PHYSICAL EXAMINATION
GENERAL: Awake, alert, NAD  LUNGS:  non labored breathing   ABDOMEN: RUQ tenderness otherwise abdomen soft no guarding   EXT:  no deformities.  NEURO: A&Ox3. Moving all extremities.  SKIN: Warm and dry. No rash.  PSYCH: Normal affect.

## 2024-03-01 NOTE — H&P ADULT - ATTENDING COMMENTS
DATE OF SERVICE: 03-01-24 @ 16:03    53M with PMHx as above here with RUQ pain that started overnight. Pain had resolved on its own. similar to prior episodes of Gb related pain  VSS  WBC NL   T bili 1.1 (previous 0.3) mildly elevated AST/ALT  Abd soft no RUQ TTP    MRCP to r/o choledocholithiasis  has lap britt scheduled at [facility] Mon  If negative, will plan to proceed with lap britt as scheduled

## 2024-03-01 NOTE — ED ADULT NURSE NOTE - NSFALLUNIVINTERV_ED_ALL_ED
Bed/Stretcher in lowest position, wheels locked, appropriate side rails in place/Call bell, personal items and telephone in reach/Instruct patient to call for assistance before getting out of bed/chair/stretcher/Non-slip footwear applied when patient is off stretcher/[place]omer to call system/Physically safe environment - no spills, clutter or unnecessary equipment/Purposeful proactive rounding/Room/bathroom lighting operational, light cord in reach

## 2024-03-01 NOTE — DISCHARGE NOTE PROVIDER - NSDCMRMEDTOKEN_GEN_ALL_CORE_FT
Coumadin 5 mg oral tablet: 1 tab(s) orally once a day (at bedtime)  Tagrisso 80 mg oral tablet: 160 milligram(s) orally once a day (at bedtime)   acetaminophen 500 mg oral tablet: 2 tab(s) orally every 6 hours As needed Mild Pain (1 - 3)  amoxicillin-clavulanate 875 mg-125 mg oral tablet: 875 milligram(s) orally 2 times a day  Tagrisso 80 mg oral tablet: 160 milligram(s) orally once a day (at bedtime)

## 2024-03-01 NOTE — ED PROVIDER NOTE - PROGRESS NOTE DETAILS
Alyson PGY 3 Surgery aware Neville Garcia MD: I have been given all relevant clinical information regarding this patient and will be assuming care from Dr. Shields.  Patient had planned cholecystectomy this Coming Monday.  He presented to the emergency department due to persistent right upper quadrant abdominal pain.  He has known antiphospholipid syndrome, is on warfarin and has had dose titrated recently in preparation for surgery.  At this time, surgery evaluated the patient, will admit to surgery.

## 2024-03-01 NOTE — H&P ADULT - NSHPLABSRESULTS_GEN_ALL_CORE
< from: POCUS ED Abdomen Limited (03.01.24 @ 01:29) >      Procedure was performed in the Emergency Department by a credentialed   Emergency Medicine Attending Physician    EXAM:  ER US ABDOMEN LTD      ORDER COMMENTS:      PROCEDURE DATE:  03/01/2024    FOCUSED ED ULTRASOUND REPORT          INTERPRETATION:  Grayscale imaging of the right upper quadrant was   performed.  The gallbladder was visualized and scanned in longitudinal and transverse   planes.  The anterior gallbladder wall measured  0.31.cm.  The common bile duct measured 0.45cm.  Gallstonespresent.  Sludge not present.  Pericholecystic fluid not present.  Gallbladder wall edema not present.  Sonographic Kowalski's sign not present.    IMPRESSION:Cholelithiasis with immobile stone in neck    --- End of Report ---            LISBETH HOYOS DO; Attending Emergency Medicine  This document has been electronically signed. Mar  1 2024  1:36AM    < end of copied text >

## 2024-03-01 NOTE — ASU PATIENT PROFILE, ADULT - ANESTHESIA, PREVIOUS REACTION, PROFILE
Pt is a 27 y/o male self-presented to ED unable to provide reasoning for presentation. Pt presents with garbled speech and word salad, appears to be experiencing thought blocking. In pt's belongings, there is discharge paperwork from inpatient hospital stay at [facility]. Pt was discharged on 4/12/21. Diagnosis of Schizoaffective d/o on paperwork. Pt's ongoing medication listed at discharge is Seroquel 300mg. Pt unable to tolerate evaluation at this time, this writer attempted assessment for risk but pt could not engage. No collateral obtained at this time. [facility] and [facility].    none

## 2024-03-01 NOTE — ED PROVIDER NOTE - OBJECTIVE STATEMENT
53-year-old male chief complaint upper quadrant pain.  Patient has a history of antiphospholipid syndrome on warfarin.  Patient coming in for increasing right upper quadrant pain.  Patient has a surgery scheduled for Monday/cholecystectomy had presurgical planning done today was supposed to get it done at [facility] patient complaining of constant pain current 4 out of 10

## 2024-03-01 NOTE — PATIENT PROFILE ADULT - DOES PATIENT HAVE ADVANCE DIRECTIVE
I will START or STAY ON the medications listed below when I get home from the hospital:    finasteride 5 mg oral tablet  -- 1 tab(s) by mouth once a day  -- Indication: For BPH    cholecalciferol oral tablet  -- 1000 unit(s) by mouth once a day  -- Indication: For vitamin d deficiency No

## 2024-03-01 NOTE — DISCHARGE NOTE PROVIDER - NSDCFUSCHEDAPPT_GEN_ALL_CORE_FT
Yahaira Espinoza  [facility]  [facility] Amb Surg [facility]  Scheduled Appointment: 03/04/2024    Kyle Hart  [facility]  [facility]  Scheduled Appointment: 03/26/2024    Luis Man  [facility]  UROLOGY [address]  Scheduled Appointment: 04/03/2024    Jose Luis Boudreaux  [facility]  [facility] [address]  Scheduled Appointment: 05/08/2024

## 2024-03-01 NOTE — DISCHARGE NOTE PROVIDER - NSDCCPCAREPLAN_GEN_ALL_CORE_FT
PRINCIPAL DISCHARGE DIAGNOSIS  Diagnosis: Cholelithiasis  Assessment and Plan of Treatment: 1.  Lowfat diet  2.  Activity as tolerated  3.  Complete a course of oral antibiotics as prescribed  4.  Follow-up at [facility] on Monday 3/4 for scheduled surgery

## 2024-03-01 NOTE — ED ADULT NURSE REASSESSMENT NOTE - NS ED NURSE REASSESS COMMENT FT1
Report received from KARLO Diaz. Pt a & o x 3, able to follow commands. Breathing spontaneous & nonlabored. Abdomen soft & nondistended. IV site patent, no signs of phlebitis, flushing without difficulty. Call bell within reach, bed in lowest position. Awaiting admission bed.

## 2024-03-01 NOTE — DISCHARGE NOTE NURSING/CASE MANAGEMENT/SOCIAL WORK - PATIENT PORTAL LINK FT
You can access the FollowMyHealth Patient Portal offered by [facility] by registering at the following website: [email or web address]. By joining [employer]’s FollowMyHealth portal, you will also be able to view your health information using other applications (apps) compatible with our system.

## 2024-03-01 NOTE — ED ADULT NURSE REASSESSMENT NOTE - NS ED NURSE REASSESS COMMENT FT1
Pt ordered for Lovenox 40mg, pt aptt 120.5. Unknown if pt is going to OR today. Paged [facility] team at [phone] to clarify if Lovenox is okay to give, spoke with MD, per MD okay to give pt Lovenox, at this time pt is not scheduled to go to OR today and is scheduled for MRCP. Pt provided with MRI safety screening forms to complete.

## 2024-03-01 NOTE — ED ADULT NURSE NOTE - OBJECTIVE STATEMENT
54 YO male    via walk in presenting with complaints of  abd pain. pt repots he has known  cholelithiasis and was scheduled for surgery next week however had worsening pain today and called doctor and was told to come in. pt denies cp/sob  Pt Axox4, gross neuro intact, . respirations even, & non-labored Abdomen soft, slightly tender, non-distended. Skin warm, dry, and intact. Pt placed in position of comfort. Pt educated on call bell system and provided call bell. Bed in lowest position, wheels locked, appropriate side rails raised. Pt denies needs at this time.

## 2024-03-01 NOTE — DISCHARGE NOTE PROVIDER - CARE PROVIDER_API CALL
Yahaira Espinoza  Colon/Rectal Surgery  [address] [facility] [address]  [place] [address]  Phone: [phone]  Fax: [phone]  Follow Up Time: 1 week

## 2024-03-01 NOTE — CONSULT NOTE ADULT - SUBJECTIVE AND OBJECTIVE BOX
HPI:  52yo M w PMHx of APLS (on warfarin- now transitioned to Lovenox) and cholelithiasis p/w RUQ pain.  Reports during dinner he started to develop dull RUQ pain and immediately came to the hospital. Reports the pain is a 4/10. Patient has a cholecystectomy scheduled for Monday at [facility] Yesterday he had presurgical planning done. Denies fever/chills, SOB, nausea/vomiting, chest pain, headache, dysuria.     Patient was seen and examined in the ED. Reports pain has improved. Reports PCP instructed him to come to [facility] to eval this pain. Physical exam w/o abdominal tenderness. US shows a gallstone in the neck of the GB w/o secondary signs of acute cholecystitis. Labs remarkable for elevated AST (182) and ALT (147).     PAST MEDICAL & SURGICAL HISTORY:  Antiphospholipid syndrome    Pulmonary embolism    Lung cancer    Sarcoidosis    Thalassemia minor    Calculus of gallbladder without cholecystitis without obstruction    S/P IVC filter    S/P shoulder surgery      MEDICATIONS  (STANDING):    MEDICATIONS  (PRN):      Allergies  No Known Allergies      SOCIAL HISTORY:    FAMILY HISTORY:  No pertinent family history in first degree relatives        Physical Exam:  General: NAD, resting comfortably  HEENT: NC/AT, EOMI, normal hearing, sclera clear  Pulmonary: normal resp effort  Cardiovascular: NSR  Abdominal: soft, nondistended, nontender  Extremities: WWP, normal strength, no clubbing/cyanosis/edema  Neuro: A/O x 3, CNs II-XII grossly intact, normal sensation, no focal deficits      Vital Signs Last 24 Hrs  T(C): 36.6 (01 Mar 2024 04:04), Max: 36.6 (29 Feb 2024 08:29)  T(F): 97.9 (01 Mar 2024 04:04), Max: 97.9 (29 Feb 2024 08:29)  HR: 73 (01 Mar 2024 04:04) (62 - 76)  BP: 124/70 (01 Mar 2024 04:04) (124/70 - 161/89)  BP(mean): 105 (29 Feb 2024 08:29) (105 - 105)  RR: 16 (01 Mar 2024 04:04) (16 - 18)  SpO2: 97% (01 Mar 2024 04:04) (97% - 99%)    Parameters below as of 01 Mar 2024 04:04  Patient On (Oxygen Delivery Method): room air      LABS:                        11.1   4.32  )-----------( 145      ( 01 Mar 2024 02:30 )             36.3     03-01    139  |  103  |  15  ----------------------------<  110<H>  4.2   |  23  |  0.88    Ca    9.9      01 Mar 2024 02:30    TPro  7.5  /  Alb  4.5  /  TBili  1.1  /  DBili  x   /  AST  182<H>  /  ALT  147<H>  /  AlkPhos  76  03-01    PT/INR - ( 01 Mar 2024 02:30 )   PT: 25.7 sec;   INR: 2.40 ratio    PTT - ( 01 Mar 2024 02:30 )  PTT:120.5 sec  Urinalysis Basic - ( 01 Mar 2024 02:30 )    Color: x / Appearance: x / SG: x / pH: x  Gluc: 110 mg/dL / Ketone: x  / Bili: x / Urobili: x   Blood: x / Protein: x / Nitrite: x   Leuk Esterase: x / RBC: x / WBC x   Sq Epi: x / Non Sq Epi: x / Bacteria: x      LIVER FUNCTIONS - ( 01 Mar 2024 02:30 )  Alb: 4.5 g/dL / Pro: 7.5 g/dL / ALK PHOS: 76 U/L / ALT: 147 U/L / AST: 182 U/L / GGT: x             RADIOLOGY & ADDITIONAL STUDIES:    < from: POCUS ED Abdomen Limited (03.01.24 @ 01:29) >  INTERPRETATION:  Grayscale imaging of the right upper quadrant was   performed.  The gallbladder was visualized and scanned in longitudinal and transverse   planes.  The anterior gallbladder wall measured  0.31.cm.  The common bile duct measured 0.45cm.  Gallstonespresent.  Sludge not present.  Pericholecystic fluid not present.  Gallbladder wall edema not present.  Sonographic Kowalski's sign not present.    IMPRESSION:Cholelithiasis with immobile stone in neck

## 2024-03-01 NOTE — ED PROVIDER NOTE - CLINICAL SUMMARY MEDICAL DECISION MAKING FREE TEXT BOX
Given history and physical patient already has cholelithiasis with plan for cholecystectomy on Monday Will likely need surgery admission for persistant pain least meal 730pm Given history and physical patient already has cholelithiasis with plan for cholecystectomy on Monday Will likely need surgery admission for persistant pain least meal 730pm    pettet attending- see attending attestation for my mdm

## 2024-03-01 NOTE — H&P ADULT - ASSESSMENT
53M hx of APLS (on warfarin- now transitioned to Lovenox) and cholelithiasis p/w cholelithiasis with cf choledocholithiasis.    Plan:  - Admit to red surgery, Dr. Cortes.  - Low concern for acute cholecystitis.  - Elevated LFTs and increasing bilirubin concerning for choledocholithiasis.  - MRCP to r/o choledocholithiasis.   - Lovenox and SCDs for DVT ppx.  - Zosyn.  - Repeat labs this evening.    Discussed with Dr. Espinoza and Sophia.    Red Surgery  [id number]

## 2024-03-01 NOTE — DISCHARGE NOTE PROVIDER - HOSPITAL COURSE
54yo M w PMHx of APLS (on warfarin- now transitioned to Lovenox) and cholelithiasis p/w RUQ pain.  Reports during dinner he started to develop dull RUQ pain and immediately came to the hospital. Reports the pain is a 4/10. Patient has a cholecystectomy scheduled for Monday at [facility] Yesterday he had presurgical planning done. Denies fever/chills, SOB, nausea/vomiting, chest pain, headache, dysuria.     Patient was seen and examined in the ED. Reports pain has improved. Reports PCP instructed him to come to [facility] to eval this pain. Physical exam w/o abdominal tenderness. US shows a gallstone in the neck of the GB w/o secondary signs of acute cholecystitis. Labs remarkable for elevated AST (182) and ALT (147).   MRCP showed no choledocholithiasis.  He was given a lowfat diet and will take Augmentin until scheduled surgery on 3/4.  He will follow-up 3/4 for surgery at [facility] with Dr. Mcclain.

## 2024-03-01 NOTE — ED PROVIDER NOTE - ATTENDING CONTRIBUTION TO CARE
I, Jose Sutherland, performed a history and physical exam of the patient and discussed their management with the resident and/or advanced care provider. I reviewed the resident and/or advanced care provider's note and agree with the documented findings and plan of care. I was present and available for all procedures.    53-year-old male chief complaint upper quadrant pain.  Patient has a history of antiphospholipid syndrome on warfarin.  Patient coming in for increasing right upper quadrant pain.  Patient has a surgery scheduled for Monday/cholecystectomy had presurgical planning done today was supposed to get it done at [facility] patient complaining of constant pain current 4 out of 10    Well appearing and in NAD, head normal appearing atraumatic, trachea midline, no respiratory distress, lungs cta bilaterally, rrr no murmurs, soft slight epigastric ruq ttp no murphys sign no other abd ttp, abdomen, no visible extremity deformities, Alert and oriented, non focal neuro exam, skin warm and dry, normal affect and mood, no leg swelling, calf ttp or jvd    Patient presenting with symptomatic cholelithiasis with impacted stone in neck otherwise well-appearing reassuring vital signs and exam we will send screening blood work pain control discussed with surgery likely requiring admission for operative management unlikely ACS PE pneumothorax dissection AAA pneumonia

## 2024-03-01 NOTE — H&P ADULT - REASON FOR ADMISSION
Called Karen to see how she was doing.    She's checked her BPs at home and they are better in the 130s. She thinks that the BPs were higher the last few months were d/t increased stress levels ('s death, insurance things, traveling, etc).    1. Agreed she'll keep checking BPs and call if SBP >140 most of the time  2. Let her know pharmacy confirmed no interaction between amlodipine and Sulfa allergy so if high, we could start amlodipine without concern  3. Otherwise, planning to see us 10/2022.    Tiera  March 29, 2022 at 11:44 AM       Cf choledocholithiasis

## 2024-03-01 NOTE — H&P ADULT - NSHPPHYSICALEXAM_GEN_ALL_CORE
General: NAD, resting comfortably  HEENT: NC/AT, EOMI, normal hearing, sclera clear  Pulmonary: normal resp effort  Cardiovascular: NSR  Abdominal: soft, nondistended, nontender  Extremities: WWP, normal strength, no clubbing/cyanosis/edema  Neuro: A/O x 3, CNs II-XII grossly intact, normal sensation, no focal deficits

## 2024-03-01 NOTE — CONSULT NOTE ADULT - ASSESSMENT
54yo M w PMHx of APLS (on warfarin- now transitioned to Lovenox) and cholelithiasis p/w symptomatic cholelithiasis    PLAN:  - Low concern for acute cholecystitis  - Elevated LFTs and increasing bilirubin concerning  - Elevated coags, would recommend holding lovenox today  - If patient still has pain will recommend admission and potential surgery    NOTE INCOMPLETE    Discussed w/ Dr. Espinoza    [facility]  [id number]

## 2024-03-02 LAB
CULTURE RESULTS: NO GROWTH — SIGNIFICANT CHANGE UP
SPECIMEN SOURCE: SIGNIFICANT CHANGE UP

## 2024-03-04 ENCOUNTER — NON-APPOINTMENT (OUTPATIENT)
Age: 54
End: 2024-03-04

## 2024-03-04 ENCOUNTER — TRANSCRIPTION ENCOUNTER (OUTPATIENT)
Age: 54
End: 2024-03-04

## 2024-03-05 ENCOUNTER — TRANSCRIPTION ENCOUNTER (OUTPATIENT)
Age: 54
End: 2024-03-05

## 2024-03-05 ENCOUNTER — OUTPATIENT (OUTPATIENT)
Dept: OUTPATIENT SERVICES | Facility: HOSPITAL | Age: 54
LOS: 1 days | Discharge: HOME | End: 2024-03-05
Payer: COMMERCIAL

## 2024-03-05 VITALS
WEIGHT: 195.11 LBS | HEART RATE: 69 BPM | HEIGHT: 69 IN | SYSTOLIC BLOOD PRESSURE: 123 MMHG | TEMPERATURE: 98 F | DIASTOLIC BLOOD PRESSURE: 70 MMHG | OXYGEN SATURATION: 97 % | RESPIRATION RATE: 14 BRPM

## 2024-03-05 VITALS — TEMPERATURE: 98 F | HEART RATE: 67 BPM | RESPIRATION RATE: 16 BRPM | OXYGEN SATURATION: 100 %

## 2024-03-05 DIAGNOSIS — Z95.828 PRESENCE OF OTHER VASCULAR IMPLANTS AND GRAFTS: Chronic | ICD-10-CM

## 2024-03-05 DIAGNOSIS — K80.20 CALCULUS OF GALLBLADDER WITHOUT CHOLECYSTITIS WITHOUT OBSTRUCTION: ICD-10-CM

## 2024-03-05 DIAGNOSIS — Z98.890 OTHER SPECIFIED POSTPROCEDURAL STATES: Chronic | ICD-10-CM

## 2024-03-05 LAB
50/50 COAG PANEL: SIGNIFICANT CHANGE UP
APTT 50/50 2HOUR INCUB: 75.3 SEC — HIGH (ref 24.5–36.6)
APTT BLD: 65.4 SEC — HIGH (ref 27.5–37.4)
APTT BLD: 83.8 SEC — HIGH (ref 24.5–35.6)
APTT BLD: 83.8 SEC — HIGH (ref 24.5–35.6)
FIBRINOGEN PPP-MCNC: 433 MG/DL — SIGNIFICANT CHANGE UP (ref 200–465)
INR BLD: 1.15 RATIO — SIGNIFICANT CHANGE UP (ref 0.85–1.18)
LUPUS ANTICOAGULANT PROFILE RESULT: SIGNIFICANT CHANGE UP
PAT CTL 2H: 34.4 SEC — SIGNIFICANT CHANGE UP (ref 24.5–36.6)
PROTHROM AB SERPL-ACNC: 12.8 SEC — SIGNIFICANT CHANGE UP (ref 9.5–13)
THROMBIN TIME: 18.3 SEC — SIGNIFICANT CHANGE UP (ref 16–26)

## 2024-03-05 PROCEDURE — 88304 TISSUE EXAM BY PATHOLOGIST: CPT | Mod: 26

## 2024-03-05 RX ORDER — OXYCODONE HYDROCHLORIDE 5 MG/1
5 TABLET ORAL ONCE
Refills: 0 | Status: DISCONTINUED | OUTPATIENT
Start: 2024-03-05 | End: 2024-03-05

## 2024-03-05 RX ORDER — FENTANYL CITRATE 50 UG/ML
50 INJECTION INTRAVENOUS
Refills: 0 | Status: DISCONTINUED | OUTPATIENT
Start: 2024-03-05 | End: 2024-03-05

## 2024-03-05 RX ORDER — SIMETHICONE 80 MG/1
80 TABLET, CHEWABLE ORAL ONCE
Refills: 0 | Status: COMPLETED | OUTPATIENT
Start: 2024-03-05 | End: 2024-03-05

## 2024-03-05 RX ORDER — FENTANYL CITRATE 50 UG/ML
25 INJECTION INTRAVENOUS
Refills: 0 | Status: DISCONTINUED | OUTPATIENT
Start: 2024-03-05 | End: 2024-03-05

## 2024-03-05 RX ORDER — ONDANSETRON 8 MG/1
4 TABLET, FILM COATED ORAL ONCE
Refills: 0 | Status: COMPLETED | OUTPATIENT
Start: 2024-03-05 | End: 2024-03-05

## 2024-03-05 RX ORDER — WARFARIN SODIUM 2.5 MG/1
1 TABLET ORAL
Refills: 0 | DISCHARGE

## 2024-03-05 RX ORDER — HYDROMORPHONE HYDROCHLORIDE 2 MG/ML
0.5 INJECTION INTRAMUSCULAR; INTRAVENOUS; SUBCUTANEOUS
Refills: 0 | Status: DISCONTINUED | OUTPATIENT
Start: 2024-03-05 | End: 2024-03-05

## 2024-03-05 RX ORDER — OXYCODONE HYDROCHLORIDE 5 MG/1
1 TABLET ORAL
Qty: 12 | Refills: 0
Start: 2024-03-05

## 2024-03-05 RX ADMIN — HYDROMORPHONE HYDROCHLORIDE 0.5 MILLIGRAM(S): 2 INJECTION INTRAMUSCULAR; INTRAVENOUS; SUBCUTANEOUS at 15:30

## 2024-03-05 RX ADMIN — ONDANSETRON 4 MILLIGRAM(S): 8 TABLET, FILM COATED ORAL at 18:29

## 2024-03-05 RX ADMIN — HYDROMORPHONE HYDROCHLORIDE 0.5 MILLIGRAM(S): 2 INJECTION INTRAMUSCULAR; INTRAVENOUS; SUBCUTANEOUS at 17:00

## 2024-03-05 RX ADMIN — OXYCODONE HYDROCHLORIDE 5 MILLIGRAM(S): 5 TABLET ORAL at 16:22

## 2024-03-05 RX ADMIN — OXYCODONE HYDROCHLORIDE 5 MILLIGRAM(S): 5 TABLET ORAL at 17:00

## 2024-03-05 RX ADMIN — HYDROMORPHONE HYDROCHLORIDE 0.5 MILLIGRAM(S): 2 INJECTION INTRAMUSCULAR; INTRAVENOUS; SUBCUTANEOUS at 15:15

## 2024-03-05 RX ADMIN — HYDROMORPHONE HYDROCHLORIDE 0.5 MILLIGRAM(S): 2 INJECTION INTRAMUSCULAR; INTRAVENOUS; SUBCUTANEOUS at 16:22

## 2024-03-05 RX ADMIN — SIMETHICONE 80 MILLIGRAM(S): 80 TABLET, CHEWABLE ORAL at 16:50

## 2024-03-05 RX ADMIN — HYDROMORPHONE HYDROCHLORIDE 0.5 MILLIGRAM(S): 2 INJECTION INTRAMUSCULAR; INTRAVENOUS; SUBCUTANEOUS at 15:33

## 2024-03-05 RX ADMIN — HYDROMORPHONE HYDROCHLORIDE 0.5 MILLIGRAM(S): 2 INJECTION INTRAMUSCULAR; INTRAVENOUS; SUBCUTANEOUS at 16:00

## 2024-03-05 NOTE — CHART NOTE - NSCHARTNOTEFT_GEN_A_CORE
Mixing study and PTT reviewed this Am with hematology, Dr. Hart, studies are concordant with presence of lupus anticoagulant. Ok to proceed to OR.

## 2024-03-05 NOTE — ASU DISCHARGE PLAN (ADULT/PEDIATRIC) - ASU DC SPECIAL INSTRUCTIONSFT
Do not remove derma bond, it will fall off Do not remove derma bond, it will fall off.    Patient to resume lovenox tomorrow morning (3/6/24) and continue with twice a day dosing. Please reach out to PCP within the next week to transition back to warfarin for long term anticoagulation.

## 2024-03-05 NOTE — ASU DISCHARGE PLAN (ADULT/PEDIATRIC) - NURSING INSTRUCTIONS
DO NOT take any Tylenol (Acetaminophen) or narcotics containing Tylenol until after  __8:30pm____ . You received Tylenol during your operation and it can cause damage to your liver if too much is taken within a 24 hour time period.
PAST MEDICAL HISTORY:  Anxiety     Biliary dyskinesia     History of alcohol use sober 7 years

## 2024-03-05 NOTE — ASU DISCHARGE PLAN (ADULT/PEDIATRIC) - CALL YOUR DOCTOR IF YOU HAVE ANY OF THE FOLLOWING:
Bleeding that does not stop/Swelling that gets worse/Fever greater than (need to indicate Fahrenheit or Celsius)/Wound/Surgical Site with redness, or foul smelling discharge or pus/Unable to urinate/Inability to tolerate liquids or foods Bleeding that does not stop/Swelling that gets worse/Fever greater than (need to indicate Fahrenheit or Celsius)/Wound/Surgical Site with redness, or foul smelling discharge or pus/Nausea and vomiting that does not stop/Unable to urinate/Inability to tolerate liquids or foods

## 2024-03-05 NOTE — ASU DISCHARGE PLAN (ADULT/PEDIATRIC) - CARE PROVIDER_API CALL
Neville Cortes (DO)  Surgery  [address] [facility], [address]  [place] [address]  Phone: [phone]  Fax: [phone]  Follow Up Time: 2 weeks

## 2024-03-05 NOTE — ASU DISCHARGE PLAN (ADULT/PEDIATRIC) - C. MAKE IMPORTANT PERSONAL OR BUSINESS DECISIONS
Problem: Patient Care Overview  Goal: Plan of Care Review  Outcome: Ongoing (interventions implemented as appropriate)  Patient AAOx4. Patient VSS. Patient complains of generalized pain; pain managed with PRN medications. Patient free from falls or injury during shift. Patient repositioned every two hours. Patient in bed, bed in lowest position, call light in reach, bed alarm set, personal items at bedside and sitter at bedside. Will continue to monitor.        Statement Selected

## 2024-03-05 NOTE — ASU PREOP CHECKLIST - RESPIRATORY RATE (BREATHS/MIN)
CRYOTHERAPY/   LIQUID NITROGEN THERAPY      Cryotherapy is used to treat warts, seborrheic keratoses, actinic keratoses, and other benign and pre-malignant lesions.    Cryotherapy destroys tissue by direct freezing and thawing of skin lesions.  Larger lesions may require multiple treatments or longer freezing and thawing cycles.    Side effects include:     Swelling of area treated and the surrounding area (especially on face)   Mild to moderate pain   Redness around the areas     Blisters   Numbness   Mild scarring   Local pigment changes    If you develop blisters, you may:    1)  Sterilize a needle with heat or rubbing alcohol and puncture the blister.    2)  Apply Vaseline or Aquaphor and a Band-Aid.    Call the clinic if you develop:  1)  Large blisters that do not heal within 3 days.    2)  Bleeding.    3)  Open sores that do not heal.    4)  Open sores that have draining pus, spreading redness, tenderness or       warmth.           5)  Return to clinic if lesion persists after 1 month.      What if I am having some discomfort?  Following minor procedure, the discomfort is usually mild.  You may use Tylenol, Extra Strength Tylenol, Naproxen or Ibuprofen.    If you are already taking daily aspirin or other blood thinners, you do NOT have to discontinue your daily use.    For any concerns, call the Dermatology clinic at:  • [phone], during business hours Monday-Friday  • [phone] evenings after 5 pm, weekends, holidays  (rev 9/15)         Dermatology Post-Excision Wound Care   (Stitches or Staples)    Wound Care for 7 days or until stitches/staples removed:  ? Remove pressure dressing the next day, before showering for the first time.  ? Wash the area gently once daily with mild soap and water.   ? Dry the area with clean towel.  ? Apply Vaseline or Aquaphor and a dressing.    Pain Control  Following surgery, the discomfort is usually mild.  Postoperative pain can be reduced by taking extra strength  acetaminophen (Tylenol) 500 mg 2 tablets with ibuprofen (Motrin) 200 mg 3 tablets three times daily for the first 3 days after surgery.  This is more effective and has fewer side-effects than taking narcotics for pain.  Please do not take these medications if your doctor has specifically asked you NOT to take them.    If you are already taking daily aspirin or other blood thinners you do NOT have to discontinue your daily use.  You may also apply an ice pack to the area to decrease swelling and discomfort.  Apply ice 15 minutes out of every hour, as needed.    Other Instructions  Occasionally, after the numbing medicine wears off, there might be some minor bleeding in the area for the first 24 hours.  If bleeding occurs, apply pressure to area with your fingers and gauze or a soft cloth for 15 minutes continuously, NO PEEKING!!! If bleeding persists, reapply pressure for an additional 15 minutes.  If the bleeding does not stop, call the Dermatology clinic.    Any procedure done near the eyes, nose or on the face will most likely result in bruising or “black eye(s)” and considerable swelling.  This is normal, and should fade within 2-3 weeks.    When should I notify the clinic?  You should call the Dermatology clinic if any of the following occur:  ? Bleeding that persists despite pressure applied as instructed above.  ? Pus noted at wound.  ? Significant redness or swelling that continues to spread.  ? Fever, chills, or flu-like symptoms.      For any concerns, call the Dermatology clinic at:  • [phone], during business hours Monday-Friday  • [phone] evenings after 5pm, weekends, holidays  OR  If you have not received your biopsy results within 2 weeks after the procedure.     (rev 9/15)       14

## 2024-03-05 NOTE — BRIEF OPERATIVE NOTE - OPERATION/FINDINGS
laparoscopic cholecystectomy. critical view of safety achieved. Metal clips used. Gallbladder reterieved Chronic inflammation

## 2024-03-06 ENCOUNTER — RX RENEWAL (OUTPATIENT)
Age: 54
End: 2024-03-06

## 2024-03-06 RX ORDER — ONDANSETRON 8 MG/1
8 TABLET ORAL
Qty: 30 | Refills: 0 | Status: ACTIVE | COMMUNITY
Start: 2024-03-06 | End: 1900-01-01

## 2024-03-10 ENCOUNTER — NON-APPOINTMENT (OUTPATIENT)
Age: 54
End: 2024-03-10

## 2024-03-10 LAB
INR PPP: 2.22 RATIO
PT BLD: 24.5 SEC

## 2024-03-12 LAB — SURGICAL PATHOLOGY STUDY: SIGNIFICANT CHANGE UP

## 2024-03-18 ENCOUNTER — OUTPATIENT (OUTPATIENT)
Dept: OUTPATIENT SERVICES | Facility: HOSPITAL | Age: 54
LOS: 1 days | Discharge: HOME | End: 2024-03-18

## 2024-03-18 DIAGNOSIS — D64.9 ANEMIA, UNSPECIFIED: ICD-10-CM

## 2024-03-19 ENCOUNTER — OUTPATIENT (OUTPATIENT)
Dept: OUTPATIENT SERVICES | Facility: HOSPITAL | Age: 54
LOS: 1 days | End: 2024-03-19
Payer: COMMERCIAL

## 2024-03-19 ENCOUNTER — APPOINTMENT (OUTPATIENT)
Dept: CT IMAGING | Facility: CLINIC | Age: 54
End: 2024-03-19
Payer: COMMERCIAL

## 2024-03-19 DIAGNOSIS — C34.90 MALIGNANT NEOPLASM OF UNSPECIFIED PART OF UNSPECIFIED BRONCHUS OR LUNG: ICD-10-CM

## 2024-03-19 DIAGNOSIS — Z95.828 PRESENCE OF OTHER VASCULAR IMPLANTS AND GRAFTS: Chronic | ICD-10-CM

## 2024-03-19 DIAGNOSIS — Z98.890 OTHER SPECIFIED POSTPROCEDURAL STATES: Chronic | ICD-10-CM

## 2024-03-19 PROCEDURE — 71260 CT THORAX DX C+: CPT

## 2024-03-19 PROCEDURE — 74177 CT ABD & PELVIS W/CONTRAST: CPT

## 2024-03-19 PROCEDURE — 71260 CT THORAX DX C+: CPT | Mod: 26

## 2024-03-19 PROCEDURE — 74177 CT ABD & PELVIS W/CONTRAST: CPT | Mod: 26

## 2024-03-26 ENCOUNTER — APPOINTMENT (OUTPATIENT)
Dept: HEMATOLOGY ONCOLOGY | Facility: CLINIC | Age: 54
End: 2024-03-26
Payer: COMMERCIAL

## 2024-03-26 VITALS
SYSTOLIC BLOOD PRESSURE: 137 MMHG | WEIGHT: 199.29 LBS | HEART RATE: 61 BPM | BODY MASS INDEX: 29.52 KG/M2 | TEMPERATURE: 98 F | OXYGEN SATURATION: 98 % | DIASTOLIC BLOOD PRESSURE: 83 MMHG | RESPIRATION RATE: 16 BRPM | HEIGHT: 69 IN

## 2024-03-26 PROCEDURE — 99214 OFFICE O/P EST MOD 30 MIN: CPT

## 2024-03-26 PROCEDURE — G2211 COMPLEX E/M VISIT ADD ON: CPT | Mod: NC,1L

## 2024-03-26 NOTE — ASSESSMENT
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 MRI at baseline shows four subcentimeter parenchymal enhancing lesions in the cerebral and cerebellar hemispheres as detailed in the body the report. Findings were suspicious for the presence of intracranial metastases. Started Osimertinib in June 2021. 2 month scan on osi with excellent response in the CNS as well as lungs. CT C/A/P 12/02/21 with decreased RUL mass size. Scans from March 2022, reviewed personally- completely stable findings- sustained MN. Brain MRI read pending ANNALISE in December 2021. Scans done on 6/1/22 reviewed independently- sustained MN noted. Stable 1.1 cm right upper lobe nodule along a bandlike opacity. Stable mild tree-in-bud opacity within the lingula. The lungs are otherwise clear. No new or enlarging nodule. Numerous subcentimeter retroperitoneal lymph nodes are unchanged. Previously mentioned enlarged external iliac lymph nodes are not imaged on this exam. US doppler of LE which showed chronic DVT in rt LE. While systemic scans were stable as noted above, brain MRI showed POD. Therefore, decision was made to increase osimertinib dose to BID (160 mg) since this dosing is more effective in CNS/leptomeningeal disease. Scans from September 2022 reviewed and showed stable right upper lobe scarring, stable 1.2 cm lingular nodule which could be scarring/atelectasis. Unchanged retroperitoneal/pelvic lymph nodes.New subcentimeter right lower lobe ground glass nodule which was thought to be inflammatory. Scans from December 2022 which no new or enlarging lung nodule. Mild patchy ground glass opacity within lateral segment of right middle lobe likely inflammatory. Stable scarring within right upper lobe.Stable subcentimeter retroperitoneal lymph nodes. Pt had COVID around this time which can explain these changes.MRI brain at this time showed an excellent response. CT scans done in September 2023 shows excellent response- CR.  Scans from Dec 18 shows continued CR based on my review. Also, scan from March which has not been reported as yet seems to show continued CR based on my independent review.  Recent MR 10/30/23, + increase in left cerebellum lesion  now 6 mm (16:53), previously 3 mm sen by Dr. Boudreaux s/p mask gammaknife 11/27/23. Scans from Jan showed excellent response.  Continues on tagrisso 160 mg daily, tolerating well Of note, pt has microcytosis with mild anemia. He has a hx of thalassemia minor. Plt count slightly low and PS shows giant platelets. pt likely has chronic ITP which is commonly associated with lupus anticoagulant. He has not needed any treatment for this since his plt count is normal. Elevated PTT- sec to lupus anticoagulant. pt does not have a hematologist. Referral to Dr. Art pending.  Educated on proper care of nails and skin surrounding nails. Informed to soak hands and feet in one-to-one ratio of water to vinegar. Instructed to follow up with podiatry to manage left 2nd toe paronychia and other nail changes. Labs today including CBC, CMP, mag (for muscle cramps), CEA (previously elevated and now decreased to normal range), and INR (for coumadin monitoring- this has been controlled perfectly in the 2.5-3 range) All questions answered. Firm rt supraclav LN- will send for US and bx OV in 4 months after repeat scans (include neck)

## 2024-03-26 NOTE — REVIEW OF SYSTEMS
[Negative] : Endocrine [Night Sweats] : no night sweats [Fever] : no fever [Cough] : no cough [FreeTextEntry7] : as above

## 2024-03-26 NOTE — PHYSICAL EXAM
[Restricted in physically strenuous activity but ambulatory and able to carry out work of a light or sedentary nature] : Status 1- Restricted in physically strenuous activity but ambulatory and able to carry out work of a light or sedentary nature, e.g., light house work, office work [Normal] : affect appropriate [de-identified] : paronychia on left 2nd toe. Inflammation on skin surrounding nails and toenails  [de-identified] : rt supracalv adenopathy

## 2024-03-26 NOTE — HISTORY OF PRESENT ILLNESS
[Disease: _____________________] : Disease: [unfilled] [T: ___] : T[unfilled] [N: ___] : N[unfilled] [M: ___] : M[unfilled] [AJCC Stage: ____] : AJCC Stage: [unfilled] [de-identified] : adeno ca [de-identified] : Mr. Mclaughlin with hx of sarcoidosis is a pleasant 52-year-old male who started having allergy-like symptoms (cough) 5 months ago. He saw his PCP, Dr. Mo- referred to Dr. Trujillo after he was noted to have an abnormal chest CAT scan. He was referred for PET/CT which was done on 5/23 which showed  FDG avid right upper lobe masslike consolidation, considerably increased in size and coalesced with adjacent right upper lobe nodules and new FDG avid supraclavicular, mediastinal and hilar lymphadenopathy, as compared to CT chest January 13, 2021 concerning for malignancy although progression of sarcoidosis is also a consideration. rt supra clav bx was c/w lung adeno ca.   Of note, the patient has a complex medical history having had a diagnosis of sarcoid made in 2002 via a mediastinoscopy. At that time he was noted to have several pulmonary nodules but no treatment was required. 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 He was started on anticoagulation and a IVC filter was placed. He is also suspected of having lupus for which she is on hydroxychloroquine. He has a history of bronchial reactivity but is currently not on any inhaled bronchodilators.  He is a lifelong nonsmoker without occupational exposures. Other than a dry cough, he feels well. He denies any fever or, chest discomfort, dyspnea or peripheral edema.   6/22/21: Here for follow up. No complaints.    7/14/21: Patient seen for routine follow-up.  Patient is currently getting treatment for lupus and remains on hydrochorloquine and coumadin for anti-Cardiolipin antibod. Patient verbalized pain in the left foot that radiated from the small toe to the sole of feet, he takes advil with satisfactory pain control.  Patient also verbalized joint pain  that is related to lupus flare up.  Pt recently started on Tagrisso 80mg daily which he is tolerating well. Denies rash/ diarrhea. Pt met with Dr. Boudreaux for consideration of GK to brain mets.   9/3/21: Facial rash, gained weight, migrating joint pains better after starting steroids (prednisone 5 mg daily). Has had CT scans and MRI yesterday.   10/19/21: Feeling well. Mild facial rash, gained more weight. Restarted hydroxychloroquine for lupus with worsening joint pain in his wrists and fingers;  continues coumadin; plans f/u with his rheumatologist. Reports toe infection, on abx as per his podiatrist with improvement. Has f/u MRI Brain on 12/1.   12/7/21: Since August has been having toenail infection of R and L 1st and 2nd toes. Infection is painful, non pruritic. Has seen podiatrist who resected some of the toenails/skin. Using topical antibiotics with some improvement.   3/10/22: doing well. no new symptoms to report. Has some nail changes but all other AEs have improved. He does have episodic worsening of joint pains. Has gained some weight.   6/9/22: had a recent ER visit for acute swelling of rt knee and ankle- possibly related to occult trauma. Of note, pt is on Coumadin for VTE with optimal maintenance of INR. He has no other symptoms   9/15/22: Pt seem today for follow up. Continues on Tagrisso. Noted to have inflammation of the skin around nails and toe nails. Otherwise not having any additional AEs. Had CT scans which showed stable right upper lobe scarring at site of primary neoplasm. Stable 1.2 cm lingular nodule which could be scarring/atelectasis however as it is larger compared to more remote studies, 3 month follow-up is recommended. New subcentimeter right lower lobe groundglass nodule which can be reassessed on follow-up. Unchanged numerous predominantly subcentimeter retroperitoneal/pelvic lymph nodes.  12/15/22: Here for follow up.Continues on Tagrisso at 160 mg. Feet improved. cracked skin around finger nails. no diarrhea. Taking coumadin   6/15/23: Patient seen in clinic to follow up on his Osimertinib therapy. Patient is experiencing some nail toxicity on and off and muscle cramps, which he is using tonic water for, Denies diarrhea. He has fatigue. He notes that the texture of his scalp hair is different, but he is not bothered by it. He has no mucositis. Pt denies any neurological symptoms.   9/20/23: Skin, nail changes-was really bad, now subsided. Also needed abx as prescribed by podiatrist. Continues on Tagrisso at 160 mg  12/22/23: Patient accompanied by wife, here for follow up visit s/p mask gammaknife RT to cerebellar met 11/27/23, continues on tagrisso and has no complaints.   3/26/24: Pt recently had rt upper quadrant pain, went o ER a month ago and was noted to have gall stones. Was sent home after pain management. He was referred to the surgeon. There was a delay in surgery duen to elevated PTT. I received a call at that time and as documented, this was deemed to be sec to lupus anticoagulant.

## 2024-03-27 ENCOUNTER — RESULT REVIEW (OUTPATIENT)
Age: 54
End: 2024-03-27

## 2024-03-27 RX ORDER — AZITHROMYCIN 250 MG/1
250 TABLET, FILM COATED ORAL
Qty: 1 | Refills: 0 | Status: DISCONTINUED | COMMUNITY
Start: 2023-12-14 | End: 2024-03-27

## 2024-04-03 ENCOUNTER — OUTPATIENT (OUTPATIENT)
Dept: OUTPATIENT SERVICES | Facility: HOSPITAL | Age: 54
LOS: 1 days | Discharge: HOME | End: 2024-04-03

## 2024-04-03 ENCOUNTER — APPOINTMENT (OUTPATIENT)
Dept: UROLOGY | Facility: CLINIC | Age: 54
End: 2024-04-03

## 2024-04-03 DIAGNOSIS — D64.9 ANEMIA, UNSPECIFIED: ICD-10-CM

## 2024-04-03 DIAGNOSIS — Z95.828 PRESENCE OF OTHER VASCULAR IMPLANTS AND GRAFTS: Chronic | ICD-10-CM

## 2024-04-03 DIAGNOSIS — Z98.890 OTHER SPECIFIED POSTPROCEDURAL STATES: Chronic | ICD-10-CM

## 2024-04-08 DIAGNOSIS — D68.61 ANTIPHOSPHOLIPID SYNDROME: ICD-10-CM

## 2024-04-10 ENCOUNTER — RESULT REVIEW (OUTPATIENT)
Age: 54
End: 2024-04-10

## 2024-04-10 ENCOUNTER — APPOINTMENT (OUTPATIENT)
Dept: HEMATOLOGY ONCOLOGY | Facility: CLINIC | Age: 54
End: 2024-04-10
Payer: COMMERCIAL

## 2024-04-10 VITALS
HEIGHT: 69 IN | BODY MASS INDEX: 28.44 KG/M2 | OXYGEN SATURATION: 98 % | DIASTOLIC BLOOD PRESSURE: 91 MMHG | HEART RATE: 69 BPM | TEMPERATURE: 97.6 F | WEIGHT: 192 LBS | SYSTOLIC BLOOD PRESSURE: 146 MMHG

## 2024-04-10 LAB
ANISOCYTOSIS BLD QL: SLIGHT — SIGNIFICANT CHANGE UP
BASOPHILS # BLD AUTO: 0 K/UL — SIGNIFICANT CHANGE UP (ref 0–0.2)
BASOPHILS NFR BLD AUTO: 2 % — SIGNIFICANT CHANGE UP (ref 0–2)
ELLIPTOCYTES BLD QL SMEAR: SLIGHT — SIGNIFICANT CHANGE UP
EOSINOPHIL # BLD AUTO: 0.1 K/UL — SIGNIFICANT CHANGE UP (ref 0–0.5)
EOSINOPHIL NFR BLD AUTO: 3 % — SIGNIFICANT CHANGE UP (ref 0–6)
HCT VFR BLD CALC: 40.3 % — SIGNIFICANT CHANGE UP (ref 39–50)
HGB BLD-MCNC: 12.4 G/DL — LOW (ref 13–17)
HYPOCHROMIA BLD QL: SLIGHT — SIGNIFICANT CHANGE UP
LG PLATELETS BLD QL AUTO: SLIGHT — SIGNIFICANT CHANGE UP
LYMPHOCYTES # BLD AUTO: 0.9 K/UL — LOW (ref 1–3.3)
LYMPHOCYTES # BLD AUTO: 12 % — LOW (ref 13–44)
MCHC RBC-ENTMCNC: 20.3 PG — LOW (ref 27–34)
MCHC RBC-ENTMCNC: 30.6 G/DL — LOW (ref 32–36)
MCV RBC AUTO: 66.2 FL — LOW (ref 80–100)
MICROCYTES BLD QL: SLIGHT — SIGNIFICANT CHANGE UP
MONOCYTES # BLD AUTO: 0.4 K/UL — SIGNIFICANT CHANGE UP (ref 0–0.9)
MONOCYTES NFR BLD AUTO: 11 % — SIGNIFICANT CHANGE UP (ref 2–14)
NEUTROPHILS # BLD AUTO: 2.4 K/UL — SIGNIFICANT CHANGE UP (ref 1.8–7.4)
NEUTROPHILS NFR BLD AUTO: 54 % — SIGNIFICANT CHANGE UP (ref 43–77)
NEUTS BAND # BLD: 3 % — SIGNIFICANT CHANGE UP (ref 0–8)
PLAT MORPH BLD: NORMAL — SIGNIFICANT CHANGE UP
PLATELET # BLD AUTO: 180 K/UL — SIGNIFICANT CHANGE UP (ref 150–400)
POIKILOCYTOSIS BLD QL AUTO: SLIGHT — SIGNIFICANT CHANGE UP
POLYCHROMASIA BLD QL SMEAR: SLIGHT — SIGNIFICANT CHANGE UP
RBC # BLD: 6.08 M/UL — HIGH (ref 4.2–5.8)
RBC # FLD: 14.2 % — SIGNIFICANT CHANGE UP (ref 10.3–14.5)
RBC BLD AUTO: SIGNIFICANT CHANGE UP
VARIANT LYMPHS # BLD: 15 % — HIGH (ref 0–6)
WBC # BLD: 3.6 K/UL — LOW (ref 3.8–10.5)
WBC # FLD AUTO: 3.6 K/UL — LOW (ref 3.8–10.5)

## 2024-04-10 PROCEDURE — 99205 OFFICE O/P NEW HI 60 MIN: CPT

## 2024-04-13 ENCOUNTER — RX RENEWAL (OUTPATIENT)
Age: 54
End: 2024-04-13

## 2024-04-21 LAB
APTT 2H P 1:4 NP PPP: 72.6 SEC
APTT 2H P INC PPP: 72.1 SEC
APTT BLD: 97.1 SEC
APTT IMM NP/PRE NP PPP: 73 SEC
APTT INV RATIO PPP: 97.1 SEC
B2 GLYCOPROT1 IGG SER-ACNC: 133.7 SGU
B2 GLYCOPROT1 IGM SER-ACNC: 31.2 SMU
CARDIOLIPIN IGM SER-MCNC: 14.2 MPL
CARDIOLIPIN IGM SER-MCNC: >150 GPL
CONFIRM: 59.5 SEC
DRVVT IMM 1:2 NP PPP: ABNORMAL
DRVVT SCREEN TO CONFIRM RATIO: 1.82 RATIO
FACT IX ACT/NOR PPP: NORMAL
FACT VIII ACT/NOR PPP: NORMAL
FACT XI ACT/NOR PPP: NORMAL
INR PPP: 3.18 RATIO
NPP NORMAL POOLED PLASMA: 34.5 SECS
PT BLD: 34.7 SEC
SCREEN DRVVT: 127.4 SEC
SILICA CLOTTING TIME INTERPRETATION: ABNORMAL
SILICA CLOTTING TIME S/C: 2.39 RATIO

## 2024-04-23 ENCOUNTER — RESULT REVIEW (OUTPATIENT)
Age: 54
End: 2024-04-23

## 2024-04-23 ENCOUNTER — APPOINTMENT (OUTPATIENT)
Dept: ULTRASOUND IMAGING | Facility: IMAGING CENTER | Age: 54
End: 2024-04-23
Payer: COMMERCIAL

## 2024-04-23 ENCOUNTER — OUTPATIENT (OUTPATIENT)
Dept: OUTPATIENT SERVICES | Facility: HOSPITAL | Age: 54
LOS: 1 days | End: 2024-04-23
Payer: COMMERCIAL

## 2024-04-23 DIAGNOSIS — C34.90 MALIGNANT NEOPLASM OF UNSPECIFIED PART OF UNSPECIFIED BRONCHUS OR LUNG: ICD-10-CM

## 2024-04-23 DIAGNOSIS — Z95.828 PRESENCE OF OTHER VASCULAR IMPLANTS AND GRAFTS: Chronic | ICD-10-CM

## 2024-04-23 DIAGNOSIS — Z98.890 OTHER SPECIFIED POSTPROCEDURAL STATES: Chronic | ICD-10-CM

## 2024-04-23 PROCEDURE — 88173 CYTOPATH EVAL FNA REPORT: CPT | Mod: 26

## 2024-04-23 PROCEDURE — 88305 TISSUE EXAM BY PATHOLOGIST: CPT

## 2024-04-23 PROCEDURE — 88172 CYTP DX EVAL FNA 1ST EA SITE: CPT

## 2024-04-23 PROCEDURE — 88305 TISSUE EXAM BY PATHOLOGIST: CPT | Mod: 26

## 2024-04-23 PROCEDURE — 88360 TUMOR IMMUNOHISTOCHEM/MANUAL: CPT

## 2024-04-23 PROCEDURE — 88360 TUMOR IMMUNOHISTOCHEM/MANUAL: CPT | Mod: 26

## 2024-04-23 PROCEDURE — 38505 NEEDLE BIOPSY LYMPH NODES: CPT | Mod: RT

## 2024-04-23 PROCEDURE — 38505 NEEDLE BIOPSY LYMPH NODES: CPT

## 2024-04-23 PROCEDURE — 76942 ECHO GUIDE FOR BIOPSY: CPT

## 2024-04-23 PROCEDURE — 76942 ECHO GUIDE FOR BIOPSY: CPT | Mod: 26

## 2024-04-23 PROCEDURE — 88173 CYTOPATH EVAL FNA REPORT: CPT

## 2024-04-23 NOTE — HISTORY OF PRESENT ILLNESS
[de-identified] : This is a 53 year old male who is referred by Dr. Kyle Hart to establish care for a history of APLS, elevated PTT.   He has a history of sarcoidosis diagnosed in 2002, involving his neck/mediastinal nodes.  Never required treatment.  He was diagnosed at the same time with a right LE DVT (Right common femoral vein)/PE in 2002 at the age of 31, unprovoked.  He was diagnosed with APLS at that time and has been on warfarin since without issues.   He also had an IVC filter placed.  Also with a history of thalassemia minor.   He has a history of metastatic lung adenocarcinoma to the brain, EGFR L858R mutation, TP53 mutated.  He was started on Tagrisso in June 2021.  Progression of his brain mets for which he received SRS of the cerebellar lesion with Dr. Boudreaux along with increased dose of Targrisso.   He has since been stable.   He recently had lap cholecystectomy on 3/5/24 due to acute cholelithiasis.  PTT on 3/5/24- 83.8, PT 24.5/INR 2.22. 50/50 mixing study- immediate 65.4, on 2 hour incubation 75.3 DRVVT 111.2, SCT +  He has no complaints, feeling well.  He is back on warfarin, 5mg daily.

## 2024-04-23 NOTE — CONSULT LETTER
[Dear  ___] : Dear  [unfilled], [Consult Letter:] : I had the pleasure of evaluating your patient, [unfilled]. [Please see my note below.] : Please see my note below. [Consult Closing:] : Thank you very much for allowing me to participate in the care of this patient.  If you have any questions, please do not hesitate to contact me. [Sincerely,] : Sincerely, [FreeTextEntry3] : Nyla Art D.O.  of Medicine Hematology/Oncology [facility] [DrAbbi  ___] : Dr. HORNER

## 2024-04-23 NOTE — ASSESSMENT
[FreeTextEntry1] : This is a 53 year old male who was diagnosed with APLS in 2002 at the age of 31 due to unprovoked DVT (right femoral vein), pulmonary embolism.  He also was diagnosed in 2002 with sarcoidosis of his neck/mediastinal nodes, never required treatment.  Also with metastatic adenocarcinoma of the lung to the brain s/p SRS of a cerebellar brain lesion along with Tagrisso.   1. APLS- unprovoked DVT/PE.  Planned for long term anticoagulation- has been on AC since 2002. He recently underwent a lap cholecystectomy with no complications.   Will confirm APLS today, check his DRVVT/ACL/B2G today along with factor VIII, IX, XI to ensure no deficiency as a causative factor for his elevated PTT.  His PTT did not correct with 50/50 mixing study.  He will remain on warfarin 5mg, stable INR without any issues.  He has an IVC filter, recent imaging with CT a/p 2/22/24 with no issues.   2.  Follows with Dr. Hart for his metastatic NSCLC.   Follows with Dr. Mo.

## 2024-04-25 LAB — NON-GYNECOLOGICAL CYTOLOGY STUDY: SIGNIFICANT CHANGE UP

## 2024-04-30 ENCOUNTER — OUTPATIENT (OUTPATIENT)
Dept: OUTPATIENT SERVICES | Facility: HOSPITAL | Age: 54
LOS: 1 days | End: 2024-04-30
Payer: COMMERCIAL

## 2024-04-30 ENCOUNTER — APPOINTMENT (OUTPATIENT)
Dept: MRI IMAGING | Facility: CLINIC | Age: 54
End: 2024-04-30
Payer: COMMERCIAL

## 2024-04-30 DIAGNOSIS — Z00.8 ENCOUNTER FOR OTHER GENERAL EXAMINATION: ICD-10-CM

## 2024-04-30 DIAGNOSIS — C79.31 SECONDARY MALIGNANT NEOPLASM OF BRAIN: ICD-10-CM

## 2024-04-30 DIAGNOSIS — Z98.890 OTHER SPECIFIED POSTPROCEDURAL STATES: Chronic | ICD-10-CM

## 2024-04-30 DIAGNOSIS — Z95.828 PRESENCE OF OTHER VASCULAR IMPLANTS AND GRAFTS: Chronic | ICD-10-CM

## 2024-04-30 PROCEDURE — 70553 MRI BRAIN STEM W/O & W/DYE: CPT

## 2024-04-30 PROCEDURE — 70553 MRI BRAIN STEM W/O & W/DYE: CPT | Mod: 26

## 2024-04-30 PROCEDURE — A9585: CPT

## 2024-05-08 ENCOUNTER — APPOINTMENT (OUTPATIENT)
Dept: RADIATION ONCOLOGY | Facility: CLINIC | Age: 54
End: 2024-05-08
Payer: COMMERCIAL

## 2024-05-08 ENCOUNTER — NON-APPOINTMENT (OUTPATIENT)
Age: 54
End: 2024-05-08

## 2024-05-08 PROCEDURE — 99213 OFFICE O/P EST LOW 20 MIN: CPT

## 2024-05-08 NOTE — HISTORY OF PRESENT ILLNESS
[Home] : at home, [unfilled] , at the time of the visit. [Medical Office: ([facility])___] : at the medical office located in  [Verbal consent obtained from patient] : the patient, [unfilled] [FreeTextEntry1] :  RT hx: GKRS 20Gy over 1 Fx to cerebellum  11/27/2023  ONCOLOGY HISTORY Mr. Mclaughlin presents with h/o sarcoid and possible lupus who has been monitoring pulmonary nodules and mediastinal adenopathy since 12/2002. His onc history began when he started having allergy-like symptoms (cough) in February 2021. He saw his PCP, Dr. Mo- referred to Dr. Trujillo after he was noted to have an abnormal chest CAT scan. He was referred for PET/CT which was done on 5/23 which showed FDG avid right upper lobe masslike consolidation, considerably increased in size and coalesced with adjacent right upper lobe nodules and new FDG avid supraclavicular, mediastinal and hilar lymphadenopathy, as compared to CT chest January 13, 2021 concerning for malignancy although progression of sarcoidosis is also a consideration.  Right SCV node biopsy 6/4/21 demonstrated EGFR+ metastatic adenocarcinoma of pulmonary origin, PD-L1 TPS < 1%. He consulted with Dr. Hart 6/10 and Dr. Weldon 6/18 for evaluation for definitive chemoradiation.  However, brain MRI 6/18/21 demonstrated the following: 6 x 5 mm enhancing parenchymal lesion with surrounding edema in the left parietal lobe (). 4 x 4 mm enhancing parenchymal lesion with surrounding edema in the right frontal pericardium region (12-99).  6 x 4 mm enhancing parenchymal lesion with surrounding edema in the right superior cerebellar hemisphere (12-54).  2 mm enhancing parenchymal lesion with surrounding edema in the left cerebellar hemisphere (12-40).  At the time of initial consult on 7/1/2021 he felt well. Denied headaches, focal weakness, nausea, vomiting. Started taking Tagrisso 80mg in late june and was tolerating it well. Follows with Dr. Maagña of neurology  10/5/2021- Mr. Mclaughlin presents today for follow up. Seen through TELEHEALTH  MRI brain done 9/1/2021 showed Previously noted lesions involving the posterior fossa and supratentorial region are either no longer seen or decrease in size. This is likely compatible with response to therapy. Continue close interval follow up is recommended.  CT CAP 9/1/2021 showed Right upper lobe lung mass has significantly decreased in size since 5/28/2021. Overall he is feeling well. He denies headaches, nausea, vomiting, focal weakness. having some skin issues on his toes while on tagrisso. joint pain has been better recently.   12/01/21 MRI Head IMPRESSION:  Previously noted residual neoplastic lesion in the LEFT parietal lobe to be stable measuring 2 mm. Previously noted metastatic lesion in the RIGHT caudate nucleus has resolved. The previously noted metastatic lesion in the medial RIGHT cerebellum now measures 3 mm. Incidental developmental venous anomaly seen in the RIGHT parietal lobe.  03/22/22: Pt presents for follow-up. MR Head (3/16/22) showing 2 mm lesion now seen in lateral LEFT cerebellum. Feeling well overall. Denies HA, numbness, dizziness, hearing/vision changes, cough, or chest pain. Remains active in everyday life. Continues on Tagrisso 80 mg with Dr. Hart with some GI upset, but overall tolerating well.   6/29/2022- Mr. Mclaughlin presents today for follow up.  MRI brain 6/27/2022 showed Multiple new metastatic lesions measuring approximately 3 to 4 mm predominantly at the gray-white junction in the BILATERAL frontal, parietal and occipital lobes as well as the LEFT caudate nucleus, RIGHT basal ganglia and BILATERAL cerebellum. No significant edema is noted. 6/1 body imaging with Dr. Hart was stable. continues on osimertinib. notes a flare in joint pain recently. following with rheumatology. denies headaches, nausea, vomiting, focal weakness, numbness, tingling.  8/31/2022: Mr. Mclaughlin presents for follow up today. Repeat MRI head completed on 8/25/2022. No official read yet, but overall looks stable/improved. He continues to follow with Dr. Hart and continues on osimertinib. Feeling well overall. Tolerating Tagrisso without complaint. Denies HA, N/V, dizziness, loss of balance, or changes to hearing/vision.  12/7/2022- Mr. Mclaughlin presents today for follow up. seen through telephone, for which he consents. MRI brain 12/2/2022 showed  Multiple punctate enhancing lesions when compared to 8/25/2022. For example, the left anterior insular region lesion is unchanged (12:87), and the right cerebellar lesion (12:67) is slightly more conspicuous. It is unclear whether this is due to differences in technique or progression of patient's underlying disease process. No new or enlarging lesions are identified.  CT CAP 9/12/2022 showed Since 6/1/2022: Stable right upper lobe scarring at site of primary neoplasm. Stable 1.2 cm lingular nodule which could be scarring/atelectasis however as it is larger compared to more remote studies, 3 month follow-up is recommended. New subcentimeter right lower lobe groundglass nodule which can be reassessed on follow-up. Unchanged numerous predominantly subcentimeter retroperitoneal/pelvic lymph nodes.  continues to follow with Dr. Hart.   feeling well overall today. no headaches, no nausea, no vomiting, no focal weakness, no numbness or tingling.   4/20/2023- Mr. Mclaughlin presents today for follow up. Continues to follow with Dr. Hart. continues on osimertinib 160 mg daily.   Clem MRI 4/10/2023 showed IMPRESSION: No new or worsening metastases. Slightly decreased size of subcentimeter right cerebellar lesion. Stable subcentimeter left cerebellar lesion.  CT CAP 3/6/2023 showed Stable ill-defined linear opacity in the right upper lobe when compared to previous exam. Previously noted patchy groundglass opacity in the right middle lobe is no longer present and has resolved.  Today no neuro or respiratory symptoms. He has history of clotting disorder and continues on Coumadin, also has inflammatory autoimmune disorder, denies recent flares with joint pains. Over all feeling well.   VISIT dated 8/1/2023 Patient returns for routine f/u with images for review Denies any weakness, numbness,tingling, nausea, vomiting, headaches or other neurologic symptoms. No issues with speech or comprehension. Endorses painful sensation in digits and fatigue but this does not limit his participation in activity.  Continues to follow with Dr. Hart on Osimertinib 160mg Established care with Dr. Louis on medical [place]ibis for neuropathy/arthralgias  MRI brain w w/o contrast 7/19/2023 IMPRESSION: 1. Redemonstrated nodules of enhancement in the bilateral cerebellar hemispheres, generally unchanged in size and appearance compared to prior imaging. 2. No evidence of new enhancing lesions in the brain parenchyma.  CT C/A/P 6/12/2023 IMPRESSION: No new disease in the chest, abdomen, or pelvis.  Visit dated 11/7/2023 Patient returns for routine follow up to include progress check and review of completed cranial images. Denies N/V, HA/unilateral extremity weakness/memory changes/gait disturbance/bowel/bladder dysfunction or other neurologic symptoms. No issues with speech or comprehension. Participates in activity w/o difficulty  Continues to follow with Dr. Hart on Osimertinib 160mg BID increased after noted d/t POD on cranial images 6/2022  MRI brain w w/o contrast 10/30/2023 IMPRESSION: Enhancing lesion in the left cerebellum is increased in size, now 6 mm (16:53), previously 3 mm. Enhancing lesion in the right cerebellum measures 2 mm (16:53), slightly decreased since prior exam. A 2 mm enhancing lesion in the right occipital lobe (16:98), new since prior exam. A 3 mm enhancing lesion in the left Meckel[place] (16:58) is unchanged since 11/19/2023 but new/increased in size since 6/18/2021. Please note that there is a typographical error in the report. A sentence in the body and impression of the report should read: A 3 mm enhancing lesion in the left [place]ckel[place] (16:58) is unchanged since 7/19/2023 but new/increased in size since 6/18/2021.  CT C/A/P 9/12/2023 IMPRESSION: No new disease in the chest, abdomen, or pelvis.  Visit dated 2/6/2024 Patient presents for routine follow-up and progress check with cranial images for review. Underwent GKRS to left cerebellum lesion which was noted for increased size in cranial images 10/2023. (GKRS 20GY over 1 Fx to cerebellum 11/27/2023) Reports overall doing no new neurological deficits.  Denies N/V, HA/unilateral extremity weakness/memory changes/gait disturbance/bowel/bladder dysfunction or other neurologic symptoms. No issues with speech or comprehension. Continues to follow with Dr. Hart on Tagrisso 160mg. but with ingrown nails for which he will be seeing podiatry.  MRI brain w w/o contrast 1/23/2024 Impression: Previously noted lesions has either decreased in size or no longer seen which is likely due to response to therapy. Continued close interval follow-up is recommended.  CT C/A/P 12/18/2023 IMPRESSION: No evidence of disease progression   VISIT DATED 5/8/2024 Patient presents for routine f/u and progress check with cranial images for review. He continues to be w/o any new focal deficits. Able to participate in most activities' w/o difficulty. He is overall doing well.  4/30/2024 underwent a US/Bx right supraclav LN + for malignant cells Metastatic adenocarcinoma, compatible with lung primary Saw hematologist Dr Art for elevated PTT with recs to continue Warfarin  Continues to follow with Dr. Hart on Tagrisso 160mg and is tolerating well. (increased June 2022)  MRI brain w w/o contrast 4/30/2024 IMPRESSION: Previously seen punctate focus of enhancement left cerebellar hemisphere has resolved. Punctate right occipital lesion is stable. 2 to 3 mm lesion anteromedial aspect of the right temporal lobe is stable from 1/23/2024 but new and/or more conspicuous from 10/30/2023. Small foci of enhancement within the bilateral cavernous sinuses appears stable.  CT C/A/P with contrast 3/19/2024 IMPRESSION: Few less than 0.4 cm nodules and thickening of the interlobular septa are noted in the right lower lobe. The finding is new when compared to previous exam. Exact etiology is unclear. One of the differential diagnostic consideration includes lymphangitic spread of cancer. CT scan of the chest was obtained on 3/19/2024. Bilateral supraclavicular adenopathy is noted. One of the larger lymph node is noted in the right supraclavicular region and measures approximately 2.3 x 1.7 cm.

## 2024-05-08 NOTE — REVIEW OF SYSTEMS
[Confused] : no confusion [Dizziness] : no dizziness [Difficulty Walking] : no difficulty walking [FreeTextEntry3] : denies [FreeTextEntry9] : minimal muscle aches [de-identified] : denies HAs or vision difficulties.

## 2024-05-15 ENCOUNTER — NON-APPOINTMENT (OUTPATIENT)
Age: 54
End: 2024-05-15

## 2024-05-21 ENCOUNTER — APPOINTMENT (OUTPATIENT)
Dept: CT IMAGING | Facility: CLINIC | Age: 54
End: 2024-05-21

## 2024-05-22 ENCOUNTER — APPOINTMENT (OUTPATIENT)
Dept: NUCLEAR MEDICINE | Facility: CLINIC | Age: 54
End: 2024-05-22
Payer: COMMERCIAL

## 2024-05-22 ENCOUNTER — OUTPATIENT (OUTPATIENT)
Dept: OUTPATIENT SERVICES | Facility: HOSPITAL | Age: 54
LOS: 1 days | End: 2024-05-22

## 2024-05-22 DIAGNOSIS — C34.90 MALIGNANT NEOPLASM OF UNSPECIFIED PART OF UNSPECIFIED BRONCHUS OR LUNG: ICD-10-CM

## 2024-05-22 DIAGNOSIS — Z98.890 OTHER SPECIFIED POSTPROCEDURAL STATES: Chronic | ICD-10-CM

## 2024-05-22 DIAGNOSIS — Z95.828 PRESENCE OF OTHER VASCULAR IMPLANTS AND GRAFTS: Chronic | ICD-10-CM

## 2024-05-22 PROCEDURE — 78815 PET IMAGE W/CT SKULL-THIGH: CPT | Mod: 26,PS

## 2024-05-30 ENCOUNTER — OUTPATIENT (OUTPATIENT)
Dept: OUTPATIENT SERVICES | Facility: HOSPITAL | Age: 54
LOS: 1 days | Discharge: HOME | End: 2024-05-30

## 2024-05-30 DIAGNOSIS — Z95.828 PRESENCE OF OTHER VASCULAR IMPLANTS AND GRAFTS: Chronic | ICD-10-CM

## 2024-05-30 DIAGNOSIS — Z98.890 OTHER SPECIFIED POSTPROCEDURAL STATES: Chronic | ICD-10-CM

## 2024-05-30 DIAGNOSIS — D64.9 ANEMIA, UNSPECIFIED: ICD-10-CM

## 2024-06-05 ENCOUNTER — NON-APPOINTMENT (OUTPATIENT)
Age: 54
End: 2024-06-05

## 2024-06-06 ENCOUNTER — RESULT REVIEW (OUTPATIENT)
Age: 54
End: 2024-06-06

## 2024-06-06 ENCOUNTER — APPOINTMENT (OUTPATIENT)
Dept: HEMATOLOGY ONCOLOGY | Facility: CLINIC | Age: 54
End: 2024-06-06
Payer: COMMERCIAL

## 2024-06-06 VITALS
HEART RATE: 68 BPM | HEIGHT: 69 IN | SYSTOLIC BLOOD PRESSURE: 142 MMHG | WEIGHT: 194 LBS | OXYGEN SATURATION: 97 % | TEMPERATURE: 97.9 F | DIASTOLIC BLOOD PRESSURE: 84 MMHG | RESPIRATION RATE: 16 BRPM | BODY MASS INDEX: 28.73 KG/M2

## 2024-06-06 LAB
BASOPHILS # BLD AUTO: 0.02 K/UL — SIGNIFICANT CHANGE UP (ref 0–0.2)
BASOPHILS NFR BLD AUTO: 0.5 % — SIGNIFICANT CHANGE UP (ref 0–2)
EOSINOPHIL # BLD AUTO: 0.07 K/UL — SIGNIFICANT CHANGE UP (ref 0–0.5)
EOSINOPHIL NFR BLD AUTO: 1.6 % — SIGNIFICANT CHANGE UP (ref 0–6)
HCT VFR BLD CALC: 40.1 % — SIGNIFICANT CHANGE UP (ref 39–50)
HGB BLD-MCNC: 12.3 G/DL — LOW (ref 13–17)
IMM GRANULOCYTES NFR BLD AUTO: 0.5 % — SIGNIFICANT CHANGE UP (ref 0–0.9)
LYMPHOCYTES # BLD AUTO: 0.71 K/UL — LOW (ref 1–3.3)
LYMPHOCYTES # BLD AUTO: 16.6 % — SIGNIFICANT CHANGE UP (ref 13–44)
MCHC RBC-ENTMCNC: 20.2 PG — LOW (ref 27–34)
MCHC RBC-ENTMCNC: 30.7 G/DL — LOW (ref 32–36)
MCV RBC AUTO: 65.8 FL — LOW (ref 80–100)
MONOCYTES # BLD AUTO: 0.47 K/UL — SIGNIFICANT CHANGE UP (ref 0–0.9)
MONOCYTES NFR BLD AUTO: 11 % — SIGNIFICANT CHANGE UP (ref 2–14)
NEUTROPHILS # BLD AUTO: 2.98 K/UL — SIGNIFICANT CHANGE UP (ref 1.8–7.4)
NEUTROPHILS NFR BLD AUTO: 69.8 % — SIGNIFICANT CHANGE UP (ref 43–77)
NRBC # BLD: 0 /100 WBCS — SIGNIFICANT CHANGE UP (ref 0–0)
PLATELET # BLD AUTO: 192 K/UL — SIGNIFICANT CHANGE UP (ref 150–400)
RBC # BLD: 6.09 M/UL — HIGH (ref 4.2–5.8)
RBC # FLD: 18.5 % — HIGH (ref 10.3–14.5)
WBC # BLD: 4.27 K/UL — SIGNIFICANT CHANGE UP (ref 3.8–10.5)
WBC # FLD AUTO: 4.27 K/UL — SIGNIFICANT CHANGE UP (ref 3.8–10.5)

## 2024-06-06 PROCEDURE — G2211 COMPLEX E/M VISIT ADD ON: CPT | Mod: NC

## 2024-06-06 PROCEDURE — 99214 OFFICE O/P EST MOD 30 MIN: CPT

## 2024-06-06 RX ORDER — FOLIC ACID 1 MG/1
1 TABLET ORAL DAILY
Qty: 30 | Refills: 2 | Status: ACTIVE | COMMUNITY
Start: 2024-06-06 | End: 1900-01-01

## 2024-06-06 RX ORDER — METOCLOPRAMIDE 10 MG/1
10 TABLET ORAL EVERY 6 HOURS
Qty: 30 | Refills: 2 | Status: ACTIVE | COMMUNITY
Start: 2024-06-06 | End: 1900-01-01

## 2024-06-06 NOTE — PHYSICAL EXAM
[Restricted in physically strenuous activity but ambulatory and able to carry out work of a light or sedentary nature] : Status 1- Restricted in physically strenuous activity but ambulatory and able to carry out work of a light or sedentary nature, e.g., light house work, office work [Normal] : affect appropriate [de-identified] : rt supracalv adenopathy [de-identified] : paronychia on left 2nd toe. Inflammation on skin surrounding nails and toenails

## 2024-06-06 NOTE — HISTORY OF PRESENT ILLNESS
[Disease: _____________________] : Disease: [unfilled] [T: ___] : T[unfilled] [N: ___] : N[unfilled] [M: ___] : M[unfilled] [AJCC Stage: ____] : AJCC Stage: [unfilled] [de-identified] : Mr. Mclaughlin with hx of sarcoidosis is a pleasant 52-year-old male who started having allergy-like symptoms (cough) 5 months ago. He saw his PCP, Dr. Mo- referred to Dr. Trujillo after he was noted to have an abnormal chest CAT scan. He was referred for PET/CT which was done on 5/23 which showed  FDG avid right upper lobe masslike consolidation, considerably increased in size and coalesced with adjacent right upper lobe nodules and new FDG avid supraclavicular, mediastinal and hilar lymphadenopathy, as compared to CT chest January 13, 2021 concerning for malignancy although progression of sarcoidosis is also a consideration. rt supra clav bx was c/w lung adeno ca.   Of note, the patient has a complex medical history having had a diagnosis of sarcoid made in 2002 via a mediastinoscopy. At that time he was noted to have several pulmonary nodules but no treatment was required. 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 He was started on anticoagulation and a IVC filter was placed. He is also suspected of having lupus for which she is on hydroxychloroquine. He has a history of bronchial reactivity but is currently not on any inhaled bronchodilators.  He is a lifelong nonsmoker without occupational exposures. Other than a dry cough, he feels well. He denies any fever or, chest discomfort, dyspnea or peripheral edema.   6/22/21: Here for follow up. No complaints.    7/14/21: Patient seen for routine follow-up.  Patient is currently getting treatment for lupus and remains on hydrochorloquine and coumadin for anti-Cardiolipin antibod. Patient verbalized pain in the left foot that radiated from the small toe to the sole of feet, he takes advil with satisfactory pain control.  Patient also verbalized joint pain  that is related to lupus flare up.  Pt recently started on Tagrisso 80mg daily which he is tolerating well. Denies rash/ diarrhea. Pt met with Dr. Boudreaux for consideration of GK to brain mets.   9/3/21: Facial rash, gained weight, migrating joint pains better after starting steroids (prednisone 5 mg daily). Has had CT scans and MRI yesterday.   10/19/21: Feeling well. Mild facial rash, gained more weight. Restarted hydroxychloroquine for lupus with worsening joint pain in his wrists and fingers;  continues coumadin; plans f/u with his rheumatologist. Reports toe infection, on abx as per his podiatrist with improvement. Has f/u MRI Brain on 12/1.   12/7/21: Since August has been having toenail infection of R and L 1st and 2nd toes. Infection is painful, non pruritic. Has seen podiatrist who resected some of the toenails/skin. Using topical antibiotics with some improvement.   3/10/22: doing well. no new symptoms to report. Has some nail changes but all other AEs have improved. He does have episodic worsening of joint pains. Has gained some weight.   6/9/22: had a recent ER visit for acute swelling of rt knee and ankle- possibly related to occult trauma. Of note, pt is on Coumadin for VTE with optimal maintenance of INR. He has no other symptoms   9/15/22: Pt seem today for follow up. Continues on Tagrisso. Noted to have inflammation of the skin around nails and toe nails. Otherwise not having any additional AEs. Had CT scans which showed stable right upper lobe scarring at site of primary neoplasm. Stable 1.2 cm lingular nodule which could be scarring/atelectasis however as it is larger compared to more remote studies, 3 month follow-up is recommended. New subcentimeter right lower lobe groundglass nodule which can be reassessed on follow-up. Unchanged numerous predominantly subcentimeter retroperitoneal/pelvic lymph nodes.  12/15/22: Here for follow up.Continues on Tagrisso at 160 mg. Feet improved. cracked skin around finger nails. no diarrhea. Taking coumadin   6/15/23: Patient seen in clinic to follow up on his Osimertinib therapy. Patient is experiencing some nail toxicity on and off and muscle cramps, which he is using tonic water for, Denies diarrhea. He has fatigue. He notes that the texture of his scalp hair is different, but he is not bothered by it. He has no mucositis. Pt denies any neurological symptoms.   9/20/23: Skin, nail changes-was really bad, now subsided. Also needed abx as prescribed by podiatrist. Continues on Tagrisso at 160 mg  12/22/23: Patient accompanied by wife, here for follow up visit s/p mask gammaknife RT to cerebellar met 11/27/23, continues on tagrisso and has no complaints.   3/26/24: Pt recently had rt upper quadrant pain, went o ER a month ago and was noted to have gall stones. Was sent home after pain management. He was referred to the surgeon. There was a delay in surgery duen to elevated PTT. I received a call at that time and as documented, this was deemed to be sec to lupus anticoagulant.   6/6/24: feeling well. No new symptoms. Had PET/CT 1.  Extensive hypermetabolic lymphadenopathy extending from the neck into the supraclavicular regions, consistent with malignant involvement. This also includes a posterior LEFT shoulder node. 2.  Uptake at the RIGHT perihilar region and air bronchograms in the RIGHT UPPER lobe consistent with residual, findings of lung cancer. Continued follow-up suggested to exclude residual metabolically reactive disease. 3.  Small, minimally avid RIGHT axillary node, nonspecific.  FM NGS on Ln bxEGFR L858R FGFR2 K659E KRAS amplification BRAF K483E - subclonal NKX2-1 amplification TP53 [id number]*4 [de-identified] : adeno ca no Statement Selected

## 2024-06-06 NOTE — ASSESSMENT
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 MRI at baseline shows four subcentimeter parenchymal enhancing lesions in the cerebral and cerebellar hemispheres as detailed in the body the report. Findings were suspicious for the presence of intracranial metastases. Started Osimertinib in June 2021. 2 month scan on osi with excellent response in the CNS as well as lungs. CT C/A/P 12/02/21 with decreased RUL mass size. Scans from March 2022, reviewed personally- completely stable findings- sustained OH. Brain MRI read pending ANNALISE in December 2021. Scans done on 6/1/22 reviewed independently- sustained OH noted. Stable 1.1 cm right upper lobe nodule along a bandlike opacity. Stable mild tree-in-bud opacity within the lingula. The lungs are otherwise clear. No new or enlarging nodule. Numerous subcentimeter retroperitoneal lymph nodes are unchanged. Previously mentioned enlarged external iliac lymph nodes are not imaged on this exam. US doppler of LE which showed chronic DVT in rt LE. While systemic scans were stable as noted above, brain MRI showed POD. Therefore, decision was made to increase osimertinib dose to BID (160 mg) since this dosing is more effective in CNS/leptomeningeal disease. Scans from September 2022 reviewed and showed stable right upper lobe scarring, stable 1.2 cm lingular nodule which could be scarring/atelectasis. Unchanged retroperitoneal/pelvic lymph nodes.New subcentimeter right lower lobe ground glass nodule which was thought to be inflammatory. Scans from December 2022 which no new or enlarging lung nodule. Mild patchy ground glass opacity within lateral segment of right middle lobe likely inflammatory. Stable scarring within right upper lobe.Stable subcentimeter retroperitoneal lymph nodes. Pt had COVID around this time which can explain these changes.MRI brain at this time showed an excellent response. CT scans done in September 2023 shows excellent response- CR.  Scans from Dec 18 shows continued CR based on my review. Also, scan from March which has not been reported as yet seems to show continued CR based on my independent review.  Recent MR 10/30/23, + increase in left cerebellum lesion  now 6 mm (16:53), previously 3 mm sen by Dr. Boudreaux s/p mask gammaknife 11/27/23. Scans from Jan showed excellent response.  Continues on tagrisso 160 mg daily, tolerating well Of note, pt has microcytosis with mild anemia. He has a hx of thalassemia minor. Plt count slightly low and PS shows giant platelets. pt likely has chronic ITP which is commonly associated with lupus anticoagulant. He has not needed any treatment for this since his plt count is normal. Elevated PTT- sec to lupus anticoagulant. pt does not have a hematologist. Referral to Dr. Art pending.  Educated on proper care of nails and skin surrounding nails. Informed to soak hands and feet in one-to-one ratio of water to vinegar. Instructed to follow up with podiatry to manage left 2nd toe paronychia and other nail changes. Labs today including CBC, CMP, mag (for muscle cramps), CEA (previously elevated and now decreased to normal range), and INR (for coumadin monitoring- this has been controlled perfectly in the 2.5-3 range) All questions answered. Firm rt supraclav LN- Bx consistent with adeno ca NGS reveals a more complex pattern, concerning for Tagrisso resistance  Also, PET/CT shows diffuse disease Recommend adding Carbo AUC 5, pemetrexed 500 mg/m2 based on FLAURA-2 B12 and folate supplement discussed We discussed the regimen in detail including potential benefits and AEs in detail. We answered all questions. we went over schedule and other pertinent details. Pt signed informed consent. Discussed rationale Alternate options of Rybrevant+chemo (STEPHANIE regimen) was also discussed

## 2024-06-06 NOTE — REVIEW OF SYSTEMS
[Negative] : Allergic/Immunologic [Fever] : no fever [Night Sweats] : no night sweats [Cough] : no cough [FreeTextEntry7] : as above

## 2024-06-10 ENCOUNTER — RX RENEWAL (OUTPATIENT)
Age: 54
End: 2024-06-10

## 2024-06-11 ENCOUNTER — RESULT REVIEW (OUTPATIENT)
Age: 54
End: 2024-06-11

## 2024-06-11 ENCOUNTER — APPOINTMENT (OUTPATIENT)
Dept: HEMATOLOGY ONCOLOGY | Facility: CLINIC | Age: 54
End: 2024-06-11

## 2024-06-11 ENCOUNTER — APPOINTMENT (OUTPATIENT)
Dept: INFUSION THERAPY | Facility: HOSPITAL | Age: 54
End: 2024-06-11

## 2024-06-11 LAB
ALBUMIN SERPL ELPH-MCNC: 4.5 G/DL — SIGNIFICANT CHANGE UP (ref 3.3–5)
ALBUMIN SERPL ELPH-MCNC: 4.9 G/DL
ALP BLD-CCNC: 74 U/L
ALP SERPL-CCNC: 65 U/L — SIGNIFICANT CHANGE UP (ref 40–120)
ALT FLD-CCNC: 28 U/L — SIGNIFICANT CHANGE UP (ref 10–45)
ALT SERPL-CCNC: 27 U/L
ANION GAP SERPL CALC-SCNC: 13 MMOL/L — SIGNIFICANT CHANGE UP (ref 5–17)
ANION GAP SERPL CALC-SCNC: 14 MMOL/L
ANISOCYTOSIS BLD QL: SLIGHT — SIGNIFICANT CHANGE UP
AST SERPL-CCNC: 29 U/L
AST SERPL-CCNC: 34 U/L — SIGNIFICANT CHANGE UP (ref 10–40)
BASOPHILS # BLD AUTO: 0.07 K/UL — SIGNIFICANT CHANGE UP (ref 0–0.2)
BASOPHILS NFR BLD AUTO: 1.6 % — SIGNIFICANT CHANGE UP (ref 0–2)
BILIRUB SERPL-MCNC: 0.2 MG/DL
BILIRUB SERPL-MCNC: 0.3 MG/DL — SIGNIFICANT CHANGE UP (ref 0.2–1.2)
BUN SERPL-MCNC: 15 MG/DL
BUN SERPL-MCNC: 15 MG/DL — SIGNIFICANT CHANGE UP (ref 7–23)
CALCIUM SERPL-MCNC: 9.6 MG/DL — SIGNIFICANT CHANGE UP (ref 8.4–10.5)
CALCIUM SERPL-MCNC: 9.7 MG/DL
CEA SERPL-MCNC: 2.8 NG/ML
CHLORIDE SERPL-SCNC: 104 MMOL/L
CHLORIDE SERPL-SCNC: 106 MMOL/L — SIGNIFICANT CHANGE UP (ref 96–108)
CO2 SERPL-SCNC: 22 MMOL/L — SIGNIFICANT CHANGE UP (ref 22–31)
CO2 SERPL-SCNC: 23 MMOL/L
CREAT SERPL-MCNC: 0.81 MG/DL — SIGNIFICANT CHANGE UP (ref 0.5–1.3)
CREAT SERPL-MCNC: 0.97 MG/DL
EGFR: 107 ML/MIN/1.73M2 — SIGNIFICANT CHANGE UP
EGFR: 95 ML/MIN/1.73M2
ELLIPTOCYTES BLD QL SMEAR: SLIGHT — SIGNIFICANT CHANGE UP
EOSINOPHIL # BLD AUTO: 0.1 K/UL — SIGNIFICANT CHANGE UP (ref 0–0.5)
EOSINOPHIL NFR BLD AUTO: 2.3 % — SIGNIFICANT CHANGE UP (ref 0–6)
GLUCOSE SERPL-MCNC: 101 MG/DL
GLUCOSE SERPL-MCNC: 136 MG/DL — HIGH (ref 70–99)
HCT VFR BLD CALC: 36.8 % — LOW (ref 39–50)
HGB BLD-MCNC: 11.7 G/DL — LOW (ref 13–17)
IMM GRANULOCYTES NFR BLD AUTO: 4.5 % — HIGH (ref 0–0.9)
LYMPHOCYTES # BLD AUTO: 0.78 K/UL — LOW (ref 1–3.3)
LYMPHOCYTES # BLD AUTO: 17.6 % — SIGNIFICANT CHANGE UP (ref 13–44)
MAGNESIUM SERPL-MCNC: 2.3 MG/DL
MCHC RBC-ENTMCNC: 20.3 PG — LOW (ref 27–34)
MCHC RBC-ENTMCNC: 31.8 G/DL — LOW (ref 32–36)
MCV RBC AUTO: 63.8 FL — LOW (ref 80–100)
MICROCYTES BLD QL: SLIGHT — SIGNIFICANT CHANGE UP
MONOCYTES # BLD AUTO: 0.69 K/UL — SIGNIFICANT CHANGE UP (ref 0–0.9)
MONOCYTES NFR BLD AUTO: 15.5 % — HIGH (ref 2–14)
NEUTROPHILS # BLD AUTO: 2.6 K/UL — SIGNIFICANT CHANGE UP (ref 1.8–7.4)
NEUTROPHILS NFR BLD AUTO: 58.5 % — SIGNIFICANT CHANGE UP (ref 43–77)
NRBC # BLD: 0 /100 WBCS — SIGNIFICANT CHANGE UP (ref 0–0)
PLAT MORPH BLD: NORMAL — SIGNIFICANT CHANGE UP
PLATELET # BLD AUTO: 166 K/UL — SIGNIFICANT CHANGE UP (ref 150–400)
POIKILOCYTOSIS BLD QL AUTO: SLIGHT — SIGNIFICANT CHANGE UP
POTASSIUM SERPL-MCNC: 4.2 MMOL/L — SIGNIFICANT CHANGE UP (ref 3.5–5.3)
POTASSIUM SERPL-SCNC: 4.2 MMOL/L — SIGNIFICANT CHANGE UP (ref 3.5–5.3)
POTASSIUM SERPL-SCNC: 4.3 MMOL/L
PROT SERPL-MCNC: 7.4 G/DL — SIGNIFICANT CHANGE UP (ref 6–8.3)
PROT SERPL-MCNC: 7.8 G/DL
RBC # BLD: 5.77 M/UL — SIGNIFICANT CHANGE UP (ref 4.2–5.8)
RBC # FLD: 18.3 % — HIGH (ref 10.3–14.5)
RBC BLD AUTO: ABNORMAL
SODIUM SERPL-SCNC: 141 MMOL/L
SODIUM SERPL-SCNC: 141 MMOL/L — SIGNIFICANT CHANGE UP (ref 135–145)
TARGETS BLD QL SMEAR: SLIGHT — SIGNIFICANT CHANGE UP
TSH SERPL-ACNC: 1.34 UIU/ML
WBC # BLD: 4.44 K/UL — SIGNIFICANT CHANGE UP (ref 3.8–10.5)
WBC # FLD AUTO: 4.44 K/UL — SIGNIFICANT CHANGE UP (ref 3.8–10.5)

## 2024-06-11 RX ORDER — OSIMERTINIB 80 1/1
160 TABLET, FILM COATED ORAL
Refills: 0 | DISCHARGE

## 2024-06-11 RX ORDER — OSIMERTINIB 80 1/1
80 TABLET, FILM COATED ORAL
Qty: 30 | Refills: 2 | Status: ACTIVE | COMMUNITY
Start: 2021-06-22 | End: 1900-01-01

## 2024-06-12 ENCOUNTER — NON-APPOINTMENT (OUTPATIENT)
Age: 54
End: 2024-06-12

## 2024-06-12 DIAGNOSIS — Z51.11 ENCOUNTER FOR ANTINEOPLASTIC CHEMOTHERAPY: ICD-10-CM

## 2024-06-12 DIAGNOSIS — R11.2 NAUSEA WITH VOMITING, UNSPECIFIED: ICD-10-CM

## 2024-06-12 DIAGNOSIS — C34.90 MALIGNANT NEOPLASM OF UNSPECIFIED PART OF UNSPECIFIED BRONCHUS OR LUNG: ICD-10-CM

## 2024-06-12 LAB
CEA SERPL-MCNC: 2.6 NG/ML — SIGNIFICANT CHANGE UP (ref 0–3.8)
HBV CORE AB SER-ACNC: SIGNIFICANT CHANGE UP
HBV SURFACE AB SER-ACNC: SIGNIFICANT CHANGE UP
HBV SURFACE AG SER-ACNC: SIGNIFICANT CHANGE UP
HCV AB S/CO SERPL IA: 0.05 S/CO — SIGNIFICANT CHANGE UP (ref 0–0.99)
HCV AB SERPL-IMP: SIGNIFICANT CHANGE UP
INR BLD: 2.93 RATIO — HIGH (ref 0.85–1.18)
PROTHROM AB SERPL-ACNC: 32.1 SEC — HIGH (ref 9.5–13)
TSH SERPL-MCNC: 0.99 UIU/ML — SIGNIFICANT CHANGE UP (ref 0.27–4.2)

## 2024-07-02 ENCOUNTER — APPOINTMENT (OUTPATIENT)
Dept: INFUSION THERAPY | Facility: HOSPITAL | Age: 54
End: 2024-07-02

## 2024-07-02 ENCOUNTER — APPOINTMENT (OUTPATIENT)
Dept: HEMATOLOGY ONCOLOGY | Facility: CLINIC | Age: 54
End: 2024-07-02

## 2024-07-02 ENCOUNTER — APPOINTMENT (OUTPATIENT)
Dept: HEMATOLOGY ONCOLOGY | Facility: CLINIC | Age: 54
End: 2024-07-02
Payer: COMMERCIAL

## 2024-07-02 ENCOUNTER — RESULT REVIEW (OUTPATIENT)
Age: 54
End: 2024-07-02

## 2024-07-02 PROBLEM — C79.31 METASTASIS TO BRAIN: Status: ACTIVE | Noted: 2021-07-03

## 2024-07-02 PROBLEM — C79.49: Status: ACTIVE | Noted: 2022-06-29

## 2024-07-02 PROBLEM — C34.90 PRIMARY LUNG ADENOCARCINOMA: Status: ACTIVE | Noted: 2021-06-10

## 2024-07-02 LAB
ALBUMIN SERPL ELPH-MCNC: 4.5 G/DL — SIGNIFICANT CHANGE UP (ref 3.3–5)
ALP SERPL-CCNC: 53 U/L — SIGNIFICANT CHANGE UP (ref 40–120)
ALT FLD-CCNC: 27 U/L — SIGNIFICANT CHANGE UP (ref 10–45)
ANION GAP SERPL CALC-SCNC: 11 MMOL/L — SIGNIFICANT CHANGE UP (ref 5–17)
AST SERPL-CCNC: 31 U/L — SIGNIFICANT CHANGE UP (ref 10–40)
BASOPHILS # BLD AUTO: 0.02 K/UL — SIGNIFICANT CHANGE UP (ref 0–0.2)
BASOPHILS NFR BLD AUTO: 0.6 % — SIGNIFICANT CHANGE UP (ref 0–2)
BILIRUB SERPL-MCNC: 0.4 MG/DL — SIGNIFICANT CHANGE UP (ref 0.2–1.2)
BUN SERPL-MCNC: 16 MG/DL — SIGNIFICANT CHANGE UP (ref 7–23)
CALCIUM SERPL-MCNC: 9.7 MG/DL — SIGNIFICANT CHANGE UP (ref 8.4–10.5)
CEA SERPL-MCNC: 2.4 NG/ML — SIGNIFICANT CHANGE UP (ref 0–3.8)
CHLORIDE SERPL-SCNC: 105 MMOL/L — SIGNIFICANT CHANGE UP (ref 96–108)
CO2 SERPL-SCNC: 23 MMOL/L — SIGNIFICANT CHANGE UP (ref 22–31)
CREAT SERPL-MCNC: 0.99 MG/DL — SIGNIFICANT CHANGE UP (ref 0.5–1.3)
EGFR: 92 ML/MIN/1.73M2 — SIGNIFICANT CHANGE UP
EOSINOPHIL # BLD AUTO: 0.04 K/UL — SIGNIFICANT CHANGE UP (ref 0–0.5)
EOSINOPHIL NFR BLD AUTO: 1.3 % — SIGNIFICANT CHANGE UP (ref 0–6)
GLUCOSE SERPL-MCNC: 91 MG/DL — SIGNIFICANT CHANGE UP (ref 70–99)
HCT VFR BLD CALC: 33.4 % — LOW (ref 39–50)
HGB BLD-MCNC: 10.4 G/DL — LOW (ref 13–17)
IMM GRANULOCYTES NFR BLD AUTO: 2.2 % — HIGH (ref 0–0.9)
INR BLD: 1.59 RATIO — HIGH (ref 0.85–1.18)
LYMPHOCYTES # BLD AUTO: 0.81 K/UL — LOW (ref 1–3.3)
LYMPHOCYTES # BLD AUTO: 26 % — SIGNIFICANT CHANGE UP (ref 13–44)
MCHC RBC-ENTMCNC: 20.2 PG — LOW (ref 27–34)
MCHC RBC-ENTMCNC: 31.1 G/DL — LOW (ref 32–36)
MCV RBC AUTO: 64.7 FL — LOW (ref 80–100)
MONOCYTES # BLD AUTO: 0.45 K/UL — SIGNIFICANT CHANGE UP (ref 0–0.9)
MONOCYTES NFR BLD AUTO: 14.4 % — HIGH (ref 2–14)
NEUTROPHILS # BLD AUTO: 1.73 K/UL — LOW (ref 1.8–7.4)
NEUTROPHILS NFR BLD AUTO: 55.5 % — SIGNIFICANT CHANGE UP (ref 43–77)
NRBC # BLD: 0 /100 WBCS — SIGNIFICANT CHANGE UP (ref 0–0)
PLATELET # BLD AUTO: 138 K/UL — LOW (ref 150–400)
POTASSIUM SERPL-MCNC: 4.2 MMOL/L — SIGNIFICANT CHANGE UP (ref 3.5–5.3)
POTASSIUM SERPL-SCNC: 4.2 MMOL/L — SIGNIFICANT CHANGE UP (ref 3.5–5.3)
PROT SERPL-MCNC: 7.4 G/DL — SIGNIFICANT CHANGE UP (ref 6–8.3)
PROTHROM AB SERPL-ACNC: 17.7 SEC — HIGH (ref 9.5–13)
RBC # BLD: 5.16 M/UL — SIGNIFICANT CHANGE UP (ref 4.2–5.8)
RBC # FLD: 17.2 % — HIGH (ref 10.3–14.5)
SODIUM SERPL-SCNC: 139 MMOL/L — SIGNIFICANT CHANGE UP (ref 135–145)
TSH SERPL-MCNC: 1.19 UIU/ML — SIGNIFICANT CHANGE UP (ref 0.27–4.2)
WBC # BLD: 3.12 K/UL — LOW (ref 3.8–10.5)
WBC # FLD AUTO: 3.12 K/UL — LOW (ref 3.8–10.5)

## 2024-07-02 PROCEDURE — 99214 OFFICE O/P EST MOD 30 MIN: CPT

## 2024-07-22 ENCOUNTER — NON-APPOINTMENT (OUTPATIENT)
Age: 54
End: 2024-07-22

## 2024-07-23 ENCOUNTER — NON-APPOINTMENT (OUTPATIENT)
Age: 54
End: 2024-07-23

## 2024-07-23 ENCOUNTER — RESULT REVIEW (OUTPATIENT)
Age: 54
End: 2024-07-23

## 2024-07-23 ENCOUNTER — APPOINTMENT (OUTPATIENT)
Dept: INFUSION THERAPY | Facility: HOSPITAL | Age: 54
End: 2024-07-23

## 2024-07-23 ENCOUNTER — APPOINTMENT (OUTPATIENT)
Dept: HEMATOLOGY ONCOLOGY | Facility: CLINIC | Age: 54
End: 2024-07-23

## 2024-07-23 ENCOUNTER — APPOINTMENT (OUTPATIENT)
Dept: HEMATOLOGY ONCOLOGY | Facility: CLINIC | Age: 54
End: 2024-07-23
Payer: COMMERCIAL

## 2024-07-23 DIAGNOSIS — C79.31 SECONDARY MALIGNANT NEOPLASM OF BRAIN: ICD-10-CM

## 2024-07-23 DIAGNOSIS — C79.49 SECONDARY MALIGNANT NEOPLASM OF OTHER PARTS OF NERVOUS SYSTEM: ICD-10-CM

## 2024-07-23 DIAGNOSIS — C80.1 SECONDARY MALIGNANT NEOPLASM OF OTHER PARTS OF NERVOUS SYSTEM: ICD-10-CM

## 2024-07-23 DIAGNOSIS — L27.0 GENERALIZED SKIN ERUPTION DUE TO DRUGS AND MEDICAMENTS TAKEN INTERNALLY: ICD-10-CM

## 2024-07-23 LAB
ALBUMIN SERPL ELPH-MCNC: 4.4 G/DL — SIGNIFICANT CHANGE UP (ref 3.3–5)
ALP SERPL-CCNC: 49 U/L — SIGNIFICANT CHANGE UP (ref 40–120)
ALT FLD-CCNC: 17 U/L — SIGNIFICANT CHANGE UP (ref 10–45)
ANION GAP SERPL CALC-SCNC: 13 MMOL/L — SIGNIFICANT CHANGE UP (ref 5–17)
AST SERPL-CCNC: 27 U/L — SIGNIFICANT CHANGE UP (ref 10–40)
BASOPHILS # BLD AUTO: 0.01 K/UL — SIGNIFICANT CHANGE UP (ref 0–0.2)
BASOPHILS NFR BLD AUTO: 0.5 % — SIGNIFICANT CHANGE UP (ref 0–2)
BILIRUB SERPL-MCNC: 0.3 MG/DL — SIGNIFICANT CHANGE UP (ref 0.2–1.2)
BUN SERPL-MCNC: 17 MG/DL — SIGNIFICANT CHANGE UP (ref 7–23)
CALCIUM SERPL-MCNC: 9.7 MG/DL — SIGNIFICANT CHANGE UP (ref 8.4–10.5)
CEA SERPL-MCNC: 1.7 NG/ML — SIGNIFICANT CHANGE UP (ref 0–3.8)
CHLORIDE SERPL-SCNC: 105 MMOL/L — SIGNIFICANT CHANGE UP (ref 96–108)
CO2 SERPL-SCNC: 24 MMOL/L — SIGNIFICANT CHANGE UP (ref 22–31)
CREAT SERPL-MCNC: 1.26 MG/DL — SIGNIFICANT CHANGE UP (ref 0.5–1.3)
EGFR: 69 ML/MIN/1.73M2 — SIGNIFICANT CHANGE UP
EOSINOPHIL # BLD AUTO: 0.01 K/UL — SIGNIFICANT CHANGE UP (ref 0–0.5)
EOSINOPHIL NFR BLD AUTO: 0.5 % — SIGNIFICANT CHANGE UP (ref 0–6)
GLUCOSE SERPL-MCNC: 92 MG/DL — SIGNIFICANT CHANGE UP (ref 70–99)
HCT VFR BLD CALC: 27.9 % — LOW (ref 39–50)
HGB BLD-MCNC: 9 G/DL — LOW (ref 13–17)
IMM GRANULOCYTES NFR BLD AUTO: 0.5 % — SIGNIFICANT CHANGE UP (ref 0–0.9)
INR BLD: 2.43 RATIO — HIGH (ref 0.85–1.18)
LYMPHOCYTES # BLD AUTO: 0.56 K/UL — LOW (ref 1–3.3)
LYMPHOCYTES # BLD AUTO: 29.5 % — SIGNIFICANT CHANGE UP (ref 13–44)
MCHC RBC-ENTMCNC: 20.5 PG — LOW (ref 27–34)
MCHC RBC-ENTMCNC: 32.3 G/DL — SIGNIFICANT CHANGE UP (ref 32–36)
MCV RBC AUTO: 63.4 FL — LOW (ref 80–100)
MONOCYTES # BLD AUTO: 0.35 K/UL — SIGNIFICANT CHANGE UP (ref 0–0.9)
MONOCYTES NFR BLD AUTO: 18.4 % — HIGH (ref 2–14)
NEUTROPHILS # BLD AUTO: 0.96 K/UL — LOW (ref 1.8–7.4)
NEUTROPHILS NFR BLD AUTO: 50.6 % — SIGNIFICANT CHANGE UP (ref 43–77)
NRBC # BLD: 0 /100 WBCS — SIGNIFICANT CHANGE UP (ref 0–0)
PLATELET # BLD AUTO: 110 K/UL — LOW (ref 150–400)
POTASSIUM SERPL-MCNC: 4 MMOL/L — SIGNIFICANT CHANGE UP (ref 3.5–5.3)
POTASSIUM SERPL-SCNC: 4 MMOL/L — SIGNIFICANT CHANGE UP (ref 3.5–5.3)
PROT SERPL-MCNC: 7.3 G/DL — SIGNIFICANT CHANGE UP (ref 6–8.3)
PROTHROM AB SERPL-ACNC: 26.8 SEC — HIGH (ref 9.5–13)
RBC # BLD: 4.4 M/UL — SIGNIFICANT CHANGE UP (ref 4.2–5.8)
RBC # FLD: 17.3 % — HIGH (ref 10.3–14.5)
SODIUM SERPL-SCNC: 142 MMOL/L — SIGNIFICANT CHANGE UP (ref 135–145)
TSH SERPL-MCNC: 0.97 UIU/ML — SIGNIFICANT CHANGE UP (ref 0.27–4.2)
WBC # BLD: 1.9 K/UL — LOW (ref 3.8–10.5)
WBC # FLD AUTO: 1.9 K/UL — LOW (ref 3.8–10.5)

## 2024-07-23 PROCEDURE — 99214 OFFICE O/P EST MOD 30 MIN: CPT

## 2024-07-23 NOTE — HISTORY OF PRESENT ILLNESS
"[id number]    Chief Complaint   Patient presents with     Prenatal Care       Initial /60   Ht 1.6 m (5' 3\")   Wt 98.5 kg (217 lb 3.2 oz)   LMP 2021   BMI 38.48 kg/m   Estimated body mass index is 38.48 kg/m  as calculated from the following:    Height as of this encounter: 1.6 m (5' 3\").    Weight as of this encounter: 98.5 kg (217 lb 3.2 oz).  BP completed using cuff size: regular    Questioned patient about current smoking habits.  Pt. has never smoked.          The following HM Due: NONE  " [Disease: _____________________] : Disease: [unfilled] [T: ___] : T[unfilled] [N: ___] : N[unfilled] [M: ___] : M[unfilled] [AJCC Stage: ____] : AJCC Stage: [unfilled] [de-identified] : Mr. Mclaughlin with hx of sarcoidosis is a pleasant 52-year-old male who started having allergy-like symptoms (cough) 5 months ago. He saw his PCP, Dr. Mo- referred to Dr. Trujillo after he was noted to have an abnormal chest CAT scan. He was referred for PET/CT which was done on 5/23 which showed  FDG avid right upper lobe masslike consolidation, considerably increased in size and coalesced with adjacent right upper lobe nodules and new FDG avid supraclavicular, mediastinal and hilar lymphadenopathy, as compared to CT chest January 13, 2021 concerning for malignancy although progression of sarcoidosis is also a consideration. rt supra clav bx was c/w lung adeno ca.   Of note, the patient has a complex medical history having had a diagnosis of sarcoid made in 2002 via a mediastinoscopy. At that time he was noted to have several pulmonary nodules but no treatment was required. 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 He was started on anticoagulation and a IVC filter was placed. He is also suspected of having lupus for which she is on hydroxychloroquine. He has a history of bronchial reactivity but is currently not on any inhaled bronchodilators.  He is a lifelong nonsmoker without occupational exposures. Other than a dry cough, he feels well. He denies any fever or, chest discomfort, dyspnea or peripheral edema.   6/22/21: Here for follow up. No complaints.    7/14/21: Patient seen for routine follow-up.  Patient is currently getting treatment for lupus and remains on hydrochorloquine and coumadin for anti-Cardiolipin antibod. Patient verbalized pain in the left foot that radiated from the small toe to the sole of feet, he takes advil with satisfactory pain control.  Patient also verbalized joint pain  that is related to lupus flare up.  Pt recently started on Tagrisso 80mg daily which he is tolerating well. Denies rash/ diarrhea. Pt met with Dr. Boudreaux for consideration of GK to brain mets.   9/3/21: Facial rash, gained weight, migrating joint pains better after starting steroids (prednisone 5 mg daily). Has had CT scans and MRI yesterday.   10/19/21: Feeling well. Mild facial rash, gained more weight. Restarted hydroxychloroquine for lupus with worsening joint pain in his wrists and fingers;  continues coumadin; plans f/u with his rheumatologist. Reports toe infection, on abx as per his podiatrist with improvement. Has f/u MRI Brain on 12/1.   12/7/21: Since August has been having toenail infection of R and L 1st and 2nd toes. Infection is painful, non pruritic. Has seen podiatrist who resected some of the toenails/skin. Using topical antibiotics with some improvement.   3/10/22: doing well. no new symptoms to report. Has some nail changes but all other AEs have improved. He does have episodic worsening of joint pains. Has gained some weight.   6/9/22: had a recent ER visit for acute swelling of rt knee and ankle- possibly related to occult trauma. Of note, pt is on Coumadin for VTE with optimal maintenance of INR. He has no other symptoms   9/15/22: Pt seem today for follow up. Continues on Tagrisso. Noted to have inflammation of the skin around nails and toe nails. Otherwise not having any additional AEs. Had CT scans which showed stable right upper lobe scarring at site of primary neoplasm. Stable 1.2 cm lingular nodule which could be scarring/atelectasis however as it is larger compared to more remote studies, 3 month follow-up is recommended. New subcentimeter right lower lobe groundglass nodule which can be reassessed on follow-up. Unchanged numerous predominantly subcentimeter retroperitoneal/pelvic lymph nodes.  12/15/22: Here for follow up.Continues on Tagrisso at 160 mg. Feet improved. cracked skin around finger nails. no diarrhea. Taking coumadin   6/15/23: Patient seen in clinic to follow up on his Osimertinib therapy. Patient is experiencing some nail toxicity on and off and muscle cramps, which he is using tonic water for, Denies diarrhea. He has fatigue. He notes that the texture of his scalp hair is different, but he is not bothered by it. He has no mucositis. Pt denies any neurological symptoms.   9/20/23: Skin, nail changes-was really bad, now subsided. Also needed abx as prescribed by podiatrist. Continues on Tagrisso at 160 mg  12/22/23: Patient accompanied by wife, here for follow up visit s/p mask gammaknife RT to cerebellar met 11/27/23, continues on tagrisso and has no complaints.   3/26/24: Pt recently had rt upper quadrant pain, went o ER a month ago and was noted to have gall stones. Was sent home after pain management. He was referred to the surgeon. There was a delay in surgery duen to elevated PTT. I received a call at that time and as documented, this was deemed to be sec to lupus anticoagulant.   6/6/24: feeling well. No new symptoms. Had PET/CT 1.  Extensive hypermetabolic lymphadenopathy extending from the neck into the supraclavicular regions, consistent with malignant involvement. This also includes a posterior LEFT shoulder node. 2.  Uptake at the RIGHT perihilar region and air bronchograms in the RIGHT UPPER lobe consistent with residual, findings of lung cancer. Continued follow-up suggested to exclude residual metabolically reactive disease. 3.  Small, minimally avid RIGHT axillary node, nonspecific.  7/2/24: Patient seen today for follow up while receiving treatment with carbo/alimta. He started this treatment on June 11th. He reports that after his first cycle he experienced some discomfort in his right upper thorax area, no rash indentified. He also experienced fatigue and felt he had a low grade temp 3 days following infusion. He feels his LAD improved since starting the treatment. He denies N/V/D.   7/23/24: Patient seen today for follow up while receiving C3 carbo/alimta. He continues on Tagrisso. He reports that following his last treatment he felt more fatigued. Did not experience thorax discomfort or fever. Notes rash on bilateral upper and lower extremities. He also has occasional hiccups resolved with reglan. His appetite is good and denies N/V/D, constipation. Breathing is stable. Remains active, golfs and continues to work.    FM NGS on Ln bxEGFR L858R FGFR2 K659E KRAS amplification BRAF K483E - subclonal NKX2-1 amplification TP53 [id number]*4 [de-identified] : adeno ca

## 2024-07-23 NOTE — REVIEW OF SYSTEMS
[Fever] : no fever [Night Sweats] : no night sweats [Cough] : no cough [Skin Rash] : skin rash [Negative] : Allergic/Immunologic [FreeTextEntry7] : as above

## 2024-07-23 NOTE — ASSESSMENT
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 MRI at baseline shows four subcentimeter parenchymal enhancing lesions in the cerebral and cerebellar hemispheres as detailed in the body the report. Findings were suspicious for the presence of intracranial metastases. Started Osimertinib in June 2021. 2 month scan on osi with excellent response in the CNS as well as lungs. CT C/A/P 12/02/21 with decreased RUL mass size. Scans from March 2022, reviewed personally- completely stable findings- sustained MA. Brain MRI read pending ANNALISE in December 2021. Scans done on 6/1/22 reviewed independently- sustained MA noted. Stable 1.1 cm right upper lobe nodule along a bandlike opacity. Stable mild tree-in-bud opacity within the lingula. The lungs are otherwise clear. No new or enlarging nodule. Numerous subcentimeter retroperitoneal lymph nodes are unchanged. Previously mentioned enlarged external iliac lymph nodes are not imaged on this exam. US doppler of LE which showed chronic DVT in rt LE. While systemic scans were stable as noted above, brain MRI showed POD. Therefore, decision was made to increase osimertinib dose to BID (160 mg) since this dosing is more effective in CNS/leptomeningeal disease. Scans from September 2022 reviewed and showed stable right upper lobe scarring, stable 1.2 cm lingular nodule which could be scarring/atelectasis. Unchanged retroperitoneal/pelvic lymph nodes.New subcentimeter right lower lobe ground glass nodule which was thought to be inflammatory. Scans from December 2022 which no new or enlarging lung nodule. Mild patchy ground glass opacity within lateral segment of right middle lobe likely inflammatory. Stable scarring within right upper lobe.Stable subcentimeter retroperitoneal lymph nodes. Pt had COVID around this time which can explain these changes.MRI brain at this time showed an excellent response. CT scans done in September 2023 shows excellent response- CR.  Scans from Dec 18 shows continued CR based on my review. Also, scan from March which has not been reported as yet seems to show continued CR based on my independent review.  Recent MR 10/30/23, + increase in left cerebellum lesion  now 6 mm (16:53), previously 3 mm sen by Dr. Boudreaux s/p mask gammaknife 11/27/23. Scans from Jan showed excellent response.  Continues on tagrisso 160 mg daily, tolerating well Of note, pt has microcytosis with mild anemia. He has a hx of thalassemia minor. Plt count slightly low and PS shows giant platelets. pt likely has chronic ITP which is commonly associated with lupus anticoagulant. He has not needed any treatment for this since his plt count is normal. Elevated PTT- sec to lupus anticoagulant. pt does not have a hematologist. Referral to Dr. Art pending.  Educated on proper care of nails and skin surrounding nails. Informed to soak hands and feet in one-to-one ratio of water to vinegar. Instructed to follow up with podiatry to manage left 2nd toe paronychia and other nail changes. Labs today including CBC, CMP, mag (for muscle cramps), CEA (previously elevated and now decreased to normal range), and INR (for coumadin monitoring- this has been controlled perfectly in the 2.5-3 range) All questions answered. Firm rt supraclav LN- Bx consistent with adeno ca NGS reveals a more complex pattern, concerning for Tagrisso resistance  Also, PET/CT shows diffuse disease Recommend adding Carbo AUC 5, pemetrexed 500 mg/m2 based on FLAURA-2. He started this treatment June 2024  Plan:  -Continue C3 carbo/alimta today. Continue Tagrisso  -Will obtain CT imaging  -Alternate options of Rybrevant+chemo (STEPHANIE regimen)   -B12 and folate supplementation  -Acneiform rash: Will refer to dermatology  OV with next treatment or sooner if needed

## 2024-07-23 NOTE — PHYSICAL EXAM
[Restricted in physically strenuous activity but ambulatory and able to carry out work of a light or sedentary nature] : Status 1- Restricted in physically strenuous activity but ambulatory and able to carry out work of a light or sedentary nature, e.g., light house work, office work [Normal] : affect appropriate [de-identified] : rt supracalv adenopathy [de-identified] : acneiform rash on bilateral upper and lower extremities

## 2024-07-25 NOTE — PHYSICAL EXAM
[General Appearance - Well Developed] : well developed [Exaggerated Use Of Accessory Muscles For Inspiration] : no accessory muscle use [Oriented To Time, Place, And Person] : oriented to person, place, and time [de-identified] : seen through  TELEHEALTH visit exam limited

## 2024-07-25 NOTE — HISTORY OF PRESENT ILLNESS
[FreeTextEntry1] : RT hx: GKRS 20Gy over 1 Fx to cerebellum  11/27/2023  ONCOLOGY HISTORY Mr. Mclaughlin presents with h/o sarcoid and possible lupus who has been monitoring pulmonary nodules and mediastinal adenopathy since 12/2002. His onc history began when he started having allergy-like symptoms (cough) in February 2021. He saw his PCP, Dr. Mo- referred to Dr. Trujillo after he was noted to have an abnormal chest CAT scan. He was referred for PET/CT which was done on 5/23 which showed FDG avid right upper lobe masslike consolidation, considerably increased in size and coalesced with adjacent right upper lobe nodules and new FDG avid supraclavicular, mediastinal and hilar lymphadenopathy, as compared to CT chest January 13, 2021 concerning for malignancy although progression of sarcoidosis is also a consideration.  Right SCV node biopsy 6/4/21 demonstrated EGFR+ metastatic adenocarcinoma of pulmonary origin, PD-L1 TPS < 1%. He consulted with Dr. Hart 6/10 and Dr. Weldon 6/18 for evaluation for definitive chemoradiation.  However, brain MRI 6/18/21 demonstrated the following: 6 x 5 mm enhancing parenchymal lesion with surrounding edema in the left parietal lobe (). 4 x 4 mm enhancing parenchymal lesion with surrounding edema in the right frontal pericardium region (12-99).  6 x 4 mm enhancing parenchymal lesion with surrounding edema in the right superior cerebellar hemisphere (12-54).  2 mm enhancing parenchymal lesion with surrounding edema in the left cerebellar hemisphere (12-40).  At the time of initial consult on 7/1/2021 he felt well. Denied headaches, focal weakness, nausea, vomiting. Started taking Tagrisso 80mg in late june and was tolerating it well. Follows with Dr. Magaña of neurology  10/5/2021- Mr. Mclaughlin presents today for follow up. Seen through TELEHEALTH  MRI brain done 9/1/2021 showed Previously noted lesions involving the posterior fossa and supratentorial region are either no longer seen or decrease in size. This is likely compatible with response to therapy. Continue close interval follow up is recommended.  CT CAP 9/1/2021 showed Right upper lobe lung mass has significantly decreased in size since 5/28/2021. Overall he is feeling well. He denies headaches, nausea, vomiting, focal weakness. having some skin issues on his toes while on tagrisso. joint pain has been better recently.   12/01/21 MRI Head IMPRESSION:  Previously noted residual neoplastic lesion in the LEFT parietal lobe to be stable measuring 2 mm. Previously noted metastatic lesion in the RIGHT caudate nucleus has resolved. The previously noted metastatic lesion in the medial RIGHT cerebellum now measures 3 mm. Incidental developmental venous anomaly seen in the RIGHT parietal lobe.  03/22/22: Pt presents for follow-up. MR Head (3/16/22) showing 2 mm lesion now seen in lateral LEFT cerebellum. Feeling well overall. Denies HA, numbness, dizziness, hearing/vision changes, cough, or chest pain. Remains active in everyday life. Continues on Tagrisso 80 mg with Dr. Hart with some GI upset, but overall tolerating well.   6/29/2022- Mr. Mclaughlin presents today for follow up.  MRI brain 6/27/2022 showed Multiple new metastatic lesions measuring approximately 3 to 4 mm predominantly at the gray-white junction in the BILATERAL frontal, parietal and occipital lobes as well as the LEFT caudate nucleus, RIGHT basal ganglia and BILATERAL cerebellum. No significant edema is noted. 6/1 body imaging with Dr. Hart was stable. continues on osimertinib. notes a flare in joint pain recently. following with rheumatology. denies headaches, nausea, vomiting, focal weakness, numbness, tingling.  8/31/2022: Mr. Mclaughlin presents for follow up today. Repeat MRI head completed on 8/25/2022. No official read yet, but overall looks stable/improved. He continues to follow with Dr. Hart and continues on osimertinib. Feeling well overall. Tolerating Tagrisso without complaint. Denies HA, N/V, dizziness, loss of balance, or changes to hearing/vision.  12/7/2022- Mr. Mclaughlin presents today for follow up. seen through telephone, for which he consents. MRI brain 12/2/2022 showed  Multiple punctate enhancing lesions when compared to 8/25/2022. For example, the left anterior insular region lesion is unchanged (12:87), and the right cerebellar lesion (12:67) is slightly more conspicuous. It is unclear whether this is due to differences in technique or progression of patient's underlying disease process. No new or enlarging lesions are identified.  CT CAP 9/12/2022 showed Since 6/1/2022: Stable right upper lobe scarring at site of primary neoplasm. Stable 1.2 cm lingular nodule which could be scarring/atelectasis however as it is larger compared to more remote studies, 3 month follow-up is recommended. New subcentimeter right lower lobe groundglass nodule which can be reassessed on follow-up. Unchanged numerous predominantly subcentimeter retroperitoneal/pelvic lymph nodes.  continues to follow with Dr. Hart.   feeling well overall today. no headaches, no nausea, no vomiting, no focal weakness, no numbness or tingling.   4/20/2023- Mr. Mclaughlin presents today for follow up. Continues to follow with Dr. Hart. continues on osimertinib 160 mg daily.   [facility] MRI 4/10/2023 showed IMPRESSION: No new or worsening metastases. Slightly decreased size of subcentimeter right cerebellar lesion. Stable subcentimeter left cerebellar lesion.  CT CAP 3/6/2023 showed Stable ill-defined linear opacity in the right upper lobe when compared to previous exam. Previously noted patchy groundglass opacity in the right middle lobe is no longer present and has resolved.  Today no neuro or respiratory symptoms. He has history of clotting disorder and continues on Coumadin, also has inflammatory autoimmune disorder, denies recent flares with joint pains. Over all feeling well.   VISIT dated 8/1/2023 Patient returns for routine f/u with images for review Denies any weakness, numbness,tingling, nausea, vomiting, headaches or other neurologic symptoms. No issues with speech or comprehension. Endorses painful sensation in digits and fatigue but this does not limit his participation in activity.  Continues to follow with Dr. Hart on Osimertinib 160mg Established care with Dr. Louis on medical Cannibis for neuropathy/arthralgias  MRI brain w w/o contrast 7/19/2023 IMPRESSION: 1. Redemonstrated nodules of enhancement in the bilateral cerebellar hemispheres, generally unchanged in size and appearance compared to prior imaging. 2. No evidence of new enhancing lesions in the brain parenchyma.  CT C/A/P 6/12/2023 IMPRESSION: No new disease in the chest, abdomen, or pelvis.  Visit dated 11/7/2023 Patient returns for routine follow up to include progress check and review of completed cranial images. Denies N/V, HA/unilateral extremity weakness/memory changes/gait disturbance/bowel/bladder dysfunction or other neurologic symptoms. No issues with speech or comprehension. Participates in activity w/o difficulty  Continues to follow with Dr. Hart on Osimertinib 160mg BID increased after noted d/t POD on cranial images 6/2022  MRI brain w w/o contrast 10/30/2023 IMPRESSION: Enhancing lesion in the left cerebellum is increased in size, now 6 mm (16:53), previously 3 mm. Enhancing lesion in the right cerebellum measures 2 mm (16:53), slightly decreased since prior exam. A 2 mm enhancing lesion in the right occipital lobe (16:98), new since prior exam. A 3 mm enhancing lesion in the left Meckel's cave (16:58) is unchanged since 11/19/2023 but new/increased in size since 6/18/2021. Please note that there is a typographical error in the report. A sentence in the body and impression of the report should read: A 3 mm enhancing lesion in the left Meckel[place] (16:58) is unchanged since 7/19/2023 but new/increased in size since 6/18/2021.  CT C/A/P 9/12/2023 IMPRESSION: No new disease in the chest, abdomen, or pelvis.  Visit dated 2/6/2024 Patient presents for routine follow-up and progress check with cranial images for review. Underwent GKRS to left cerebellum lesion which was noted for increased size in cranial images 10/2023. (GKRS 20GY over 1 Fx to cerebellum 11/27/2023) Reports overall doing no new neurological deficits.  Denies N/V, HA/unilateral extremity weakness/memory changes/gait disturbance/bowel/bladder dysfunction or other neurologic symptoms. No issues with speech or comprehension. Continues to follow with Dr. Hart on Tagrisso 160mg. but with ingrown nails for which he will be seeing podiatry.  MRI brain w w/o contrast 1/23/2024 Impression: Previously noted lesions has either decreased in size or no longer seen which is likely due to response to therapy. Continued close interval follow-up is recommended.  CT C/A/P 12/18/2023 IMPRESSION: No evidence of disease progression   VISIT DATED 5/8/2024 Patient presents for routine f/u and progress check with cranial images for review. He continues to be w/o any new focal deficits. Able to participate in most activities' w/o difficulty. He is overall doing well.  4/30/2024 underwent a US/Bx right supraclav LN + for malignant cells Metastatic adenocarcinoma, compatible with lung primary Saw hematologist Dr Art for elevated PTT with recs to continue Warfarin  Continues to follow with Dr. Hart on Tagrisso 160mg and is tolerating well. (increased June 2022)  MRI brain w w/o contrast 4/30/2024 IMPRESSION: Previously seen punctate focus of enhancement left cerebellar hemisphere has resolved. Punctate right occipital lesion is stable. 2 to 3 mm lesion anteromedial aspect of the right temporal lobe is stable from 1/23/2024 but new and/or more conspicuous from 10/30/2023. Small foci of enhancement within the bilateral cavernous sinuses appears stable.  CT C/A/P with contrast 3/19/2024 IMPRESSION: Few less than 0.4 cm nodules and thickening of the interlobular septa are noted in the right lower lobe. The finding is new when compared to previous exam. Exact etiology is unclear. One of the differential diagnostic consideration includes lymphangitic spread of cancer. CT scan of the chest was obtained on 3/19/2024. Bilateral supraclavicular adenopathy is noted. One of the larger lymph node is noted in the right supraclavicular region and measures approximately 2.3 x 1.7 cm.  VISIT DATED: 7/31/2024 Patient returns for routine f/u and progress check with cranial images for review. Since his last visit patient underwent biopsy of RT supraclav LN in lieu of PET finding in May 2024 Path consistent with adenocarcinoma with NSG revealing a more complex pattern concerning for Tagrisso resistance at which time it was decided to add Carbo/Pemetrexed June 2024 Continues to follow with Dr. Hart on Tagrisso 160mg and is tolerating well. (increased June 2022) with Carbo/Alimta Today  PETct skull to thigh 5/22/2024 IMPRESSION: 1.  Extensive hypermetabolic lymphadenopathy extending from the neck into the supraclavicular regions, consistent with malignant involvement. This also includes a posterior LEFT shoulder node. 2.  Uptake at the RIGHT perihilar region and air bronchograms in the RIGHT UPPER lobe consistent with residual, findings of lung cancer. Continued follow-up suggested to exclude residual metabolically reactive disease. 3.  Small, minimally avid RIGHT axillary node, nonspecific.  MRI brain w w/o contrast

## 2024-07-25 NOTE — REVIEW OF SYSTEMS
[Negative] : Heme/Lymph [Confused] : no confusion [Dizziness] : no dizziness [Difficulty Walking] : no difficulty walking [FreeTextEntry3] : denies [FreeTextEntry9] : minimal muscle aches [de-identified] : denies HAs or vision difficulties.

## 2024-07-30 ENCOUNTER — NON-APPOINTMENT (OUTPATIENT)
Age: 54
End: 2024-07-30

## 2024-07-31 ENCOUNTER — APPOINTMENT (OUTPATIENT)
Dept: RADIATION ONCOLOGY | Facility: CLINIC | Age: 54
End: 2024-07-31

## 2024-07-31 DIAGNOSIS — C34.90 MALIGNANT NEOPLASM OF UNSPECIFIED PART OF UNSPECIFIED BRONCHUS OR LUNG: ICD-10-CM

## 2024-08-07 ENCOUNTER — OUTPATIENT (OUTPATIENT)
Dept: OUTPATIENT SERVICES | Facility: HOSPITAL | Age: 54
LOS: 1 days | Discharge: HOME | End: 2024-08-07

## 2024-08-07 DIAGNOSIS — Z95.828 PRESENCE OF OTHER VASCULAR IMPLANTS AND GRAFTS: Chronic | ICD-10-CM

## 2024-08-07 DIAGNOSIS — D64.9 ANEMIA, UNSPECIFIED: ICD-10-CM

## 2024-08-07 DIAGNOSIS — Z98.890 OTHER SPECIFIED POSTPROCEDURAL STATES: Chronic | ICD-10-CM

## 2024-08-08 ENCOUNTER — RX RENEWAL (OUTPATIENT)
Age: 54
End: 2024-08-08

## 2024-08-12 ENCOUNTER — APPOINTMENT (OUTPATIENT)
Dept: CT IMAGING | Facility: CLINIC | Age: 54
End: 2024-08-12
Payer: COMMERCIAL

## 2024-08-12 PROCEDURE — 74177 CT ABD & PELVIS W/CONTRAST: CPT | Mod: 26

## 2024-08-12 PROCEDURE — 70491 CT SOFT TISSUE NECK W/DYE: CPT | Mod: 26

## 2024-08-12 PROCEDURE — 71260 CT THORAX DX C+: CPT | Mod: 26

## 2024-08-13 ENCOUNTER — APPOINTMENT (OUTPATIENT)
Dept: HEMATOLOGY ONCOLOGY | Facility: CLINIC | Age: 54
End: 2024-08-13
Payer: COMMERCIAL

## 2024-08-13 ENCOUNTER — RESULT REVIEW (OUTPATIENT)
Age: 54
End: 2024-08-13

## 2024-08-13 ENCOUNTER — APPOINTMENT (OUTPATIENT)
Dept: INFUSION THERAPY | Facility: HOSPITAL | Age: 54
End: 2024-08-13

## 2024-08-13 ENCOUNTER — APPOINTMENT (OUTPATIENT)
Dept: HEMATOLOGY ONCOLOGY | Facility: CLINIC | Age: 54
End: 2024-08-13

## 2024-08-13 ENCOUNTER — OUTPATIENT (OUTPATIENT)
Dept: OUTPATIENT SERVICES | Facility: HOSPITAL | Age: 54
LOS: 1 days | End: 2024-08-13
Payer: COMMERCIAL

## 2024-08-13 DIAGNOSIS — Z98.890 OTHER SPECIFIED POSTPROCEDURAL STATES: Chronic | ICD-10-CM

## 2024-08-13 DIAGNOSIS — D64.9 ANEMIA, UNSPECIFIED: ICD-10-CM

## 2024-08-13 DIAGNOSIS — C34.90 MALIGNANT NEOPLASM OF UNSPECIFIED PART OF UNSPECIFIED BRONCHUS OR LUNG: ICD-10-CM

## 2024-08-13 DIAGNOSIS — Z95.828 PRESENCE OF OTHER VASCULAR IMPLANTS AND GRAFTS: Chronic | ICD-10-CM

## 2024-08-13 DIAGNOSIS — C79.31 SECONDARY MALIGNANT NEOPLASM OF BRAIN: ICD-10-CM

## 2024-08-13 LAB
ALBUMIN SERPL ELPH-MCNC: 4.5 G/DL — SIGNIFICANT CHANGE UP (ref 3.3–5)
ALP SERPL-CCNC: 51 U/L — SIGNIFICANT CHANGE UP (ref 40–120)
ALT FLD-CCNC: 17 U/L — SIGNIFICANT CHANGE UP (ref 10–45)
ANION GAP SERPL CALC-SCNC: 15 MMOL/L — SIGNIFICANT CHANGE UP (ref 5–17)
ANISOCYTOSIS BLD QL: SIGNIFICANT CHANGE UP
AST SERPL-CCNC: 33 U/L — SIGNIFICANT CHANGE UP (ref 10–40)
BASOPHILS # BLD AUTO: 0.02 K/UL — SIGNIFICANT CHANGE UP (ref 0–0.2)
BASOPHILS NFR BLD AUTO: 0.9 % — SIGNIFICANT CHANGE UP (ref 0–2)
BILIRUB SERPL-MCNC: 0.3 MG/DL — SIGNIFICANT CHANGE UP (ref 0.2–1.2)
BUN SERPL-MCNC: 24 MG/DL — HIGH (ref 7–23)
CALCIUM SERPL-MCNC: 10.2 MG/DL — SIGNIFICANT CHANGE UP (ref 8.4–10.5)
CEA SERPL-MCNC: 1.9 NG/ML — SIGNIFICANT CHANGE UP (ref 0–3.8)
CHLORIDE SERPL-SCNC: 104 MMOL/L — SIGNIFICANT CHANGE UP (ref 96–108)
CO2 SERPL-SCNC: 22 MMOL/L — SIGNIFICANT CHANGE UP (ref 22–31)
CREAT SERPL-MCNC: 1.69 MG/DL — HIGH (ref 0.5–1.3)
DACRYOCYTES BLD QL SMEAR: SLIGHT — SIGNIFICANT CHANGE UP
EGFR: 49 ML/MIN/1.73M2 — LOW
ELLIPTOCYTES BLD QL SMEAR: SLIGHT — SIGNIFICANT CHANGE UP
EOSINOPHIL # BLD AUTO: 0 K/UL — SIGNIFICANT CHANGE UP (ref 0–0.5)
EOSINOPHIL NFR BLD AUTO: 0 % — SIGNIFICANT CHANGE UP (ref 0–6)
GLUCOSE SERPL-MCNC: 100 MG/DL — HIGH (ref 70–99)
HCT VFR BLD CALC: 22 % — LOW (ref 39–50)
HGB BLD-MCNC: 7.2 G/DL — LOW (ref 13–17)
HYPOCHROMIA BLD QL: SLIGHT — SIGNIFICANT CHANGE UP
IMM GRANULOCYTES NFR BLD AUTO: 4.6 % — HIGH (ref 0–0.9)
INR BLD: 1.85 RATIO — HIGH (ref 0.85–1.18)
LYMPHOCYTES # BLD AUTO: 0.6 K/UL — LOW (ref 1–3.3)
LYMPHOCYTES # BLD AUTO: 27.4 % — SIGNIFICANT CHANGE UP (ref 13–44)
MCHC RBC-ENTMCNC: 20.7 PG — LOW (ref 27–34)
MCHC RBC-ENTMCNC: 32.7 G/DL — SIGNIFICANT CHANGE UP (ref 32–36)
MCV RBC AUTO: 63.2 FL — LOW (ref 80–100)
MICROCYTES BLD QL: SIGNIFICANT CHANGE UP
MONOCYTES # BLD AUTO: 0.37 K/UL — SIGNIFICANT CHANGE UP (ref 0–0.9)
MONOCYTES NFR BLD AUTO: 16.9 % — HIGH (ref 2–14)
NEUTROPHILS # BLD AUTO: 1.1 K/UL — LOW (ref 1.8–7.4)
NEUTROPHILS NFR BLD AUTO: 50.2 % — SIGNIFICANT CHANGE UP (ref 43–77)
NRBC # BLD: 0 /100 WBCS — SIGNIFICANT CHANGE UP (ref 0–0)
PLAT MORPH BLD: NORMAL — SIGNIFICANT CHANGE UP
PLATELET # BLD AUTO: 91 K/UL — LOW (ref 150–400)
POIKILOCYTOSIS BLD QL AUTO: SLIGHT — SIGNIFICANT CHANGE UP
POTASSIUM SERPL-MCNC: 4.2 MMOL/L — SIGNIFICANT CHANGE UP (ref 3.5–5.3)
POTASSIUM SERPL-SCNC: 4.2 MMOL/L — SIGNIFICANT CHANGE UP (ref 3.5–5.3)
PROT SERPL-MCNC: 8.2 G/DL — SIGNIFICANT CHANGE UP (ref 6–8.3)
PROTHROM AB SERPL-ACNC: 20.5 SEC — HIGH (ref 9.5–13)
RBC # BLD: 3.48 M/UL — LOW (ref 4.2–5.8)
RBC # FLD: 18.6 % — HIGH (ref 10.3–14.5)
RBC BLD AUTO: ABNORMAL
SCHISTOCYTES BLD QL AUTO: SLIGHT — SIGNIFICANT CHANGE UP
SODIUM SERPL-SCNC: 141 MMOL/L — SIGNIFICANT CHANGE UP (ref 135–145)
TARGETS BLD QL SMEAR: SLIGHT — SIGNIFICANT CHANGE UP
TSH SERPL-MCNC: 0.96 UIU/ML — SIGNIFICANT CHANGE UP (ref 0.27–4.2)
WBC # BLD: 2.19 K/UL — LOW (ref 3.8–10.5)
WBC # FLD AUTO: 2.19 K/UL — LOW (ref 3.8–10.5)

## 2024-08-13 PROCEDURE — 99214 OFFICE O/P EST MOD 30 MIN: CPT

## 2024-08-13 NOTE — PHYSICAL EXAM
[Restricted in physically strenuous activity but ambulatory and able to carry out work of a light or sedentary nature] : Status 1- Restricted in physically strenuous activity but ambulatory and able to carry out work of a light or sedentary nature, e.g., light house work, office work [Normal] : affect appropriate [de-identified] : rt supracalv adenopathy improved [de-identified] : acneiform rash on bilateral upper and lower extremities

## 2024-08-13 NOTE — HISTORY OF PRESENT ILLNESS
[Disease: _____________________] : Disease: [unfilled] [T: ___] : T[unfilled] [N: ___] : N[unfilled] [M: ___] : M[unfilled] [AJCC Stage: ____] : AJCC Stage: [unfilled] [de-identified] : Mr. Mclaughlin with hx of sarcoidosis is a pleasant 52-year-old male who started having allergy-like symptoms (cough) 5 months ago. He saw his PCP, Dr. Mo- referred to Dr. Trujillo after he was noted to have an abnormal chest CAT scan. He was referred for PET/CT which was done on 5/23 which showed  FDG avid right upper lobe masslike consolidation, considerably increased in size and coalesced with adjacent right upper lobe nodules and new FDG avid supraclavicular, mediastinal and hilar lymphadenopathy, as compared to CT chest January 13, 2021 concerning for malignancy although progression of sarcoidosis is also a consideration. rt supra clav bx was c/w lung adeno ca.   Of note, the patient has a complex medical history having had a diagnosis of sarcoid made in 2002 via a mediastinoscopy. At that time he was noted to have several pulmonary nodules but no treatment was required. 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 He was started on anticoagulation and a IVC filter was placed. He is also suspected of having lupus for which she is on hydroxychloroquine. He has a history of bronchial reactivity but is currently not on any inhaled bronchodilators.  He is a lifelong nonsmoker without occupational exposures. Other than a dry cough, he feels well. He denies any fever or, chest discomfort, dyspnea or peripheral edema.   6/22/21: Here for follow up. No complaints.    7/14/21: Patient seen for routine follow-up.  Patient is currently getting treatment for lupus and remains on hydrochorloquine and coumadin for anti-Cardiolipin antibod. Patient verbalized pain in the left foot that radiated from the small toe to the sole of feet, he takes advil with satisfactory pain control.  Patient also verbalized joint pain  that is related to lupus flare up.  Pt recently started on Tagrisso 80mg daily which he is tolerating well. Denies rash/ diarrhea. Pt met with Dr. Boudreaux for consideration of GK to brain mets.   9/3/21: Facial rash, gained weight, migrating joint pains better after starting steroids (prednisone 5 mg daily). Has had CT scans and MRI yesterday.   10/19/21: Feeling well. Mild facial rash, gained more weight. Restarted hydroxychloroquine for lupus with worsening joint pain in his wrists and fingers;  continues coumadin; plans f/u with his rheumatologist. Reports toe infection, on abx as per his podiatrist with improvement. Has f/u MRI Brain on 12/1.   12/7/21: Since August has been having toenail infection of R and L 1st and 2nd toes. Infection is painful, non pruritic. Has seen podiatrist who resected some of the toenails/skin. Using topical antibiotics with some improvement.   3/10/22: doing well. no new symptoms to report. Has some nail changes but all other AEs have improved. He does have episodic worsening of joint pains. Has gained some weight.   6/9/22: had a recent ER visit for acute swelling of rt knee and ankle- possibly related to occult trauma. Of note, pt is on Coumadin for VTE with optimal maintenance of INR. He has no other symptoms   9/15/22: Pt seem today for follow up. Continues on Tagrisso. Noted to have inflammation of the skin around nails and toe nails. Otherwise not having any additional AEs. Had CT scans which showed stable right upper lobe scarring at site of primary neoplasm. Stable 1.2 cm lingular nodule which could be scarring/atelectasis however as it is larger compared to more remote studies, 3 month follow-up is recommended. New subcentimeter right lower lobe groundglass nodule which can be reassessed on follow-up. Unchanged numerous predominantly subcentimeter retroperitoneal/pelvic lymph nodes.  12/15/22: Here for follow up.Continues on Tagrisso at 160 mg. Feet improved. cracked skin around finger nails. no diarrhea. Taking coumadin   6/15/23: Patient seen in clinic to follow up on his Osimertinib therapy. Patient is experiencing some nail toxicity on and off and muscle cramps, which he is using tonic water for, Denies diarrhea. He has fatigue. He notes that the texture of his scalp hair is different, but he is not bothered by it. He has no mucositis. Pt denies any neurological symptoms.   9/20/23: Skin, nail changes-was really bad, now subsided. Also needed abx as prescribed by podiatrist. Continues on Tagrisso at 160 mg  12/22/23: Patient accompanied by wife, here for follow up visit s/p mask gammaknife RT to cerebellar met 11/27/23, continues on tagrisso and has no complaints.   3/26/24: Pt recently had rt upper quadrant pain, went o ER a month ago and was noted to have gall stones. Was sent home after pain management. He was referred to the surgeon. There was a delay in surgery duen to elevated PTT. I received a call at that time and as documented, this was deemed to be sec to lupus anticoagulant.   6/6/24: feeling well. No new symptoms. Had PET/CT 1.  Extensive hypermetabolic lymphadenopathy extending from the neck into the supraclavicular regions, consistent with malignant involvement. This also includes a posterior LEFT shoulder node. 2.  Uptake at the RIGHT perihilar region and air bronchograms in the RIGHT UPPER lobe consistent with residual, findings of lung cancer. Continued follow-up suggested to exclude residual metabolically reactive disease. 3.  Small, minimally avid RIGHT axillary node, nonspecific.  7/2/24: Patient seen today for follow up while receiving treatment with carbo/alimta. He started this treatment on June 11th. He reports that after his first cycle he experienced some discomfort in his right upper thorax area, no rash indentified. He also experienced fatigue and felt he had a low grade temp 3 days following infusion. He feels his LAD improved since starting the treatment. He denies N/V/D.   7/23/24: Patient seen today for follow up while receiving C3 carbo/alimta. He continues on Tagrisso. He reports that following his last treatment he felt more fatigued. Did not experience thorax discomfort or fever. Notes rash on bilateral upper and lower extremities. He also has occasional hiccups resolved with reglan. His appetite is good and denies N/V/D, constipation. Breathing is stable. Remains active, golfs and continues to work.   8/13/24: Patient seen today for follow up in treatment room accompanied by his wife. Of note,  his hgb today is 7.2. We discussed necessity of 2UPRBCs transfusion however he is moving his kids into college [place] of [place] over the next two weeks and is leaving tomorrow so he will receivie 1U PRBC today. He and his wife report that following the third cycle of chemo, he felt very weak and tired and fatigued and was unable to participate in his normal activities. He continues to have acneiform rash on his upper and lower extremities. He also has dysgeusia. Denies hematuria, hemoptysis, melena.    FM NGS on Ln bxEGFR L858R FGFR2 K659E KRAS amplification BRAF [id number] - subclonal NKX2-1 amplification TP53 [id number]*4 [de-identified] : adeno ca

## 2024-08-13 NOTE — ASSESSMENT
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 MRI at baseline shows four subcentimeter parenchymal enhancing lesions in the cerebral and cerebellar hemispheres as detailed in the body the report. Findings were suspicious for the presence of intracranial metastases. Started Osimertinib in June 2021. 2 month scan on osi with excellent response in the CNS as well as lungs. CT C/A/P 12/02/21 with decreased RUL mass size. Scans from March 2022, reviewed personally- completely stable findings- sustained NJ. Brain MRI read pending ANNALISE in December 2021. Scans done on 6/1/22 reviewed independently- sustained NJ noted. Stable 1.1 cm right upper lobe nodule along a bandlike opacity. Stable mild tree-in-bud opacity within the lingula. The lungs are otherwise clear. No new or enlarging nodule. Numerous subcentimeter retroperitoneal lymph nodes are unchanged. Previously mentioned enlarged external iliac lymph nodes are not imaged on this exam. US doppler of LE which showed chronic DVT in rt LE. While systemic scans were stable as noted above, brain MRI showed POD. Therefore, decision was made to increase osimertinib dose to BID (160 mg) since this dosing is more effective in CNS/leptomeningeal disease. Scans from September 2022 reviewed and showed stable right upper lobe scarring, stable 1.2 cm lingular nodule which could be scarring/atelectasis. Unchanged retroperitoneal/pelvic lymph nodes.New subcentimeter right lower lobe ground glass nodule which was thought to be inflammatory. Scans from December 2022 which no new or enlarging lung nodule. Mild patchy ground glass opacity within lateral segment of right middle lobe likely inflammatory. Stable scarring within right upper lobe.Stable subcentimeter retroperitoneal lymph nodes. Pt had COVID around this time which can explain these changes.MRI brain at this time showed an excellent response. CT scans done in September 2023 shows excellent response- CR.  Scans from Dec 18 shows continued CR based on my review. Also, scan from March which has not been reported as yet seems to show continued CR based on my independent review.  Recent MR 10/30/23, + increase in left cerebellum lesion  now 6 mm (16:53), previously 3 mm sen by Dr. Boudreaux s/p mask gammaknife 11/27/23. Scans from Jan showed excellent response.  Continues on tagrisso 160 mg daily, tolerating well Of note, pt has microcytosis with mild anemia. He has a hx of thalassemia minor. Plt count slightly low and PS shows giant platelets. pt likely has chronic ITP which is commonly associated with lupus anticoagulant. He has not needed any treatment for this since his plt count is normal. Elevated PTT- sec to lupus anticoagulant. pt does not have a hematologist. Referral to Dr. Art pending.  Educated on proper care of nails and skin surrounding nails. Informed to soak hands and feet in one-to-one ratio of water to vinegar. Instructed to follow up with podiatry to manage left 2nd toe paronychia and other nail changes. Labs today including CBC, CMP, mag (for muscle cramps), CEA (previously elevated and now decreased to normal range), and INR (for coumadin monitoring- this has been controlled perfectly in the 2.5-3 range) All questions answered. Firm rt supraclav LN- Bx consistent with adeno ca NGS reveals a more complex pattern, concerning for Tagrisso resistance  Also, PET/CT shows diffuse disease Recommend adding Carbo AUC 5, pemetrexed 500 mg/m2 based on FLAURA-2. He started this treatment June 2024  Plan:  -Continue C4 carbo/alimta today. After today he will continue on Alimta q3 weeks along with Tagrisso  -CT imaging was done yesterday and result is pending  -Anemia is likely due to chemo. Will transfuse 1U PRBC today  -Alternate options of Rybrevant+chemo (STEPHANIE regimen)   -B12 and folate supplementation  -Acneiform rash: Will refer to dermatology ASAP OV with next treatment or sooner if needed [place] slip was provided to patient to schedule additional treatment and OV

## 2024-08-13 NOTE — REVIEW OF SYSTEMS
[Skin Rash] : skin rash [Negative] : Allergic/Immunologic [Fever] : no fever [Night Sweats] : no night sweats [Cough] : no cough [FreeTextEntry7] : as above

## 2024-08-14 DIAGNOSIS — Z51.11 ENCOUNTER FOR ANTINEOPLASTIC CHEMOTHERAPY: ICD-10-CM

## 2024-08-14 DIAGNOSIS — C34.90 MALIGNANT NEOPLASM OF UNSPECIFIED PART OF UNSPECIFIED BRONCHUS OR LUNG: ICD-10-CM

## 2024-08-14 DIAGNOSIS — R11.2 NAUSEA WITH VOMITING, UNSPECIFIED: ICD-10-CM

## 2024-08-14 NOTE — HISTORY OF PRESENT ILLNESS
[FreeTextEntry1] : RT hx: GKRS 20Gy over 1 Fx to cerebellum  11/27/2023  ONCOLOGY HISTORY Mr. Mclaughlin presents with h/o sarcoid and possible lupus who has been monitoring pulmonary nodules and mediastinal adenopathy since 12/2002. His onc history began when he started having allergy-like symptoms (cough) in February 2021. He saw his PCP, Dr. Mo- referred to Dr. Trujillo after he was noted to have an abnormal chest CAT scan. He was referred for PET/CT which was done on 5/23 which showed FDG avid right upper lobe masslike consolidation, considerably increased in size and coalesced with adjacent right upper lobe nodules and new FDG avid supraclavicular, mediastinal and hilar lymphadenopathy, as compared to CT chest January 13, 2021 concerning for malignancy although progression of sarcoidosis is also a consideration.  Right SCV node biopsy 6/4/21 demonstrated EGFR+ metastatic adenocarcinoma of pulmonary origin, PD-L1 TPS < 1%. He consulted with Dr. Hart 6/10 and Dr. Weldon 6/18 for evaluation for definitive chemoradiation.  However, brain MRI 6/18/21 demonstrated the following: 6 x 5 mm enhancing parenchymal lesion with surrounding edema in the left parietal lobe (). 4 x 4 mm enhancing parenchymal lesion with surrounding edema in the right frontal pericardium region (12-99).  6 x 4 mm enhancing parenchymal lesion with surrounding edema in the right superior cerebellar hemisphere (12-54).  2 mm enhancing parenchymal lesion with surrounding edema in the left cerebellar hemisphere (12-40).  At the time of initial consult on 7/1/2021 he felt well. Denied headaches, focal weakness, nausea, vomiting. Started taking Tagrisso 80mg in late june and was tolerating it well. Follows with Dr. Magaña of neurology  10/5/2021- Mr. Mclaughlin presents today for follow up. Seen through TELEHEALTH  MRI brain done 9/1/2021 showed Previously noted lesions involving the posterior fossa and supratentorial region are either no longer seen or decrease in size. This is likely compatible with response to therapy. Continue close interval follow up is recommended.  CT CAP 9/1/2021 showed Right upper lobe lung mass has significantly decreased in size since 5/28/2021. Overall he is feeling well. He denies headaches, nausea, vomiting, focal weakness. having some skin issues on his toes while on tagrisso. joint pain has been better recently.   12/01/21 MRI Head IMPRESSION:  Previously noted residual neoplastic lesion in the LEFT parietal lobe to be stable measuring 2 mm. Previously noted metastatic lesion in the RIGHT caudate nucleus has resolved. The previously noted metastatic lesion in the medial RIGHT cerebellum now measures 3 mm. Incidental developmental venous anomaly seen in the RIGHT parietal lobe.  03/22/22: Pt presents for follow-up. MR Head (3/16/22) showing 2 mm lesion now seen in lateral LEFT cerebellum. Feeling well overall. Denies HA, numbness, dizziness, hearing/vision changes, cough, or chest pain. Remains active in everyday life. Continues on Tagrisso 80 mg with Dr. Hart with some GI upset, but overall tolerating well.   6/29/2022- Mr. Mclaughlin presents today for follow up.  MRI brain 6/27/2022 showed Multiple new metastatic lesions measuring approximately 3 to 4 mm predominantly at the gray-white junction in the BILATERAL frontal, parietal and occipital lobes as well as the LEFT caudate nucleus, RIGHT basal ganglia and BILATERAL cerebellum. No significant edema is noted. 6/1 body imaging with Dr. Hart was stable. continues on osimertinib. notes a flare in joint pain recently. following with rheumatology. denies headaches, nausea, vomiting, focal weakness, numbness, tingling.  8/31/2022: Mr. Mclaughlin presents for follow up today. Repeat MRI head completed on 8/25/2022. No official read yet, but overall looks stable/improved. He continues to follow with Dr. Hart and continues on osimertinib. Feeling well overall. Tolerating Tagrisso without complaint. Denies HA, N/V, dizziness, loss of balance, or changes to hearing/vision.  12/7/2022- Mr. Mclaughlin presents today for follow up. seen through telephone, for which he consents. MRI brain 12/2/2022 showed  Multiple punctate enhancing lesions when compared to 8/25/2022. For example, the left anterior insular region lesion is unchanged (12:87), and the right cerebellar lesion (12:67) is slightly more conspicuous. It is unclear whether this is due to differences in technique or progression of patient's underlying disease process. No new or enlarging lesions are identified.  CT CAP 9/12/2022 showed Since 6/1/2022: Stable right upper lobe scarring at site of primary neoplasm. Stable 1.2 cm lingular nodule which could be scarring/atelectasis however as it is larger compared to more remote studies, 3 month follow-up is recommended. New subcentimeter right lower lobe groundglass nodule which can be reassessed on follow-up. Unchanged numerous predominantly subcentimeter retroperitoneal/pelvic lymph nodes.  continues to follow with Dr. Hart.   feeling well overall today. no headaches, no nausea, no vomiting, no focal weakness, no numbness or tingling.   4/20/2023- Mr. Mclaughlin presents today for follow up. Continues to follow with Dr. Hart. continues on osimertinib 160 mg daily.   [facility] MRI 4/10/2023 showed IMPRESSION: No new or worsening metastases. Slightly decreased size of subcentimeter right cerebellar lesion. Stable subcentimeter left cerebellar lesion.  CT CAP 3/6/2023 showed Stable ill-defined linear opacity in the right upper lobe when compared to previous exam. Previously noted patchy groundglass opacity in the right middle lobe is no longer present and has resolved.  Today no neuro or respiratory symptoms. He has history of clotting disorder and continues on Coumadin, also has inflammatory autoimmune disorder, denies recent flares with joint pains. Over all feeling well.   VISIT dated 8/1/2023 Patient returns for routine f/u with images for review Denies any weakness, numbness,tingling, nausea, vomiting, headaches or other neurologic symptoms. No issues with speech or comprehension. Endorses painful sensation in digits and fatigue but this does not limit his participation in activity.  Continues to follow with Dr. Hart on Osimertinib 160mg Established care with Dr. Louis on medical [place] for neuropathy/arthralgias  MRI brain w w/o contrast 7/19/2023 IMPRESSION: 1. Redemonstrated nodules of enhancement in the bilateral cerebellar hemispheres, generally unchanged in size and appearance compared to prior imaging. 2. No evidence of new enhancing lesions in the brain parenchyma.  CT C/A/P 6/12/2023 IMPRESSION: No new disease in the chest, abdomen, or pelvis.  Visit dated 11/7/2023 Patient returns for routine follow up to include progress check and review of completed cranial images. Denies N/V, HA/unilateral extremity weakness/memory changes/gait disturbance/bowel/bladder dysfunction or other neurologic symptoms. No issues with speech or comprehension. Participates in activity w/o difficulty  Continues to follow with Dr. Hart on Osimertinib 160mg BID increased after noted d/t POD on cranial images 6/2022  MRI brain w w/o contrast 10/30/2023 IMPRESSION: Enhancing lesion in the left cerebellum is increased in size, now 6 mm (16:53), previously 3 mm. Enhancing lesion in the right cerebellum measures 2 mm (16:53), slightly decreased since prior exam. A 2 mm enhancing lesion in the right occipital lobe (16:98), new since prior exam. A 3 mm enhancing lesion in the left Meckel's cave (16:58) is unchanged since 11/19/2023 but new/increased in size since 6/18/2021. Please note that there is a typographical error in the report. A sentence in the body and impression of the report should read: A 3 mm enhancing lesion in the left Meckel's cave (16:58) is unchanged since 7/19/2023 but new/increased in size since 6/18/2021.  CT C/A/P 9/12/2023 IMPRESSION: No new disease in the chest, abdomen, or pelvis.  Visit dated 2/6/2024 Patient presents for routine follow-up and progress check with cranial images for review. Underwent GKRS to left cerebellum lesion which was noted for increased size in cranial images 10/2023. (GKRS 20GY over 1 Fx to cerebellum 11/27/2023) Reports overall doing no new neurological deficits.  Denies N/V, HA/unilateral extremity weakness/memory changes/gait disturbance/bowel/bladder dysfunction or other neurologic symptoms. No issues with speech or comprehension. Continues to follow with Dr. Hart on Tagrisso 160mg. but with ingrown nails for which he will be seeing podiatry.  MRI brain w w/o contrast 1/23/2024 Impression: Previously noted lesions has either decreased in size or no longer seen which is likely due to response to therapy. Continued close interval follow-up is recommended.  CT C/A/P 12/18/2023 IMPRESSION: No evidence of disease progression   VISIT DATED 5/8/2024 Patient presents for routine f/u and progress check with cranial images for review. He continues to be w/o any new focal deficits. Able to participate in most activities' w/o difficulty. He is overall doing well.  4/30/2024 underwent a US/Bx right supraclav LN + for malignant cells Metastatic adenocarcinoma, compatible with lung primary Saw hematologist Dr Art for elevated PTT with recs to continue Warfarin  Continues to follow with Dr. Hart on Tagrisso 160mg and is tolerating well. (increased June 2022)  MRI brain w w/o contrast 4/30/2024 IMPRESSION: Previously seen punctate focus of enhancement left cerebellar hemisphere has resolved. Punctate right occipital lesion is stable. 2 to 3 mm lesion anteromedial aspect of the right temporal lobe is stable from 1/23/2024 but new and/or more conspicuous from 10/30/2023. Small foci of enhancement within the bilateral cavernous sinuses appears stable.  CT C/A/P with contrast 3/19/2024 IMPRESSION: Few less than 0.4 cm nodules and thickening of the interlobular septa are noted in the right lower lobe. The finding is new when compared to previous exam. Exact etiology is unclear. One of the differential diagnostic consideration includes lymphangitic spread of cancer. CT scan of the chest was obtained on 3/19/2024. Bilateral supraclavicular adenopathy is noted. One of the larger lymph node is noted in the right supraclavicular region and measures approximately 2.3 x 1.7 cm.  VISIT DATED: 8/20/2024 Patient returns for routine f/u and progress check with cranial images for review. Since his last visit patient underwent biopsy of RT supraclav LN in lieu of PET finding in May 2024 Path consistent with adenocarcinoma with NSG revealing a more complex pattern concerning for Tagrisso resistance at which time it was decided to add Carbo/Pemetrexed June 2024 Continues to follow with Dr. Hart on Tagrisso 160mg and is tolerating well. (increased June 2022) with Carbo/Alimta Today  PETct skull to thigh 5/22/2024 IMPRESSION: 1.  Extensive hypermetabolic lymphadenopathy extending from the neck into the supraclavicular regions, consistent with malignant involvement. This also includes a posterior LEFT shoulder node. 2.  Uptake at the RIGHT perihilar region and air bronchograms in the RIGHT UPPER lobe consistent with residual, findings of lung cancer. Continued follow-up suggested to exclude residual metabolically reactive disease. 3.  Small, minimally avid RIGHT axillary node, nonspecific.  CT C/A/P w w/o contrast 8/12/2024 IMPRESSION: Interval treatment response with improved multistation thoracic lymphadenopathy as well as decreased ill-defined right upper lobe nodule and regional lymphatic spread compared to May 2024 PET/CT.  MRI brain w w/o contrast 8/19/2024

## 2024-08-14 NOTE — REVIEW OF SYSTEMS
[Confused] : no confusion [Dizziness] : no dizziness [Difficulty Walking] : no difficulty walking [FreeTextEntry3] : denies [FreeTextEntry9] : minimal muscle aches [de-identified] : denies HAs or vision difficulties.

## 2024-08-14 NOTE — DATA REVIEWED
[FreeTextEntry1] : I have personally reviewed the most recent MRI and compared it with the previous scans.\par   No

## 2024-08-19 ENCOUNTER — APPOINTMENT (OUTPATIENT)
Dept: DERMATOLOGY | Facility: CLINIC | Age: 54
End: 2024-08-19

## 2024-08-19 ENCOUNTER — APPOINTMENT (OUTPATIENT)
Dept: MRI IMAGING | Facility: CLINIC | Age: 54
End: 2024-08-19

## 2024-08-19 NOTE — ED PROVIDER NOTE - WR INTERPRETATION 1
165.1 CXR negative - No infiltrates, No consolidation, No atelectasis seenCXR negative - No CHF, No cardiomegaly, No pleural effusions

## 2024-08-20 ENCOUNTER — NON-APPOINTMENT (OUTPATIENT)
Age: 54
End: 2024-08-20

## 2024-08-20 ENCOUNTER — APPOINTMENT (OUTPATIENT)
Dept: RADIATION ONCOLOGY | Facility: CLINIC | Age: 54
End: 2024-08-20

## 2024-08-20 NOTE — REVIEW OF SYSTEMS
[Confused] : no confusion [Dizziness] : no dizziness [Difficulty Walking] : no difficulty walking [FreeTextEntry3] : denies [FreeTextEntry9] : minimal muscle aches [de-identified] : denies HAs or vision difficulties.

## 2024-08-22 ENCOUNTER — NON-APPOINTMENT (OUTPATIENT)
Age: 54
End: 2024-08-22

## 2024-08-23 ENCOUNTER — NON-APPOINTMENT (OUTPATIENT)
Age: 54
End: 2024-08-23

## 2024-08-24 ENCOUNTER — INPATIENT (INPATIENT)
Facility: HOSPITAL | Age: 54
LOS: 4 days | Discharge: HOME | DRG: 810 | End: 2024-08-29
Attending: FAMILY MEDICINE | Admitting: INTERNAL MEDICINE
Payer: COMMERCIAL

## 2024-08-24 VITALS
HEART RATE: 90 BPM | RESPIRATION RATE: 18 BRPM | SYSTOLIC BLOOD PRESSURE: 130 MMHG | HEIGHT: 69 IN | OXYGEN SATURATION: 99 % | TEMPERATURE: 99 F | DIASTOLIC BLOOD PRESSURE: 76 MMHG | WEIGHT: 179.9 LBS

## 2024-08-24 DIAGNOSIS — L98.9 DISORDER OF THE SKIN AND SUBCUTANEOUS TISSUE, UNSPECIFIED: ICD-10-CM

## 2024-08-24 DIAGNOSIS — Z86.711 PERSONAL HISTORY OF PULMONARY EMBOLISM: ICD-10-CM

## 2024-08-24 DIAGNOSIS — N17.9 ACUTE KIDNEY FAILURE, UNSPECIFIED: ICD-10-CM

## 2024-08-24 DIAGNOSIS — Z98.890 OTHER SPECIFIED POSTPROCEDURAL STATES: Chronic | ICD-10-CM

## 2024-08-24 DIAGNOSIS — D61.818 OTHER PANCYTOPENIA: ICD-10-CM

## 2024-08-24 DIAGNOSIS — Z95.828 PRESENCE OF OTHER VASCULAR IMPLANTS AND GRAFTS: Chronic | ICD-10-CM

## 2024-08-24 DIAGNOSIS — D70.9 NEUTROPENIA, UNSPECIFIED: ICD-10-CM

## 2024-08-24 DIAGNOSIS — Z29.9 ENCOUNTER FOR PROPHYLACTIC MEASURES, UNSPECIFIED: ICD-10-CM

## 2024-08-24 DIAGNOSIS — C34.90 MALIGNANT NEOPLASM OF UNSPECIFIED PART OF UNSPECIFIED BRONCHUS OR LUNG: ICD-10-CM

## 2024-08-24 LAB
ALBUMIN SERPL ELPH-MCNC: 3.5 G/DL — SIGNIFICANT CHANGE UP (ref 3.3–5)
ALP SERPL-CCNC: 42 U/L — SIGNIFICANT CHANGE UP (ref 40–120)
ALT FLD-CCNC: 24 U/L — SIGNIFICANT CHANGE UP (ref 12–78)
ANION GAP SERPL CALC-SCNC: 10 MMOL/L — SIGNIFICANT CHANGE UP (ref 5–17)
ANISOCYTOSIS BLD QL: SLIGHT — SIGNIFICANT CHANGE UP
APPEARANCE UR: CLEAR — SIGNIFICANT CHANGE UP
APTT BLD: 101.2 SEC — HIGH (ref 24.5–35.6)
AST SERPL-CCNC: 28 U/L — SIGNIFICANT CHANGE UP (ref 15–37)
BACTERIA # UR AUTO: ABNORMAL /HPF
BASOPHILS # BLD AUTO: SIGNIFICANT CHANGE UP (ref 0–0.2)
BASOPHILS NFR BLD AUTO: SIGNIFICANT CHANGE UP (ref 0–2)
BILIRUB SERPL-MCNC: 0.3 MG/DL — SIGNIFICANT CHANGE UP (ref 0.2–1.2)
BILIRUB UR-MCNC: NEGATIVE — SIGNIFICANT CHANGE UP
BUN SERPL-MCNC: 33 MG/DL — HIGH (ref 7–23)
CALCIUM SERPL-MCNC: 8.8 MG/DL — SIGNIFICANT CHANGE UP (ref 8.5–10.1)
CHLORIDE SERPL-SCNC: 104 MMOL/L — SIGNIFICANT CHANGE UP (ref 96–108)
CK SERPL-CCNC: 98 U/L — SIGNIFICANT CHANGE UP (ref 26–308)
CO2 SERPL-SCNC: 22 MMOL/L — SIGNIFICANT CHANGE UP (ref 22–31)
COLOR SPEC: YELLOW — SIGNIFICANT CHANGE UP
CREAT SERPL-MCNC: 2.5 MG/DL — HIGH (ref 0.5–1.3)
DACRYOCYTES BLD QL SMEAR: SLIGHT — SIGNIFICANT CHANGE UP
DIFF PNL FLD: NEGATIVE — SIGNIFICANT CHANGE UP
EGFR: 30 ML/MIN/1.73M2 — LOW
ELLIPTOCYTES BLD QL SMEAR: SLIGHT — SIGNIFICANT CHANGE UP
EOSINOPHIL # BLD AUTO: SIGNIFICANT CHANGE UP (ref 0–0.5)
EOSINOPHIL NFR BLD AUTO: SIGNIFICANT CHANGE UP (ref 0–6)
EPI CELLS # UR: PRESENT
GLUCOSE SERPL-MCNC: 123 MG/DL — HIGH (ref 70–99)
GLUCOSE UR QL: NEGATIVE MG/DL — SIGNIFICANT CHANGE UP
HCT VFR BLD CALC: 20.7 % — CRITICAL LOW (ref 39–50)
HGB BLD-MCNC: 7.1 G/DL — LOW (ref 13–17)
IMM GRANULOCYTES NFR BLD AUTO: SIGNIFICANT CHANGE UP (ref 0–0.9)
INR BLD: 3.9 RATIO — HIGH (ref 0.85–1.18)
KETONES UR-MCNC: NEGATIVE MG/DL — SIGNIFICANT CHANGE UP
LACTATE SERPL-SCNC: 2 MMOL/L — SIGNIFICANT CHANGE UP (ref 0.7–2)
LEUKOCYTE ESTERASE UR-ACNC: NEGATIVE — SIGNIFICANT CHANGE UP
LG PLATELETS BLD QL AUTO: SLIGHT — SIGNIFICANT CHANGE UP
LYMPHOCYTES # BLD AUTO: SIGNIFICANT CHANGE UP (ref 13–44)
LYMPHOCYTES # BLD AUTO: SIGNIFICANT CHANGE UP (ref 1–3.3)
MCHC RBC-ENTMCNC: 22.8 PG — LOW (ref 27–34)
MCHC RBC-ENTMCNC: 34.3 GM/DL — SIGNIFICANT CHANGE UP (ref 32–36)
MCV RBC AUTO: 66.6 FL — LOW (ref 80–100)
MICROCYTES BLD QL: SIGNIFICANT CHANGE UP
MONOCYTES # BLD AUTO: SIGNIFICANT CHANGE UP (ref 0–0.9)
MONOCYTES NFR BLD AUTO: SIGNIFICANT CHANGE UP (ref 2–14)
NEUTROPHILS # BLD AUTO: SIGNIFICANT CHANGE UP (ref 1.8–7.4)
NEUTROPHILS NFR BLD AUTO: SIGNIFICANT CHANGE UP (ref 43–77)
NITRITE UR-MCNC: NEGATIVE — SIGNIFICANT CHANGE UP
NRBC # BLD: 0 /100 WBCS — SIGNIFICANT CHANGE UP (ref 0–0)
NT-PROBNP SERPL-SCNC: 417 PG/ML — HIGH (ref 0–125)
PH UR: 6 — SIGNIFICANT CHANGE UP (ref 5–8)
PLAT MORPH BLD: NORMAL — SIGNIFICANT CHANGE UP
PLATELET # BLD AUTO: 14 K/UL — CRITICAL LOW (ref 150–400)
POIKILOCYTOSIS BLD QL AUTO: SLIGHT — SIGNIFICANT CHANGE UP
POLYCHROMASIA BLD QL SMEAR: SLIGHT — SIGNIFICANT CHANGE UP
POTASSIUM SERPL-MCNC: 3.6 MMOL/L — SIGNIFICANT CHANGE UP (ref 3.5–5.3)
POTASSIUM SERPL-SCNC: 3.6 MMOL/L — SIGNIFICANT CHANGE UP (ref 3.5–5.3)
PROT SERPL-MCNC: 7.6 G/DL — SIGNIFICANT CHANGE UP (ref 6–8.3)
PROT UR-MCNC: 30 MG/DL
PROTHROM AB SERPL-ACNC: 42.7 SEC — HIGH (ref 9.5–13)
RAPID RVP RESULT: SIGNIFICANT CHANGE UP
RBC # BLD: 3.11 M/UL — LOW (ref 4.2–5.8)
RBC # FLD: 23.4 % — HIGH (ref 10.3–14.5)
RBC BLD AUTO: ABNORMAL
RBC CASTS # UR COMP ASSIST: 1 /HPF — SIGNIFICANT CHANGE UP (ref 0–4)
SARS-COV-2 RNA SPEC QL NAA+PROBE: SIGNIFICANT CHANGE UP
SODIUM SERPL-SCNC: 136 MMOL/L — SIGNIFICANT CHANGE UP (ref 135–145)
SP GR SPEC: 1.01 — SIGNIFICANT CHANGE UP (ref 1–1.03)
SPHEROCYTES BLD QL SMEAR: SLIGHT — SIGNIFICANT CHANGE UP
TROPONIN I, HIGH SENSITIVITY RESULT: 14 NG/L — SIGNIFICANT CHANGE UP
UROBILINOGEN FLD QL: 0.2 MG/DL — SIGNIFICANT CHANGE UP (ref 0.2–1)
WBC # BLD: 0.54 K/UL — CRITICAL LOW (ref 3.8–10.5)
WBC # FLD AUTO: 0.54 K/UL — CRITICAL LOW (ref 3.8–10.5)
WBC UR QL: 3 /HPF — SIGNIFICANT CHANGE UP (ref 0–5)

## 2024-08-24 PROCEDURE — 99285 EMERGENCY DEPT VISIT HI MDM: CPT

## 2024-08-24 PROCEDURE — 99223 1ST HOSP IP/OBS HIGH 75: CPT | Mod: GC

## 2024-08-24 PROCEDURE — 71045 X-RAY EXAM CHEST 1 VIEW: CPT | Mod: 26

## 2024-08-24 RX ORDER — BACITRACIN 500 UNIT/G
1 OINTMENT (GRAM) TOPICAL DAILY
Refills: 0 | Status: DISCONTINUED | OUTPATIENT
Start: 2024-08-24 | End: 2024-08-29

## 2024-08-24 RX ORDER — CEFEPIME 2 G/1
2000 INJECTION, POWDER, FOR SOLUTION INTRAVENOUS EVERY 8 HOURS
Refills: 0 | Status: DISCONTINUED | OUTPATIENT
Start: 2024-08-24 | End: 2024-08-24

## 2024-08-24 RX ORDER — SODIUM CHLORIDE 9 MG/ML
1000 INJECTION INTRAMUSCULAR; INTRAVENOUS; SUBCUTANEOUS
Refills: 0 | Status: DISCONTINUED | OUTPATIENT
Start: 2024-08-24 | End: 2024-08-24

## 2024-08-24 RX ORDER — VANCOMYCIN/0.9 % SOD CHLORIDE 1.75G/25
1000 PLASTIC BAG, INJECTION (ML) INTRAVENOUS ONCE
Refills: 0 | Status: COMPLETED | OUTPATIENT
Start: 2024-08-24 | End: 2024-08-24

## 2024-08-24 RX ORDER — CEFEPIME 2 G/1
2000 INJECTION, POWDER, FOR SOLUTION INTRAVENOUS EVERY 12 HOURS
Refills: 0 | Status: DISCONTINUED | OUTPATIENT
Start: 2024-08-25 | End: 2024-08-28

## 2024-08-24 RX ORDER — FOLIC ACID 1 MG
1 TABLET ORAL DAILY
Refills: 0 | Status: DISCONTINUED | OUTPATIENT
Start: 2024-08-24 | End: 2024-08-29

## 2024-08-24 RX ORDER — METOCLOPRAMIDE HCL 5 MG
10 TABLET ORAL EVERY 6 HOURS
Refills: 0 | Status: DISCONTINUED | OUTPATIENT
Start: 2024-08-24 | End: 2024-08-29

## 2024-08-24 RX ORDER — SODIUM CHLORIDE 9 MG/ML
1000 INJECTION INTRAMUSCULAR; INTRAVENOUS; SUBCUTANEOUS
Refills: 0 | Status: COMPLETED | OUTPATIENT
Start: 2024-08-24 | End: 2024-08-24

## 2024-08-24 RX ORDER — ACETAMINOPHEN 325 MG/1
650 TABLET ORAL EVERY 6 HOURS
Refills: 0 | Status: DISCONTINUED | OUTPATIENT
Start: 2024-08-24 | End: 2024-08-29

## 2024-08-24 RX ORDER — CEFEPIME 2 G/1
2000 INJECTION, POWDER, FOR SOLUTION INTRAVENOUS ONCE
Refills: 0 | Status: COMPLETED | OUTPATIENT
Start: 2024-08-24 | End: 2024-08-24

## 2024-08-24 RX ORDER — FILGRASTIM 300 UG/.5ML
480 INJECTION, SOLUTION INTRAVENOUS; SUBCUTANEOUS DAILY
Refills: 0 | Status: DISCONTINUED | OUTPATIENT
Start: 2024-08-24 | End: 2024-08-29

## 2024-08-24 RX ADMIN — SODIUM CHLORIDE 1000 MILLILITER(S): 9 INJECTION INTRAMUSCULAR; INTRAVENOUS; SUBCUTANEOUS at 15:27

## 2024-08-24 RX ADMIN — Medication 250 MILLIGRAM(S): at 13:27

## 2024-08-24 RX ADMIN — FILGRASTIM 480 MICROGRAM(S): 300 INJECTION, SOLUTION INTRAVENOUS; SUBCUTANEOUS at 21:31

## 2024-08-24 RX ADMIN — SODIUM CHLORIDE 1000 MILLILITER(S): 9 INJECTION INTRAMUSCULAR; INTRAVENOUS; SUBCUTANEOUS at 12:04

## 2024-08-24 RX ADMIN — CEFEPIME 100 MILLIGRAM(S): 2 INJECTION, POWDER, FOR SOLUTION INTRAVENOUS at 23:37

## 2024-08-24 RX ADMIN — ACETAMINOPHEN 650 MILLIGRAM(S): 325 TABLET ORAL at 21:06

## 2024-08-24 RX ADMIN — Medication 100 MILLILITER(S): at 22:11

## 2024-08-24 RX ADMIN — CEFEPIME 100 MILLIGRAM(S): 2 INJECTION, POWDER, FOR SOLUTION INTRAVENOUS at 12:56

## 2024-08-24 RX ADMIN — ACETAMINOPHEN 650 MILLIGRAM(S): 325 TABLET ORAL at 22:12

## 2024-08-24 NOTE — ED PROVIDER NOTE - CLINICAL SUMMARY MEDICAL DECISION MAKING FREE TEXT BOX
Acute febrile illness, generalized weakness, currently on chemo.  Will check labs, UA, x-ray, IV fluids, RVP, reeval

## 2024-08-24 NOTE — H&P ADULT - NSICDXFAMILYHX_GEN_ALL_CORE_FT
FAMILY HISTORY:  Father  Still living? Unknown  FH: HTN (hypertension), Age at diagnosis: Age Unknown    Mother  Still living? Unknown  FH: thyroid disease, Age at diagnosis: Age Unknown    Sibling  Still living? Unknown  FH: thyroid disease, Age at diagnosis: Age Unknown

## 2024-08-24 NOTE — CONSULT NOTE ADULT - SUBJECTIVE AND OBJECTIVE BOX
All records reviewed.  hx from chart, pt,  LAUREANO Scott    HPI:  54 yo w hx Thalassemia minor, sarcoidosis, PE/DVT  post IVC filter on Coumadin, Lupus Anticoagulant w prolonged PTT, met adenoca of right lung w brain mets since (followed by Dr Rachna oYung and Rad Onc  Dr Boudreaux, Heme Dr Art. presented 2021 RUL lung mass w add nodule and med/hilar LADs, 2021 Rt supraclav LN bx met adenocarcinoma EGFR L858F mutation, also found w multi brain mets, started on Tagrisso, serial scans rel stable/MT, 10/2023 gamma RT knife to brain met for POD 24 added Carbo/Alimta to Tagrisso based on NGS after repeat bx right supracalv LN 2024)  Last chemo (C#4, planned last one) , cbc at that time wbc 2.19 hgb 7.2 mcv 63.2 plts 91 and also given 1U PRBC    Pt reports never given GCSF support, CBC each cycle seem worse  [place] and noted intermittent fever, day prior to adm [place], told Hgb 5.7 given 1u PRBC, bleed also stopped  Day of adm T 101.5, called his Onc and told to come to ER    ED COURSE:  Vitals: T 98.6   F , 90 HR  ,  /76 , 18 RR  , SpO2  99% on RA  Labs significant for: 0.54 wbc, hgb 7.1, plts 14, cr 2.5, egfr 30 INR 3.9 .2  UA 30protein neg Leuk, 3WBC 1RBC    Pt admits to incr bruise today, no nausea, vomit, SOB. Does have decr appetite w more cycle of chemo  Started on IV Cefepime, post Vanco          PAST MEDICAL & SURGICAL HISTORY:  Antiphospholipid syndrome      Pulmonary embolism      Lung cancer      Sarcoidosis      Thalassemia minor      Calculus of gallbladder without cholecystitis without obstruction      S/P IVC filter      S/P shoulder surgery          Review of System:  see above    MEDICATIONS  (STANDING):  filgrastim-sndz (ZARXIO) Injectable 480 MICROGram(s) SubCutaneous daily  folic acid 1 milliGRAM(s) Oral daily  sodium chloride 0.9%. 1000 milliLiter(s) (75 mL/Hr) IV Continuous <Continuous>    MEDICATIONS  (PRN):  metoclopramide 10 milliGRAM(s) Oral every 6 hours PRN for nausea      Allergies    Animal dander-Cat (Other)  No Known Drug Allergies    Intolerances        SOCIAL HISTORY:  denies active Etoh or tobacco    FAMILY HISTORY:  FH: HTN (hypertension) (Father)    FH: thyroid disease (Mother, Sibling)        Vital Signs Last 24 Hrs  T(C): 37 (24 Aug 2024 11:25), Max: 37 (24 Aug 2024 11:25)  T(F): 98.6 (24 Aug 2024 11:25), Max: 98.6 (24 Aug 2024 11:25)  HR: 90 (24 Aug 2024 11:25) (90 - 90)  BP: 130/76 (24 Aug 2024 11:25) (130/76 - 130/76)  BP(mean): --  RR: 18 (24 Aug 2024 11:25) (18 - 18)  SpO2: 99% (24 Aug 2024 11:25) (99% - 99%)    Parameters below as of 24 Aug 2024 11:25  Patient On (Oxygen Delivery Method): room air        PHYSICAL EXAM:      General: sitting up on ER stretcher, in no acute distress  Eyes:sclera anicteric, pupils equal and EOMI  ENMT:buccal mucosa moist  Neck:supple, trachea midline  Lungs:clear, no wheeze/rhonchi  Cardiovascular:regular rate and rhythm, S1 S2  Abdomen:soft, nontender, no organomegaly present, bowel sounds normal  Neurological:alert and oriented x3, Cranial Nerves II-XII grossly intact  Skin:no increased ecchymosis/petechiae/purpura  Lymph Nodes:no palpable cervical/supraclavicular lymph nodes enlargements  Extremities:no cyanosis/clubbing/edema        LABS:                        7.1    0.54  )-----------( 14       ( 24 Aug 2024 11:45 )             20.7     08-24 @ 11:45  WBC0.54  RBC3.11 Hgb7.1 Hct20.7  MCV66.6  Plts14  Auto NeutroSee note Band-- Auto LymphSee note Atypical Lymph-- Reactive Lymph-- Auto MonoSee note Auto EosSee note Auto BasoSee note              136  |  104  |  33<H>  ----------------------------<  123<H>  3.6   |  22  |  2.50<H>    Ca    8.8      24 Aug 2024 11:45    TPro  7.6  /  Alb  3.5  /  TBili  0.3  /  DBili  x   /  AST  28  /  ALT  24  /  AlkPhos  42      08 @ 11:45  PT42.7 INR3.90  OOS237.2    Urinalysis Basic - ( 24 Aug 2024 14:30 )    Color: Yellow / Appearance: Clear / S.011 / pH: x  Gluc: x / Ketone: Negative mg/dL  / Bili: Negative / Urobili: 0.2 mg/dL   Blood: x / Protein: 30 mg/dL / Nitrite: Negative   Leuk Esterase: Negative / RBC: 1 /HPF / WBC 3 /HPF   Sq Epi: x / Non Sq Epi: x / Bacteria: Occasional /HPF        PERIPHERAL BLOOD SMEAR REVIEW:      RADIOLOGY & ADDITIONAL STUDIES:

## 2024-08-24 NOTE — CONSULT NOTE ADULT - ASSESSMENT
JORDAN on CKD  HTN    -Baseline Creatinine  -JORDAN Likely 2/2   -Check Urine lytes  -Check UA JORDAN on CKD 2  Neutropenic Fever  Anemia  NSCLC    -Baseline Creatinine 1.2?  -JORDAN Likely multifactorial decreased EABV + Carboplatin chemo   -Check Urine lytes  -Check UA  -IVF  -No indication for dialysis at this time    d/w primary  d/w Heme

## 2024-08-24 NOTE — H&P ADULT - PROBLEM SELECTOR PLAN 3
Skin sore on sternum, previous biopsy point for sarcoidosis   - Topical neosporin   - dressing change to keep clean as needed Skin sore on sternum, previous biopsy point for sarcoidosis   - Topical neosporin   - dressing change to keep clean as needed  -ID consulted- Dr Reese

## 2024-08-24 NOTE — ED PROVIDER NOTE - IV ALTEPLASE ADMIN OUTSIDE HIDDEN
show

## 2024-08-24 NOTE — CONSULT NOTE ADULT - SUBJECTIVE AND OBJECTIVE BOX
Patient is a 53y old  Male who presents with a chief complaint of fever (24 Aug 2024 15:49)       HPI:  53-year-old male with pmh anticardiolipin antibody, lupus antibody, DVT/PE  on Coumadin, IVC filter, sarcoidosis, NSCLC presents with having some fever on and off over past 3 to 4 days. For his non-small cell lung cancer currently undergoing chemotherapy, had last chemo (round 3) on , during which he received 1 unit of blood due to low numbers. Denies any cough/URI.  No acute shortness of breath.  No acute chest pain.  Patient was seen in ER yesterday in [place] after having a nosebleed.  The nosebleed was controlled, but the patient had a hemoglobin of 5.5.  The patient received 1 unit of blood.  No further bleeding since that time.  The patient called his doctor today, was told to go to the ER for the fever. Patient had a fever of 101.5 this morning at home.Patient did have a negative RVP yesterday at the outside ER. No abdominal pain.  No vomiting or diarrhea.  No dysuria/hematuria. Complains of painful sore on chest which is new where he had a past biopsy for his sarcoidosis, now mostly resolved.   Has not seeen nephrologist on the past.  NO N/V/SOB.  He is urinating without issue.     PAST MEDICAL & SURGICAL HISTORY:  Antiphospholipid syndrome      Pulmonary embolism      Lung cancer      Sarcoidosis      Thalassemia minor      Calculus of gallbladder without cholecystitis without obstruction      S/P IVC filter      S/P shoulder surgery           FAMILY HISTORY:  FH: HTN (hypertension) (Father)    FH: thyroid disease (Mother, Sibling)    NC    Social History:Non smoker    MEDICATIONS  (STANDING):  filgrastim-sndz (ZARXIO) Injectable 480 MICROGram(s) SubCutaneous daily  folic acid 1 milliGRAM(s) Oral daily  sodium chloride 0.9%. 1000 milliLiter(s) (75 mL/Hr) IV Continuous <Continuous>    MEDICATIONS  (PRN):  metoclopramide 10 milliGRAM(s) Oral every 6 hours PRN for nausea   Meds reviewed    Allergies    Animal dander-Cat (Other)  No Known Drug Allergies    Intolerances         REVIEW OF SYSTEMS:    Review of Systems:   Constitutional: Denies fatigue  HEENT: Denies headaches and dizziness  Respiratory: denies SOB, cough, or wheezing  Cardiovascular: denies CP, palpitations  Gastrointestinal: Denies nausea, denies vomiting, diarrhea, constipation, abdominal pain, or bloody stools  Genitourinary: denies painful urination, increased frequency, urgency, or bloody urine  Skin: denies rashes or itching  Musculoskeletal: denies muscle aches, joint swelling  Neurologic: Denies generalized weakness, denies loss of sensation, numbness, or tingling      Vital Signs Last 24 Hrs  T(C): 37 (24 Aug 2024 11:25), Max: 37 (24 Aug 2024 11:25)  T(F): 98.6 (24 Aug 2024 11:25), Max: 98.6 (24 Aug 2024 11:25)  HR: 90 (24 Aug 2024 11:25) (90 - 90)  BP: 130/76 (24 Aug 2024 11:25) (130/76 - 130/76)  BP(mean): --  RR: 18 (24 Aug 2024 11:25) (18 - 18)  SpO2: 99% (24 Aug 2024 11:25) (99% - 99%)    Parameters below as of 24 Aug 2024 11:25  Patient On (Oxygen Delivery Method): room air      Daily Height in cm: 175.26 (24 Aug 2024 11:25)    Daily     PHYSICAL EXAM:    GENERAL: NAD  HEAD:  Atraumatic, Normocephalic  EYES: EOMI, conjunctiva and sclera clear  ENMT: No Drainage from nares, No drainage from ears  NERVOUS SYSTEM:  Awake and Alert  CHEST/LUNG: Clear to percussion bilaterally  EXTREMITIES:  No Edema  SKIN: No rashes No obvious ecchymosis      LABS:                        7.1    0.54  )-----------( 14       ( 24 Aug 2024 11:45 )             20.7     08-24    136  |  104  |  33<H>  ----------------------------<  123<H>  3.6   |  22  |  2.50<H>    Ca    8.8      24 Aug 2024 11:45    TPro  7.6  /  Alb  3.5  /  TBili  0.3  /  DBili  x   /  AST  28  /  ALT  24  /  AlkPhos  42  08-24    PT/INR - ( 24 Aug 2024 11:45 )   PT: 42.7 sec;   INR: 3.90 ratio         PTT - ( 24 Aug 2024 11:45 )  PTT:101.2 sec  Urinalysis Basic - ( 24 Aug 2024 14:30 )    Color: Yellow / Appearance: Clear / S.011 / pH: x  Gluc: x / Ketone: Negative mg/dL  / Bili: Negative / Urobili: 0.2 mg/dL   Blood: x / Protein: 30 mg/dL / Nitrite: Negative   Leuk Esterase: Negative / RBC: 1 /HPF / WBC 3 /HPF   Sq Epi: x / Non Sq Epi: x / Bacteria: Occasional /HPF              RADIOLOGY & ADDITIONAL TESTS:

## 2024-08-24 NOTE — H&P ADULT - PROBLEM SELECTOR PLAN 6
- on coumadin - on coumadin with elevated INR History of DVT with multiple PE ion 2002  - Supratherapeutic INR  -repeat INR tomorrow  -hold coumadin pending repeat INR results

## 2024-08-24 NOTE — ED PROVIDER NOTE - PROGRESS NOTE DETAILS
Discussed with patient, doing well, no acute changes at this time.  Discussed with patient family regarding all findings, need for admission.  Discussed with Dr. Jose Jauregui, will see patient to admit.

## 2024-08-24 NOTE — H&P ADULT - ASSESSMENT
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 For his non-small cell lung cancer currently undergoing chemotherapy, had last chemo (round 3) on August 13, during which he received 1 unit of blood due to low numbers. Denies any cough/URI.  No acute shortness of breath.  No acute chest pain.  Patient was seen in ER yesterday in [place] after having a nosebleed.  The nosebleed was controlled, but the patient had a hemoglobin of 5.5.  The patient received 1 unit of blood.  No further bleeding since that time.  The patient called his doctor today, was told to go to the ER for the fever. Patient had a fever of 101.5 this morning at home. 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

## 2024-08-24 NOTE — H&P ADULT - PROBLEM SELECTOR PLAN 4
History of DVT with multiple PE ion 2002  - continue home coumadin Cr 2.5 from baseline of 0.8 in the setting of recent chemo with carboplatin which is nephrotoxic  - monitor lytes and CMP Cr 2.5 from baseline of 0.8 in the setting of recent chemo with carboplatin which is nephrotoxic  - monitor lytes and CMP  -continue IVF  -Dr Fallon consulted

## 2024-08-24 NOTE — CONSULT NOTE ADULT - ASSESSMENT
54 yo w hx Thalassemia minor, sarcoidosis, PE/DVT 2002 post IVC filter on Coumadin, Lupus Anticoagulant w prolonged PTT, met adenoca of right lung w brain mets since 2021(followed by Dr Rachna Scott [place] and Rad Onc  Dr Boudreaux, Heme Dr Art. presented 2/2021 RUL lung mass w add nodule and med/hilar LADs, 6/2021 Rt supraclav LN bx met adenocarcinoma EGFR L858F mutation, also found w multi brain mets, started on Tagrisso, serial scans rel stable/AL, 10/2023 gamma RT knife to brain met for POD 6/11/24 added Carbo/Alimta to Tagrisso based on NGS after repeat bx right supracalv LN 4/2024)  Last chemo (C#4, planned last one) 8/13, cbc at that time wbc 2.19 hgb 7.2 mcv 63.2 plts 91 and also given 1U PRBC    Pt reports never given GCSF support, CBC each cycle seem worse  Traveled to [place] last 2 days and noted intermittent fever, day prior to adm [place], told Hgb 5.7 given 1u PRBC, bleed also stopped  Day of adm T 101.5, called his Onc and told to come to ER    ED COURSE:  Vitals: T 98.6   F , 90 HR  ,  /76 , 18 RR  , SpO2  99% on RA  Labs significant for: 0.54 wbc, hgb 7.1, plts 14, cr 2.5, egfr 30 INR 3.9 .2  UA 30protein neg Leuk, 3WBC 1RBC    Pt admits to incr bruise today, no nausea, vomit, SOB. Does have decr appetite w more cycle of chemo  Started on IV Cefepime, post Vanco  --------------------------  -met adenocarcinoma right lung w right supraclav and bran mets since 2021, under care Dr Briscoe, has been on Tagrisso, post gamma knife, post C#4 Tagrisso/Carbo/Alimta 8/13/24, adm w neutropenic fever, pancytopenia  -pancytopenia due to chemo effect, sofy usu in 7-10 days which correl w pt hx  -ordered Zarxio 480mcg sq qD  -follow serial CBC  -neutropenic and platelets precaution--no rectal temp, all food well cooked, strict hand-washinig  -ID consulted, continue broad spectrum support, follow cultures  -transfuse as clinically indicated. Also when Hgb <7, plts <10k    -Lupus Anticoagulant, hx PE/DVT post IVC filter and on coumadin  -supratherapeutic INR due to acute illness  -hold Coumadin for now, monitor serial INR and resume at therapeutic range. also precaution for signs of bleed griselda w pt sig thrombocytopenic at this time    -thalassemia minor-likely reason for low MCV and RBC higher than expective for degree of anemia. No specific intervention needed  -   54 yo w hx Thalassemia minor, sarcoidosis, PE/DVT 2002 post IVC filter on Coumadin, Lupus Anticoagulant w prolonged PTT, met adenoca of right lung w brain mets since 2021(followed by Dr Rachna Scott [place] and Rad Onc  Dr Boudreaux, Heme Dr Art. presented 2/2021 RUL lung mass w add nodule and med/hilar LADs, 6/2021 Rt supraclav LN bx met adenocarcinoma EGFR L858F mutation, also found w multi brain mets, started on Tagrisso, serial scans rel stable/VA, 10/2023 gamma RT knife to brain met for POD 6/11/24 added Carbo/Alimta to Tagrisso based on NGS after repeat bx right supracalv LN 4/2024)  Last chemo (C#4, planned last one) 8/13, cbc at that time wbc 2.19 hgb 7.2 mcv 63.2 plts 91 and also given 1U PRBC    Pt reports never given GCSF support, CBC each cycle seem worse  [place]ed to [place] last 2 days and noted intermittent fever, day prior to adm [place], told Hgb 5.7 given 1u PRBC, bleed also stopped  Day of adm T 101.5, called his Onc and told to come to ER    ED COURSE:  Vitals: T 98.6   F , 90 HR  ,  /76 , 18 RR  , SpO2  99% on RA  Labs significant for: 0.54 wbc, hgb 7.1, plts 14, cr 2.5, egfr 30 INR 3.9 .2  UA 30protein neg Leuk, 3WBC 1RBC    Pt admits to incr bruise today, no nausea, vomit, SOB. Does have decr appetite w more cycle of chemo  Started on IV Cefepime, post Vanco    discussed w pt  discussed w Medicine  thank you, will follow w you  --------------------------  -met adenocarcinoma right lung w right supraclav and bran mets since 2021, under care Dr Briscoe, has been on Tagrisso, post gamma knife, post C#4 Tagrisso/Carbo/Alimta 8/13/24, adm w neutropenic fever, pancytopenia  -pancytopenia due to chemo effect, sofy usu in 7-10 days which correl w pt hx  -ordered Zarxio 480mcg sq qD  -follow serial CBC  -neutropenic and platelets precaution--no rectal temp, all food well cooked, strict hand-washinig  -ID consulted, continue broad spectrum support, follow cultures  -transfuse as clinically indicated. Also when Hgb <7, plts <10k    -Lupus Anticoagulant, hx PE/DVT post IVC filter and on coumadin  -supratherapeutic INR due to acute illness  -hold Coumadin for now, monitor serial INR and resume at therapeutic range. also precaution for signs of bleed griselda w pt sig thrombocytopenic at this time    -thalassemia minor-likely reason for low MCV and RBC higher than expective for degree of anemia. No specific intervention needed  -   52 yo w hx Thalassemia minor, sarcoidosis, PE/DVT 2002 post IVC filter on Coumadin, Lupus Anticoagulant w prolonged PTT, met adenoca of right lung w brain mets since 2021(followed by Dr Rachna Scott [place] and Rad Onc  Dr Boudreaux, Heme Dr Art. presented 2/2021 RUL lung mass w add nodule and med/hilar LADs, 6/2021 Rt supraclav LN bx met adenocarcinoma EGFR L858F mutation, also found w multi brain mets, started on Tagrisso, serial scans rel stable/DE, 10/2023 gamma RT knife to brain met for POD 6/11/24 added Carbo/Alimta to Tagrisso based on NGS after repeat bx right supracalv LN 4/2024)  Last chemo (C#4, planned last one) 8/13, cbc at that time wbc 2.19 hgb 7.2 mcv 63.2 plts 91 and also given 1U PRBC    Pt reports never given GCSF support, CBC each cycle seem worse  [place]ed [place] last 2 days and noted intermittent fever, day prior to adm [place], told Hgb 5.7 given 1u PRBC, bleed also stopped  Day of adm T 101.5, called his Onc and told to come to ER    ED COURSE:  Vitals: T 98.6   F , 90 HR  ,  /76 , 18 RR  , SpO2  99% on RA  Labs significant for: 0.54 wbc, hgb 7.1, plts 14, cr 2.5, egfr 30 INR 3.9 .2  UA 30protein neg Leuk, 3WBC 1RBC    Pt admits to incr bruise today, no nausea, vomit, SOB. Does have decr appetite w more cycle of chemo  Started on IV Cefepime, post Vanco    --------------------------  -met adenocarcinoma right lung w right supraclav and bran mets since 2021, under care Dr Briscoe, has been on Tagrisso, post gamma knife, post C#4 Tagrisso/Carbo/Alimta 8/13/24, adm w neutropenic fever, pancytopenia  -pancytopenia due to chemo effect, sofy usu in 7-10 days which correl w pt hx  -ordered Zarxio 480mcg sq qD  -follow serial CBC  -neutropenic and platelets precaution--no rectal temp, all food well cooked, strict hand-washinig  -ID consulted, continue broad spectrum support, follow cultures  -transfuse as clinically indicated. Also when Hgb <7, plts <10k    -Lupus Anticoagulant, hx PE/DVT post IVC filter and on coumadin  -supratherapeutic INR due to acute illness  -hold Coumadin for now, monitor serial INR and resume at therapeutic range. also precaution for signs of bleed griselda w pt sig thrombocytopenic at this time    -thalassemia minor-likely reason for low MCV and RBC higher than expective for degree of anemia. No specific intervention needed    discussed w pt  discussed w Medicine  thank you, will follow w you  management time >60minutes

## 2024-08-24 NOTE — ED PROVIDER NOTE - PHYSICAL EXAMINATION
Positive area of erythema to the anterior upper chest wall, no crepitus  Diffuse purpura to arms and legs

## 2024-08-24 NOTE — H&P ADULT - PROBLEM SELECTOR PLAN 5
- on coumadin History of DVT with multiple PE ion 2002  - continue home coumadin History of DVT with multiple PE ion 2002  - Supratherapeutic INR  -repeat INR tomorrow  -hold coumadin pending repeat INR results in the setting of recent chemo session on 8/13, monitor cbc

## 2024-08-24 NOTE — H&P ADULT - ATTENDING COMMENTS
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     HPI as above.     Plan:   Neutropenic fever: continue cefepime. S/p 1 dose of vanco, further vanco pending ID recs and vanco trough  -ID consulted  -f/u culture results  -monitor for fevers  -Zarxio due to low WBC count-discussed with oncology    elevated INR noted. hold coumadin today, repeat INR in AM  JORDAN: continue IVF, nephro consult, monitor lytes  pancytopenia: in setting of chemo, f/u onco recommendations. No evidence of bleeding. Had nose bleed yesterday resolved, s/p 1u prbc yesterday at hospital in [place]    remainder as above

## 2024-08-24 NOTE — ED PROVIDER NOTE - OBJECTIVE STATEMENT
53-year-old male with a history of non-small cell lung cancer currently undergoing chemotherapy, last on August 13, anticardiolipin antibody, lupus antibody, DVT/PE on Coumadin, IVC filter, sarcoidosis presents with having some fever on and off over past 3 to 4 days.  No cough/URI.  No acute shortness of breath.  No acute chest pain.  Patient was seen in ER yesterday, after having a nosebleed.  The nosebleed was controlled, but the patient had a hemoglobin of 5.5.  The patient received 1 unit of blood.  No further bleeding since that time.  The patient called his doctor today, was told to go to the ER for the fever.  No abdominal pain.  No vomiting or diarrhea.  No dysuria/hematuria.  No aggravating or alleviating factors otherwise noted.  No other acute injury or complaint.  Patient did have a negative RVP yesterday at the outside ER 53-year-old male with a history of non-small cell lung cancer currently undergoing chemotherapy, last on August 13, anticardiolipin antibody, lupus antibody, DVT/PE on Coumadin, IVC filter, sarcoidosis presents with having some fever on and off over past 3 to 4 days.  No cough/URI.  No acute shortness of breath.  No acute chest pain.  Patient was seen in ER yesterday, after having a nosebleed.  The nosebleed was controlled, but the patient had a hemoglobin of 5.5.  The patient received 1 unit of blood.  No further bleeding since that time.  The patient called his doctor today, was told to go to the ER for the fever.  No abdominal pain.  No vomiting or diarrhea.  No dysuria/hematuria.  No aggravating or alleviating factors otherwise noted.  No other acute injury or complaint.  Patient had a fever of 101.5 this morning at home.  Patient did have a negative RVP yesterday at the outside ER

## 2024-08-24 NOTE — ED PROVIDER NOTE - DIFFERENTIAL DIAGNOSIS
Differential Diagnosis Rule out acute infection, URI, electrolyte abnormality, leukocytosis, leukopenia, other acute pathology

## 2024-08-24 NOTE — ED ADULT NURSE NOTE - OBJECTIVE STATEMENT
patient comes in with fevers, weakness, low H/H. patient had fevers for the last day and half. patient is on chemo for lung cancer. patient went to his doctor yesterday and had blood work done. patient noted with low WBC and low HGB. patient did receive 2 units of PRBC recently, patient states that they were concerned about infection and was told to come in to be evaluated. patient noted with redness to chest post procured.

## 2024-08-24 NOTE — H&P ADULT - HISTORY OF PRESENT ILLNESS
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 For his non-small cell lung cancer currently undergoing chemotherapy, had last chemo (round 3) on August 13, during which he received 1 unit of blood due to low numbers. Denies any cough/URI.  No acute shortness of breath.  No acute chest pain.  Patient was seen in ER yesterday in [place] after having a nosebleed.  The nosebleed was controlled, but the patient had a hemoglobin of 5.5.  The patient received 1 unit of blood.  No further bleeding since that time.  The patient called his doctor today, was told to go to the ER for the fever. Patient had a fever of 101.5 this morning at home.Patient did have a negative RVP yesterday at the outside ER. No abdominal pain.  No vomiting or diarrhea.  No dysuria/hematuria. Complains of painful sore on chest which is new where he had a past biopsy for his sarcoidosis, now mostly resolved.     ED COURSE:  Vitals: T 98.6   F , 90 HR  ,  /76 , 18 RR  , SpO2  99% on RA  Labs significant for: 0.54 wbc, hgb 7.1, cr 2.5, egfr 30  EKG: NSR  Pt received: saline, vanc 1g, cefepime 2g

## 2024-08-24 NOTE — H&P ADULT - NSHPREVIEWOFSYSTEMS_GEN_ALL_CORE
REVIEW OF SYSTEMS:  CONSTITUTIONAL: + fever  HENMT: No HA, dizziness, rhinorrhea  RESPIRATORY: No cough or shortness of breath.  CARDIOVASCULAR: No chest pain, palpitations.  GASTROINTESTINAL: No abdominal pain. No nausea or vomiting; No diarrhea or constipation. No blood per rectum.  GENITOURINARY: No dysuria, changes in frequency, hematuria, or incontinence  NEUROLOGICAL: Baseline strength. Sensation intact bilaterally.  SKIN: Sore on mid chest  MUSCULOSKELETAL: No joint pain or swelling; No muscle, back, or extremity pain

## 2024-08-24 NOTE — H&P ADULT - PROBLEM SELECTOR PLAN 1
- wbc .54 in the setting of recent chemo session and fever  - s/p vanc 1g and cefepime 2g in ED  - consider giving filgrastim  - f/u blood cx urine cx  - f/u chest x ray - wbc 0.54 in the setting of recent chemo session and fever  - s/p vanc 1g and cefepime 2g in ED  - consider giving filgrastim-oncology consulted  - f/u blood cx urine cx  - f/u chest x ray and UA  -continue Cefepime. Received one dose of vanco, f/u AM vanco trough as patient with JORDAN today - wbc 0.54 in the setting of recent chemo session and fever  - s/p vanc 1g and cefepime 2g in ED  - consider giving filgrastim-oncology consulted  - f/u blood cx urine cx  - f/u chest x ray and UA  -continue Cefepime. Received one dose of vanco, f/u AM vanco trough as patient with JORDAN today  - NS 75 cc/hr

## 2024-08-24 NOTE — H&P ADULT - NSHPPHYSICALEXAM_GEN_ALL_CORE
{{0:vitals}}    CONSTITUTIONAL: awake, alert, no acute distress. Has mild shivering in bed which patient attributes to feeling cold.   HEENT: Atraumatic normocephalic. Moist mucous membranes.  No conjunctival injection.  RESP: No respiratory distress; CTA b/l, no WRR  CV: RRR, +S1S2, no MRG  GI: Bowel sounds present. Soft, nontender, nondistended, no rebound or guarding  MSK: Moving all four extremities spontaneously. No peripheral edema. No calf tenderness.  SKIN: Diffuse ecchymosis bilateral lower extremities. Peeling rubrous pink skin on bilateral upper extremities. Erythematous ulcer like lesion on sternum.    NEURO: AOx3, answering questions and following commands appropriately  PSYCH: Mood and affect appropriate, recent memory intact

## 2024-08-24 NOTE — ED ADULT NURSE NOTE - AS PAIN REST
Palliative Care Progress Note    Reason for Palliative Care: Pain/Symptom Management     Subjective      Patient complaining of substantial abdominal pain and unable to eat breakfast on account of it.  Patient rates her pain at 8 or more.  Patient stated that she has not noticed substantial improvement with the initiation of Toradol.  Review of Systems  Review of Systems   Unable to perform ROS: Acuity of condition           Palliative Care Assessment Tools    Pain Assessment                         ESAS Scale         Palliative Performance Scale             Functional Assessment Staging (FAST)       Heart Failure Tools             Objective      Vital Signs:   Vital Last Value (24 Hour) 24 Hour Range   Temperature 97.4 °F (36.3 °C) (04/28/23 0410) Temp  Min: 97.4 °F (36.3 °C)  Max: 98.3 °F (36.8 °C)   Pulse 94 (04/28/23 0410) Pulse  Min: 89  Max: 121   Arterial   Blood Pressure   No data recorded   Non-Invasive  Blood Pressure (!) 165/82 (04/28/23 0410) BP  Min: 137/87  Max: 165/82   Central Venous Pressure   No data recorded   Respiratory 17 (04/28/23 0507) Resp  Min: 16  Max: 26   SpO2 98 % (04/28/23 0410) SpO2  Min: 96 %  Max: 100 %   Last BM:      Weight Trends    Wt Readings from Last 10 Encounters:   04/21/23 61.7 kg (136 lb)       Physical Exam  Constitutional:       General: She is in acute distress.      Appearance: She is ill-appearing.   HENT:      Head: Normocephalic and atraumatic.      Nose: Nose normal.      Mouth/Throat:      Mouth: Mucous membranes are moist.   Cardiovascular:      Rate and Rhythm: Tachycardia present.   Pulmonary:      Effort: Pulmonary effort is normal.   Abdominal:      General: There is distension.      Tenderness: There is abdominal tenderness.   Musculoskeletal:         General: No swelling or deformity.   Skin:     General: Skin is warm and dry.   Neurological:      Mental Status: She is oriented to person, place, and time.         Labs & Imaging  Recent Labs   Lab  04/28/23  0427 04/26/23  1645 04/26/23  0419 04/25/23  0427 04/24/23  0420   SODIUM 136  --  139 143 141   POTASSIUM 3.6 3.6 3.3* 3.3* 4.4   CHLORIDE 102  --  104 108 106   CO2 26  --  28 28 23   BUN 5*  --  6 9 6   CREATININE 0.53  --  0.42* 0.57 0.43*   CALCIUM 9.0  --  8.9 9.5 10.0   ALBUMIN  --   --  2.2* 2.2* 2.4*   BILIRUBIN  --   --  0.6 0.4 0.4   ALKPT  --   --  198* 172* 211*   GPT  --   --  17 12 15   AST  --   --  26 15 20   GLUCOSE 92  --  95 103* 129*      Recent Labs   Lab 04/28/23 0427   WBC 18.7*   RBC 3.08*   HGB 8.5*   HCT 25.6*   *        Results for orders placed or performed during the hospital encounter of 04/21/23 (from the past 72 hour(s))   CT ABDOMEN PELVIS W CONTRAST    Impression    1. Overall findings are very similar to 04/21/2023: Small right pleural  effusion is slightly increased and trace left pleural effusion is new.  2.  Complex uterine and adnexal mass is consistent with advanced  gynecologic malignancy.  No  evidence to suggest abscess.  3.  Peritoneal carcinomatosis.  4.  Metastatic adenopathy throughout the chest, abdomen and pelvis in the  inguinal nodes.  5.  Bilateral moderate hydronephrosis and hydroureter is stable.    Electronically Signed by: NIURKA ADAME MD   Signed on: 4/26/2023 12:29 PM   Workstation ID: [id number]         Advance Care Planning/Goals of Care/Discussion   Discussion: Patient agrees to modify her pain control regimen.  Informed of continuous infusion to begin this morning                  Assessment      Abdominal pain  Endometrial carcinoma with abdominal carcinomatosis  Suspected pelvic abscess             Plan      Advance care planning  Assessment & Plan  Educated patient on importance of advance care planning.  Limitations to emergency treatments discussed briefly since patient did not want to discuss at length.  - Patient already with POA HC paperwork and is satisfied with selected agent.  - Patient's son Tone Beaulieu assigned  as primary agent, dated 4/18/2023.  - Patient electing to remain full code and does understand the implications of this.  - Patient agreed to discuss privately with her family support.    Encounter for palliative care  Assessment & Plan  Advance Care Planning    Location: [address]   Advance care planning documents on file - no patient has hardcopy POA HC document located in paper chart naming her son Duane Russell as primary agent.  Document dated 4/18/2023.  Patient satisfied with selected agent.    Advance care planning discussion offered to participants: Eunice Guerrero, CNP, Patient consented to discussion. Time spent on discussion was 16 minutes.       Chart reviewed, case discussed with ID physician.  Palliative NP met with patient with no visitors at bedside.  Explained role in current treatment plan and provided palliative care and full code.  Patient alert, oriented x3, currently denies distress or discomfort.  Patient does report good pain control with current regimen in place.  She reports having BM every other day.  She also reports still being able to urinate without difficulty.  Patient able to exhibit good insight into her PMH as well as reason for current admission, including events occurring at OSH.  Patient states understanding that she has a \"mass\" which is located in her pelvis.  Patient understands that it may be gynecologic origin.  She also states understanding that it is affecting her kidney function.  She was able to good exhibit insight into her current state of health as well as plan of care so far.  Discussed current condition, medical findings and plan of care.  Educated patient on the disease trajectory of advanced cancer which could place her at risk for future setbacks and hospitalizations.  Explained difference between palliative care and the philosophy of hospice.  Stressed the importance of advanced care planning.  Limitations to emergency treatments  discussed at length.  Discussed goals of care.  Patient states her diagnosis is very new and she is \"still not sure what I want to do.\"  She reports understanding that oncology may be offering chemotherapy.  She states understanding that surgery to remove the cancer is not an option for her.  Discussed continuing with current plan of care with disease modifying treatments, attempting to prolong her life.  Discussed option for pursuit of comfort focused care.  Patient also informed of , palliative LCSW and psychology services which are available to her.  Encouraged patient to discuss her goals for care with her support system with whom are her 2 adult children.  Patient goal currently is to continue current plan of care although she continues to determine her long-term goals for care.  Patient deferred  and psychology support at this time.  No further questions or concerns voiced by patient at this time.  Patient did provide permission for this writer to discuss goals of care as well with her son/POA HC Tone.  Palliative NP attempted to make contact with patient's son/POA HC via telephone without success; voicemail left.  Will include palliative LCSW for psychosocial support and with processing of new diagnosis.    Code status: Full Resuscitation           Will stop intermittent morphine bolus  Begin continuous morphine infusion 1.5 mg/h following 4 mg morphine IV bolus and titrate upward as needed       Total combined time for today's Face to Face encounter was 35 minutes, with > 50% of that time spent counseling and coordinating care regarding the above.  Discussed With: patient    Thank you for involving Palliative Care. Please contact the covering provider via [employer] ([place])/[facility] ([place]) with further questions or concerns.    Shay Garcia MD        Progress Note For Department Use Only          LVAD: No  #1 Post-Visit: No OTHER  #2 Post-Visit: No  #3 Post-Visit: No        0 (no pain/absence of nonverbal indicators of pain)

## 2024-08-24 NOTE — H&P ADULT - PROBLEM SELECTOR PLAN 2
- continue home folic acid  - continue home tagrisso - continue home folic acid  - hold home tagrisso pending oncology rec's

## 2024-08-24 NOTE — H&P ADULT - NSHPSOCIALHISTORY_GEN_ALL_CORE
Tobacco: no use  EtOH: no use  Recreational drug use: no use  Lives with: wife in [place]  Ambulates: normally, plays golf  ADLs: full ADLs without assistance

## 2024-08-25 DIAGNOSIS — R04.0 EPISTAXIS: ICD-10-CM

## 2024-08-25 LAB
ABO RH CONFIRMATION: SIGNIFICANT CHANGE UP
ALBUMIN SERPL ELPH-MCNC: 2.8 G/DL — LOW (ref 3.3–5)
ALP SERPL-CCNC: 34 U/L — LOW (ref 40–120)
ALT FLD-CCNC: 21 U/L — SIGNIFICANT CHANGE UP (ref 12–78)
ANION GAP SERPL CALC-SCNC: 7 MMOL/L — SIGNIFICANT CHANGE UP (ref 5–17)
APPEARANCE UR: CLEAR — SIGNIFICANT CHANGE UP
APTT BLD: 92.9 SEC — HIGH (ref 24.5–35.6)
AST SERPL-CCNC: 28 U/L — SIGNIFICANT CHANGE UP (ref 15–37)
BASOPHILS # BLD AUTO: 0 K/UL — SIGNIFICANT CHANGE UP (ref 0–0.2)
BASOPHILS NFR BLD AUTO: 0 % — SIGNIFICANT CHANGE UP (ref 0–2)
BILIRUB SERPL-MCNC: 0.3 MG/DL — SIGNIFICANT CHANGE UP (ref 0.2–1.2)
BILIRUB UR-MCNC: NEGATIVE — SIGNIFICANT CHANGE UP
BUN SERPL-MCNC: 24 MG/DL — HIGH (ref 7–23)
CALCIUM SERPL-MCNC: 8.7 MG/DL — SIGNIFICANT CHANGE UP (ref 8.5–10.1)
CHLORIDE SERPL-SCNC: 109 MMOL/L — HIGH (ref 96–108)
CO2 SERPL-SCNC: 23 MMOL/L — SIGNIFICANT CHANGE UP (ref 22–31)
COLOR SPEC: YELLOW — SIGNIFICANT CHANGE UP
CREAT ?TM UR-MCNC: 68 MG/DL — SIGNIFICANT CHANGE UP
CREAT SERPL-MCNC: 1.8 MG/DL — HIGH (ref 0.5–1.3)
DIFF PNL FLD: NEGATIVE — SIGNIFICANT CHANGE UP
EGFR: 45 ML/MIN/1.73M2 — LOW
EOSINOPHIL # BLD AUTO: 0 K/UL — SIGNIFICANT CHANGE UP (ref 0–0.5)
EOSINOPHIL NFR BLD AUTO: 0 % — SIGNIFICANT CHANGE UP (ref 0–6)
GLUCOSE SERPL-MCNC: 98 MG/DL — SIGNIFICANT CHANGE UP (ref 70–99)
GLUCOSE UR QL: NEGATIVE MG/DL — SIGNIFICANT CHANGE UP
HCT VFR BLD CALC: 18.9 % — CRITICAL LOW (ref 39–50)
HCT VFR BLD CALC: 19.8 % — CRITICAL LOW (ref 39–50)
HGB BLD-MCNC: 6.5 G/DL — CRITICAL LOW (ref 13–17)
HGB BLD-MCNC: 6.8 G/DL — CRITICAL LOW (ref 13–17)
INR BLD: 3.14 RATIO — HIGH (ref 0.85–1.18)
KETONES UR-MCNC: NEGATIVE MG/DL — SIGNIFICANT CHANGE UP
LEUKOCYTE ESTERASE UR-ACNC: NEGATIVE — SIGNIFICANT CHANGE UP
LYMPHOCYTES # BLD AUTO: 0.32 K/UL — LOW (ref 1–3.3)
LYMPHOCYTES # BLD AUTO: 33 % — SIGNIFICANT CHANGE UP (ref 13–44)
MAGNESIUM SERPL-MCNC: 1.7 MG/DL — SIGNIFICANT CHANGE UP (ref 1.6–2.6)
MCHC RBC-ENTMCNC: 23 PG — LOW (ref 27–34)
MCHC RBC-ENTMCNC: 34.4 GM/DL — SIGNIFICANT CHANGE UP (ref 32–36)
MCV RBC AUTO: 66.8 FL — LOW (ref 80–100)
MONOCYTES # BLD AUTO: 0.1 K/UL — SIGNIFICANT CHANGE UP (ref 0–0.9)
MONOCYTES NFR BLD AUTO: 10 % — SIGNIFICANT CHANGE UP (ref 2–14)
NEUTROPHILS # BLD AUTO: 0.56 K/UL — LOW (ref 1.8–7.4)
NEUTROPHILS NFR BLD AUTO: 55 % — SIGNIFICANT CHANGE UP (ref 43–77)
NITRITE UR-MCNC: NEGATIVE — SIGNIFICANT CHANGE UP
NRBC # BLD: SIGNIFICANT CHANGE UP /100 WBCS (ref 0–0)
OSMOLALITY UR: 289 MOSM/KG — SIGNIFICANT CHANGE UP (ref 50–1200)
PH UR: 6 — SIGNIFICANT CHANGE UP (ref 5–8)
PHOSPHATE SERPL-MCNC: 2.4 MG/DL — LOW (ref 2.5–4.5)
PLATELET # BLD AUTO: 6 K/UL — CRITICAL LOW (ref 150–400)
POTASSIUM SERPL-MCNC: 3.9 MMOL/L — SIGNIFICANT CHANGE UP (ref 3.5–5.3)
POTASSIUM SERPL-SCNC: 3.9 MMOL/L — SIGNIFICANT CHANGE UP (ref 3.5–5.3)
POTASSIUM UR-SCNC: 19.1 MMOL/L — SIGNIFICANT CHANGE UP
PROT ?TM UR-MCNC: 17 MG/DL — HIGH (ref 0–12)
PROT SERPL-MCNC: 6.7 G/DL — SIGNIFICANT CHANGE UP (ref 6–8.3)
PROT UR-MCNC: SIGNIFICANT CHANGE UP MG/DL
PROT/CREAT UR-RTO: 0.3 RATIO — HIGH (ref 0–0.2)
PROTHROM AB SERPL-ACNC: 34.6 SEC — HIGH (ref 9.5–13)
RBC # BLD: 2.83 M/UL — LOW (ref 4.2–5.8)
RBC # FLD: 23.9 % — HIGH (ref 10.3–14.5)
SODIUM SERPL-SCNC: 139 MMOL/L — SIGNIFICANT CHANGE UP (ref 135–145)
SODIUM UR-SCNC: 36 MMOL/L — SIGNIFICANT CHANGE UP
SP GR SPEC: 1.01 — SIGNIFICANT CHANGE UP (ref 1–1.03)
UROBILINOGEN FLD QL: 0.2 MG/DL — SIGNIFICANT CHANGE UP (ref 0.2–1)
UUN UR-MCNC: 418 MG/DL — SIGNIFICANT CHANGE UP
VANCOMYCIN TROUGH SERPL-MCNC: <0.8 UG/ML — LOW (ref 10–20)
WBC # BLD: 0.98 K/UL — CRITICAL LOW (ref 3.8–10.5)
WBC # FLD AUTO: 0.98 K/UL — CRITICAL LOW (ref 3.8–10.5)

## 2024-08-25 PROCEDURE — 99233 SBSQ HOSP IP/OBS HIGH 50: CPT | Mod: GC

## 2024-08-25 PROCEDURE — 71250 CT THORAX DX C-: CPT | Mod: 26

## 2024-08-25 RX ORDER — SODIUM PHOSPHATE, DIBASIC, ANHYDROUS, POTASSIUM PHOSPHATE, MONOBASIC, AND SODIUM PHOSPHATE, MONOBASIC, MONOHYDRATE 852; 155; 130 MG/1; MG/1; MG/1
1 TABLET, COATED ORAL
Refills: 0 | Status: COMPLETED | OUTPATIENT
Start: 2024-08-25 | End: 2024-08-26

## 2024-08-25 RX ORDER — OXYMETAZOLINE HYDROCHLORIDE 0.05 G/100ML
2 SPRAY, METERED NASAL ONCE
Refills: 0 | Status: COMPLETED | OUTPATIENT
Start: 2024-08-25 | End: 2024-08-25

## 2024-08-25 RX ORDER — SODIUM CHLORIDE 0.65 %
1 AEROSOL, SPRAY (ML) NASAL THREE TIMES A DAY
Refills: 0 | Status: DISCONTINUED | OUTPATIENT
Start: 2024-08-25 | End: 2024-08-29

## 2024-08-25 RX ORDER — CHLORHEXIDINE GLUCONATE 40 MG/ML
15 SOLUTION TOPICAL
Refills: 0 | Status: DISCONTINUED | OUTPATIENT
Start: 2024-08-25 | End: 2024-08-29

## 2024-08-25 RX ADMIN — CEFEPIME 100 MILLIGRAM(S): 2 INJECTION, POWDER, FOR SOLUTION INTRAVENOUS at 12:46

## 2024-08-25 RX ADMIN — CEFEPIME 100 MILLIGRAM(S): 2 INJECTION, POWDER, FOR SOLUTION INTRAVENOUS at 23:32

## 2024-08-25 RX ADMIN — SODIUM PHOSPHATE, DIBASIC, ANHYDROUS, POTASSIUM PHOSPHATE, MONOBASIC, AND SODIUM PHOSPHATE, MONOBASIC, MONOHYDRATE 1 PACKET(S): 852; 155; 130 TABLET, COATED ORAL at 17:34

## 2024-08-25 RX ADMIN — Medication 1 SPRAY(S): at 17:34

## 2024-08-25 RX ADMIN — Medication 1 APPLICATION(S): at 12:47

## 2024-08-25 RX ADMIN — FILGRASTIM 480 MICROGRAM(S): 300 INJECTION, SOLUTION INTRAVENOUS; SUBCUTANEOUS at 12:47

## 2024-08-25 RX ADMIN — ACETAMINOPHEN 650 MILLIGRAM(S): 325 TABLET ORAL at 05:04

## 2024-08-25 RX ADMIN — CHLORHEXIDINE GLUCONATE 15 MILLILITER(S): 40 SOLUTION TOPICAL at 17:34

## 2024-08-25 RX ADMIN — OXYMETAZOLINE HYDROCHLORIDE 2 SPRAY(S): 0.05 SPRAY, METERED NASAL at 17:34

## 2024-08-25 RX ADMIN — ACETAMINOPHEN 650 MILLIGRAM(S): 325 TABLET ORAL at 06:04

## 2024-08-25 NOTE — PROGRESS NOTE ADULT - PROBLEM SELECTOR PLAN 1
WBC 0.54 in ED and Tmax 101.4 2/2 recent chemo   - s/p vanc 1g and cefepime 2g in ED  - S/P: G-CSF   - WBC up to 0.98 this am  - CXR 8/24/24: Linear opacity at the retrocardiac region likely atelectasis versus scarring. Patchy opacity right upper lobe.  - UA (+) protein, Bact., and Sq cell. F/U refex UCx if any  - F/U BCx  - c/w IV cefepime  - c/w IVF   - c/w G-CSF  - monitor temps and WBC WBC 0.54 in ED and Tmax 101.4 2/2 recent chemo   - s/p vanc 1g and cefepime 2g in ED  - S/P: G-CSF   - WBC up to 0.98 this am  - CXR 8/24/24: Linear opacity at the retrocardiac region likely atelectasis versus scarring. Patchy opacity right upper lobe.  - UA (+) protein, Bact., and Sq cell. F/U UCx   - F/U BCx  - c/w IV cefepime  - c/w IVF  LR@100cc/hr x 24 hours  - c/w G-CSF  - monitor temps and WBC  - ID and heme/onc consult

## 2024-08-25 NOTE — PROGRESS NOTE ADULT - PROBLEM SELECTOR PLAN 6
History of DVT with multiple PE ion 2002 on warfarin  - reports that his traget INR is 2.5  - supra-therapeutic INR 3.9 on presentation  - warfarin held  - INR this am 3.14   - trend INR and titrate with warfarin as needed to meet the target of 2.5 History of DVT with multiple PE ion 2002 on warfarin  - reports that his target INR is 2.5  - supra-therapeutic INR 3.9 on presentation  - warfarin held  - INR this am 3.14   - trend INR and titrate with warfarin as needed to meet the target of 2.5 Skin sore on sternum, previous biopsy point for sarcoidosis   - Topical bacitracin    - dressing change to keep clean as needed

## 2024-08-25 NOTE — PROGRESS NOTE ADULT - ASSESSMENT
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 presented 2/2021 RUL lung mass w add nodule and med/hilar LADs, 6/2021 Rt supraclav LN bx met adenocarcinoma EGFR L858F mutation, also found w multi brain mets, started on Tagrisso, serial scans rel stable/[place], 10/2023 gamma RT knife to brain met for POD 6/11/24 added Carbo/Alimta to Tagrisso based on NGS after repeat bx right supracalv LN 4/2024)  Last chemo (C#4, planned last one) 8/13, cbc at that time wbc 2.19 hgb 7.2 mcv 63.2 plts 91 and also given 1U PRBC    Pt reports never given GCSF support, CBC each cycle seem worse  Traveled to [place] last 2 days and noted intermittent fever, day prior to adm also nose bleed seen at [place] ER, told Hgb 5.7 given 1u PRBC, bleed also stopped  Day of adm T 101.5, called his Onc and told to come to ER    ED COURSE:  Vitals: T 98.6   F , 90 HR  ,  /76 , 18 RR  , SpO2  99% on RA  Labs significant for: 0.54 wbc, hgb 7.1, plts 14, cr 2.5, egfr 30 INR 3.9 .2  UA 30protein neg Leuk, 3WBC 1RBC    Pt admits to incr bruise today, no nausea, vomit, SOB. Does have decr appetite w more cycle of chemo  Started on IV Cefepime, post Vanco  -------------------------  -met adenocarcinoma right lung w right supraclav and bran mets since 2021, under care Dr Briscoe, has been on Tagrisso, post gamma knife, post C#4 Tagrisso/Carbo/Alimta 8/13/24, adm w neutropenic fever, pancytopenia  -pancytopenia due to chemo effect-wbc sl incr post Zarxio GCSF first dose yesterday, Hgb and plts decr c/w still nadiring, also maybe element of infection/antibiotic effect  -continue Zarxio daily  -transfuse 1u PRBC 1U plts as per discussion  -continue neutropenic/platelets precautions--ie all foods well cooked, no rectal temps, strict handwashing  -follow serial CBC  -ID on case    -Lupus Anticoagulant, hx PE/DVT post IVC filter and on coumadin  -supratherapeutic INR on adm due to acute illness  -today  INR decr to 3/14  -continue to hold Coumadin griselda in view of sig thrombocytopenia, still nadiring from chemo, and has signs of bleed    -thalassemia minor-likely reason for low MCV and RBC higher than expective for degree of anemia. No specific intervention needed

## 2024-08-25 NOTE — PROGRESS NOTE ADULT - SUBJECTIVE AND OBJECTIVE BOX
All interim records and events noted.    remains febrile, Tm 101.4  mild nose bleed when blos nose  ambulating in room      MEDICATIONS  (STANDING):  bacitracin   Ointment 1 Application(s) Topical daily  cefepime   IVPB 2000 milliGRAM(s) IV Intermittent every 12 hours  filgrastim-sndz (ZARXIO) Injectable 480 MICROGram(s) SubCutaneous daily  folic acid 1 milliGRAM(s) Oral daily  lactated ringers. 1000 milliLiter(s) (100 mL/Hr) IV Continuous <Continuous>    MEDICATIONS  (PRN):  acetaminophen     Tablet .. 650 milliGRAM(s) Oral every 6 hours PRN Temp greater or equal to 38C (100.4F), Moderate Pain (4 - 6)  metoclopramide 10 milliGRAM(s) Oral every 6 hours PRN for nausea      Vital Signs Last 24 Hrs  T(C): 36.7 (25 Aug 2024 11:27), Max: 38.6 (24 Aug 2024 20:47)  T(F): 98.1 (25 Aug 2024 11:27), Max: 101.4 (24 Aug 2024 20:47)  HR: 68 (25 Aug 2024 11:27) (68 - 87)  BP: 117/68 (25 Aug 2024 11:27) (117/68 - 138/69)  BP(mean): --  RR: 18 (25 Aug 2024 11:27) (17 - 18)  SpO2: 98% (25 Aug 2024 11:27) (95% - 98%)    Parameters below as of 25 Aug 2024 11:27  Patient On (Oxygen Delivery Method): room air        PHYSICAL EXAM  General:  in no acute distress  Head: atraumatic, normocephalic  ENT: sclera anicteric, buccal mucosa moist  Neck: supple, trachea midline  CV: S1 S2, regular rate and rhythm  Lungs: clear to auscultation, no wheezes/rhonchi  Abdomen: soft, nontender, bowel sounds present  Extrem: no clubbing/cyanosis/edema  Skin: no significant increased ecchymosis/petechiae  Neuro: alert and oriented X3,  no focal deficits      LABS:             6.5    0.98  )-----------( 6        ( 08-25 @ 08:37 )             18.9                7.1    0.54  )-----------( 14       ( 08-24 @ 11:45 )             20.7       08-25    139  |  109<H>  |  24<H>  ----------------------------<  98  3.9   |  23  |  1.80<H>    Ca    8.7      25 Aug 2024 08:37  Phos  2.4     08-25  Mg     1.7     08-25    TPro  6.7  /  Alb  2.8<L>  /  TBili  0.3  /  DBili  x   /  AST  28  /  ALT  21  /  AlkPhos  34<L>  08-25 08-25 @ 08:37  PT34.6 INR3.14  PTT92.9  08-24 @ 11:45  PT42.7 INR3.90  ZNY737.2      RADIOLOGY & ADDITIONAL STUDIES:    IMPRESSION/RECOMMENDATIONS:

## 2024-08-25 NOTE — CONSULT NOTE ADULT - ASSESSMENT
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

## 2024-08-25 NOTE — PROVIDER CONTACT NOTE (OTHER) - SITUATION
Nurse attempted to conduct neurological assessment as ordered. Patient refused despite education on the importance of neurological checks at this time.

## 2024-08-25 NOTE — PROGRESS NOTE ADULT - ASSESSMENT
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

## 2024-08-25 NOTE — PROGRESS NOTE ADULT - PROBLEM SELECTOR PLAN 3
Cr 2.5 from baseline of 1.2 2/2 chemo w carboplatin   - on continuous IVF  - Cr 1.8 this am  - cont monitoring lytes/BUN/Cr  - Nephro following, recs appreciated Cr 2.5 from baseline of 1.2 2/2 chemo w carboplatin   - on continuous IVF LR@100cc/hr x 24 hours.  Hopefully volume from PRBC and platelet transfusions will also help.     - Cr 1.8 this am  - cont monitoring lytes/BUN/Cr  - Nephro following, recs appreciated Pt C/O in pm of bleeding per nose with blowing  - likely 2/2 low plt and elevated INR  - precautions readdressed  - PRN nasal saline spray  - PRN oxymetazoline

## 2024-08-25 NOTE — PROGRESS NOTE ADULT - PROBLEM SELECTOR PLAN 5
Skin sore on sternum, previous biopsy point for sarcoidosis   - Topical bacitracin    - dressing change to keep clean as needed  - F/U recs of ID consult Skin sore on sternum, previous biopsy point for sarcoidosis   - Topical bacitracin    - dressing change to keep clean as needed Met. Adenocarcinoma right lung w right supraclav and bran mets since 2021   (under care of Dr. Briscoe)   - on Tagrisso   - S/P gamma knife   - S/P chemo (round 4), C#4 Tagrisso/Carbo/Alimta 8/13/24   - continue home folic acid  - home tagrisso held, pending oncology rec's

## 2024-08-25 NOTE — PROGRESS NOTE ADULT - PROBLEM SELECTOR PLAN 7
- on coumadin with elevated INR  - currently at supra-therapeutic INR   - no further DVT ppx at the moment  - hold coumadin, monitor INR History of DVT with multiple PE ion 2002 on warfarin  - reports that his target INR is 2.5  - supra-therapeutic INR 3.9 on presentation  - warfarin held  - INR this am 3.14   - trend INR and titrate with warfarin as needed to meet the target of 2.5

## 2024-08-25 NOTE — CHART NOTE - NSCHARTNOTEFT_GEN_A_CORE
Called by RN regarding critical value Hgb 6.8/ Hct 19.8; will transfuse 1 unit pRBCs now; f/u AM CBC. Called by RN regarding critical value Hgb 6.8/ Hct 19.8; will transfuse 1 unit pRBCs now; f/u H+H at 3:30. Called by RN regarding critical value Hgb 6.8/ Hct 19.8; will transfuse 1 unit pRBCs now; f/u H+H at 3:30.    Patient seen and examined at bedside; pt has no acute complaints at this time; denies any dizziness, chest pain, palpitations, SOB, hematuria, melena, hematochezia. No signs/ symptoms of acute bleeding. Patient states epistaxis has resolved.       T(C): 37.4 (08-25-24 @ 22:59), Max: 38.2 (08-25-24 @ 04:59)  HR: 88 (08-25-24 @ 21:12) (68 - 88)  BP: 117/69 (08-25-24 @ 21:12) (117/68 - 134/67)  RR: 18 (08-25-24 @ 21:12) (17 - 18)  SpO2: 97% (08-25-24 @ 21:12) (96% - 98%)    GENERAL: patient appears well, no acute distress, appropriate, pleasant  EYES: sclera clear, no exudates  ENMT: no epistaxis; moist mucous membranes  NECK: supple  LUNGS: good air entry bilaterally, clear to auscultation, symmetric breath sounds, no wheezing or rhonchi appreciated  HEART: S1/S2 present, regular rate and rhythm, no murmurs noted, no lower extremity edema  GASTROINTESTINAL: abdomen is soft, nontender, nondistended, normoactive bowel sounds  INTEGUMENT: good skin turgor; 1cm x 1cm crusted lesion of chest  MUSCULOSKELETAL: no clubbing or cyanosis, no obvious deformity  NEUROLOGIC: awake, alert, oriented x3, good muscle tone in 4 extremities, no obvious sensory deficits  PSYCHIATRIC: mood is good, affect is congruent, linear and logical thought process  HEME/LYMPH: multiple petechiae of bilateral lower extremities

## 2024-08-25 NOTE — PROGRESS NOTE ADULT - PROBLEM SELECTOR PLAN 2
All cell lines dec onpresentation 2/2 recent chemo session on 8/13   - WBC 0.54 -> 0.98  - Hb 7.1 -> 6.5  - Plt 14 -> 6    - Heme (Dr. Encarnacion) consulted, recs appreciated  - Type & screen ordered  - consented for transfusion of blood products  - transfuse 1U pRBC  - transfuse 1U Plt  - monitor CBC  - transfuse when Hgb <7, plts <10k All cell lines decreased on presentation 2/2 recent chemo session on 8/13   - WBC 0.54 -> 0.98  - Hb 7.1 -> 6.5  - Plt 14 -> 6    - Heme (Dr. Encarnacion) consulted, recs appreciated  - Type & screen ordered  - consented for transfusion of blood products  - transfuse 1U pRBC  - transfuse 1U Plt  - monitor CBC  - transfuse when Hgb <7, plts <10k

## 2024-08-25 NOTE — PROGRESS NOTE ADULT - ASSESSMENT
JORDAN on CKD 2  Neutropenic Fever  Anemia  NSCLC    -Baseline Creatinine 1.2?  -JORDAN Likely multifactorial decreased EABV + Carboplatin chemo   -Check Urine lytes  -Check UA  -IVF  -No indication for dialysis at this time  -Creatinine improving  -PRBCs per primary/heme

## 2024-08-25 NOTE — CONSULT NOTE ADULT - SUBJECTIVE AND OBJECTIVE BOX
[facility], Division of Infectious Diseases   LIANNE Carney S. Shah, Y. Patel, G. 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 For his non-small cell lung cancer currently undergoing chemotherapy, had last chemo (round 3) on August 13, during which he received 1 unit of blood due to low numbers. Denies any cough/URI.  No acute shortness of breath.  No acute chest pain.  Patient was seen in ER yesterday in [place] after having a nosebleed.  The nosebleed was controlled, but the patient had a hemoglobin of 5.5.  The patient received 1 unit of blood.  No further bleeding since that time.  The patient called his doctor today, was told to go to the ER for the fever. Patient had a fever of 101.5 this morning at home.Patient did have a negative RVP yesterday at the outside ER. No abdominal pain.  No vomiting or diarrhea.  No dysuria/hematuria. Complains of painful sore on chest which is new where he had a past biopsy for his sarcoidosis, now mostly resolved.     ED COURSE:  Vitals: T 98.6   F , 90 HR  ,  /76 , 18 RR  , SpO2  99% on RA  Labs significant for: 0.54 wbc, hgb 7.1, cr 2.5, egfr 30  EKG: NSR  Pt received: saline, vanc 1g, cefepime 2g   (24 Aug 2024 14:04)      PMH/PSH--  Antiphospholipid syndrome    Pulmonary embolism    Lung cancer    Sarcoidosis    Thalassemia minor    Calculus of gallbladder without cholecystitis without obstruction    S/P IVC filter    S/P shoulder surgery        Allergies--  No Known Drug Allergies      Medications--  Antibiotics: cefepime   IVPB 2000 milliGRAM(s) IV Intermittent every 12 hours    Immunologic: filgrastim-sndz (ZARXIO) Injectable 480 MICROGram(s) SubCutaneous daily    Other: acetaminophen     Tablet .. PRN  bacitracin   Ointment  folic acid  lactated ringers.  metoclopramide PRN      Social History--  EtOH: denies ***  Tobacco: denies ***  Drug Use: denies ***    Family/Marital History--  No pertinent family history in first degree relatives    FH: HTN (hypertension) (Father)    FH: thyroid disease (Mother, Sibling)          Travel/Environmental/Occupational History:  *** inserth T/E/O Hx ***    Review of Systems:  REVIEW OF SYSTEMS  General: no fever, no chills, no wt loss	  Ophthalmologic: no blurry vision  Respiratory and Thorax: no cough, no dyspnea  Cardiovascular: no chest pain, no palpitations  Gastrointestinal:  no nausea, no vomiting, diarrhea  Genitourinary: no dysuria, no urgency, no frequency	  Musculoskeletal: no myalgias	  Neurological:  no headache	    Physical Exam--  Vital Signs: T(F): 100.1 (08-25-24 @ 06:04), Max: 101.4 (08-24-24 @ 20:47)  HR: 83 (08-25-24 @ 04:59)  BP: 134/67 (08-25-24 @ 04:59)  RR: 17 (08-25-24 @ 04:59)  SpO2: 96% (08-25-24 @ 04:59)  Wt(kg): --  General: Nontoxic-appearing Male in no acute distress.  HEENT: AT/NC. PERRL. EOMI. Anicteric. Conjunctiva pink and moist. Oropharynx clear. Dentition fair.  Neck: Not rigid. No sense of mass.  Nodes: None palpable.  Lungs: Clear bilaterally without rales, wheezing or rhonchi  Heart: Regular rate and rhythm. No Murmur. No rub. No gallop. No palpable thrill.  Abdomen: Bowel sounds present and normoactive. Soft. Nondistended. Nontender. No sense of mass. No organomegaly.  Back: No spinal tenderness. No costovertebral angle tenderness.   Extremities: No cyanosis or clubbing. No edema.   Skin: Warm. Dry. Good turgor. No rash. No vasculitic stigmata.  Psychiatric: Appropriate affect and mood for situation.         Laboratory & Imaging Data--  CBC                        6.5    0.98  )-----------( 6        ( 25 Aug 2024 08:37 )             18.9       Chemistries  08-25    139  |  109<H>  |  24<H>  ----------------------------<  98  3.9   |  23  |  1.80<H>    Ca    8.7      25 Aug 2024 08:37  Phos  2.4     08-25  Mg     1.7     08-25    TPro  6.7  /  Alb  2.8<L>  /  TBili  0.3  /  DBili  x   /  AST  28  /  ALT  21  /  AlkPhos  34<L>  08-25      Culture Data    Urinalysis with Rflx Culture (collected 24 Aug 2024 14:30)             [facility], Division of Infectious Diseases   LIANNE Carney S. Shah, Y. Patel, G. Casimir  [phone]   weekends and after hours [phone]    TIFFANY ESCAMILLA  53y, Male  [id number]      HPI:  53-year-old male with anticardiolipin antibody,  lupus antibody, DVT/PE  on Coumadin, IVC filter, sarcoidosis, NSCLC presents with having some fever on and off over past 3 to 4 days. For his non-small cell lung cancer currently undergoing chemotherapy, had last chemo (round 3) on , during which he received 1 unit of blood due to low numbers.  noted a small ulcer on chest with redness and pain about 3 days ago  some mouth soreness, tolerating po   no cough,, no diarrhea, no dysuria, no abd pain, no sob        PMH/PSH--  Antiphospholipid syndrome  Pulmonary embolism  Lung cancer  Sarcoidosis  Thalassemia minor  Calculus of gallbladder without cholecystitis without obstruction  S/P IVC filter  S/P shoulder surgery        Allergies--  No Known Drug Allergies      Medications--  Antibiotics: cefepime   IVPB 2000 milliGRAM(s) IV Intermittent every 12 hours    Immunologic: filgrastim-sndz (ZARXIO) Injectable 480 MICROGram(s) SubCutaneous daily    Other: acetaminophen     Tablet .. PRN  bacitracin   Ointment  folic acid  lactated ringers.  metoclopramide PRN      Social History--  EtOH: denies ***  Tobacco: denies ***  Drug Use: denies ***    Family/Marital History--  FH: HTN (hypertension) (Father)  FH: thyroid disease (Mother, Sibling)      Travel/Environmental/Occupational History:  works in sales     Review of Systems:  REVIEW OF SYSTEMS  General: + fever, +chills, no wt loss	  Ophthalmologic: no blurry vision  Respiratory and Thorax: no cough, no dyspnea  Cardiovascular: no chest pain, no palpitations  Gastrointestinal:  no nausea, no vomiting, diarrhea  Genitourinary: no dysuria, no urgency, no frequency	  Musculoskeletal: no myalgias	  Neurological:  no headache	    Physical Exam--  Vital Signs: T(F): 100.1 (24 @ 06:04), Max: 101.4 (24 @ 20:47)  HR: 83 (24 @ 04:59)  BP: 134/67 (24 @ 04:59)  RR: 17 (24 @ 04:59)  SpO2: 96% (24 @ 04:59)  Wt(kg): --  General: Nontoxic-appearing Male in no acute distress.  HEENT: AT/NC. no thrush  Neck: Not rigid. No sense of mass.  Nodes: None palpable.  Lungs: Clear bilaterally without rales, wheezing or rhonchi  Heart: Regular rate and rhythm.  chest wall eschar ulcer , surrounding erythema   Abdomen: Bowel sounds present and normoactive. Soft. Nondistended. Nontender. No sense of mass. No organomegaly.  Back: No spinal tenderness. No costovertebral angle tenderness.   Extremities: No cyanosis or clubbing. No edema.   Skin: scarring from rash  Psychiatric: Appropriate affect and mood for situation.         Laboratory & Imaging Data--  CBC                        6.5    0.98  )-----------( 6        ( 25 Aug 2024 08:37 )             18.9       Chemistries      139  |  109<H>  |  24<H>  ----------------------------<  98  3.9   |  23  |  1.80<H>    Ca    8.7      25 Aug 2024 08:37  Phos  2.4       Mg     1.7         TPro  6.7  /  Alb  2.8<L>  /  TBili  0.3  /  DBili  x   /  AST  28  /  ALT  21  /  AlkPhos  34<L>        Culture Data    Urinalysis with Rflx Culture (collected 24 Aug 2024 14:30)      < from: CT Chest No Cont (24 @ 12:35) >  RPRETATION:  .  Patient: TIFFANY ESCAMILLA  : 1970  MRN: [id number]  ACC: [id number]  Order Date: 2024 12:35 PM  Exam: CT CHEST    INDICATION: neutropenic fever  TECHNIQUE: Unenhanced CT of the chest. Coronal, sagittal, and MIP images   were reconstructed and reviewed.  COMPARISON: 2024 chest CT.    FINDINGS:    AIRWAYS, LUNGS, PLEURA: Bronchial wall thickening. Stable 1 cm nodule and   obliteration of subsegmental bronchus within posterior right upper lobe   (series 302 image 95). Stable 0.2 cm nodule within periphery of the right   apex (series 302 image 70). Stable linear scarring within the right upper   lobe and lingula. The lungs are otherwise clear. No pleural effusion.    LYMPH NODES, MEDIASTINUM: Stable multiple subcentimeter left   supraclavicular lymph nodes.    HEART, VESSELS: Cardiomegaly. Low attenuation of blood pool representing   anemia. Coronary calcification. No pericardial effusion. Thoracic aorta   normal in diameter.    UPPER ABDOMEN: Cholecystectomy.    CHEST WALL, BONES: No aggressive osseous lesion. Mild skin thickening and   subcutaneous infiltrates within mid anterior upper chest dickson new from   prior exam possibly representing cellulitis.    LOWER NECK: Within normal limits.    IMPRESSION:    No pneumonia.    Stable 1 cm endobronchial nodule within posterior right upper lobe   (series 302 image 95). Stable 0.2 cm nodule withinperiphery of the right   apex (series 302 image 70).    Mild skin thickening and subcutaneous infiltrates within mid anterior   upper chest wall is new from prior exam possibly representing cellulitis.    < end of copied text >

## 2024-08-25 NOTE — PROGRESS NOTE ADULT - PROBLEM SELECTOR PLAN 4
Met. Adenocarcinoma right lung w right supraclav and bran mets since 2021   (under care of Dr. Briscoe)   - on Tagrisso   - S/P gamma knife   - S/P chemo (round 4), C#4 Tagrisso/Carbo/Alimta 8/13/24   - continue home folic acid  - home tagrisso held, pending oncology rec's Cr 2.5 from baseline of 1.2 2/2 chemo w carboplatin   - on continuous IVF LR@100cc/hr x 24 hours.  Hopefully volume from PRBC and platelet transfusions will also help.     - Cr 1.8 this am  - cont monitoring lytes/BUN/Cr  - Nephro following, recs appreciated

## 2024-08-25 NOTE — PROGRESS NOTE ADULT - SUBJECTIVE AND OBJECTIVE BOX
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 For his non-small cell lung cancer currently undergoing chemotherapy, had last chemo (round 3) on August 13, during which he received 1 unit of blood due to low numbers. Denies any cough/URI.  No acute shortness of breath.  No acute chest pain.  Patient was seen in ER yesterday in [place] after having a nosebleed.  The nosebleed was controlled, but the patient had a hemoglobin of 5.5.  The patient received 1 unit of blood.  No further bleeding since that time.  The patient called his doctor today, was told to go to the ER for the fever. Patient had a fever of 101.5 this morning at home.Patient did have a negative RVP yesterday at the outside ER. No abdominal pain.  No vomiting or diarrhea.  No dysuria/hematuria. Complains of painful sore on chest which is new where he had a past biopsy for his sarcoidosis, now mostly resolved.   Has not seeen nephrologist on the past.  NO N/V/SOB.  He is urinating without issue.     PAST MEDICAL & SURGICAL HISTORY:  Antiphospholipid syndrome      Pulmonary embolism      Lung cancer      Sarcoidosis      Thalassemia minor      Calculus of gallbladder without cholecystitis without obstruction      S/P IVC filter      S/P shoulder surgery           FAMILY HISTORY:  FH: HTN (hypertension) (Father)    FH: thyroid disease (Mother, Sibling)    NC    Social History:Non smoker    MEDICATIONS  (STANDING):  filgrastim-sndz (ZARXIO) Injectable 480 MICROGram(s) SubCutaneous daily  folic acid 1 milliGRAM(s) Oral daily  sodium chloride 0.9%. 1000 milliLiter(s) (75 mL/Hr) IV Continuous <Continuous>    MEDICATIONS  (PRN):  metoclopramide 10 milliGRAM(s) Oral every 6 hours PRN for nausea   Meds reviewed    Allergies    Animal dander-Cat (Other)  No Known Drug Allergies    Intolerances         REVIEW OF SYSTEMS:    as above    ICU Vital Signs Last 24 Hrs  T(C): 36.7 (25 Aug 2024 11:27), Max: 38.6 (24 Aug 2024 20:47)  T(F): 98.1 (25 Aug 2024 11:27), Max: 101.4 (24 Aug 2024 20:47)  HR: 68 (25 Aug 2024 11:27) (68 - 87)  BP: 117/68 (25 Aug 2024 11:27) (117/68 - 138/69)  BP(mean): --  ABP: --  ABP(mean): --  RR: 18 (25 Aug 2024 11:27) (17 - 18)  SpO2: 98% (25 Aug 2024 11:27) (95% - 98%)    O2 Parameters below as of 25 Aug 2024 11:27  Patient On (Oxygen Delivery Method): room air            PHYSICAL EXAM:    GENERAL: NAD  HEAD:  Atraumatic, Normocephalic  EYES: EOMI, conjunctiva and sclera clear  ENMT: No Drainage from nares, No drainage from ears  NERVOUS SYSTEM:  Awake and Alert  SKIN: No rashes No obvious ecchymosis      LABS:                                   6.5    0.98  )-----------( 6        ( 25 Aug 2024 08:37 )             18.9     08-25    139  |  109<H>  |  24<H>  ----------------------------<  98  3.9   |  23  |  1.80<H>    Ca    8.7      25 Aug 2024 08:37  Phos  2.4     08-25  Mg     1.7     08-25    TPro  6.7  /  Alb  2.8<L>  /  TBili  0.3  /  DBili  x   /  AST  28  /  ALT  21  /  AlkPhos  34<L>  08-25    PT/INR - ( 25 Aug 2024 08:37 )   PT: 34.6 sec;   INR: 3.14 ratio         PTT - ( 25 Aug 2024 08:37 )  PTT:92.9 sec  Urinalysis Basic - ( 25 Aug 2024 08:37 )    Color: x / Appearance: x / SG: x / pH: x  Gluc: 98 mg/dL / Ketone: x  / Bili: x / Urobili: x   Blood: x / Protein: x / Nitrite: x   Leuk Esterase: x / RBC: x / WBC x   Sq Epi: x / Non Sq Epi: x / Bacteria: x                  RADIOLOGY & ADDITIONAL TESTS:

## 2024-08-25 NOTE — PROGRESS NOTE ADULT - PROBLEM SELECTOR PLAN 8
- on coumadin with elevated INR  - currently at supra-therapeutic INR   - no further DVT ppx at the moment  - hold coumadin, monitor INR

## 2024-08-25 NOTE — PROGRESS NOTE ADULT - SUBJECTIVE AND OBJECTIVE BOX
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     INTERVAL HPI/OVERNIGHT EVENTS: Overnight had mild fever 100-101 F, been downtrending. Pt seen at the bedside this am, denies feeling of being febrile or diaphoretic. Denies any pain or discomfort, CP, SOB, palpitations epistaxis, hemoptysis, N/V/D/C, hematemesis, melena, hematochezia, weakness or paresthesia.       MEDICATIONS  (STANDING):  bacitracin   Ointment 1 Application(s) Topical daily  cefepime   IVPB 2000 milliGRAM(s) IV Intermittent every 12 hours  filgrastim-sndz (ZARXIO) Injectable 480 MICROGram(s) SubCutaneous daily  folic acid 1 milliGRAM(s) Oral daily  lactated ringers. 1000 milliLiter(s) (100 mL/Hr) IV Continuous <Continuous>    MEDICATIONS  (PRN):  acetaminophen     Tablet .. 650 milliGRAM(s) Oral every 6 hours PRN Temp greater or equal to 38C (100.4F), Moderate Pain (4 - 6)  metoclopramide 10 milliGRAM(s) Oral every 6 hours PRN for nausea      Allergies    Animal dander-Cat (Other)  No Known Drug Allergies    Intolerances        REVIEW OF SYSTEMS:  CONSTITUTIONAL: No fever or chills  HEENT:  No headache, no sore throat  RESPIRATORY: No cough, wheezing, or shortness of breath  CARDIOVASCULAR: No chest pain, palpitations  GASTROINTESTINAL: No abd pain, nausea, vomiting, or diarrhea  GENITOURINARY: No dysuria, frequency, or hematuria  NEUROLOGICAL: no focal weakness or dizziness  MUSCULOSKELETAL: no myalgias     Vital Signs Last 24 Hrs  T(C): 37.8 (25 Aug 2024 06:04), Max: 38.6 (24 Aug 2024 20:47)  T(F): 100.1 (25 Aug 2024 06:04), Max: 101.4 (24 Aug 2024 20:47)  HR: 83 (25 Aug 2024 04:59) (80 - 90)  BP: 134/67 (25 Aug 2024 04:59) (128/78 - 138/69)  BP(mean): --  RR: 17 (25 Aug 2024 04:59) (17 - 18)  SpO2: 96% (25 Aug 2024 04:59) (95% - 99%)    Parameters below as of 25 Aug 2024 04:59  Patient On (Oxygen Delivery Method): room air        PHYSICAL EXAM:  GENERAL: NAD  HEENT:  anicteric, moist mucous membranes  CHEST/LUNG:  CTA b/l, no rales, wheezes, or rhonchi,  chronic non-healing wound on manubrium sterni  HEART:  RRR, S1, S2  ABDOMEN:  BS+, soft, nontender, nondistended  EXTREMITIES: no edema, cyanosis, or calf tenderness  NERVOUS SYSTEM: answers questions and follows commands appropriately    LABS:                        6.5    0.98  )-----------( 6        ( 25 Aug 2024 08:37 )             18.9     CBC Full  -  ( 25 Aug 2024 08:37 )  WBC Count : 0.98 K/uL  Hemoglobin : 6.5 g/dL  Hematocrit : 18.9 %  Platelet Count - Automated : 6 K/uL  Mean Cell Volume : 66.8 fl  Mean Cell Hemoglobin : 23.0 pg  Mean Cell Hemoglobin Concentration : 34.4 gm/dL  Auto Neutrophil # : x  Auto Lymphocyte # : x  Auto Monocyte # : x  Auto Eosinophil # : x  Auto Basophil # : x  Auto Neutrophil % : x  Auto Lymphocyte % : x  Auto Monocyte % : x  Auto Eosinophil % : x  Auto Basophil % : x    25 Aug 2024 08:37    139    |  109    |  24     ----------------------------<  98     3.9     |  23     |  1.80     Ca    8.7        25 Aug 2024 08:37  Phos  2.4       25 Aug 2024 08:37  Mg     1.7       25 Aug 2024 08:37    TPro  6.7    /  Alb  2.8    /  TBili  0.3    /  DBili  x      /  AST  28     /  ALT  21     /  AlkPhos  34     25 Aug 2024 08:37    PT/INR - ( 25 Aug 2024 08:37 )   PT: 34.6 sec;   INR: 3.14 ratio         PTT - ( 25 Aug 2024 08:37 )  PTT:92.9 sec  Urinalysis Basic - ( 25 Aug 2024 08:37 )    Color: x / Appearance: x / SG: x / pH: x  Gluc: 98 mg/dL / Ketone: x  / Bili: x / Urobili: x   Blood: x / Protein: x / Nitrite: x   Leuk Esterase: x / RBC: x / WBC x   Sq Epi: x / Non Sq Epi: x / Bacteria: x      CAPILLARY BLOOD GLUCOSE            Urinalysis with Rflx Culture (collected 08-24-24 @ 14:30)        RADIOLOGY & ADDITIONAL TESTS:    Personally reviewed.     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     INTERVAL HPI/OVERNIGHT EVENTS: Overnight had mild fever 100-101 F, been downtrending. Pt seen at the bedside this am, denies feeling of being febrile or diaphoretic. Denies any pain or discomfort, CP, SOB, palpitations epistaxis, hemoptysis, N/V/D/C, hematemesis, melena, hematochezia, weakness or paresthesia.    Pt @ ~ 1PM C/O bleeding per nose after blowing. No profuse bleeding. Reassessed.     MEDICATIONS  (STANDING):  bacitracin   Ointment 1 Application(s) Topical daily  cefepime   IVPB 2000 milliGRAM(s) IV Intermittent every 12 hours  filgrastim-sndz (ZARXIO) Injectable 480 MICROGram(s) SubCutaneous daily  folic acid 1 milliGRAM(s) Oral daily  lactated ringers. 1000 milliLiter(s) (100 mL/Hr) IV Continuous <Continuous>    MEDICATIONS  (PRN):  acetaminophen     Tablet .. 650 milliGRAM(s) Oral every 6 hours PRN Temp greater or equal to 38C (100.4F), Moderate Pain (4 - 6)  metoclopramide 10 milliGRAM(s) Oral every 6 hours PRN for nausea      Allergies    Animal dander-Cat (Other)  No Known Drug Allergies    Intolerances        REVIEW OF SYSTEMS:  CONSTITUTIONAL: No fever or chills  HEENT:  No headache, no sore throat  RESPIRATORY: No cough, wheezing, or shortness of breath  CARDIOVASCULAR: No chest pain, palpitations  GASTROINTESTINAL: No abd pain, nausea, vomiting, or diarrhea  GENITOURINARY: No dysuria, frequency, or hematuria  NEUROLOGICAL: no focal weakness or dizziness  MUSCULOSKELETAL: no myalgias     Vital Signs Last 24 Hrs  T(C): 37.8 (25 Aug 2024 06:04), Max: 38.6 (24 Aug 2024 20:47)  T(F): 100.1 (25 Aug 2024 06:04), Max: 101.4 (24 Aug 2024 20:47)  HR: 83 (25 Aug 2024 04:59) (80 - 90)  BP: 134/67 (25 Aug 2024 04:59) (128/78 - 138/69)  BP(mean): --  RR: 17 (25 Aug 2024 04:59) (17 - 18)  SpO2: 96% (25 Aug 2024 04:59) (95% - 99%)    Parameters below as of 25 Aug 2024 04:59  Patient On (Oxygen Delivery Method): room air        PHYSICAL EXAM:  GENERAL: NAD  HEENT:  anicteric, moist mucous membranes  CHEST/LUNG:  CTA b/l, no rales, wheezes, or rhonchi,  chronic non-healing wound on manubrium sterni  HEART:  RRR, S1, S2  ABDOMEN:  BS+, soft, nontender, nondistended  EXTREMITIES: no edema, cyanosis, or calf tenderness  NERVOUS SYSTEM: answers questions and follows commands appropriately    LABS:                        6.5    0.98  )-----------( 6        ( 25 Aug 2024 08:37 )             18.9     CBC Full  -  ( 25 Aug 2024 08:37 )  WBC Count : 0.98 K/uL  Hemoglobin : 6.5 g/dL  Hematocrit : 18.9 %  Platelet Count - Automated : 6 K/uL  Mean Cell Volume : 66.8 fl  Mean Cell Hemoglobin : 23.0 pg  Mean Cell Hemoglobin Concentration : 34.4 gm/dL  Auto Neutrophil # : x  Auto Lymphocyte # : x  Auto Monocyte # : x  Auto Eosinophil # : x  Auto Basophil # : x  Auto Neutrophil % : x  Auto Lymphocyte % : x  Auto Monocyte % : x  Auto Eosinophil % : x  Auto Basophil % : x    25 Aug 2024 08:37    139    |  109    |  24     ----------------------------<  98     3.9     |  23     |  1.80     Ca    8.7        25 Aug 2024 08:37  Phos  2.4       25 Aug 2024 08:37  Mg     1.7       25 Aug 2024 08:37    TPro  6.7    /  Alb  2.8    /  TBili  0.3    /  DBili  x      /  AST  28     /  ALT  21     /  AlkPhos  34     25 Aug 2024 08:37    PT/INR - ( 25 Aug 2024 08:37 )   PT: 34.6 sec;   INR: 3.14 ratio         PTT - ( 25 Aug 2024 08:37 )  PTT:92.9 sec  Urinalysis Basic - ( 25 Aug 2024 08:37 )    Color: x / Appearance: x / SG: x / pH: x  Gluc: 98 mg/dL / Ketone: x  / Bili: x / Urobili: x   Blood: x / Protein: x / Nitrite: x   Leuk Esterase: x / RBC: x / WBC x   Sq Epi: x / Non Sq Epi: x / Bacteria: x      CAPILLARY BLOOD GLUCOSE            Urinalysis with Rflx Culture (collected 08-24-24 @ 14:30)        RADIOLOGY & ADDITIONAL TESTS:    Personally reviewed.     Consultant(s) Notes Reviewed:  [x] YES  [ ] NO

## 2024-08-26 ENCOUNTER — TRANSCRIPTION ENCOUNTER (OUTPATIENT)
Age: 54
End: 2024-08-26

## 2024-08-26 LAB
ALBUMIN SERPL ELPH-MCNC: 2.9 G/DL — LOW (ref 3.3–5)
ALP SERPL-CCNC: 40 U/L — SIGNIFICANT CHANGE UP (ref 40–120)
ALT FLD-CCNC: 30 U/L — SIGNIFICANT CHANGE UP (ref 12–78)
ANION GAP SERPL CALC-SCNC: 5 MMOL/L — SIGNIFICANT CHANGE UP (ref 5–17)
AST SERPL-CCNC: 36 U/L — SIGNIFICANT CHANGE UP (ref 15–37)
BASOPHILS # BLD AUTO: 0 K/UL — SIGNIFICANT CHANGE UP (ref 0–0.2)
BASOPHILS NFR BLD AUTO: 0 % — SIGNIFICANT CHANGE UP (ref 0–2)
BILIRUB SERPL-MCNC: 0.3 MG/DL — SIGNIFICANT CHANGE UP (ref 0.2–1.2)
BUN SERPL-MCNC: 22 MG/DL — SIGNIFICANT CHANGE UP (ref 7–23)
CALCIUM SERPL-MCNC: 8.5 MG/DL — SIGNIFICANT CHANGE UP (ref 8.5–10.1)
CHLORIDE SERPL-SCNC: 109 MMOL/L — HIGH (ref 96–108)
CO2 SERPL-SCNC: 26 MMOL/L — SIGNIFICANT CHANGE UP (ref 22–31)
CREAT SERPL-MCNC: 1.6 MG/DL — HIGH (ref 0.5–1.3)
CULTURE RESULTS: NO GROWTH — SIGNIFICANT CHANGE UP
EGFR: 52 ML/MIN/1.73M2 — LOW
EOSINOPHIL # BLD AUTO: 0 K/UL — SIGNIFICANT CHANGE UP (ref 0–0.5)
EOSINOPHIL NFR BLD AUTO: 0 % — SIGNIFICANT CHANGE UP (ref 0–6)
GLUCOSE SERPL-MCNC: 96 MG/DL — SIGNIFICANT CHANGE UP (ref 70–99)
HCT VFR BLD CALC: 23.2 % — LOW (ref 39–50)
HGB BLD-MCNC: 8 G/DL — LOW (ref 13–17)
IMM GRANULOCYTES NFR BLD AUTO: 9.4 % — HIGH (ref 0–0.9)
INR BLD: 1.96 RATIO — HIGH (ref 0.85–1.18)
LYMPHOCYTES # BLD AUTO: 0.29 K/UL — LOW (ref 1–3.3)
LYMPHOCYTES # BLD AUTO: 18.1 % — SIGNIFICANT CHANGE UP (ref 13–44)
MCHC RBC-ENTMCNC: 24 PG — LOW (ref 27–34)
MCHC RBC-ENTMCNC: 34.5 GM/DL — SIGNIFICANT CHANGE UP (ref 32–36)
MCV RBC AUTO: 69.7 FL — LOW (ref 80–100)
MONOCYTES # BLD AUTO: 0.28 K/UL — SIGNIFICANT CHANGE UP (ref 0–0.9)
MONOCYTES NFR BLD AUTO: 17.5 % — HIGH (ref 2–14)
NEUTROPHILS # BLD AUTO: 0.88 K/UL — LOW (ref 1.8–7.4)
NEUTROPHILS NFR BLD AUTO: 55 % — SIGNIFICANT CHANGE UP (ref 43–77)
NRBC # BLD: 0 /100 WBCS — SIGNIFICANT CHANGE UP (ref 0–0)
PLATELET # BLD AUTO: 22 K/UL — LOW (ref 150–400)
POTASSIUM SERPL-MCNC: 4 MMOL/L — SIGNIFICANT CHANGE UP (ref 3.5–5.3)
POTASSIUM SERPL-SCNC: 4 MMOL/L — SIGNIFICANT CHANGE UP (ref 3.5–5.3)
PROT SERPL-MCNC: 6.8 G/DL — SIGNIFICANT CHANGE UP (ref 6–8.3)
PROTHROM AB SERPL-ACNC: 21.9 SEC — HIGH (ref 9.5–13)
RBC # BLD: 3.33 M/UL — LOW (ref 4.2–5.8)
RBC # FLD: 25.2 % — HIGH (ref 10.3–14.5)
SODIUM SERPL-SCNC: 140 MMOL/L — SIGNIFICANT CHANGE UP (ref 135–145)
SPECIMEN SOURCE: SIGNIFICANT CHANGE UP
WBC # BLD: 1.6 K/UL — LOW (ref 3.8–10.5)
WBC # FLD AUTO: 1.6 K/UL — LOW (ref 3.8–10.5)

## 2024-08-26 PROCEDURE — 99233 SBSQ HOSP IP/OBS HIGH 50: CPT | Mod: GC

## 2024-08-26 RX ORDER — WARFARIN SODIUM 2 MG
5 TABLET ORAL ONCE
Refills: 0 | Status: COMPLETED | OUTPATIENT
Start: 2024-08-26 | End: 2024-08-26

## 2024-08-26 RX ORDER — NYSTATIN 100000/ML
500000 SUSPENSION, ORAL (FINAL DOSE FORM) ORAL EVERY 6 HOURS
Refills: 0 | Status: DISCONTINUED | OUTPATIENT
Start: 2024-08-26 | End: 2024-08-29

## 2024-08-26 RX ADMIN — Medication 1 APPLICATION(S): at 11:39

## 2024-08-26 RX ADMIN — CHLORHEXIDINE GLUCONATE 15 MILLILITER(S): 40 SOLUTION TOPICAL at 05:13

## 2024-08-26 RX ADMIN — FILGRASTIM 480 MICROGRAM(S): 300 INJECTION, SOLUTION INTRAVENOUS; SUBCUTANEOUS at 12:48

## 2024-08-26 RX ADMIN — CHLORHEXIDINE GLUCONATE 15 MILLILITER(S): 40 SOLUTION TOPICAL at 18:00

## 2024-08-26 RX ADMIN — SODIUM PHOSPHATE, DIBASIC, ANHYDROUS, POTASSIUM PHOSPHATE, MONOBASIC, AND SODIUM PHOSPHATE, MONOBASIC, MONOHYDRATE 1 PACKET(S): 852; 155; 130 TABLET, COATED ORAL at 05:12

## 2024-08-26 RX ADMIN — Medication 25 GRAM(S): at 11:39

## 2024-08-26 RX ADMIN — Medication 5 MILLIGRAM(S): at 22:21

## 2024-08-26 RX ADMIN — Medication 100 MILLILITER(S): at 11:40

## 2024-08-26 RX ADMIN — Medication 500000 UNIT(S): at 18:00

## 2024-08-26 RX ADMIN — CEFEPIME 100 MILLIGRAM(S): 2 INJECTION, POWDER, FOR SOLUTION INTRAVENOUS at 11:39

## 2024-08-26 NOTE — DISCHARGE NOTE PROVIDER - NSDCFUSCHEDAPPT_GEN_ALL_CORE_FT
Valentina Mcmahon  [facility]  [facility]  Scheduled Appointment: 09/04/2024    [facility]  [facility]  Scheduled Appointment: 09/04/2024    [facility]  Scheduled Appointment: 09/04/2024    [facility]  [facility]  Scheduled Appointment: 09/24/2024    [facility]  Scheduled Appointment: 09/24/2024    Valentina Mcmahon  [facility]  [facility]  Scheduled Appointment: 09/24/2024    Nyla Art  [facility]  [facility]  Scheduled Appointment: 10/09/2024    [facility]  [facility]  Scheduled Appointment: 10/15/2024    [facility]  Scheduled Appointment: 10/15/2024    Valentina Mcmahon  [facility]  [facility]  Scheduled Appointment: 10/15/2024     [facility]  [facility]  Scheduled Appointment: 09/03/2024    Valentina Mcmahon  [facility]  [facility]  Scheduled Appointment: 09/04/2024    [facility]  [facility]  Scheduled Appointment: 09/04/2024    [facility]  Scheduled Appointment: 09/04/2024    [facility]  [facility]  Scheduled Appointment: 09/24/2024    [facility]  Scheduled Appointment: 09/24/2024    Valentina Mcmahon  [facility]  [facility]  Scheduled Appointment: 09/24/2024    Nyla Art  [facility]  [facility]  Scheduled Appointment: 10/09/2024    [facility]  Scheduled Appointment: 10/15/2024    [facility]  Scheduled Appointment: 10/15/2024    Valentina Mcmahon  [facility]  [facility]  Scheduled Appointment: 10/15/2024

## 2024-08-26 NOTE — PROGRESS NOTE ADULT - PROBLEM SELECTOR PLAN 1
WBC 0.54 in ED and Tmax 101.4 2/2 recent chemo   - s/p vanc 1g and cefepime 2g in ED  - S/P: G-CSF   - WBC up to 0.98 this am  - CXR 8/24/24: Linear opacity at the retrocardiac region likely atelectasis versus scarring. Patchy opacity right upper lobe.  - UA (+) protein, Bact., and Sq cell. F/U UCx   - F/U BCx  - c/w IV cefepime  - c/w IVF  LR@100cc/hr x 24 hours  - c/w G-CSF  - monitor temps and WBC  - ID and heme/onc consult, recs appreciated WBC 0.54 in ED and Tmax 101.4 2/2 recent chemo   - s/p vanc 1g and cefepime 2g in ED  - S/P: G-CSF   - WBC up to 0.98 this am  - CXR 8/24/24: Linear opacity at the retrocardiac region likely atelectasis versus scarring. Patchy opacity right upper lobe.  - CT chest:  no pneumonia  - UA (+) protein, Bact., and Sq cell. F/U UCx   - BCx NGTD  - c/w IV cefepime  - continue IVF  LR@100cc/hr x 24 hours  - c/w G-CSF  - monitor temps and WBC  - ID and heme/onc consult, recs appreciated WBC 0.54 in ED and Tmax 101.4 2/2 recent chemo   - s/p vanc 1g and cefepime 2g in ED  - S/P: G-CSF   - WBC up to 1.6 this am (from 0.98)  - CXR 8/24/24: Linear opacity at the retrocardiac region likely atelectasis versus scarring. Patchy opacity right upper lobe.  - CT chest:  no pneumonia  - UA (+) protein, Bact., and Sq cell. F/U UCx   - BCx NGTD  - c/w IV cefepime  - continue IVF  LR@100cc/hr x 24 hours  - c/w G-CSF  - monitor temps and WBC  - ID and heme/onc consult, recs appreciated

## 2024-08-26 NOTE — DISCHARGE NOTE PROVIDER - CARE PROVIDER_API CALL
[facility]  [address] [place] [id number]  Phone: [phone]  Fax: [phone]  Follow Up Time:    [facility]  [address] [place] [id number]  Phone: [phone]  Fax: [phone]  Follow Up Time:     BERNA DEGROOT  [address] [place] [address]  Phone: [phone]  Fax: ()-  Follow Up Time:

## 2024-08-26 NOTE — DISCHARGE NOTE PROVIDER - HOSPITAL COURSE
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 For his non-small cell lung cancer currently undergoing chemotherapy, had last chemo (round 3) on August 13, during which he received 1 unit of blood due to low numbers. Denies any cough/URI.  No acute shortness of breath.  No acute chest pain.  Patient was seen in ER yesterday in [place] after having a nosebleed.  The nosebleed was controlled, but the patient had a hemoglobin of 5.5.  The patient received 1 unit of blood.  No further bleeding since that time.  The patient called his doctor today, was told to go to the ER for the fever. Patient had a fever of 101.5 this morning at home.Patient did have a negative RVP yesterday at the outside ER. No abdominal pain.  No vomiting or diarrhea.  No dysuria/hematuria. Complains of painful sore on chest which is new where he had a past biopsy for his sarcoidosis, now mostly resolved.     ED COURSE:  Vitals: T 98.6   F , 90 HR  ,  /76 , 18 RR  , SpO2  99% on RA  Labs significant for: 0.54 wbc, hgb 7.1, cr 2.5, egfr 30  EKG: NSR  Pt received: saline, vanc 1g, cefepime 2g   (24 Aug 2024 14:04)      ---  HOSPITAL COURSE: Patient admitted for neutropenic fever and pancytopenia in setting of NSCLC s/p chemo (round 4). Given IV abx.   Heme/ Onc consulted. Tigrasso held. Given G-CSF.   Had episode of epistaxis while inpatient likely 2/2 low plt and elevated INR.   Received ** 2UPRNC, 1U Plt.    CT chest neg for pneumonia.   Blood cx ____.   Presented with JORDAN, nephro consulted. Given fluids.   Found to have skin sore on sternum (previous biopsy point for sarcoidosis), treated with topical bacitracin.   For history of PE, given warfarin per INR to meet target of 2.5.      Patient's clinical condition improved throughout hospital course and patient is stable for discharge *** with close outpatient follow up.     ---  CONSULTANTS:   Nephro: Dr. Manrique  Heme/Onc: Dr. Encarnacion  ID: Dr. Rao    ---  Physical Exam on the day of discharge:    ---  TIME SPENT:  I, the attending physician, was physically present for the key portions of the evaluation and management (E/M) service provided. 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 For his non-small cell lung cancer currently undergoing chemotherapy, had last chemo (round 3) on August 13, during which he received 1 unit of blood due to low numbers. Denies any cough/URI.  No acute shortness of breath.  No acute chest pain.  Patient was seen in ER yesterday in [place] after having a nosebleed.  The nosebleed was controlled, but the patient had a hemoglobin of 5.5.  The patient received 1 unit of blood.  No further bleeding since that time.  The patient called his doctor today, was told to go to the ER for the fever. Patient had a fever of 101.5 this morning at home.Patient did have a negative RVP yesterday at the outside ER. No abdominal pain.  No vomiting or diarrhea.  No dysuria/hematuria. Complains of painful sore on chest which is new where he had a past biopsy for his sarcoidosis, now mostly resolved.     ED COURSE:  Vitals: T 98.6   F , 90 HR  ,  /76 , 18 RR  , SpO2  99% on RA  Labs significant for: 0.54 wbc, hgb 7.1, cr 2.5, egfr 30  EKG: NSR  Pt received: saline, vanc 1g, cefepime 2g   (24 Aug 2024 14:04)      ---  HOSPITAL COURSE: Patient admitted for neutropenic fever and pancytopenia in setting of NSCLC s/p chemo (round 4). Given IV abx.   Heme/ Onc consulted. Tigrasso held. Given G-CSF in setting of neutropenic fever and pancytopenia.  Had episode of epistaxis while inpatient likely 2/2 low plt and elevated INR, improved with nasal saline spray.  Received 2U pRBC, 1U Plt with adequate response in H/H.    CT chest neg for pneumonia.   Blood cx revealed no growth to date.  Presented with JORDAN, nephro consulted. Given fluids with improvement in kidney function.  Found to have skin sore on sternum (previous biopsy point for sarcoidosis), treated with topical bacitracin.   For history of PE, given warfarin per INR to meet target of 2.5.      Patient's clinical condition improved throughout hospital course and patient is stable for discharge *** with close outpatient follow up.     ---  CONSULTANTS:   Nephro: Dr. Manrique  Heme/Onc: Dr. Encarnacion  ID: Dr. Rao    ---  Physical Exam on the day of discharge:    ---  TIME SPENT:  I, the attending physician, was physically present for the key portions of the evaluation and management (E/M) service provided. 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 For his non-small cell lung cancer currently undergoing chemotherapy, had last chemo (round 3) on August 13, during which he received 1 unit of blood due to low numbers. Denies any cough/URI.  No acute shortness of breath.  No acute chest pain.  Patient was seen in ER yesterday in [place] after having a nosebleed.  The nosebleed was controlled, but the patient had a hemoglobin of 5.5.  The patient received 1 unit of blood.  No further bleeding since that time.  The patient called his doctor today, was told to go to the ER for the fever. Patient had a fever of 101.5 this morning at home. Patient did have a negative RVP yesterday at the outside ER. No abdominal pain.  No vomiting or diarrhea.  No dysuria/hematuria. Complains of painful sore on chest which is new where he had a past biopsy for his sarcoidosis, now mostly resolved.     ED COURSE:  Vitals: T 98.6   F , 90 HR  ,  /76 , 18 RR  , SpO2  99% on RA  Labs significant for: 0.54 wbc, hgb 7.1, cr 2.5, egfr 30  EKG: NSR  Pt received: saline, vanc 1g, cefepime 2g   (24 Aug 2024 14:04)      ---  HOSPITAL COURSE: Patient admitted for neutropenic fever and pancytopenia in setting of NSCLC s/p chemo (round 4). Given IV abx.   Heme/ Onc consulted. Tigrasso held. Given G-CSF in setting of neutropenic fever and pancytopenia.  Had episode of epistaxis while inpatient likely 2/2 low plt and elevated INR, improved with nasal saline spray.  Received 2U pRBC, 2U Plt with adequate response in H/H and platelet count.   CT chest neg for pneumonia. Blood cx revealed no growth to date.  Presented with JORDAN, nephro consulted. Given fluids with improvement in kidney function.  Found to have skin sore on sternum (previous biopsy point for sarcoidosis), treated with topical bacitracin.   For history of PE, given warfarin per INR to meet target of 2.5. In setting of resolving pancytopenia w/ thrombocytopenia, advised to hold coumadin until f/u with outpatient oncology.      Patient's clinical condition improved throughout hospital course and patient is stable for discharge home with close outpatient follow up.     ---  CONSULTANTS:   Nephro: Dr. Manrique  Heme/Onc: Dr. Encarnacion  ID: Dr. Rao    ---  Physical Exam on the day of discharge:  GENERAL: resting comfortably in NAD  HEENT:  anicteric, moist mucous membranes  CHEST/LUNG:  CTA b/l, no rales, wheezes, or rhonchi  chronic non-healing wound on manubrium sterni  HEART:  RRR, S1, S2  ABDOMEN: soft, nontender, nondistended  EXTREMITIES: no edema, cyanosis, or calf tenderness  NERVOUS SYSTEM: answers questions and follows commands appropriately    ---  TIME SPENT:  I, the attending physician, was physically present for the key portions of the evaluation and management (E/M) service provided. 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 For his non-small cell lung cancer currently undergoing chemotherapy, had last chemo (round 3) on August 13, during which he received 1 unit of blood due to low numbers. Denies any cough/URI.  No acute shortness of breath.  No acute chest pain.  Patient was seen in ER yesterday in [place] after having a nosebleed.  The nosebleed was controlled, but the patient had a hemoglobin of 5.5.  The patient received 1 unit of blood.  No further bleeding since that time.  The patient called his doctor today, was told to go to the ER for the fever. Patient had a fever of 101.5 this morning at home. Patient did have a negative RVP yesterday at the outside ER. No abdominal pain.  No vomiting or diarrhea.  No dysuria/hematuria. Complains of painful sore on chest which is new where he had a past biopsy for his sarcoidosis, now mostly resolved.     ED COURSE:  Vitals: T 98.6   F , 90 HR  ,  /76 , 18 RR  , SpO2  99% on RA  Labs significant for: 0.54 wbc, hgb 7.1, cr 2.5, egfr 30  EKG: NSR  Pt received: saline, vanc 1g, cefepime 2g   (24 Aug 2024 14:04)      ---  HOSPITAL COURSE: Patient admitted for neutropenic fever and pancytopenia in setting of NSCLC s/p chemo (round 4). Given IV abx.   Heme/ Onc consulted. Tigrasso held. Given G-CSF in setting of neutropenic fever and pancytopenia.  Had episode of epistaxis while inpatient likely 2/2 low plt and elevated INR, improved with nasal saline spray.  Received 2U pRBC, 2U Plt with adequate response in H/H and platelet count.   CT chest neg for pneumonia. Blood cx revealed no growth to date.  Presented with JORDAN, nephro consulted. Given fluids with improvement in kidney function.  Found to have skin sore on sternum (previous biopsy point for sarcoidosis), treated with topical bacitracin.   For history of PE, given warfarin per INR to meet target of 2.5. In setting of resolving pancytopenia w/ thrombocytopenia, advised to  start home  coumadin until f/u with outpatient oncology/ if any sign / symptom of active bleed stop your blood thinner .  Open chest wound, right, initial encounter.  santyl collagenase, gauze qd  May need to consider excisional bx  f/u 1 wk at [facility].        Patient's clinical condition improved throughout hospital course and patient is stable for discharge home with close outpatient follow up.     ---  CONSULTANTS:   Nephro: Dr. Manrique  Heme/Onc: Dr. Encarnacion  ID: Dr. Rao    ---  Physical Exam on the day of discharge: 8/29/24  Vital Signs Last 24 Hrs  T(C): 36.7 (29 Aug 2024 10:10), Max: 37 (28 Aug 2024 20:41)  T(F): 98 (29 Aug 2024 10:10), Max: 98.6 (28 Aug 2024 20:41)  HR: 74 (29 Aug 2024 10:10) (71 - 94)  BP: 129/74 (29 Aug 2024 10:10) (116/69 - 131/78)  BP(mean): --  RR: 17 (29 Aug 2024 10:10) (16 - 17)  SpO2: 94% (29 Aug 2024 04:39) (94% - 96%)    Parameters below as of 29 Aug 2024 04:39  Patient On (Oxygen Delivery Method): room air      GENERAL: resting comfortably in NAD  HEENT:  anicteric, moist mucous membranes  CHEST/LUNG:  CTA b/l, no rales, wheezes, or rhonchi  chronic non-healing wound on manubrium sterni  HEART:  RRR, S1, S2  ABDOMEN: soft, nontender, nondistended  EXTREMITIES: no edema, cyanosis, or calf tenderness bl lower leg petechia   NERVOUS SYSTEM: answers questions and follows commands appropriately  gu intact   ---  TIME SPENT:  I, the attending physician, was physically present for the key portions of the evaluation and management (E/M) service provided. 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 For his non-small cell lung cancer currently undergoing chemotherapy, had last chemo (round 3) on August 13, during which he received 1 unit of blood due to low numbers. Denies any cough/URI.  No acute shortness of breath.  No acute chest pain.  Patient was seen in ER yesterday in [place] after having a nosebleed.  The nosebleed was controlled, but the patient had a hemoglobin of 5.5.  The patient received 1 unit of blood.  No further bleeding since that time.  The patient called his doctor today, was told to go to the ER for the fever. Patient had a fever of 101.5 this morning at home. Patient did have a negative RVP yesterday at the outside ER. No abdominal pain.  No vomiting or diarrhea.  No dysuria/hematuria. Complains of painful sore on chest which is new where he had a past biopsy for his sarcoidosis, now mostly resolved.     ED COURSE:  Vitals: T 98.6   F , 90 HR  ,  /76 , 18 RR  , SpO2  99% on RA  Labs significant for: 0.54 wbc, hgb 7.1, cr 2.5, egfr 30  EKG: NSR  Pt received: saline, vanc 1g, cefepime 2g   (24 Aug 2024 14:04)      ---  HOSPITAL COURSE: Patient admitted for neutropenic fever and pancytopenia in setting of NSCLC s/p chemo (round 4). Given IV abx.   Heme/ Onc consulted. Tigrasso held. Given G-CSF in setting of neutropenic fever and pancytopenia.  Had episode of epistaxis while inpatient likely 2/2 low plt and elevated INR, improved with nasal saline spray.  Received 2U pRBC, 3U Plt with adequate response in H/H and platelet count.   CT chest neg for pneumonia. Blood cx revealed no growth to date.  Presented with JORDAN, nephro consulted. Given fluids with improvement in kidney function.  For history of PE, given warfarin per INR to meet target of 2.5. In setting of resolving pancytopenia w/ thrombocytopenia, initially held. On discharge, advised to start home  coumadin until f/u with outpatient oncology/ if any sign / symptom of active bleed stop your blood thinner.   Found to have skin sore on sternum (previous biopsy point for sarcoidosis), treated with topical bacitracin. Open chest wound, right. Santyl collagenase, gauze qd  May need to consider excisional bx, f/u 1 wk at [facility].        Patient's clinical condition improved throughout hospital course and patient is stable for discharge home with close outpatient follow up.     ---  CONSULTANTS:   Nephro: Dr. Manrique  Heme/Onc: Dr. Encarnacion  ID: Dr. Rao    ---  Physical Exam on the day of discharge: 8/29/24  Vital Signs Last 24 Hrs  T(C): 36.7 (29 Aug 2024 10:10), Max: 37 (28 Aug 2024 20:41)  T(F): 98 (29 Aug 2024 10:10), Max: 98.6 (28 Aug 2024 20:41)  HR: 74 (29 Aug 2024 10:10) (71 - 94)  BP: 129/74 (29 Aug 2024 10:10) (116/69 - 131/78)  RR: 17 (29 Aug 2024 10:10) (16 - 17)  SpO2: 94% (29 Aug 2024 04:39) (94% - 96%)    Parameters below as of 29 Aug 2024 04:39  Patient On (Oxygen Delivery Method): room air      GENERAL: resting comfortably in NAD  HEENT:  anicteric, moist mucous membranes  CHEST/LUNG:  CTA b/l, no rales, wheezes, or rhonchi  chronic non-healing wound on manubrium sterni  HEART:  RRR, S1, S2  ABDOMEN: soft, nontender, nondistended  EXTREMITIES: no edema, cyanosis, or calf tenderness bl lower leg petechia   NERVOUS SYSTEM: answers questions and follows commands appropriately  gu intact   ---  TIME SPENT:  I, the attending physician, was physically present for the key portions of the evaluation and management (E/M) service provided. 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 For his non-small cell lung cancer currently undergoing chemotherapy, had last chemo (round 3) on August 13, during which he received 1 unit of blood due to low numbers. Denies any cough/URI.  No acute shortness of breath.  No acute chest pain.  Patient was seen in ER yesterday in [place] after having a nosebleed.  The nosebleed was controlled, but the patient had a hemoglobin of 5.5.  The patient received 1 unit of blood.  No further bleeding since that time.  The patient called his doctor today, was told to go to the ER for the fever. Patient had a fever of 101.5 this morning at home. Patient did have a negative RVP yesterday at the outside ER. No abdominal pain.  No vomiting or diarrhea.  No dysuria/hematuria. Complains of painful sore on chest which is new where he had a past biopsy for his sarcoidosis, now mostly resolved.     ED COURSE:  Vitals: T 98.6   F , 90 HR  ,  /76 , 18 RR  , SpO2  99% on RA  Labs significant for: 0.54 wbc, hgb 7.1, cr 2.5, egfr 30  EKG: NSR  Pt received: saline, vanc 1g, cefepime 2g   (24 Aug 2024 14:04)      ---  HOSPITAL COURSE: Patient admitted for neutropenic fever and pancytopenia in setting of NSCLC s/p chemo (round 4). Given IV abx prophy .   Heme/ Onc consulted. Tigrasso held. Given G-CSF in setting of neutropenic fever and pancytopenia.  Had episode of epistaxis while inpatient likely 2/2 low plt and elevated INR, improved with nasal saline spray.  Received 2U pRBC, 3U Plt with adequate response in H/H and platelet count.   CT chest neg for pneumonia. Blood cx revealed no growth to date.  Presented with JORDAN, nephro consulted. Given fluids with improvement in kidney function.  For history of PE, given warfarin per INR to meet target of 2.5. In setting of resolving pancytopenia w/ thrombocytopenia, initially held. On discharge, advised to start home  coumadin until f/u with outpatient oncology/ if any sign / symptom of active bleed stop your blood thinner.   Found to have skin sore on sternum (previous biopsy point for sarcoidosis), treated with topical bacitracin. Open chest wound, right. Santyl collagenase, gauze qd  May need to consider excisional bx, f/u 1 wk at [facility].        Patient's clinical condition improved throughout hospital course and patient is stable for discharge home with close outpatient follow up.     ---  CONSULTANTS:   Nephro: Dr. Manrique  Heme/Onc: Dr. Encarnacion  ID: Dr. Rao    ---  Physical Exam on the day of discharge: 8/29/24  Vital Signs Last 24 Hrs  T(C): 36.7 (29 Aug 2024 10:10), Max: 37 (28 Aug 2024 20:41)  T(F): 98 (29 Aug 2024 10:10), Max: 98.6 (28 Aug 2024 20:41)  HR: 74 (29 Aug 2024 10:10) (71 - 94)  BP: 129/74 (29 Aug 2024 10:10) (116/69 - 131/78)  RR: 17 (29 Aug 2024 10:10) (16 - 17)  SpO2: 94% (29 Aug 2024 04:39) (94% - 96%)    Parameters below as of 29 Aug 2024 04:39  Patient On (Oxygen Delivery Method): room air      GENERAL: resting comfortably in NAD  HEENT:  anicteric, moist mucous membranes  CHEST/LUNG:  CTA b/l, no rales, wheezes, or rhonchi  chronic non-healing wound on manubrium sterni  HEART:  RRR, S1, S2 , no tachy   ABDOMEN: soft, nontender, nondistended  EXTREMITIES: no edema, cyanosis, or calf tenderness bl lower leg petechia   NERVOUS SYSTEM: answers questions and follows commands appropriately  gu intact   ---  TIME SPENT:  I, the attending physician, was physically present for the key portions of the evaluation and management (E/M) service provided. The total amount of time spent reviewing the hospital notes, laboratory values, imaging findings, assessing/counseling the patient, discussing with consultant physicians, social work, nursing staff was -45- minutes

## 2024-08-26 NOTE — DISCHARGE NOTE PROVIDER - PROVIDER TOKENS
PROVIDER:[TOKEN:[[id number]]] PROVIDER:[TOKEN:[[id number]]],PROVIDER:[TOKEN:[[id number]]]

## 2024-08-26 NOTE — PROGRESS NOTE ADULT - PROBLEM SELECTOR PLAN 8
- on coumadin with elevated INR  - currently at supra-therapeutic INR   - no further DVT ppx at the moment  - hold coumadin, monitor INR - on coumadin

## 2024-08-26 NOTE — DISCHARGE NOTE PROVIDER - CARE PROVIDERS DIRECT ADDRESSES
,[email or web address],DirectAddress_Unknown

## 2024-08-26 NOTE — PROGRESS NOTE ADULT - PROBLEM SELECTOR PLAN 2
All cell lines decreased on presentation 2/2 recent chemo session on 8/13   - WBC 0.54 -> 0.98  - Hb 7.1 -> 6.5  - Plt 14 -> 6    - Heme (Dr. Encarnacion) consulted, recs appreciated  - Type & screen ordered  - consented for transfusion of blood products, s/p 1U pRBC, 1U Plt  - S/p additional 1u pRBC ovn 8/25, Hgb 8.0 this AM  - monitor CBC  - transfuse when Hgb <7, plts <10k

## 2024-08-26 NOTE — PROGRESS NOTE ADULT - SUBJECTIVE AND OBJECTIVE BOX
TIFFANY ESCAMILLA is a 53yMale , patient examined and chart reviewed.    INTERVAL HPI/ OVERNIGHT EVENTS:   Low grade temps last night. Tmax 100.2 Afebrile today.  Feeling better.    PAST MEDICAL & SURGICAL HISTORY:  Antiphospholipid syndrome  Pulmonary embolism  Lung cancer  Sarcoidosis  Thalassemia minor  Calculus of gallbladder without cholecystitis without obstruction  S/P IVC filter  S/P shoulder surgery      For details regarding the patient's social history, family history, and other miscellaneous elements, please refer the initial infectious diseases consultation and/or the admitting history and physical examination for this admission.    ROS:  CONSTITUTIONAL: + fever no  chills  EYES:  Negative  blurry vision or double vision  CARDIOVASCULAR:  Negative for chest pain or palpitations  RESPIRATORY:  Negative for cough, wheezing, or SOB   GASTROINTESTINAL:  Negative for nausea, vomiting, diarrhea, constipation, or abdominal pain  GENITOURINARY:  Negative frequency, urgency or dysuria  NEUROLOGIC:  No headache, confusion, dizziness, lightheadedness  All other systems were reviewed and are negative     Animal dander-Cat (Other)  No Known Drug Allergies      Current inpatient medications :    ANTIBIOTICS/RELEVANT:  cefepime   IVPB 2000 milliGRAM(s) IV Intermittent every 12 hours      acetaminophen     Tablet .. 650 milliGRAM(s) Oral every 6 hours PRN  bacitracin   Ointment 1 Application(s) Topical daily  chlorhexidine 0.12% Liquid 15 milliLiter(s) Swish and Spit two times a day  folic acid 1 milliGRAM(s) Oral daily  lactated ringers. 1000 milliLiter(s) IV Continuous <Continuous>  metoclopramide 10 milliGRAM(s) Oral every 6 hours PRN  sodium chloride 0.65% Nasal 1 Spray(s) Both Nostrils three times a day PRN  warfarin 5 milliGRAM(s) Oral once      Objective:     @ 07:01  -   @ 07:00  --------------------------------------------------------  IN: 0 mL / OUT: 150 mL / NET: -150 mL      T(C): 36.8 (24 @ 12:19), Max: 37.9 (24 @ 21:12)  HR: 74 (24 @ 12:19) (74 - 88)  BP: 115/71 (24 @ 12:19) (115/71 - 129/73)  RR: 18 (24 @ 12:19) (18 - 18)  SpO2: 93% (24 @ 12:19) (93% - 98%)      Physical Exam:  General: well developed well nourished, in no acute distress  Neck: supple, trachea midline  Lungs: clear, no wheeze/rhonchi  Cardiovascular: regular rate and rhythm, S1 S2  Abdomen: soft, nontender,  bowel sounds normal  Neurological: alert and oriented x3  Skin: no rash  Extremities: no cyanosis/clubbing/edema            LABS:                          8.0    1.60  )-----------( 22       ( 26 Aug 2024 06:30 )             23.2           140  |  109<H>  |  22  ----------------------------<  96  4.0   |  26  |  1.60<H>    Ca    8.5      26 Aug 2024 06:30  Phos  2.8       Mg     1.4         TPro  6.8  /  Alb  2.9<L>  /  TBili  0.3  /  DBili  x   /  AST  36  /  ALT  30  /  AlkPhos  40  -      PT/INR - ( 26 Aug 2024 06:30 )   PT: 21.9 sec;   INR: 1.96 ratio         PTT - ( 25 Aug 2024 08:37 )  PTT:92.9 sec    Vancomycin Level, Trough: <0.8 ug/mL ( @ 08:37)    MICROBIOLOGY:    Culture - Urine (collected 25 Aug 2024 13:42)  Source: Clean Catch Clean Catch (Midstream)  Final Report (26 Aug 2024 13:38):    No growth    Culture - Blood (collected 24 Aug 2024 11:50)  Source: .Blood Blood-Peripheral  Preliminary Report (25 Aug 2024 16:01):    No growth at 24 hours    Culture - Blood (collected 24 Aug 2024 11:45)  Source: .Blood Blood-Peripheral  Preliminary Report (25 Aug 2024 16:01):    No growth at 24 hours    RADIOLOGY & ADDITIONAL STUDIES:    ACC: [id number] EXAM:  CT CHEST   ORDERED BY: INDIRA HARRINGTON     PROCEDURE DATE:  2024          INTERPRETATION:  .  Patient: TIFFANY ESCAMILLA  : 1970  MRN: [id number]  ACC: [id number]  Order Date: 2024 12:35 PM  Exam: CT CHEST    INDICATION: neutropenic fever  TECHNIQUE: Unenhanced CT of the chest. Coronal, sagittal, and MIP images   were reconstructed and reviewed.  COMPARISON: 2024 chest CT.    FINDINGS:    AIRWAYS, LUNGS, PLEURA: Bronchial wall thickening. Stable 1 cm nodule and   obliteration of subsegmental bronchus within posterior right upper lobe   (series 302 image 95). Stable 0.2 cm nodule within periphery of the right   apex (series 302 image 70). Stable linear scarring within the right upper   lobe and lingula. The lungs are otherwise clear. No pleural effusion.    LYMPH NODES, MEDIASTINUM: Stable multiple subcentimeter left   supraclavicular lymph nodes.    HEART, VESSELS: Cardiomegaly. Low attenuation of blood pool representing   anemia. Coronary calcification. No pericardial effusion. Thoracic aorta   normal in diameter.    UPPER ABDOMEN: Cholecystectomy.    CHEST WALL, BONES: No aggressive osseous lesion. Mild skin thickening and   subcutaneous infiltrates within mid anterior upper chest dickson new from   prior exam possibly representing cellulitis.    LOWER NECK: Within normal limits.    IMPRESSION:    No pneumonia.    Stable 1 cm endobronchial nodule within posterior right upper lobe   (series 302 image 95). Stable 0.2 cm nodule withinperiphery of the right   apex (series 302 image 70).    Mild skin thickening and subcutaneous infiltrates within mid anterior   upper chest wall is new from prior exam possibly representing cellulitis.    Assessment:  53-year-old male with ckd, anticardiolipin antibody,  lupus antibody, DVT/PE  on Coumadin, IVC filter, sarcoidosis, NSCLC presents with having some fever on and off over past 3 to 4 days  neutropenia fever WBC recovering   chest wall cellulitis  yessica on ckd   anemia - transfusion   blood cx neg to date  cxr no pna  ua neg    Plan:  Cont Cefepime  Trend temps and cbc  GCSF per onc  Pulm toileting  Increase activity      Continue with present regiment.  Appropriate use of antibiotics and adverse effects reviewed.      I have discussed the above plan of care with patient in detail. He expressed understanding of the  treatment plan . Risks, benefits and alternatives discussed in detail. I have asked if he has any questions or concerns and appropriately addressed them to the best of my ability .    > 35 minutes were spent in direct patient care reviewing notes, medications ,labs data/ imaging , discussion with multidisciplinary team.    Thank you for allowing me to participate in care of your patient .    Mayra Reese MD  Infectious Disease  [phone]

## 2024-08-26 NOTE — PROGRESS NOTE ADULT - SUBJECTIVE AND OBJECTIVE BOX
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 For his non-small cell lung cancer currently undergoing chemotherapy, had last chemo (round 3) on August 13, during which he received 1 unit of blood due to low numbers. Denies any cough/URI.  No acute shortness of breath.  No acute chest pain.  Patient was seen in ER yesterday in [place] after having a nosebleed.  The nosebleed was controlled, but the patient had a hemoglobin of 5.5.  The patient received 1 unit of blood.  No further bleeding since that time.  The patient called his doctor today, was told to go to the ER for the fever. Patient had a fever of 101.5 this morning at home.Patient did have a negative RVP yesterday at the outside ER. No abdominal pain.  No vomiting or diarrhea.  No dysuria/hematuria. Complains of painful sore on chest which is new where he had a past biopsy for his sarcoidosis, now mostly resolved.   Has not seeen nephrologist on the past.  NO N/V/SOB.  He is urinating without issue.     PAST MEDICAL & SURGICAL HISTORY:  Antiphospholipid syndrome      Pulmonary embolism      Lung cancer      Sarcoidosis      Thalassemia minor      Calculus of gallbladder without cholecystitis without obstruction      S/P IVC filter      S/P shoulder surgery           FAMILY HISTORY:  FH: HTN (hypertension) (Father)    FH: thyroid disease (Mother, Sibling)    NC    Social History:Non smoker    MEDICATIONS  (STANDING):  filgrastim-sndz (ZARXIO) Injectable 480 MICROGram(s) SubCutaneous daily  folic acid 1 milliGRAM(s) Oral daily  sodium chloride 0.9%. 1000 milliLiter(s) (75 mL/Hr) IV Continuous <Continuous>    MEDICATIONS  (PRN):  metoclopramide 10 milliGRAM(s) Oral every 6 hours PRN for nausea   Meds reviewed    Allergies    Animal dander-Cat (Other)  No Known Drug Allergies    Intolerances         REVIEW OF SYSTEMS:    as above    Vital Signs Last 24 Hrs  T(C): 36.8 (26 Aug 2024 12:19), Max: 37.9 (25 Aug 2024 21:12)  T(F): 98.2 (26 Aug 2024 12:19), Max: 100.2 (25 Aug 2024 21:12)  HR: 74 (26 Aug 2024 12:19) (74 - 88)  BP: 115/71 (26 Aug 2024 12:19) (115/71 - 129/73)  BP(mean): --  RR: 18 (26 Aug 2024 12:19) (18 - 18)  SpO2: 93% (26 Aug 2024 12:19) (93% - 98%)    Parameters below as of 26 Aug 2024 12:19  Patient On (Oxygen Delivery Method): room air        PHYSICAL EXAM:    GENERAL: NAD  HEAD:  Atraumatic, Normocephalic  EYES: EOMI, conjunctiva and sclera clear  ENMT: No Drainage from nares, No drainage from ears  NERVOUS SYSTEM:  Awake and Alert  SKIN: No rashes No obvious ecchymosis      LABS:                        8.0    1.60  )-----------( 22       ( 26 Aug 2024 06:30 )             23.2     08-26    140  |  109<H>  |  22  ----------------------------<  96  4.0   |  26  |  1.60<H>    Ca    8.5      26 Aug 2024 06:30  Phos  2.8     08-26  Mg     1.4     08-26    TPro  6.8  /  Alb  2.9<L>  /  TBili  0.3  /  DBili  x   /  AST  36  /  ALT  30  /  AlkPhos  40  08-26    PT/INR - ( 26 Aug 2024 06:30 )   PT: 21.9 sec;   INR: 1.96 ratio         PTT - ( 25 Aug 2024 08:37 )  PTT:92.9 sec  Urinalysis Basic - ( 26 Aug 2024 06:30 )    Color: x / Appearance: x / SG: x / pH: x  Gluc: 96 mg/dL / Ketone: x  / Bili: x / Urobili: x   Blood: x / Protein: x / Nitrite: x   Leuk Esterase: x / RBC: x / WBC x   Sq Epi: x / Non Sq Epi: x / Bacteria: x

## 2024-08-26 NOTE — PROGRESS NOTE ADULT - PROBLEM SELECTOR PLAN 5
Met. Adenocarcinoma right lung w right supraclav and bran mets since 2021   (under care of Dr. Briscoe)   - on Tagrisso   - S/P gamma knife   - S/P chemo (round 4), C#4 Tagrisso/Carbo/Alimta 8/13/24   - continue home folic acid  - cont to hold home tagrisso  - Heme/onc (Dr. Encarnacion) -> oncology recs appreciated

## 2024-08-26 NOTE — PROGRESS NOTE ADULT - ASSESSMENT
52 yo w hx Thalassemia minor, sarcoidosis, PE/DVT 2002 post IVC filter on Coumadin, Lupus Anticoagulant w prolonged PTT, met adenoca of right lung w brain mets since 2021[facility], Heme Dr Art. presented 2/2021 RUL lung mass w add nodule and med/hilar LADs, 6/2021 Rt supraclav LN bx met adenocarcinoma EGFR L858F mutation, also found w multi brain mets, started on Tagrisso, serial scans rel stable/MA, 10/2023 gamma RT knife to brain met for POD 6/11/24 added Carbo/Alimta to Tagrisso based on NGS after repeat bx right supracalv LN 4/2024)  Last chemo (C#4, planned last one) 8/13, cbc at that time wbc 2.19 hgb 7.2 mcv 63.2 plts 91 and also given 1U PRBC    Pt reports never given GCSF support, CBC each cycle seem worse  Traveled to [place] last 2 days and noted intermittent fever, day prior to adm also nose bleed seen at [place], told Hgb 5.7 given 1u PRBC, bleed also stopped  Day of adm T 101.5, called his Onc and told to come to ER    ED COURSE:  Vitals: T 98.6   F , 90 HR  ,  /76 , 18 RR  , SpO2  99% on RA  Labs significant for: 0.54 wbc, hgb 7.1, plts 14, cr 2.5, egfr 30 INR 3.9 .2  UA 30protein neg Leuk, 3WBC 1RBC    Pt admits to incr bruise today, no nausea, vomit, SOB. Does have decr appetite w more cycle of chemo  Started on IV Cefepime, post Vanco  -------------------------  -met adenocarcinoma right lung w right supraclav and bran mets since 2021, under care Dr Briscoe, has been on Tagrisso, post gamma knife, post C#4 Tagrisso/Carbo/Alimta 8/13/24, adm w neutropenic fever, pancytopenia  -pancytopenia due to chemo effect-wbc sl incr post Zarxio GCSF first dose yesterday, Hgb and plts decr c/w still nadiring, also maybe element of infection/antibiotic effect  -continue Zarxio daily  -transfuse 1u PRBC 1U plts as per discussion  -continue neutropenic/platelets precautions all foods well cooked, no rectal temps, strict handwashing  -follow serial CBC  -ID on case    -Lupus Anticoagulant, hx PE/DVT post IVC filter and on coumadin  -supratherapeutic INR on adm due to acute illness  -today  INR decr to 3/14  -continue to hold Coumadin griselda in view of sig thrombocytopenia, still nadiring from chemo, and has signs of bleed    -thalassemia minor-likely reason for low MCV and RBC higher than expective for degree of anemia. No specific intervention needed      RECOMMEND:     Cont Folic acid   Cont GCSF  Start Nystatin     on IV abx, Cefepime    OB as sagar    Monitor Plts, 22

## 2024-08-26 NOTE — PROGRESS NOTE ADULT - ASSESSMENT
JORDAN on CKD 2  Neutropenic Fever  Anemia  NSCLC    -Baseline Creatinine 1.2?  -JORDAN Likely multifactorial decreased EABV + Carboplatin chemo, now improving   -Urine studies reviewed   -IVF x 1 day   -No indication for dialysis at this time  -S/p PRBCs per primary/heme

## 2024-08-26 NOTE — CARE COORDINATION ASSESSMENT. - NSDCPLANSERVICES_GEN_ALL_CORE
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  CM met with patient at bedside.  Patient. A&O x 4.    Pt  resting comfortably / Pt has no complaints at this time.  CM explained role of CM and availability to assist with discharge planning throughout stay.   Provided CM contact information and pt. verbalized understanding. Patient lives in a private house with his wife, multiple stairs to navigate. Prior to admission patient was ind in adls. Patient had chemo for lung CA 2 weeks ago last cycle. Patients oncologist is Dr Hart. Patient denies any services or DME at home. No needs identified for home discharge when medically stable. Patient identified his wife  as his caregiver at home who will assist him as needed  during his recuperation and will transport him home and to MD appointments.     Pt verbalizing understanding and in agreement with d/c plans.  PCP: DR Chepe Culver [phone]  [facility] [phone]/No Anticipated Discharge Needs

## 2024-08-26 NOTE — PROGRESS NOTE ADULT - PROBLEM SELECTOR PLAN 4
Cr 2.5 from baseline of 1.2 2/2 chemo w carboplatin   - on continuous IVF LR@100cc/hr x 24 hours.  Hopefully volume from PRBC and platelet transfusions will also help.     - Cr 1.6 this am, improving  - cont monitoring lytes/BUN/Cr  - Nephro following, recs appreciated Cr 2.5 from baseline of 1.2 2/2 chemo w carboplatin   - on continuous IVF LR@100cc/hr x 24 hours.     - Cr 1.6 this am, improving  - cont monitoring lytes/BUN/Cr  - Nephro following, recs appreciated

## 2024-08-26 NOTE — CARE COORDINATION ASSESSMENT. - NSCAREPROVIDERS_GEN_ALL_CORE_FT
CARE PROVIDERS:  Accepting Physician: Jose Jauregui  Access Services: Debby Howell  Administration: Julio César Shell  Administration: Myrna Dick  Administration: Alonso Akers  Administration: Van Gaffney  Admitting: Jose Jauregui  Attending: Zay Morris  Consultant: Abdoul Fallon  Consultant: Shanta Encarnacion  Consultant: Mckayla Rao  Covering Team: Beth Lockwood  Covering Team: Kelly Multani  ED Attending: Cricket Goel  ED Nurse: Sonya Fischer  Nurse: Sadaf Moise  Nurse: Courtney Dawn  Nurse: Jeana Alberts  Oncology: Saud Sandoval  Oncology: Nyla Art  Ordered: Doctor, Unknown  Ordered: Cheri Rizzo  Ordered: ServiceAccount, SCMMLM  Override: Michele Mendez  Override: Sadaf Moise  PCA/Nursing Assistant: Dmitry Baht  Primary Team: Aissatou Grigsby  Primary Team: Raheel Valles  Primary Team: Rk Anne  Primary Team: Ravinder Jauregui  Primary Team: Isaiah Hardy  Primary Team: Zay Morris  Registered Dietitian: Landy Xavier  Team: [facility], Team  UR// Supp. Assoc.: Liseth Dutton  Wound Care Specialist Nurse: Obdulia Sellers

## 2024-08-26 NOTE — PROGRESS NOTE ADULT - PROBLEM SELECTOR PLAN 3
Pt C/O in pm of bleeding per nose with blowing  - likely 2/2 low plt and elevated INR  - precautions readdressed  - PRN nasal saline spray  - PRN oxymetazoline Pt C/O in pm of bleeding per nose with blowing  - likely 2/2 low plt and elevated INR  - precautions readdressed  - PRN nasal saline spray

## 2024-08-26 NOTE — PROGRESS NOTE ADULT - SUBJECTIVE AND OBJECTIVE BOX
[INTERVAL HX: ]  Patient seen and examined;  Chart reviewed and events noted;     no CP, no SOB    no fever  Tmax 99    [MEDICATIONS]  MEDICATIONS  (STANDING):  bacitracin   Ointment 1 Application(s) Topical daily  cefepime   IVPB 2000 milliGRAM(s) IV Intermittent every 12 hours  chlorhexidine 0.12% Liquid 15 milliLiter(s) Swish and Spit two times a day  filgrastim-sndz (ZARXIO) Injectable 480 MICROGram(s) SubCutaneous daily  folic acid 1 milliGRAM(s) Oral daily  lactated ringers. 1000 milliLiter(s) (100 mL/Hr) IV Continuous <Continuous>  nystatin    Suspension 634471 Unit(s) Oral every 6 hours    MEDICATIONS  (PRN):  acetaminophen     Tablet .. 650 milliGRAM(s) Oral every 6 hours PRN Temp greater or equal to 38C (100.4F), Moderate Pain (4 - 6)  metoclopramide 10 milliGRAM(s) Oral every 6 hours PRN for nausea  sodium chloride 0.65% Nasal 1 Spray(s) Both Nostrils three times a day PRN Nasal Congestion      [VITALS]  Vital Signs Last 24 Hrs  T(C): 36.6 (26 Aug 2024 20:21), Max: 37.6 (26 Aug 2024 04:23)  T(F): 97.9 (26 Aug 2024 20:21), Max: 99.7 (26 Aug 2024 04:23)  HR: 78 (26 Aug 2024 20:21) (74 - 80)  BP: 120/64 (26 Aug 2024 20:21) (115/71 - 129/67)  BP(mean): --  RR: 18 (26 Aug 2024 20:21) (18 - 18)  SpO2: 94% (26 Aug 2024 20:21) (93% - 96%)    Parameters below as of 26 Aug 2024 20:21  Patient On (Oxygen Delivery Method): room air      [WT/HT]  Daily     Daily Weight in k.8 (26 Aug 2024 04:23)  [VENT]      [PHYSICAL EXAM]  GEN: NAD  HEENT: normocephalic and atraumatic. EOMI. PERRL.  +mouth sores, ? faint white plaques  NECK: Supple.  No lymphadenopathy   LUNGS: Clear to auscultation.  HEART: Regular rate and rhythm,  no MRG  ABDOMEN: Soft, nontender, and nondistended.  Positive bowel sounds.    : No CVA tenderness  EXTREMITIES: Without edema.  NEUROLOGIC: grossly intact.  PSYCHIATRIC: Appropriate affect .  SKIN: No rash     [LABS:]                        8.0    1.60  )-----------( 22       ( 26 Aug 2024 06:30 )             23.2     08-26    140  |  109<H>  |  22  ----------------------------<  96  4.0   |  26  |  1.60<H>    Ca    8.5      26 Aug 2024 06:30  Phos  2.8     08-26  Mg     1.4     08-26    TPro  6.8  /  Alb  2.9<L>  /  TBili  0.3  /  DBili  x   /  AST  36  /  ALT  30  /  AlkPhos  40  08-26    PT/INR - ( 26 Aug 2024 06:30 )   PT: 21.9 sec;   INR: 1.96 ratio         PTT - ( 25 Aug 2024 08:37 )  PTT:92.9 sec        Urinalysis Basic - ( 26 Aug 2024 06:30 )    Color: x / Appearance: x / SG: x / pH: x  Gluc: 96 mg/dL / Ketone: x  / Bili: x / Urobili: x   Blood: x / Protein: x / Nitrite: x   Leuk Esterase: x / RBC: x / WBC x   Sq Epi: x / Non Sq Epi: x / Bacteria: x        Culture - Urine (collected 25 Aug 2024 13:42)  Source: Clean Catch Clean Catch (Midstream)  Final Report (26 Aug 2024 13:38):    No growth    Urinalysis with Rflx Culture (collected 24 Aug 2024 14:30)    Culture - Blood (collected 24 Aug 2024 11:50)  Source: .Blood Blood-Peripheral  Preliminary Report (26 Aug 2024 16:01):    No growth at 48 Hours    Culture - Blood (collected 24 Aug 2024 11:45)  Source: .Blood Blood-Peripheral  Preliminary Report (26 Aug 2024 16:01):    No growth at 48 Hours      SARS-CoV-2: NotDetec (24 Aug 2024 11:58)        Culture - Urine (collected 25 Aug 2024 13:42)  Source: Clean Catch Clean Catch (Midstream)  Final Report (26 Aug 2024 13:38):    No growth    Urinalysis with Rflx Culture (collected 24 Aug 2024 14:30)    Culture - Blood (collected 24 Aug 2024 11:50)  Source: .Blood Blood-Peripheral  Preliminary Report (26 Aug 2024 16:01):    No growth at 48 Hours    Culture - Blood (collected 24 Aug 2024 11:45)  Source: .Blood Blood-Peripheral  Preliminary Report (26 Aug 2024 16:01):    No growth at 48 Hours        [RADIOLOGY STUDIES:]

## 2024-08-26 NOTE — CARE COORDINATION ASSESSMENT. - NSPASTMEDSURGHISTORY_GEN_ALL_CORE_FT
PAST MEDICAL & SURGICAL HISTORY:  Antiphospholipid syndrome      Calculus of gallbladder without cholecystitis without obstruction      Thalassemia minor      Sarcoidosis      Lung cancer      Pulmonary embolism      S/P shoulder surgery      S/P IVC filter

## 2024-08-26 NOTE — DISCHARGE NOTE PROVIDER - NSDCCPCAREPLAN_GEN_ALL_CORE_FT
PRINCIPAL DISCHARGE DIAGNOSIS  Diagnosis: Neutropenic fever  Assessment and Plan of Treatment:      PRINCIPAL DISCHARGE DIAGNOSIS  Diagnosis: Neutropenic fever  Assessment and Plan of Treatment: You were admitted with fever and significantly low white blood cell count. While admitted, you received filgrastim, a medication to help stimulate the body to generate more white blood cells. You also received antibiotics in the setting of this fever and will be discharged with a short course of oral antibiotics.  Please START cephalexin 500mg every 6 hours for _______ days.  Please follow up with your primary care provider within 1 week.  Please follow up with your primary oncologist within 1 week.     PRINCIPAL DISCHARGE DIAGNOSIS  Diagnosis: Neutropenic fever  Assessment and Plan of Treatment: You were admitted with fever and significantly low white blood cell count. While admitted, you received filgrastim, a medication to help stimulate the body to generate more white blood cells. You also received antibiotics in the setting of this fever and will be discharged with a short course of oral antibiotics.  Please START cephalexin 500mg every 6 hours for 3 days.  Please follow up with your primary care provider within 1 week.  Please follow up with your primary oncologist within 1 week.      SECONDARY DISCHARGE DIAGNOSES  Diagnosis: History of pulmonary embolus (PE)  Assessment and Plan of Treatment: on coumadin -     PRINCIPAL DISCHARGE DIAGNOSIS  Diagnosis: Neutropenic fever  Assessment and Plan of Treatment: You were admitted with fever and significantly low white blood cell count. While admitted, you received filgrastim, a medication to help stimulate the body to generate more white blood cells. You also received antibiotics in the setting of this fever and will be discharged with a short course of oral antibiotics.  Please START cephalexin 500mg every 6 hours for 3 days.  Please follow up with your primary care provider within 1 week.  Please follow up with your primary oncologist within 1 week.      SECONDARY DISCHARGE DIAGNOSES  Diagnosis: History of pulmonary embolus (PE)  Assessment and Plan of Treatment: Given your history of chronic deep vein thrombosis and pulmonary embolism, you are on coumadin. While admitted, given your low platelet count and concern for bleeding, we held your coumadin and closely monitored your INR levels.  Please CONTINUE your home dose of coumadin.  Please CONTINUE to monitor your INRs at home and follow up with your primary care provider regarding continuing/adjusting your blood thinner regimen accordingly.  If you experience any signs/symptoms of active bleeding, please go to the emergency room.    Diagnosis: Open chest wound, right, initial encounter  Assessment and Plan of Treatment: On admission, you were found to have an open chest wound around your previous biopsy site. While admitted, wound care was consulted and evaluated your chest wound, cleaning the wound and applying topical treatment as necessary.  Please START application of collagenase and bacitracin to the wound.  Please follow up with the wound care clinic in 1 week for monitoring and further evaluation.

## 2024-08-26 NOTE — PROGRESS NOTE ADULT - SUBJECTIVE AND OBJECTIVE BOX
Patient is a 53y old  Male who presents with a chief complaint of fever (25 Aug 2024 12:37)      Subjective:  INTERVAL HPI/OVERNIGHT EVENTS: Patient seen and examined at bedside. No overnight events occurred. Patient has no complaints at this time. Denies fevers, chills, headache, lightheadedness, chest pain, dyspnea, abdominal pain, n/v/d/c.    MEDICATIONS  (STANDING):  bacitracin   Ointment 1 Application(s) Topical daily  cefepime   IVPB 2000 milliGRAM(s) IV Intermittent every 12 hours  chlorhexidine 0.12% Liquid 15 milliLiter(s) Swish and Spit two times a day  filgrastim-sndz (ZARXIO) Injectable 480 MICROGram(s) SubCutaneous daily  folic acid 1 milliGRAM(s) Oral daily  lactated ringers. 1000 milliLiter(s) (100 mL/Hr) IV Continuous <Continuous>  warfarin 5 milliGRAM(s) Oral once    MEDICATIONS  (PRN):  acetaminophen     Tablet .. 650 milliGRAM(s) Oral every 6 hours PRN Temp greater or equal to 38C (100.4F), Moderate Pain (4 - 6)  metoclopramide 10 milliGRAM(s) Oral every 6 hours PRN for nausea  sodium chloride 0.65% Nasal 1 Spray(s) Both Nostrils three times a day PRN Nasal Congestion      Allergies    Animal dander-Cat (Other)  No Known Drug Allergies    Intolerances        REVIEW OF SYSTEMS:  CONSTITUTIONAL: No fever or chills  HEENT:  No headache, no sore throat  RESPIRATORY: No cough, wheezing, or shortness of breath  CARDIOVASCULAR: No chest pain, palpitations  GASTROINTESTINAL: No abd pain, nausea, vomiting, or diarrhea  GENITOURINARY: No dysuria, frequency, or hematuria  NEUROLOGICAL: no focal weakness or dizziness  MUSCULOSKELETAL: no myalgias     Objective:  Vital Signs Last 24 Hrs  T(C): 37.6 (26 Aug 2024 04:41), Max: 37.9 (25 Aug 2024 21:12)  T(F): 99.7 (26 Aug 2024 04:41), Max: 100.2 (25 Aug 2024 21:12)  HR: 80 (26 Aug 2024 04:41) (68 - 88)  BP: 118/53 (26 Aug 2024 04:41) (117/68 - 130/73)  BP(mean): --  RR: 18 (26 Aug 2024 04:41) (18 - 18)  SpO2: 96% (26 Aug 2024 04:41) (94% - 98%)    Parameters below as of 26 Aug 2024 04:41  Patient On (Oxygen Delivery Method): room air        PHYSICAL EXAM:  GENERAL: resting comfortably in NAD  HEENT:  anicteric, moist mucous membranes  CHEST/LUNG:  CTA b/l, no rales, wheezes, or rhonchi,  chronic non-healing wound on manubrium sterni  HEART:  RRR, S1, S2  ABDOMEN: soft, nontender, nondistended  EXTREMITIES: no edema, cyanosis, or calf tenderness  NERVOUS SYSTEM: answers questions and follows commands appropriately      LABS:                        8.0    1.60  )-----------( 22       ( 26 Aug 2024 06:30 )             23.2     CBC Full  -  ( 26 Aug 2024 06:30 )  WBC Count : 1.60 K/uL  Hemoglobin : 8.0 g/dL  Hematocrit : 23.2 %  Platelet Count - Automated : 22 K/uL  Mean Cell Volume : 69.7 fl  Mean Cell Hemoglobin : 24.0 pg  Mean Cell Hemoglobin Concentration : 34.5 gm/dL  Auto Neutrophil # : 0.88 K/uL  Auto Lymphocyte # : 0.29 K/uL  Auto Monocyte # : 0.28 K/uL  Auto Eosinophil # : 0.00 K/uL  Auto Basophil # : 0.00 K/uL  Auto Neutrophil % : 55.0 %  Auto Lymphocyte % : 18.1 %  Auto Monocyte % : 17.5 %  Auto Eosinophil % : 0.0 %  Auto Basophil % : 0.0 %    26 Aug 2024 06:30    140    |  109    |  22     ----------------------------<  96     4.0     |  26     |  1.60     Ca    8.5        26 Aug 2024 06:30    TPro  6.8    /  Alb  2.9    /  TBili  0.3    /  DBili  x      /  AST  36     /  ALT  30     /  AlkPhos  40     26 Aug 2024 06:30    PT/INR - ( 26 Aug 2024 06:30 )   PT: 21.9 sec;   INR: 1.96 ratio         PTT - ( 25 Aug 2024 08:37 )  PTT:92.9 sec  Urinalysis Basic - ( 26 Aug 2024 06:30 )    Color: x / Appearance: x / SG: x / pH: x  Gluc: 96 mg/dL / Ketone: x  / Bili: x / Urobili: x   Blood: x / Protein: x / Nitrite: x   Leuk Esterase: x / RBC: x / WBC x   Sq Epi: x / Non Sq Epi: x / Bacteria: x      CAPILLARY BLOOD GLUCOSE            Urinalysis with Rflx Culture (collected 08-24-24 @ 14:30)    Culture - Blood (collected 08-24-24 @ 11:50)  Source: .Blood Blood-Peripheral  Preliminary Report (08-25-24 @ 16:01):    No growth at 24 hours    Culture - Blood (collected 08-24-24 @ 11:45)  Source: .Blood Blood-Peripheral  Preliminary Report (08-25-24 @ 16:01):    No growth at 24 hours        Consultant(s) Notes Reviewed:  [x] YES  [ ] NO Patient is a 53y old  Male who presents with a chief complaint of fever (25 Aug 2024 12:37)      Subjective:  INTERVAL HPI/OVERNIGHT EVENTS: Patient seen and examined at bedside, resting comfortably this morning. Pt Hgb 6.8, s/p 1u pRBC; transfused additional 1u pRBc ovn. Continues to endorse minimal epistaxis, although much improved compared to admission; otherwise no complaints at this time. Denies fevers, chills, headache, lightheadedness, chest pain, dyspnea, abdominal pain, n/v/d/c.    MEDICATIONS  (STANDING):  bacitracin   Ointment 1 Application(s) Topical daily  cefepime   IVPB 2000 milliGRAM(s) IV Intermittent every 12 hours  chlorhexidine 0.12% Liquid 15 milliLiter(s) Swish and Spit two times a day  filgrastim-sndz (ZARXIO) Injectable 480 MICROGram(s) SubCutaneous daily  folic acid 1 milliGRAM(s) Oral daily  lactated ringers. 1000 milliLiter(s) (100 mL/Hr) IV Continuous <Continuous>  warfarin 5 milliGRAM(s) Oral once    MEDICATIONS  (PRN):  acetaminophen     Tablet .. 650 milliGRAM(s) Oral every 6 hours PRN Temp greater or equal to 38C (100.4F), Moderate Pain (4 - 6)  metoclopramide 10 milliGRAM(s) Oral every 6 hours PRN for nausea  sodium chloride 0.65% Nasal 1 Spray(s) Both Nostrils three times a day PRN Nasal Congestion      Allergies    Animal dander-Cat (Other)  No Known Drug Allergies    Intolerances        REVIEW OF SYSTEMS:  CONSTITUTIONAL: No fever or chills  HEENT:  +minimal epistaxis, No headache, no sore throat  RESPIRATORY: No cough, wheezing, or shortness of breath  CARDIOVASCULAR: No chest pain, palpitations  GASTROINTESTINAL: No abd pain, nausea, vomiting, or diarrhea  GENITOURINARY: No dysuria, frequency, or hematuria  NEUROLOGICAL: no focal weakness or dizziness  MUSCULOSKELETAL: no myalgias     Objective:  Vital Signs Last 24 Hrs  T(C): 37.6 (26 Aug 2024 04:41), Max: 37.9 (25 Aug 2024 21:12)  T(F): 99.7 (26 Aug 2024 04:41), Max: 100.2 (25 Aug 2024 21:12)  HR: 80 (26 Aug 2024 04:41) (68 - 88)  BP: 118/53 (26 Aug 2024 04:41) (117/68 - 130/73)  BP(mean): --  RR: 18 (26 Aug 2024 04:41) (18 - 18)  SpO2: 96% (26 Aug 2024 04:41) (94% - 98%)    Parameters below as of 26 Aug 2024 04:41  Patient On (Oxygen Delivery Method): room air        PHYSICAL EXAM:  GENERAL: resting comfortably in NAD  HEENT:  anicteric, moist mucous membranes  CHEST/LUNG:  CTA b/l, no rales, wheezes, or rhonchi,  chronic non-healing wound on manubrium sterni  HEART:  RRR, S1, S2  ABDOMEN: soft, nontender, nondistended  EXTREMITIES: no edema, cyanosis, or calf tenderness  NERVOUS SYSTEM: answers questions and follows commands appropriately      LABS:                        8.0    1.60  )-----------( 22       ( 26 Aug 2024 06:30 )             23.2     CBC Full  -  ( 26 Aug 2024 06:30 )  WBC Count : 1.60 K/uL  Hemoglobin : 8.0 g/dL  Hematocrit : 23.2 %  Platelet Count - Automated : 22 K/uL  Mean Cell Volume : 69.7 fl  Mean Cell Hemoglobin : 24.0 pg  Mean Cell Hemoglobin Concentration : 34.5 gm/dL  Auto Neutrophil # : 0.88 K/uL  Auto Lymphocyte # : 0.29 K/uL  Auto Monocyte # : 0.28 K/uL  Auto Eosinophil # : 0.00 K/uL  Auto Basophil # : 0.00 K/uL  Auto Neutrophil % : 55.0 %  Auto Lymphocyte % : 18.1 %  Auto Monocyte % : 17.5 %  Auto Eosinophil % : 0.0 %  Auto Basophil % : 0.0 %    26 Aug 2024 06:30    140    |  109    |  22     ----------------------------<  96     4.0     |  26     |  1.60     Ca    8.5        26 Aug 2024 06:30    TPro  6.8    /  Alb  2.9    /  TBili  0.3    /  DBili  x      /  AST  36     /  ALT  30     /  AlkPhos  40     26 Aug 2024 06:30    PT/INR - ( 26 Aug 2024 06:30 )   PT: 21.9 sec;   INR: 1.96 ratio         PTT - ( 25 Aug 2024 08:37 )  PTT:92.9 sec  Urinalysis Basic - ( 26 Aug 2024 06:30 )    Color: x / Appearance: x / SG: x / pH: x  Gluc: 96 mg/dL / Ketone: x  / Bili: x / Urobili: x   Blood: x / Protein: x / Nitrite: x   Leuk Esterase: x / RBC: x / WBC x   Sq Epi: x / Non Sq Epi: x / Bacteria: x      CAPILLARY BLOOD GLUCOSE            Urinalysis with Rflx Culture (collected 08-24-24 @ 14:30)    Culture - Blood (collected 08-24-24 @ 11:50)  Source: .Blood Blood-Peripheral  Preliminary Report (08-25-24 @ 16:01):    No growth at 24 hours    Culture - Blood (collected 08-24-24 @ 11:45)  Source: .Blood Blood-Peripheral  Preliminary Report (08-25-24 @ 16:01):    No growth at 24 hours        Consultant(s) Notes Reviewed:  [x] YES  [ ] NO Patient is a 53y old  Male who presents with a chief complaint of fever (25 Aug 2024 12:37)      Subjective:  INTERVAL HPI/OVERNIGHT EVENTS: Patient seen and examined at bedside, resting comfortably this morning. Pt Hgb 6.8, s/p 1u pRBC; transfused additional 1u pRBc over night. Continues to endorse minimal epistaxis, although much improved compared to admission; otherwise no complaints at this time. Denies fevers, chills, headache, lightheadedness, chest pain, dyspnea, abdominal pain, n/v/d/c.    MEDICATIONS  (STANDING):  bacitracin   Ointment 1 Application(s) Topical daily  cefepime   IVPB 2000 milliGRAM(s) IV Intermittent every 12 hours  chlorhexidine 0.12% Liquid 15 milliLiter(s) Swish and Spit two times a day  filgrastim-sndz (ZARXIO) Injectable 480 MICROGram(s) SubCutaneous daily  folic acid 1 milliGRAM(s) Oral daily  lactated ringers. 1000 milliLiter(s) (100 mL/Hr) IV Continuous <Continuous>  warfarin 5 milliGRAM(s) Oral once    MEDICATIONS  (PRN):  acetaminophen     Tablet .. 650 milliGRAM(s) Oral every 6 hours PRN Temp greater or equal to 38C (100.4F), Moderate Pain (4 - 6)  metoclopramide 10 milliGRAM(s) Oral every 6 hours PRN for nausea  sodium chloride 0.65% Nasal 1 Spray(s) Both Nostrils three times a day PRN Nasal Congestion      Allergies    Animal dander-Cat (Other)  No Known Drug Allergies    Intolerances        REVIEW OF SYSTEMS:  CONSTITUTIONAL: No fever or chills  HEENT:  +minimal epistaxis, No headache, no sore throat  RESPIRATORY: No cough, wheezing, or shortness of breath  CARDIOVASCULAR: No chest pain, palpitations  GASTROINTESTINAL: No abd pain, nausea, vomiting, or diarrhea  GENITOURINARY: No dysuria, frequency, or hematuria  NEUROLOGICAL: no focal weakness or dizziness  MUSCULOSKELETAL: no myalgias     Objective:  Vital Signs Last 24 Hrs  T(C): 37.6 (26 Aug 2024 04:41), Max: 37.9 (25 Aug 2024 21:12)  T(F): 99.7 (26 Aug 2024 04:41), Max: 100.2 (25 Aug 2024 21:12)  HR: 80 (26 Aug 2024 04:41) (68 - 88)  BP: 118/53 (26 Aug 2024 04:41) (117/68 - 130/73)  BP(mean): --  RR: 18 (26 Aug 2024 04:41) (18 - 18)  SpO2: 96% (26 Aug 2024 04:41) (94% - 98%)    Parameters below as of 26 Aug 2024 04:41  Patient On (Oxygen Delivery Method): room air        PHYSICAL EXAM:  GENERAL: resting comfortably in NAD  HEENT:  anicteric, moist mucous membranes  CHEST/LUNG:  CTA b/l, no rales, wheezes, or rhonchi,  chronic non-healing wound on manubrium sterni  HEART:  RRR, S1, S2  ABDOMEN: soft, nontender, nondistended  EXTREMITIES: no edema, cyanosis, or calf tenderness  NERVOUS SYSTEM: answers questions and follows commands appropriately      LABS:                        8.0    1.60  )-----------( 22       ( 26 Aug 2024 06:30 )             23.2     CBC Full  -  ( 26 Aug 2024 06:30 )  WBC Count : 1.60 K/uL  Hemoglobin : 8.0 g/dL  Hematocrit : 23.2 %  Platelet Count - Automated : 22 K/uL  Mean Cell Volume : 69.7 fl  Mean Cell Hemoglobin : 24.0 pg  Mean Cell Hemoglobin Concentration : 34.5 gm/dL  Auto Neutrophil # : 0.88 K/uL  Auto Lymphocyte # : 0.29 K/uL  Auto Monocyte # : 0.28 K/uL  Auto Eosinophil # : 0.00 K/uL  Auto Basophil # : 0.00 K/uL  Auto Neutrophil % : 55.0 %  Auto Lymphocyte % : 18.1 %  Auto Monocyte % : 17.5 %  Auto Eosinophil % : 0.0 %  Auto Basophil % : 0.0 %    26 Aug 2024 06:30    140    |  109    |  22     ----------------------------<  96     4.0     |  26     |  1.60     Ca    8.5        26 Aug 2024 06:30    TPro  6.8    /  Alb  2.9    /  TBili  0.3    /  DBili  x      /  AST  36     /  ALT  30     /  AlkPhos  40     26 Aug 2024 06:30    PT/INR - ( 26 Aug 2024 06:30 )   PT: 21.9 sec;   INR: 1.96 ratio         PTT - ( 25 Aug 2024 08:37 )  PTT:92.9 sec  Urinalysis Basic - ( 26 Aug 2024 06:30 )    Color: x / Appearance: x / SG: x / pH: x  Gluc: 96 mg/dL / Ketone: x  / Bili: x / Urobili: x   Blood: x / Protein: x / Nitrite: x   Leuk Esterase: x / RBC: x / WBC x   Sq Epi: x / Non Sq Epi: x / Bacteria: x      CAPILLARY BLOOD GLUCOSE            Urinalysis with Rflx Culture (collected 08-24-24 @ 14:30)    Culture - Blood (collected 08-24-24 @ 11:50)  Source: .Blood Blood-Peripheral  Preliminary Report (08-25-24 @ 16:01):    No growth at 24 hours    Culture - Blood (collected 08-24-24 @ 11:45)  Source: .Blood Blood-Peripheral  Preliminary Report (08-25-24 @ 16:01):    No growth at 24 hours        Consultant(s) Notes Reviewed:  [x] YES  [ ] NO

## 2024-08-26 NOTE — CHART NOTE - NSCHARTNOTEFT_GEN_A_CORE
Called by RN, as there was a mismatch in patient name between blood bank and EMR (first and last in blood bank, first middle last in EMR); d/c-ed previous pRBC order and reorder 1u pRBCs after name changed in sunrise to match blood bank.

## 2024-08-26 NOTE — PROGRESS NOTE ADULT - PROBLEM SELECTOR PLAN 7
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

## 2024-08-26 NOTE — PROGRESS NOTE ADULT - PROBLEM SELECTOR PLAN 6
Skin sore on sternum, previous biopsy point for sarcoidosis   - Topical bacitracin    - dressing change to keep clean as needed

## 2024-08-26 NOTE — DISCHARGE NOTE PROVIDER - NSDCFUADDAPPT_GEN_ALL_CORE_FT
santyl collagenase, gauze qd  May need to consider excisional bx  f/u 1 wk at [facility].  for chest wounfd  - santyl collagenase, gauze qd  May need to consider excisional bx  f/u 1 wk at [facility].  fu up dr jay  9am tuesday for your appointment.

## 2024-08-26 NOTE — DISCHARGE NOTE PROVIDER - NSDCMRMEDTOKEN_GEN_ALL_CORE_FT
Coumadin 5 mg oral tablet: 1 tab(s) orally  folic acid 1 mg oral tablet: 1 tab(s) orally once a day  metoclopramide 10 mg oral tablet: 1 tab(s) orally every 6 hours as needed for  nausea take 1 tablet every 6 hours AS NEEDED for nausea  Tagrisso 80 mg oral tablet: 160 milligram(s) orally once a day (at bedtime)   bacitracin 500 units/g topical ointment: 1 Apply topically to affected area once a day  cephalexin 500 mg oral capsule: 1 cap(s) orally 4 times a day  collagenase 250 units/g topical ointment: 1 Apply topically to affected area once a day  Coumadin 5 mg oral tablet: 1 tab(s) orally  folic acid 1 mg oral tablet: 1 tab(s) orally once a day  metoclopramide 10 mg oral tablet: 1 tab(s) orally every 6 hours as needed for  nausea take 1 tablet every 6 hours AS NEEDED for nausea  Tagrisso 80 mg oral tablet: 160 milligram(s) orally once a day (at bedtime)   bacitracin 500 units/g topical ointment: Apply topically to affected area once a day Apply to chest/anterior neck wound  cephalexin 500 mg oral capsule: 1 cap(s) orally 4 times a day  collagenase 250 units/g topical ointment: Apply topically to affected area once a day  Coumadin 5 mg oral tablet: 1 tab(s) orally  folic acid 1 mg oral tablet: 1 tab(s) orally once a day  metoclopramide 10 mg oral tablet: 1 tab(s) orally every 6 hours as needed for  nausea take 1 tablet every 6 hours AS NEEDED for nausea  Tagrisso 80 mg oral tablet: 160 milligram(s) orally once a day (at bedtime)

## 2024-08-26 NOTE — PHARMACOTHERAPY INTERVENTION NOTE - OUTCOME
Preventive Health Recommendations  Male Ages 50 - 64    Yearly exam:             See your health care provider every year in order to  o   Review health changes.   o   Discuss preventive care.    o   Review your medicines if your doctor has prescribed any.     Have a cholesterol test every 5 years, or more frequently if you are at risk for high cholesterol/heart disease.     Have a diabetes test (fasting glucose) every three years. If you are at risk for diabetes, you should have this test more often.     Have a colonoscopy at age 50, or have a yearly FIT test (stool test). These exams will check for colon cancer.      Talk with your health care provider about whether or not a prostate cancer screening test (PSA) is right for you.    You should be tested each year for STDs (sexually transmitted diseases), if you re at risk.     Shots: Get a flu shot each year. Get a tetanus shot every 10 years.     Nutrition:    Eat at least 5 servings of fruits and vegetables daily.     Eat whole-grain bread, whole-wheat pasta and brown rice instead of white grains and rice.     Get adequate Calcium and Vitamin D.     Lifestyle    Exercise for at least 150 minutes a week (30 minutes a day, 5 days a week). This will help you control your weight and prevent disease.     Limit alcohol to one drink per day.     No smoking.     Wear sunscreen to prevent skin cancer.     See your dentist every six months for an exam and cleaning.     See your eye doctor every 1 to 2 years.     accepted

## 2024-08-27 LAB
ALBUMIN SERPL ELPH-MCNC: 3.1 G/DL — LOW (ref 3.3–5)
ALP SERPL-CCNC: 44 U/L — SIGNIFICANT CHANGE UP (ref 40–120)
ALT FLD-CCNC: 39 U/L — SIGNIFICANT CHANGE UP (ref 12–78)
ANION GAP SERPL CALC-SCNC: 6 MMOL/L — SIGNIFICANT CHANGE UP (ref 5–17)
AST SERPL-CCNC: 42 U/L — HIGH (ref 15–37)
BASOPHILS # BLD AUTO: 0 K/UL — SIGNIFICANT CHANGE UP (ref 0–0.2)
BASOPHILS NFR BLD AUTO: 0 % — SIGNIFICANT CHANGE UP (ref 0–2)
BILIRUB SERPL-MCNC: 0.3 MG/DL — SIGNIFICANT CHANGE UP (ref 0.2–1.2)
BUN SERPL-MCNC: 20 MG/DL — SIGNIFICANT CHANGE UP (ref 7–23)
CALCIUM SERPL-MCNC: 9.3 MG/DL — SIGNIFICANT CHANGE UP (ref 8.5–10.1)
CHLORIDE SERPL-SCNC: 108 MMOL/L — SIGNIFICANT CHANGE UP (ref 96–108)
CO2 SERPL-SCNC: 28 MMOL/L — SIGNIFICANT CHANGE UP (ref 22–31)
CREAT SERPL-MCNC: 1.6 MG/DL — HIGH (ref 0.5–1.3)
EGFR: 52 ML/MIN/1.73M2 — LOW
EOSINOPHIL # BLD AUTO: 0 K/UL — SIGNIFICANT CHANGE UP (ref 0–0.5)
EOSINOPHIL NFR BLD AUTO: 0 % — SIGNIFICANT CHANGE UP (ref 0–6)
FOLATE RBC-MCNC: 1446 NG/ML — SIGNIFICANT CHANGE UP (ref 499–1504)
FOLATE SERPL-MCNC: >20 NG/ML — SIGNIFICANT CHANGE UP
GLUCOSE SERPL-MCNC: 97 MG/DL — SIGNIFICANT CHANGE UP (ref 70–99)
HCT VFR BLD CALC: 24.2 % — LOW (ref 39–50)
HCT VFR BLD CALC: 24.2 % — LOW (ref 39–50)
HGB BLD-MCNC: 8.3 G/DL — LOW (ref 13–17)
INR BLD: 1.83 RATIO — HIGH (ref 0.85–1.18)
LYMPHOCYTES # BLD AUTO: 0.3 K/UL — LOW (ref 1–3.3)
LYMPHOCYTES # BLD AUTO: 9 % — LOW (ref 13–44)
MAGNESIUM SERPL-MCNC: 1.5 MG/DL — LOW (ref 1.6–2.6)
MCHC RBC-ENTMCNC: 24.1 PG — LOW (ref 27–34)
MCHC RBC-ENTMCNC: 34.3 GM/DL — SIGNIFICANT CHANGE UP (ref 32–36)
MCV RBC AUTO: 70.1 FL — LOW (ref 80–100)
MONOCYTES # BLD AUTO: 0.34 K/UL — SIGNIFICANT CHANGE UP (ref 0–0.9)
MONOCYTES NFR BLD AUTO: 10 % — SIGNIFICANT CHANGE UP (ref 2–14)
NEUTROPHILS # BLD AUTO: 2.72 K/UL — SIGNIFICANT CHANGE UP (ref 1.8–7.4)
NEUTROPHILS NFR BLD AUTO: 75 % — SIGNIFICANT CHANGE UP (ref 43–77)
NRBC # BLD: SIGNIFICANT CHANGE UP /100 WBCS (ref 0–0)
PHOSPHATE SERPL-MCNC: 2.4 MG/DL — LOW (ref 2.5–4.5)
PLATELET # BLD AUTO: 15 K/UL — CRITICAL LOW (ref 150–400)
POTASSIUM SERPL-MCNC: 3.9 MMOL/L — SIGNIFICANT CHANGE UP (ref 3.5–5.3)
POTASSIUM SERPL-SCNC: 3.9 MMOL/L — SIGNIFICANT CHANGE UP (ref 3.5–5.3)
PROT SERPL-MCNC: 7.3 G/DL — SIGNIFICANT CHANGE UP (ref 6–8.3)
PROTHROM AB SERPL-ACNC: 20.5 SEC — HIGH (ref 9.5–13)
RBC # BLD: 3.45 M/UL — LOW (ref 4.2–5.8)
RBC # FLD: 25.6 % — HIGH (ref 10.3–14.5)
SODIUM SERPL-SCNC: 142 MMOL/L — SIGNIFICANT CHANGE UP (ref 135–145)
VIT B12 SERPL-MCNC: 787 PG/ML — SIGNIFICANT CHANGE UP (ref 232–1245)
WBC # BLD: 3.36 K/UL — LOW (ref 3.8–10.5)
WBC # FLD AUTO: 3.36 K/UL — LOW (ref 3.8–10.5)

## 2024-08-27 PROCEDURE — 99233 SBSQ HOSP IP/OBS HIGH 50: CPT | Mod: GC

## 2024-08-27 RX ORDER — POTASSIUM PHOSPHATE 236; 224 MG/ML; MG/ML
15 INJECTION, SOLUTION INTRAVENOUS ONCE
Refills: 0 | Status: COMPLETED | OUTPATIENT
Start: 2024-08-27 | End: 2024-08-27

## 2024-08-27 RX ADMIN — CEFEPIME 100 MILLIGRAM(S): 2 INJECTION, POWDER, FOR SOLUTION INTRAVENOUS at 00:22

## 2024-08-27 RX ADMIN — Medication 500000 UNIT(S): at 23:50

## 2024-08-27 RX ADMIN — Medication 25 GRAM(S): at 13:15

## 2024-08-27 RX ADMIN — FILGRASTIM 480 MICROGRAM(S): 300 INJECTION, SOLUTION INTRAVENOUS; SUBCUTANEOUS at 12:19

## 2024-08-27 RX ADMIN — Medication 500000 UNIT(S): at 00:22

## 2024-08-27 RX ADMIN — CHLORHEXIDINE GLUCONATE 15 MILLILITER(S): 40 SOLUTION TOPICAL at 06:13

## 2024-08-27 RX ADMIN — POTASSIUM PHOSPHATE 62.5 MILLIMOLE(S): 236; 224 INJECTION, SOLUTION INTRAVENOUS at 14:53

## 2024-08-27 RX ADMIN — Medication 1 MILLIGRAM(S): at 21:51

## 2024-08-27 RX ADMIN — Medication 500000 UNIT(S): at 06:12

## 2024-08-27 RX ADMIN — Medication 500000 UNIT(S): at 17:03

## 2024-08-27 RX ADMIN — Medication 500000 UNIT(S): at 12:19

## 2024-08-27 RX ADMIN — Medication 1 APPLICATION(S): at 12:19

## 2024-08-27 RX ADMIN — CHLORHEXIDINE GLUCONATE 15 MILLILITER(S): 40 SOLUTION TOPICAL at 17:03

## 2024-08-27 RX ADMIN — CEFEPIME 100 MILLIGRAM(S): 2 INJECTION, POWDER, FOR SOLUTION INTRAVENOUS at 23:49

## 2024-08-27 RX ADMIN — CEFEPIME 100 MILLIGRAM(S): 2 INJECTION, POWDER, FOR SOLUTION INTRAVENOUS at 12:18

## 2024-08-27 NOTE — PROGRESS NOTE ADULT - ASSESSMENT
54 yo w hx Thalassemia minor, sarcoidosis, PE/DVT 2002 post IVC filter on Coumadin, Lupus Anticoagulant w prolonged PTT, met adenoca of right lung w brain mets since 2021(followed by Dr Rachna Scott [place] and Rad Onc  Dr Boudreaux, Heme Dr Art. presented 2/2021 RUL lung mass w add nodule and med/hilar LADs, 6/2021 Rt supraclav LN bx met adenocarcinoma EGFR L858F mutation, also found w multi brain mets, started on Tagrisso, serial scans rel stable/NC, 10/2023 gamma RT knife to brain met for POD 6/11/24 added Carbo/Alimta to Tagrisso based on NGS after repeat bx right supracalv LN 4/2024)  Last chemo (C#4, planned last one) 8/13, cbc at that time wbc 2.19 hgb 7.2 mcv 63.2 plts 91 and also given 1U PRBC  Pt reports never given GCSF support, CBC each cycle seem worse  Traveled to [place] last 2 days and noted intermittent fever, day prior to adm also nose bleed seen at [place] ER, told Hgb 5.7 given 1u PRBC, bleed also stopped  Day of adm T 101.5, called his Onc and told to come to ER  ED COURSE:  Vitals: T 98.6   F , 90 HR  ,  /76 , 18 RR  , SpO2  99% on RA  Labs significant for: 0.54 wbc, hgb 7.1, plts 14, cr 2.5, egfr 30 INR 3.9 .2  UA 30protein neg Leuk, 3WBC 1RBC  Pt admits to incr bruise today, no nausea, vomit, SOB. Does have decr appetite w more cycle of chemo  Started on IV Cefepime, post Vanco  -------------------------  -met adenocarcinoma right lung w right supraclav and bran mets since 2021, under care Dr Briscoe, has been on Tagrisso, post gamma knife, post C#4 Tagrisso/Carbo/Alimta 8/13/24, adm w neutropenic fever, pancytopenia  -pancytopenia due to chemo effect  -post tx 2u PRBC and 1U plts 8/25  -CBC today stable, WBC increasing today 3.36 showing response, continue Zarxio  -continue monitor serial CBC  -continue abx as per ID  -cultures negative to date    -Lupus Anticoagulant, hx PE/DVT post IVC filter and on coumadin  -supratherapeutic INR on adm due to acute illness  -restarted on Coumadin, monitor serial INR and signs of excessvie bleeds carefully as pt still thrombocytopenic    -thalassemia minor-likely reason for low MCV and RBC higher than expective for degree of anemia. No specific intervention needed

## 2024-08-27 NOTE — PROGRESS NOTE ADULT - PROBLEM SELECTOR PLAN 5
Met. Adenocarcinoma right lung w right supraclav and bran mets since 2021   (under care of Dr. Briscoe)   - on home Tagrisso   - S/P gamma knife   - S/P chemo (round 4), C#4 Tagrisso/Carbo/Alimta 8/13/24   - continue home folic acid  - cont to hold home tagrisso  - Heme/onc (Dr. Encarnacion) -> oncology recs appreciated

## 2024-08-27 NOTE — PROGRESS NOTE ADULT - PROBLEM SELECTOR PLAN 3
Pt C/O in pm of bleeding per nose with blowing; pt reporting improvement and decreased frequency  - likely 2/2 low plt and elevated INR  - precautions readdressed  - PRN nasal saline spray

## 2024-08-27 NOTE — PROGRESS NOTE ADULT - SUBJECTIVE AND OBJECTIVE BOX
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 For his non-small cell lung cancer currently undergoing chemotherapy, had last chemo (round 3) on August 13, during which he received 1 unit of blood due to low numbers. Denies any cough/URI.  No acute shortness of breath.  No acute chest pain.  Patient was seen in ER yesterday in [place] after having a nosebleed.  The nosebleed was controlled, but the patient had a hemoglobin of 5.5.  The patient received 1 unit of blood.  No further bleeding since that time.  The patient called his doctor today, was told to go to the ER for the fever. Patient had a fever of 101.5 this morning at home.Patient did have a negative RVP yesterday at the outside ER. No abdominal pain.  No vomiting or diarrhea.  No dysuria/hematuria. Complains of painful sore on chest which is new where he had a past biopsy for his sarcoidosis, now mostly resolved.   Has not seeen nephrologist on the past.  NO N/V/SOB.  He is urinating without issue.     NO N/V/SOB    PAST MEDICAL & SURGICAL HISTORY:  Antiphospholipid syndrome      Pulmonary embolism      Lung cancer      Sarcoidosis      Thalassemia minor      Calculus of gallbladder without cholecystitis without obstruction      S/P IVC filter      S/P shoulder surgery           FAMILY HISTORY:  FH: HTN (hypertension) (Father)    FH: thyroid disease (Mother, Sibling)    NC    Social History:Non smoker    MEDICATIONS  (STANDING):  filgrastim-sndz (ZARXIO) Injectable 480 MICROGram(s) SubCutaneous daily  folic acid 1 milliGRAM(s) Oral daily  sodium chloride 0.9%. 1000 milliLiter(s) (75 mL/Hr) IV Continuous <Continuous>    MEDICATIONS  (PRN):  metoclopramide 10 milliGRAM(s) Oral every 6 hours PRN for nausea   Meds reviewed    Allergies    Animal dander-Cat (Other)  No Known Drug Allergies    Intolerances         REVIEW OF SYSTEMS:    as above    ICU Vital Signs Last 24 Hrs  T(C): 37.2 (27 Aug 2024 12:02), Max: 37.2 (27 Aug 2024 12:02)  T(F): 99 (27 Aug 2024 12:02), Max: 99 (27 Aug 2024 12:02)  HR: 76 (27 Aug 2024 12:02) (76 - 78)  BP: 109/62 (27 Aug 2024 12:02) (109/62 - 120/64)  BP(mean): --  ABP: --  ABP(mean): --  RR: 18 (27 Aug 2024 12:02) (17 - 18)  SpO2: 97% (27 Aug 2024 12:02) (94% - 97%)    O2 Parameters below as of 27 Aug 2024 12:02  Patient On (Oxygen Delivery Method): room air                PHYSICAL EXAM:    GENERAL: NAD  HEAD:  Atraumatic, Normocephalic  EYES: EOMI, conjunctiva and sclera clear  ENMT: No Drainage from nares, No drainage from ears  NERVOUS SYSTEM:  Awake and Alert  SKIN: No rashes No obvious ecchymosis      LABS:                                   8.3    3.36  )-----------( 15       ( 27 Aug 2024 08:20 )             24.2     08-27    142  |  108  |  20  ----------------------------<  97  3.9   |  28  |  1.60<H>    Ca    9.3      27 Aug 2024 08:20  Phos  2.4     08-27  Mg     1.5     08-27    TPro  7.3  /  Alb  3.1<L>  /  TBili  0.3  /  DBili  x   /  AST  42<H>  /  ALT  39  /  AlkPhos  44  08-27    PT/INR - ( 27 Aug 2024 08:20 )   PT: 20.5 sec;   INR: 1.83 ratio           Urinalysis Basic - ( 27 Aug 2024 08:20 )    Color: x / Appearance: x / SG: x / pH: x  Gluc: 97 mg/dL / Ketone: x  / Bili: x / Urobili: x   Blood: x / Protein: x / Nitrite: x   Leuk Esterase: x / RBC: x / WBC x   Sq Epi: x / Non Sq Epi: x / Bacteria: x

## 2024-08-27 NOTE — PROGRESS NOTE ADULT - PROBLEM SELECTOR PLAN 4
Cr 2.5 from baseline of 1.2 2/2 chemo w carboplatin   - on continuous IVF LR@100cc/hr x 24 hours.     - Cr 1.6 this am, plateaued  - cont monitoring lytes/BUN/Cr  - Nephro following, recs appreciated

## 2024-08-27 NOTE — PROGRESS NOTE ADULT - SUBJECTIVE AND OBJECTIVE BOX
All interim records and events noted.    feeling well  nose bleed when gagged on nystatin, quickly stoped      MEDICATIONS  (STANDING):  bacitracin   Ointment 1 Application(s) Topical daily  cefepime   IVPB 2000 milliGRAM(s) IV Intermittent every 12 hours  chlorhexidine 0.12% Liquid 15 milliLiter(s) Swish and Spit two times a day  filgrastim-sndz (ZARXIO) Injectable 480 MICROGram(s) SubCutaneous daily  folic acid 1 milliGRAM(s) Oral daily  lactated ringers. 1000 milliLiter(s) (100 mL/Hr) IV Continuous <Continuous>  nystatin    Suspension 062405 Unit(s) Oral every 6 hours  potassium phosphate IVPB 15 milliMole(s) IV Intermittent once    MEDICATIONS  (PRN):  acetaminophen     Tablet .. 650 milliGRAM(s) Oral every 6 hours PRN Temp greater or equal to 38C (100.4F), Moderate Pain (4 - 6)  metoclopramide 10 milliGRAM(s) Oral every 6 hours PRN for nausea  sodium chloride 0.65% Nasal 1 Spray(s) Both Nostrils three times a day PRN Nasal Congestion      Vital Signs Last 24 Hrs  T(C): 37.2 (27 Aug 2024 12:02), Max: 37.2 (27 Aug 2024 12:02)  T(F): 99 (27 Aug 2024 12:02), Max: 99 (27 Aug 2024 12:02)  HR: 76 (27 Aug 2024 12:02) (76 - 78)  BP: 109/62 (27 Aug 2024 12:02) (109/62 - 120/64)  BP(mean): --  RR: 18 (27 Aug 2024 12:02) (17 - 18)  SpO2: 97% (27 Aug 2024 12:02) (94% - 97%)    Parameters below as of 27 Aug 2024 12:02  Patient On (Oxygen Delivery Method): room air        PHYSICAL EXAM  General:  in no acute distress  Head: atraumatic, normocephalic  ENT: sclera anicteric, buccal mucosa moist  Neck: supple, trachea midline  CV: S1 S2, regular rate and rhythm  Lungs: clear to auscultation, no wheezes/rhonchi  Abdomen: soft, nontender, bowel sounds present, no palpable masses  Extrem: no clubbing/cyanosis/edema  Skin: no significant increased ecchymosis/petechiae  Neuro: alert and oriented X3,  no focal deficits      LABS:             8.3    3.36  )-----------( 15       ( 08-27 @ 08:20 )             24.2                8.0    1.60  )-----------( 22       ( 08-26 @ 06:30 )             23.2                6.8    x     )-----------( x        ( 08-25 @ 22:00 )             19.8                6.5    0.98  )-----------( 6        ( 08-25 @ 08:37 )             18.9       08-27    142  |  108  |  20  ----------------------------<  97  3.9   |  28  |  1.60<H>    Ca    9.3      27 Aug 2024 08:20  Phos  2.4     08-27  Mg     1.5     08-27    TPro  7.3  /  Alb  3.1<L>  /  TBili  0.3  /  DBili  x   /  AST  42<H>  /  ALT  39  /  AlkPhos  44  08-27 08-27 @ 08:20  PT20.5 INR1.83  PTT--  08-26 @ 06:30  PT21.9 INR1.96  PTT--  08-25 @ 08:37  PT34.6 INR3.14  PTT92.9  08-24 @ 11:45  PT42.7 INR3.90  SDW211.2      RADIOLOGY & ADDITIONAL STUDIES:    IMPRESSION/RECOMMENDATIONS:

## 2024-08-27 NOTE — PROGRESS NOTE ADULT - ASSESSMENT
JORDAN on CKD 2  Neutropenic Fever  Anemia  NSCLC    -Baseline Creatinine 1.2?  -JORDAN Likely multifactorial decreased EABV + Carboplatin chemo, now improving   -Urine studies reviewed   -No indication for dialysis at this time  -S/p PRBCs per primary/heme  -Creatinine improving  -DC IVF

## 2024-08-27 NOTE — PROGRESS NOTE ADULT - SUBJECTIVE AND OBJECTIVE BOX
TIFFANY ESCAMILLA is a 53yMale , patient examined and chart reviewed.    INTERVAL HPI/ OVERNIGHT EVENTS:   Afebrile. Doing well.  Feeling better.    PAST MEDICAL & SURGICAL HISTORY:  Antiphospholipid syndrome  Pulmonary embolism  Lung cancer  Sarcoidosis  Thalassemia minor  Calculus of gallbladder without cholecystitis without obstruction  S/P IVC filter  S/P shoulder surgery      For details regarding the patient's social history, family history, and other miscellaneous elements, please refer the initial infectious diseases consultation and/or the admitting history and physical examination for this admission.    ROS:  CONSTITUTIONAL: no fever no  chills  EYES:  Negative  blurry vision or double vision  CARDIOVASCULAR:  Negative for chest pain or palpitations  RESPIRATORY:  Negative for cough, wheezing, or SOB   GASTROINTESTINAL:  Negative for nausea, vomiting, diarrhea, constipation, or abdominal pain  GENITOURINARY:  Negative frequency, urgency or dysuria  NEUROLOGIC:  No headache, confusion, dizziness, lightheadedness  All other systems were reviewed and are negative     Animal dander-Cat (Other)  No Known Drug Allergies      Current inpatient medications :    ANTIBIOTICS/RELEVANT:  cefepime   IVPB 2000 milliGRAM(s) IV Intermittent every 12 hours    MEDICATIONS  (STANDING):  bacitracin   Ointment 1 Application(s) Topical daily  chlorhexidine 0.12% Liquid 15 milliLiter(s) Swish and Spit two times a day  filgrastim-sndz (ZARXIO) Injectable 480 MICROGram(s) SubCutaneous daily  folic acid 1 milliGRAM(s) Oral daily  nystatin    Suspension 518145 Unit(s) Oral every 6 hours    MEDICATIONS  (PRN):  acetaminophen     Tablet .. 650 milliGRAM(s) Oral every 6 hours PRN Temp greater or equal to 38C (100.4F), Moderate Pain (4 - 6)  metoclopramide 10 milliGRAM(s) Oral every 6 hours PRN for nausea  sodium chloride 0.65% Nasal 1 Spray(s) Both Nostrils three times a day PRN Nasal Congestion        Objective:  Vital Signs Last 24 Hrs  T(C): 36.7 (27 Aug 2024 20:08), Max: 37.2 (27 Aug 2024 12:02)  T(F): 98 (27 Aug 2024 20:08), Max: 99 (27 Aug 2024 12:02)  HR: 77 (27 Aug 2024 20:08) (76 - 78)  BP: 123/71 (27 Aug 2024 20:08) (109/62 - 123/71)  RR: 18 (27 Aug 2024 20:08) (17 - 18)  SpO2: 97% (27 Aug 2024 20:08) (96% - 97%)    Parameters below as of 27 Aug 2024 20:08  Patient On (Oxygen Delivery Method): room air      Physical Exam:  General: in no acute distress  Neck: supple, trachea midline  Lungs: clear, no wheeze/rhonchi  Cardiovascular: regular rate and rhythm, S1 S2  Abdomen: soft, nontender,  bowel sounds normal  Neurological: alert and oriented x3  Skin: no rash chest wound with erythema c/d/i  Extremities: no cyanosis/clubbing/edema        LABS:                        8.3    3.36  )-----------( 15       ( 27 Aug 2024 08:20 )             24.2   08-27    142  |  108  |  20  ----------------------------<  97  3.9   |  28  |  1.60<H>    Ca    9.3      27 Aug 2024 08:20  Phos  2.4     08  Mg     1.5     08    TPro  7.3  /  Alb  3.1<L>  /  TBili  0.3  /  DBili  x   /  AST  42<H>  /  ALT  39  /  AlkPhos  44  08-27    MICROBIOLOGY:    Culture - Urine (collected 25 Aug 2024 13:42)  Source: Clean Catch Clean Catch (Midstream)  Final Report (26 Aug 2024 13:38):    No growth    Culture - Blood (collected 24 Aug 2024 11:50)  Source: .Blood Blood-Peripheral  Preliminary Report (25 Aug 2024 16:01):    No growth at 24 hours    Culture - Blood (collected 24 Aug 2024 11:45)  Source: .Blood Blood-Peripheral  Preliminary Report (25 Aug 2024 16:01):    No growth at 24 hours    RADIOLOGY & ADDITIONAL STUDIES:    ACC: [id number] EXAM:  CT CHEST   ORDERED BY: INDIRA HARRINGTON     PROCEDURE DATE:  2024          INTERPRETATION:  .  Patient: TIFFANY ESCAMILLA  : 1970  MRN: [id number]  ACC: [id number]  Order Date: 2024 12:35 PM  Exam: CT CHEST    INDICATION: neutropenic fever  TECHNIQUE: Unenhanced CT of the chest. Coronal, sagittal, and MIP images   were reconstructed and reviewed.  COMPARISON: 2024 chest CT.    FINDINGS:    AIRWAYS, LUNGS, PLEURA: Bronchial wall thickening. Stable 1 cm nodule and   obliteration of subsegmental bronchus within posterior right upper lobe   (series 302 image 95). Stable 0.2 cm nodule within periphery of the right   apex (series 302 image 70). Stable linear scarring within the right upper   lobe and lingula. The lungs are otherwise clear. No pleural effusion.    LYMPH NODES, MEDIASTINUM: Stable multiple subcentimeter left   supraclavicular lymph nodes.    HEART, VESSELS: Cardiomegaly. Low attenuation of blood pool representing   anemia. Coronary calcification. No pericardial effusion. Thoracic aorta   normal in diameter.    UPPER ABDOMEN: Cholecystectomy.    CHEST WALL, BONES: No aggressive osseous lesion. Mild skin thickening and   subcutaneous infiltrates within mid anterior upper chest dickson new from   prior exam possibly representing cellulitis.    LOWER NECK: Within normal limits.    IMPRESSION:    No pneumonia.    Stable 1 cm endobronchial nodule within posterior right upper lobe   (series 302 image 95). Stable 0.2 cm nodule withinperiphery of the right   apex (series 302 image 70).    Mild skin thickening and subcutaneous infiltrates within mid anterior   upper chest wall is new from prior exam possibly representing cellulitis.    Assessment:  53-year-old male with ckd, anticardiolipin antibody,  lupus antibody, DVT/PE  on Coumadin, IVC filter, sarcoidosis, NSCLC presents with having some fever on and off over past 3 to 4 days  neutropenia fever WBC recovering   chest wall small dime size wound with cellulitis  yessica on ckd   anemia - transfusion   blood cx neg to date  cxr no pna  ua neg  Clinically improving    Plan:  Cont Cefepime  Can change to po Keflex x 5 days in am for dc  Trend temps and cbc  GCSF per onc  Pulm toileting  Increase activity  Stable from ID standpoint  Dc planning per primary team    Continue with present regiment.  Appropriate use of antibiotics and adverse effects reviewed.      I have discussed the above plan of care with patient in detail. He expressed understanding of the  treatment plan . Risks, benefits and alternatives discussed in detail. I have asked if he has any questions or concerns and appropriately addressed them to the best of my ability .    > 35 minutes were spent in direct patient care reviewing notes, medications ,labs data/ imaging , discussion with multidisciplinary team.    Thank you for allowing me to participate in care of your patient .    Mayra Reese MD  Infectious Disease  [phone]

## 2024-08-27 NOTE — PROGRESS NOTE ADULT - PROBLEM SELECTOR PLAN 2
All cell lines decreased on presentation 2/2 recent chemo session on 8/13   - WBC 0.54 -> 0.98 -> 1.6 -> 3.36  - Hb 6.5 -> 6.8 -> 8 -> 8.3  - Plt 14 -> 6 -> 22 -> 15    - Heme (Dr. Encarnacion) consulted, recs appreciated  - Type & screen ordered  - consented for transfusion of blood products, s/p 1U pRBC, 1U Plt, s/p additional 1u pRBC 8/25, Hgb 8.3 this AM  - Platelets this AM 15 -> cont to monitor, consider platelet transfusion if remain low  - cont to monitor CBC  - transfuse when Hgb <7, plts <10k

## 2024-08-27 NOTE — PROGRESS NOTE ADULT - PROBLEM SELECTOR PLAN 1
WBC 0.54 in ED and Tmax 101.4 2/2 recent chemo   - s/p vanc 1g and cefepime 2g in ED  - S/P: G-CSF   - WBC up to 1.6 this am (from 0.98)  - CXR 8/24/24: Linear opacity at the retrocardiac region likely atelectasis versus scarring. Patchy opacity right upper lobe.  - CT chest:  no pneumonia  - UA (+) protein, Bact., and Sq cell. F/U UCx   - BCx NGTD  - c/w IV cefepime  - continue IVF  LR@100cc/hr x 24 hours  - c/w G-CSF  - monitor temps and WBC  - ID and heme/onc consult, recs appreciated WBC 0.54 in ED and Tmax 101.4 2/2 recent chemo   - s/p vanc 1g and cefepime 2g in ED  - S/P: G-CSF   - WBC trend: 0.54-->0.98-->1.60-->3.36  - CXR 8/24/24: Linear opacity at the retrocardiac region likely atelectasis versus scarring. Patchy opacity right upper lobe.  - CT chest:  no pneumonia  - UA (+) protein, Bact., and Sq cell. UCx NG  - BCx NGTD  - c/w IV cefepime  - continue IVF  LR@100cc/hr x 24 hours  - c/w G-CSF  - monitor temps and WBC  - ID and heme/onc consult, recs appreciated

## 2024-08-27 NOTE — PROGRESS NOTE ADULT - PROBLEM SELECTOR PLAN 7
History of DVT with multiple PE ion 2002 on warfarin  - reports that his target INR is 2.5  - supra-therapeutic INR 3.9 on presentation, warfarin held  - INR this am 1.83 s/p warfarin 5mg x1 8/26 -> f/u heme/onc re: coumadin  - cont to trend INR and titrate with warfarin as needed to meet target of 2.5 History of DVT with multiple PE ion 2002 on warfarin  - reports that his target INR is 2.5  - supra-therapeutic INR 3.9 on presentation, warfarin held  - INR this am 1.83 s/p warfarin 5mg x1 8/26 -> f/u heme/onc re: coumadin  - Will hold coumadin in setting of significant thrombocytopenia

## 2024-08-27 NOTE — PROGRESS NOTE ADULT - SUBJECTIVE AND OBJECTIVE BOX
Patient is a 53y old  Male who presents with a chief complaint of fever (26 Aug 2024 23:29)      Subjective:  INTERVAL HPI/OVERNIGHT EVENTS: Patient seen and examined at bedside this morning, resting comfortably. No events overnight. Patient mentions one minor episode of epistaxis that resolved with minimal nasal compression; no other complaints this morning. Denies fevers, chills, headache, lightheadedness, chest pain, dyspnea, abdominal pain, n/v/d/c.      MEDICATIONS  (STANDING):  bacitracin   Ointment 1 Application(s) Topical daily  cefepime   IVPB 2000 milliGRAM(s) IV Intermittent every 12 hours  chlorhexidine 0.12% Liquid 15 milliLiter(s) Swish and Spit two times a day  filgrastim-sndz (ZARXIO) Injectable 480 MICROGram(s) SubCutaneous daily  folic acid 1 milliGRAM(s) Oral daily  lactated ringers. 1000 milliLiter(s) (100 mL/Hr) IV Continuous <Continuous>  nystatin    Suspension 642732 Unit(s) Oral every 6 hours    MEDICATIONS  (PRN):  acetaminophen     Tablet .. 650 milliGRAM(s) Oral every 6 hours PRN Temp greater or equal to 38C (100.4F), Moderate Pain (4 - 6)  metoclopramide 10 milliGRAM(s) Oral every 6 hours PRN for nausea  sodium chloride 0.65% Nasal 1 Spray(s) Both Nostrils three times a day PRN Nasal Congestion      Allergies    Animal dander-Cat (Other)  No Known Drug Allergies    Intolerances        REVIEW OF SYSTEMS:  CONSTITUTIONAL: No fever or chills  HEENT:  +minimal epistaxis, No headache, no sore throat  RESPIRATORY: No cough, wheezing, or shortness of breath  CARDIOVASCULAR: No chest pain, palpitations  GASTROINTESTINAL: No abd pain, nausea, vomiting, or diarrhea  GENITOURINARY: No dysuria, frequency, or hematuria  NEUROLOGICAL: no focal weakness or dizziness  MUSCULOSKELETAL: no myalgias       Objective:  Vital Signs Last 24 Hrs  T(C): 36.8 (27 Aug 2024 05:19), Max: 36.8 (26 Aug 2024 12:19)  T(F): 98.3 (27 Aug 2024 05:19), Max: 98.3 (27 Aug 2024 05:19)  HR: 78 (27 Aug 2024 05:19) (74 - 78)  BP: 112/60 (27 Aug 2024 05:19) (112/60 - 120/64)  BP(mean): --  RR: 17 (27 Aug 2024 05:19) (17 - 18)  SpO2: 96% (27 Aug 2024 05:19) (93% - 96%)    Parameters below as of 27 Aug 2024 05:19  Patient On (Oxygen Delivery Method): room air      PHYSICAL EXAM:  GENERAL: resting comfortably in NAD  HEENT:  anicteric, moist mucous membranes  CHEST/LUNG:  CTA b/l, no rales, wheezes, or rhonchi,  chronic non-healing wound on manubrium sterni  HEART:  RRR, S1, S2  ABDOMEN: soft, nontender, nondistended  EXTREMITIES: no edema, cyanosis, or calf tenderness  NERVOUS SYSTEM: answers questions and follows commands appropriately      LABS:                        8.3    3.36  )-----------( 15       ( 27 Aug 2024 08:20 )             24.2     CBC Full  -  ( 27 Aug 2024 08:20 )  WBC Count : 3.36 K/uL  Hemoglobin : 8.3 g/dL  Hematocrit : 24.2 %  Platelet Count - Automated : 15 K/uL  Mean Cell Volume : 70.1 fl  Mean Cell Hemoglobin : 24.1 pg  Mean Cell Hemoglobin Concentration : 34.3 gm/dL  Auto Neutrophil # : 2.72 K/uL  Auto Lymphocyte # : 0.30 K/uL  Auto Monocyte # : 0.34 K/uL  Auto Eosinophil # : 0.00 K/uL  Auto Basophil # : 0.00 K/uL  Auto Neutrophil % : 75.0 %  Auto Lymphocyte % : 9.0 %  Auto Monocyte % : 10.0 %  Auto Eosinophil % : 0.0 %  Auto Basophil % : 0.0 %    27 Aug 2024 08:20    142    |  108    |  20     ----------------------------<  97     3.9     |  28     |  1.60     Ca    9.3        27 Aug 2024 08:20  Phos  2.4       27 Aug 2024 08:20  Mg     1.5       27 Aug 2024 08:20    TPro  7.3    /  Alb  3.1    /  TBili  0.3    /  DBili  x      /  AST  42     /  ALT  39     /  AlkPhos  44     27 Aug 2024 08:20    PT/INR - ( 27 Aug 2024 08:20 )   PT: 20.5 sec;   INR: 1.83 ratio           Urinalysis Basic - ( 27 Aug 2024 08:20 )    Color: x / Appearance: x / SG: x / pH: x  Gluc: 97 mg/dL / Ketone: x  / Bili: x / Urobili: x   Blood: x / Protein: x / Nitrite: x   Leuk Esterase: x / RBC: x / WBC x   Sq Epi: x / Non Sq Epi: x / Bacteria: x      CAPILLARY BLOOD GLUCOSE            Culture - Urine (collected 08-25-24 @ 13:42)  Source: Clean Catch Clean Catch (Midstream)  Final Report (08-26-24 @ 13:38):    No growth    Urinalysis with Rflx Culture (collected 08-24-24 @ 14:30)    Culture - Blood (collected 08-24-24 @ 11:50)  Source: .Blood Blood-Peripheral  Preliminary Report (08-26-24 @ 16:01):    No growth at 48 Hours    Culture - Blood (collected 08-24-24 @ 11:45)  Source: .Blood Blood-Peripheral  Preliminary Report (08-26-24 @ 16:01):    No growth at 48 Hours          Consultant(s) Notes Reviewed:  [x] YES  [ ] NO

## 2024-08-28 DIAGNOSIS — S21.101A UNSPECIFIED OPEN WOUND OF RIGHT FRONT WALL OF THORAX WITHOUT PENETRATION INTO THORACIC CAVITY, INITIAL ENCOUNTER: ICD-10-CM

## 2024-08-28 LAB
ALBUMIN SERPL ELPH-MCNC: 3.1 G/DL — LOW (ref 3.3–5)
ALP SERPL-CCNC: 63 U/L — SIGNIFICANT CHANGE UP (ref 40–120)
ALT FLD-CCNC: 40 U/L — SIGNIFICANT CHANGE UP (ref 12–78)
ANION GAP SERPL CALC-SCNC: 6 MMOL/L — SIGNIFICANT CHANGE UP (ref 5–17)
AST SERPL-CCNC: 42 U/L — HIGH (ref 15–37)
BASOPHILS # BLD AUTO: 0.04 K/UL — SIGNIFICANT CHANGE UP (ref 0–0.2)
BASOPHILS NFR BLD AUTO: 0.6 % — SIGNIFICANT CHANGE UP (ref 0–2)
BILIRUB SERPL-MCNC: 0.5 MG/DL — SIGNIFICANT CHANGE UP (ref 0.2–1.2)
BUN SERPL-MCNC: 19 MG/DL — SIGNIFICANT CHANGE UP (ref 7–23)
CALCIUM SERPL-MCNC: 9.1 MG/DL — SIGNIFICANT CHANGE UP (ref 8.5–10.1)
CHLORIDE SERPL-SCNC: 109 MMOL/L — HIGH (ref 96–108)
CO2 SERPL-SCNC: 27 MMOL/L — SIGNIFICANT CHANGE UP (ref 22–31)
CREAT SERPL-MCNC: 1.7 MG/DL — HIGH (ref 0.5–1.3)
EGFR: 48 ML/MIN/1.73M2 — LOW
EOSINOPHIL # BLD AUTO: 0 K/UL — SIGNIFICANT CHANGE UP (ref 0–0.5)
EOSINOPHIL NFR BLD AUTO: 0 % — SIGNIFICANT CHANGE UP (ref 0–6)
GLUCOSE SERPL-MCNC: 105 MG/DL — HIGH (ref 70–99)
HCT VFR BLD CALC: 24.8 % — LOW (ref 39–50)
HCT VFR BLD CALC: 25.3 % — LOW (ref 39–50)
HGB BLD-MCNC: 8 G/DL — LOW (ref 13–17)
HGB BLD-MCNC: 8.2 G/DL — LOW (ref 13–17)
IMM GRANULOCYTES NFR BLD AUTO: 6.1 % — HIGH (ref 0–0.9)
INR BLD: 2.32 RATIO — HIGH (ref 0.85–1.18)
LYMPHOCYTES # BLD AUTO: 0.48 K/UL — LOW (ref 1–3.3)
LYMPHOCYTES # BLD AUTO: 7.8 % — LOW (ref 13–44)
MAGNESIUM SERPL-MCNC: 1.6 MG/DL — SIGNIFICANT CHANGE UP (ref 1.6–2.6)
MCHC RBC-ENTMCNC: 23.1 PG — LOW (ref 27–34)
MCHC RBC-ENTMCNC: 23.2 PG — LOW (ref 27–34)
MCHC RBC-ENTMCNC: 32.3 GM/DL — SIGNIFICANT CHANGE UP (ref 32–36)
MCHC RBC-ENTMCNC: 32.4 GM/DL — SIGNIFICANT CHANGE UP (ref 32–36)
MCV RBC AUTO: 71.5 FL — LOW (ref 80–100)
MCV RBC AUTO: 71.5 FL — LOW (ref 80–100)
MONOCYTES # BLD AUTO: 0.56 K/UL — SIGNIFICANT CHANGE UP (ref 0–0.9)
MONOCYTES NFR BLD AUTO: 9 % — SIGNIFICANT CHANGE UP (ref 2–14)
MRSA PCR RESULT.: SIGNIFICANT CHANGE UP
NEUTROPHILS # BLD AUTO: 4.73 K/UL — SIGNIFICANT CHANGE UP (ref 1.8–7.4)
NEUTROPHILS NFR BLD AUTO: 76.5 % — SIGNIFICANT CHANGE UP (ref 43–77)
NRBC # BLD: 0 /100 WBCS — SIGNIFICANT CHANGE UP (ref 0–0)
NRBC # BLD: 0 /100 WBCS — SIGNIFICANT CHANGE UP (ref 0–0)
PHOSPHATE SERPL-MCNC: 2.7 MG/DL — SIGNIFICANT CHANGE UP (ref 2.5–4.5)
PLATELET # BLD AUTO: 11 K/UL — CRITICAL LOW (ref 150–400)
PLATELET # BLD AUTO: 41 K/UL — LOW (ref 150–400)
POTASSIUM SERPL-MCNC: 3.9 MMOL/L — SIGNIFICANT CHANGE UP (ref 3.5–5.3)
POTASSIUM SERPL-SCNC: 3.9 MMOL/L — SIGNIFICANT CHANGE UP (ref 3.5–5.3)
PROT SERPL-MCNC: 7 G/DL — SIGNIFICANT CHANGE UP (ref 6–8.3)
PROTHROM AB SERPL-ACNC: 25.8 SEC — HIGH (ref 9.5–13)
RBC # BLD: 3.47 M/UL — LOW (ref 4.2–5.8)
RBC # BLD: 3.54 M/UL — LOW (ref 4.2–5.8)
RBC # FLD: 25.9 % — HIGH (ref 10.3–14.5)
RBC # FLD: 25.9 % — HIGH (ref 10.3–14.5)
S AUREUS DNA NOSE QL NAA+PROBE: DETECTED
SODIUM SERPL-SCNC: 142 MMOL/L — SIGNIFICANT CHANGE UP (ref 135–145)
WBC # BLD: 6.19 K/UL — SIGNIFICANT CHANGE UP (ref 3.8–10.5)
WBC # BLD: 7.77 K/UL — SIGNIFICANT CHANGE UP (ref 3.8–10.5)
WBC # FLD AUTO: 6.19 K/UL — SIGNIFICANT CHANGE UP (ref 3.8–10.5)
WBC # FLD AUTO: 7.77 K/UL — SIGNIFICANT CHANGE UP (ref 3.8–10.5)

## 2024-08-28 PROCEDURE — 99222 1ST HOSP IP/OBS MODERATE 55: CPT

## 2024-08-28 PROCEDURE — 99233 SBSQ HOSP IP/OBS HIGH 50: CPT | Mod: GC

## 2024-08-28 RX ORDER — MUPIROCIN 2 %
1 OINTMENT (GRAM) TOPICAL
Refills: 0 | Status: DISCONTINUED | OUTPATIENT
Start: 2024-08-28 | End: 2024-08-29

## 2024-08-28 RX ORDER — CEPHALEXIN 500 MG
500 CAPSULE ORAL
Refills: 0 | Status: DISCONTINUED | OUTPATIENT
Start: 2024-08-28 | End: 2024-08-29

## 2024-08-28 RX ORDER — COLLAGENASE SANTYL 250 [ARB'U]/G
1 OINTMENT TOPICAL DAILY
Refills: 0 | Status: DISCONTINUED | OUTPATIENT
Start: 2024-08-28 | End: 2024-08-29

## 2024-08-28 RX ORDER — CEPHALEXIN 500 MG
500 CAPSULE ORAL EVERY 12 HOURS
Refills: 0 | Status: DISCONTINUED | OUTPATIENT
Start: 2024-08-28 | End: 2024-08-28

## 2024-08-28 RX ADMIN — Medication 1 MILLIGRAM(S): at 12:02

## 2024-08-28 RX ADMIN — Medication 1 APPLICATION(S): at 17:42

## 2024-08-28 RX ADMIN — Medication 500000 UNIT(S): at 12:02

## 2024-08-28 RX ADMIN — FILGRASTIM 480 MICROGRAM(S): 300 INJECTION, SOLUTION INTRAVENOUS; SUBCUTANEOUS at 12:03

## 2024-08-28 RX ADMIN — CHLORHEXIDINE GLUCONATE 15 MILLILITER(S): 40 SOLUTION TOPICAL at 05:18

## 2024-08-28 RX ADMIN — CEFEPIME 100 MILLIGRAM(S): 2 INJECTION, POWDER, FOR SOLUTION INTRAVENOUS at 12:02

## 2024-08-28 RX ADMIN — Medication 500 MILLIGRAM(S): at 17:42

## 2024-08-28 RX ADMIN — Medication 25 GRAM(S): at 13:33

## 2024-08-28 RX ADMIN — Medication 500000 UNIT(S): at 18:35

## 2024-08-28 RX ADMIN — CHLORHEXIDINE GLUCONATE 15 MILLILITER(S): 40 SOLUTION TOPICAL at 17:43

## 2024-08-28 RX ADMIN — Medication 500000 UNIT(S): at 05:18

## 2024-08-28 RX ADMIN — COLLAGENASE SANTYL 1 APPLICATION(S): 250 OINTMENT TOPICAL at 12:03

## 2024-08-28 NOTE — PROGRESS NOTE ADULT - SUBJECTIVE AND OBJECTIVE BOX
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 For his non-small cell lung cancer currently undergoing chemotherapy, had last chemo (round 3) on August 13, during which he received 1 unit of blood due to low numbers. Denies any cough/URI.  No acute shortness of breath.  No acute chest pain.  Patient was seen in ER yesterday in [place] after having a nosebleed.  The nosebleed was controlled, but the patient had a hemoglobin of 5.5.  The patient received 1 unit of blood.  No further bleeding since that time.  The patient called his doctor today, was told to go to the ER for the fever. Patient had a fever of 101.5 this morning at home.Patient did have a negative RVP yesterday at the outside ER. No abdominal pain.  No vomiting or diarrhea.  No dysuria/hematuria. Complains of painful sore on chest which is new where he had a past biopsy for his sarcoidosis, now mostly resolved.   Has not seen nephrologist on the past.  NO N/V/SOB.  He is urinating without issue.     NO N/V/SOB    PAST MEDICAL & SURGICAL HISTORY:  Antiphospholipid syndrome      Pulmonary embolism      Lung cancer      Sarcoidosis      Thalassemia minor      Calculus of gallbladder without cholecystitis without obstruction      S/P IVC filter      S/P shoulder surgery           FAMILY HISTORY:  FH: HTN (hypertension) (Father)    FH: thyroid disease (Mother, Sibling)    NC    Social History:Non smoker    MEDICATIONS  (STANDING):  filgrastim-sndz (ZARXIO) Injectable 480 MICROGram(s) SubCutaneous daily  folic acid 1 milliGRAM(s) Oral daily  sodium chloride 0.9%. 1000 milliLiter(s) (75 mL/Hr) IV Continuous <Continuous>    MEDICATIONS  (PRN):  metoclopramide 10 milliGRAM(s) Oral every 6 hours PRN for nausea   Meds reviewed    Allergies    Animal dander-Cat (Other)  No Known Drug Allergies    Intolerances         REVIEW OF SYSTEMS:    as above    Vital Signs Last 24 Hrs  T(C): 36.8 (28 Aug 2024 04:54), Max: 37.2 (27 Aug 2024 12:02)  T(F): 98.3 (28 Aug 2024 04:54), Max: 99 (27 Aug 2024 12:02)  HR: 74 (28 Aug 2024 04:54) (74 - 77)  BP: 145/76 (28 Aug 2024 04:54) (109/62 - 145/76)  BP(mean): --  RR: 17 (28 Aug 2024 04:54) (17 - 18)  SpO2: 93% (28 Aug 2024 04:54) (93% - 97%)    Parameters below as of 28 Aug 2024 04:54  Patient On (Oxygen Delivery Method): room air        PHYSICAL EXAM:    GENERAL: NAD  HEAD:  Atraumatic, Normocephalic  EYES: EOMI, conjunctiva and sclera clear  ENMT: No Drainage from nares, No drainage from ears  NERVOUS SYSTEM:  Awake and Alert  SKIN: No rashes No obvious ecchymosis      LABS:                        8.3    3.36  )-----------( 15       ( 27 Aug 2024 08:20 )             24.2     08-27    142  |  108  |  20  ----------------------------<  97  3.9   |  28  |  1.60<H>    Ca    9.3      27 Aug 2024 08:20  Phos  2.4     08-27  Mg     1.5     08-27    TPro  7.3  /  Alb  3.1<L>  /  TBili  0.3  /  DBili  x   /  AST  42<H>  /  ALT  39  /  AlkPhos  44  08-27    PT/INR - ( 27 Aug 2024 08:20 )   PT: 20.5 sec;   INR: 1.83 ratio           Urinalysis Basic - ( 27 Aug 2024 08:20 )    Color: x / Appearance: x / SG: x / pH: x  Gluc: 97 mg/dL / Ketone: x  / Bili: x / Urobili: x   Blood: x / Protein: x / Nitrite: x   Leuk Esterase: x / RBC: x / WBC x   Sq Epi: x / Non Sq Epi: x / Bacteria: x

## 2024-08-28 NOTE — CONSULT NOTE ADULT - SUBJECTIVE AND OBJECTIVE BOX
Chief Complaint: chest wound    HPI:     PAST MEDICAL & SURGICAL HISTORY:  Antiphospholipid syndrome      Pulmonary embolism      Lung cancer      Sarcoidosis      Thalassemia minor      Calculus of gallbladder without cholecystitis without obstruction      S/P IVC filter      S/P shoulder surgery          Allergies    Animal dander-Cat (Other)  No Known Drug Allergies    Intolerances        MEDICATIONS  (STANDING):  bacitracin   Ointment 1 Application(s) Topical daily  cefepime   IVPB 2000 milliGRAM(s) IV Intermittent every 12 hours  chlorhexidine 0.12% Liquid 15 milliLiter(s) Swish and Spit two times a day  filgrastim-sndz (ZARXIO) Injectable 480 MICROGram(s) SubCutaneous daily  folic acid 1 milliGRAM(s) Oral daily  nystatin    Suspension 992371 Unit(s) Oral every 6 hours    MEDICATIONS  (PRN):  acetaminophen     Tablet .. 650 milliGRAM(s) Oral every 6 hours PRN Temp greater or equal to 38C (100.4F), Moderate Pain (4 - 6)  metoclopramide 10 milliGRAM(s) Oral every 6 hours PRN for nausea  sodium chloride 0.65% Nasal 1 Spray(s) Both Nostrils three times a day PRN Nasal Congestion      FAMILY HISTORY:  FH: HTN (hypertension) (Father)    FH: thyroid disease (Mother, Sibling)            ROS:  CONSTITUTIONAL: No weight loss, or fatigue  EYES: No eye pain, visual disturbances, or discharge  ENMT:  No difficulty hearing, tinnitus, vertigo; No sinus or throat pain  NECK: No pain or stiffness  RESPIRATORY: No cough, wheezing, chills or hemoptysis; No shortness of breath  CARDIOVASCULAR: No chest pain, palpitations, dizziness, or leg swelling  GASTROINTESTINAL: No abdominal or epigastric pain. No nausea, vomiting, or hematemesis; No diarrhea or constipation. No melena or hematochezia.  GENITOURINARY: No dysuria, frequency, hematuria, or incontinence  NEUROLOGICAL: No headaches, memory loss, loss of strength, numbness, or tremors  SKIN: No itching, burning, rashes, or lesions   LYMPH NODES: No enlarged glands  ENDOCRINE: No heat or cold intolerance; No hair loss  MUSCULOSKELETAL: No joint pain or swelling; No muscle, back, or extremity pain  PSYCHIATRIC: No depression, anxiety, mood swings, or difficulty sleeping  HEME/LYMPH: No easy bruising, or bleeding gums  ALLERGY AND IMMUNOLOGIC: No hives or eczema    PHYSICAL EXAM-      Vital Signs Last 24 Hrs  T(C): 36.8 (28 Aug 2024 04:54), Max: 37.2 (27 Aug 2024 12:02)  T(F): 98.3 (28 Aug 2024 04:54), Max: 99 (27 Aug 2024 12:02)  HR: 74 (28 Aug 2024 04:54) (74 - 77)  BP: 145/76 (28 Aug 2024 04:54) (109/62 - 145/76)  BP(mean): --  RR: 17 (28 Aug 2024 04:54) (17 - 18)  SpO2: 93% (28 Aug 2024 04:54) (93% - 97%)    Parameters below as of 28 Aug 2024 04:54  Patient On (Oxygen Delivery Method): room air        Constitutional: well developed, well nourished, no apparent distress, alert, oriented x 3.      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 Has been on chemo which he has completed. Asked to see pt regarding a chest wound.       Pt states he noticed a new wound that developed about one week ago along an old scar/incision that was used for bx that confirmed he had sarcoidosis. The wound is covered by eschar/necrotic tissue and a nodular area is underneath the wound which pt states was not present before one wk ago. No SOI.        PAST MEDICAL & SURGICAL HISTORY:  Antiphospholipid syndrome      Pulmonary embolism      Lung cancer      Sarcoidosis      Thalassemia minor      Calculus of gallbladder without cholecystitis without obstruction      S/P IVC filter      S/P shoulder surgery          Allergies    Animal dander-Cat (Other)  No Known Drug Allergies    Intolerances        MEDICATIONS  (STANDING):  bacitracin   Ointment 1 Application(s) Topical daily  cefepime   IVPB 2000 milliGRAM(s) IV Intermittent every 12 hours  chlorhexidine 0.12% Liquid 15 milliLiter(s) Swish and Spit two times a day  filgrastim-sndz (ZARXIO) Injectable 480 MICROGram(s) SubCutaneous daily  folic acid 1 milliGRAM(s) Oral daily  nystatin    Suspension 839082 Unit(s) Oral every 6 hours    MEDICATIONS  (PRN):  acetaminophen     Tablet .. 650 milliGRAM(s) Oral every 6 hours PRN Temp greater or equal to 38C (100.4F), Moderate Pain (4 - 6)  metoclopramide 10 milliGRAM(s) Oral every 6 hours PRN for nausea  sodium chloride 0.65% Nasal 1 Spray(s) Both Nostrils three times a day PRN Nasal Congestion      FAMILY HISTORY:  FH: HTN (hypertension) (Father)    FH: thyroid disease (Mother, Sibling)            ROS:  CONSTITUTIONAL: No weight loss, or fatigue  EYES: No eye pain, visual disturbances, or discharge  ENMT:  No difficulty hearing, tinnitus, vertigo; No sinus or throat pain  NECK: No pain or stiffness  RESPIRATORY: No cough, wheezing, chills or hemoptysis; No shortness of breath  CARDIOVASCULAR: No chest pain, palpitations, dizziness, or leg swelling  GASTROINTESTINAL: No abdominal or epigastric pain. No nausea, vomiting, or hematemesis; No diarrhea or constipation. No melena or hematochezia.  GENITOURINARY: No dysuria, frequency, hematuria, or incontinence  NEUROLOGICAL: No headaches, memory loss, loss of strength, numbness, or tremors  SKIN: No itching, burning, rashes, or lesions   LYMPH NODES: No enlarged glands  ENDOCRINE: No heat or cold intolerance; No hair loss  MUSCULOSKELETAL: No joint pain or swelling; No muscle, back, or extremity pain  PSYCHIATRIC: No depression, anxiety, mood swings, or difficulty sleeping  HEME/LYMPH: No easy bruising, or bleeding gums  ALLERGY AND IMMUNOLOGIC: No hives or eczema    PHYSICAL EXAM-      Vital Signs Last 24 Hrs  T(C): 36.8 (28 Aug 2024 04:54), Max: 37.2 (27 Aug 2024 12:02)  T(F): 98.3 (28 Aug 2024 04:54), Max: 99 (27 Aug 2024 12:02)  HR: 74 (28 Aug 2024 04:54) (74 - 77)  BP: 145/76 (28 Aug 2024 04:54) (109/62 - 145/76)  BP(mean): --  RR: 17 (28 Aug 2024 04:54) (17 - 18)  SpO2: 93% (28 Aug 2024 04:54) (93% - 97%)    Parameters below as of 28 Aug 2024 04:54  Patient On (Oxygen Delivery Method): room air        Constitutional: well developed, well nourished, no apparent distress, alert, oriented x 3.    Chest-midline old scar at level of clavicle with ulcerative lesion covered by eschar/necrotic tiss.; under the lesion is a nodular 1cm area, nontender; no erythema/edema; No SOI.      Chest-             8.3    3.36  )-----------( 15       ( 27 Aug 2024 08:20 )             24.2     08-27    142  |  108  |  20  ----------------------------<  97  3.9   |  28  |  1.60<H>    Ca    9.3      27 Aug 2024 08:20  Phos  2.4     08-27  Mg     1.5     08-27    TPro  7.3  /  Alb  3.1<L>  /  TBili  0.3  /  DBili  x   /  AST  42<H>  /  ALT  39  /  AlkPhos  44  08-27      Radiology

## 2024-08-28 NOTE — PROGRESS NOTE ADULT - PROBLEM SELECTOR PLAN 4
Cr 2.5 from baseline of 1.2 2/2 chemo w carboplatin   - on continuous IVF LR@100cc/hr x 24 hours.     - Cr 1.6 this am, plateaued  - cont monitoring lytes/BUN/Cr  - Nephro following, recs appreciated Cr 2.5 from baseline of 1.2 2/2 chemo w carboplatin     - Cr 1.7 this am, mild increase but overall resolving since admission  - cont monitoring lytes/BUN/Cr  - Nephro following, recs appreciated

## 2024-08-28 NOTE — PROGRESS NOTE ADULT - SUBJECTIVE AND OBJECTIVE BOX
TIFFANY ESCAMILLA is a 53yMale , patient examined and chart reviewed.    INTERVAL HPI/ OVERNIGHT EVENTS:   Afebrile.   No events. NAD.    PAST MEDICAL & SURGICAL HISTORY:  Antiphospholipid syndrome  Pulmonary embolism  Lung cancer  Sarcoidosis  Thalassemia minor  Calculus of gallbladder without cholecystitis without obstruction  S/P IVC filter  S/P shoulder surgery      For details regarding the patient's social history, family history, and other miscellaneous elements, please refer the initial infectious diseases consultation and/or the admitting history and physical examination for this admission.    ROS:  CONSTITUTIONAL: no fever no  chills  EYES:  Negative  blurry vision or double vision  CARDIOVASCULAR:  Negative for chest pain or palpitations  RESPIRATORY:  Negative for cough, wheezing, or SOB   GASTROINTESTINAL:  Negative for nausea, vomiting, diarrhea, constipation, or abdominal pain  GENITOURINARY:  Negative frequency, urgency or dysuria  NEUROLOGIC:  No headache, confusion, dizziness, lightheadedness  All other systems were reviewed and are negative     Animal dander-Cat (Other)  No Known Drug Allergies      Current inpatient medications :    ANTIBIOTICS/RELEVANT:  cephalexin 500 milliGRAM(s) Oral four times a day    MEDICATIONS  (STANDING):  bacitracin   Ointment 1 Application(s) Topical daily  chlorhexidine 0.12% Liquid 15 milliLiter(s) Swish and Spit two times a day  collagenase Ointment 1 Application(s) Topical daily  filgrastim-sndz (ZARXIO) Injectable 480 MICROGram(s) SubCutaneous daily  folic acid 1 milliGRAM(s) Oral daily  mupirocin 2% Nasal 1 Application(s) Both Nostrils two times a day  nystatin    Suspension 531795 Unit(s) Oral every 6 hours    MEDICATIONS  (PRN):  acetaminophen     Tablet .. 650 milliGRAM(s) Oral every 6 hours PRN Temp greater or equal to 38C (100.4F), Moderate Pain (4 - 6)  metoclopramide 10 milliGRAM(s) Oral every 6 hours PRN for nausea  sodium chloride 0.65% Nasal 1 Spray(s) Both Nostrils three times a day PRN Nasal Congestion    Objective:  Vital Signs Last 24 Hrs  T(C): 37 (28 Aug 2024 20:41), Max: 37 (28 Aug 2024 20:41)  T(F): 98.6 (28 Aug 2024 20:41), Max: 98.6 (28 Aug 2024 20:41)  HR: 77 (28 Aug 2024 20:41) (71 - 77)  BP: 131/78 (28 Aug 2024 20:41) (116/69 - 145/76)  RR: 17 (28 Aug 2024 20:41) (16 - 17)  SpO2: 96% (28 Aug 2024 20:41) (93% - 96%)    Parameters below as of 28 Aug 2024 20:41  Patient On (Oxygen Delivery Method): room air      Physical Exam:  General: in no acute distress  Neck: supple, trachea midline  Lungs: clear, no wheeze/rhonchi  Cardiovascular: regular rate and rhythm, S1 S2  Abdomen: soft, nontender,  bowel sounds normal  Neurological: alert and oriented x3  Skin: no rash chest wound with erythema c/d/i  Extremities: no cyanosis/clubbing/edema        LABS:                        8.2    7.77  )-----------( 41       ( 28 Aug 2024 19:20 )             25.3   08-28    142  |  109<H>  |  19  ----------------------------<  105<H>  3.9   |  27  |  1.70<H>    Ca    9.1      28 Aug 2024 09:00  Phos  2.7     08-  Mg     1.6     08-28    TPro  7.0  /  Alb  3.1<L>  /  TBili  0.5  /  DBili  x   /  AST  42<H>  /  ALT  40  /  AlkPhos  63  08-28      MICROBIOLOGY:    Culture - Urine (collected 25 Aug 2024 13:42)  Source: Clean Catch Clean Catch (Midstream)  Final Report (26 Aug 2024 13:38):    No growth    Culture - Blood (collected 24 Aug 2024 11:50)  Source: .Blood Blood-Peripheral  Preliminary Report (25 Aug 2024 16:01):    No growth at 24 hours    Culture - Blood (collected 24 Aug 2024 11:45)  Source: .Blood Blood-Peripheral  Preliminary Report (25 Aug 2024 16:01):    No growth at 24 hours    RADIOLOGY & ADDITIONAL STUDIES:    ACC: [id number] EXAM:  CT CHEST   ORDERED BY: INDIRA HARRINGTON     PROCEDURE DATE:  2024          INTERPRETATION:  .  Patient: TIFFANY ESCAMILLA  : 1970  MRN: [id number]  ACC: [id number]  Order Date: 2024 12:35 PM  Exam: CT CHEST    INDICATION: neutropenic fever  TECHNIQUE: Unenhanced CT of the chest. Coronal, sagittal, and MIP images   were reconstructed and reviewed.  COMPARISON: 2024 chest CT.    FINDINGS:    AIRWAYS, LUNGS, PLEURA: Bronchial wall thickening. Stable 1 cm nodule and   obliteration of subsegmental bronchus within posterior right upper lobe   (series 302 image 95). Stable 0.2 cm nodule within periphery of the right   apex (series 302 image 70). Stable linear scarring within the right upper   lobe and lingula. The lungs are otherwise clear. No pleural effusion.    LYMPH NODES, MEDIASTINUM: Stable multiple subcentimeter left   supraclavicular lymph nodes.    HEART, VESSELS: Cardiomegaly. Low attenuation of blood pool representing   anemia. Coronary calcification. No pericardial effusion. Thoracic aorta   normal in diameter.    UPPER ABDOMEN: Cholecystectomy.    CHEST WALL, BONES: No aggressive osseous lesion. Mild skin thickening and   subcutaneous infiltrates within mid anterior upper chest dickson new from   prior exam possibly representing cellulitis.    LOWER NECK: Within normal limits.    IMPRESSION:    No pneumonia.    Stable 1 cm endobronchial nodule within posterior right upper lobe   (series 302 image 95). Stable 0.2 cm nodule withinperiphery of the right   apex (series 302 image 70).    Mild skin thickening and subcutaneous infiltrates within mid anterior   upper chest wall is new from prior exam possibly representing cellulitis.    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  Appropriate use of antibiotics and adverse effects reviewed.      I have discussed the above plan of care with patient in detail. He expressed understanding of the  treatment plan . Risks, benefits and alternatives discussed in detail. I have asked if he has any questions or concerns and appropriately addressed them to the best of my ability .    > 35 minutes were spent in direct patient care reviewing notes, medications ,labs data/ imaging , discussion with multidisciplinary team.    Thank you for allowing me to participate in care of your patient .    Mayra Reese MD  Infectious Disease  [phone]

## 2024-08-28 NOTE — PROGRESS NOTE ADULT - PROBLEM SELECTOR PLAN 2
All cell lines decreased on presentation 2/2 recent chemo session on 8/13   - WBC 0.54 -> 0.98 -> 1.6 -> 3.36  - Hb 6.5 -> 6.8 -> 8 -> 8.3  - Plt 14 -> 6 -> 22 -> 15    - Heme (Dr. Encarnacion) consulted, recs appreciated  - Type & screen ordered  - consented for transfusion of blood products, s/p 1U pRBC, 1U Plt, s/p additional 1u pRBC 8/25, Hgb 8.3 this AM  - Platelets this AM 15 -> cont to monitor, consider platelet transfusion if remain low  - cont to monitor CBC  - transfuse when Hgb <7, plts <10k All cell lines decreased on presentation 2/2 recent chemo session on 8/13   - WBC 3.36 -> 6.19 this AM  - Hb 8.3 -> 8.0 this AM  - Plt 15 -> 11 this AM    - Heme (Dr. Encarnacion) consulted, recs appreciated  - consented for transfusion of blood products, s/p 1U pRBC, 1U Plt 8/24, s/p additional 1u pRBC 8/25, Hgb 8.3 this AM  - Platelets this AM 11 -> platelet transfusion x1, f/u CBC @3pm  - cont to monitor CBC in AM  - transfuse when Hgb <7, plts <10k

## 2024-08-28 NOTE — PROGRESS NOTE ADULT - PROBLEM SELECTOR PLAN 3
Pt C/O in pm of bleeding per nose with blowing; pt reporting improvement and decreased frequency  - likely 2/2 low plt and elevated INR  - precautions readdressed  - PRN nasal saline spray Pt C/O in pm of bleeding per nose with blowing; resolving  - likely 2/2 low plt and elevated INR  - precautions readdressed  - PRN nasal saline spray

## 2024-08-28 NOTE — PROGRESS NOTE ADULT - SUBJECTIVE AND OBJECTIVE BOX
[INTERVAL HX: ]  Patient seen and examined;  Chart reviewed and events noted;   no CP, no SOB  no diarrhea    mouth sore better    Plts 11 today   INR 2.3    no HA  s/p 1U SDP, as plts declining      [MEDICATIONS]  MEDICATIONS  (STANDING):  bacitracin   Ointment 1 Application(s) Topical daily  cephalexin 500 milliGRAM(s) Oral four times a day  chlorhexidine 0.12% Liquid 15 milliLiter(s) Swish and Spit two times a day  collagenase Ointment 1 Application(s) Topical daily  filgrastim-sndz (ZARXIO) Injectable 480 MICROGram(s) SubCutaneous daily  folic acid 1 milliGRAM(s) Oral daily  mupirocin 2% Nasal 1 Application(s) Both Nostrils two times a day  nystatin    Suspension 258037 Unit(s) Oral every 6 hours    MEDICATIONS  (PRN):  acetaminophen     Tablet .. 650 milliGRAM(s) Oral every 6 hours PRN Temp greater or equal to 38C (100.4F), Moderate Pain (4 - 6)  metoclopramide 10 milliGRAM(s) Oral every 6 hours PRN for nausea  sodium chloride 0.65% Nasal 1 Spray(s) Both Nostrils three times a day PRN Nasal Congestion      [VITALS]  Vital Signs Last 24 Hrs  T(C): 36.7 (28 Aug 2024 17:10), Max: 36.9 (28 Aug 2024 16:25)  T(F): 98.1 (28 Aug 2024 17:10), Max: 98.4 (28 Aug 2024 16:25)  HR: 71 (28 Aug 2024 17:10) (71 - 77)  BP: 122/71 (28 Aug 2024 17:10) (116/69 - 145/76)  BP(mean): --  RR: 17 (28 Aug 2024 17:10) (16 - 18)  SpO2: 96% (28 Aug 2024 11:35) (93% - 97%)    Parameters below as of 28 Aug 2024 11:35  Patient On (Oxygen Delivery Method): room air      [WT/HT]  Daily     Daily Weight in k.2 (28 Aug 2024 04:54)  [VENT]      [PHYSICAL EXAM]  GEN: NAD  HEENT: normocephalic and atraumatic. EOMI. PERRL.    NECK: Supple.  No lymphadenopathy   LUNGS: Clear to auscultation.  HEART: Regular rate and rhythm,  no MRG  ABDOMEN: Soft, nontender, and nondistended.  Positive bowel sounds.    : No CVA tenderness  EXTREMITIES: Without edema.  NEUROLOGIC: grossly intact.  PSYCHIATRIC: Appropriate affect .  SKIN: No rash     [LABS:]                        8.0    6.19  )-----------( 11       ( 28 Aug 2024 09:00 )             24.8         142  |  109<H>  |  19  ----------------------------<  105<H>  3.9   |  27  |  1.70<H>    Ca    9.1      28 Aug 2024 09:00  Phos  2.7       Mg     1.6         TPro  7.0  /  Alb  3.1<L>  /  TBili  0.5  /  DBili  x   /  AST  42<H>  /  ALT  40  /  AlkPhos  63      PT/INR - ( 28 Aug 2024 09:00 )   PT: 25.8 sec;   INR: 2.32 ratio               Vitamin B12, Serum: 787 pg/mL [232 - 1245] (24 @ 08:20)    Folate, Serum: >20.0 ng/mL (24 @ 08:20)    Folate, RBC: 1446 ng/mL [499 - 1504] (24 @ 08:20)      Urinalysis Basic - ( 28 Aug 2024 09:00 )    Color: x / Appearance: x / SG: x / pH: x  Gluc: 105 mg/dL / Ketone: x  / Bili: x / Urobili: x   Blood: x / Protein: x / Nitrite: x   Leuk Esterase: x / RBC: x / WBC x   Sq Epi: x / Non Sq Epi: x / Bacteria: x        SARS-CoV-2: NotDetec (24 Aug 2024 11:58)          [RADIOLOGY STUDIES:]

## 2024-08-28 NOTE — PROGRESS NOTE ADULT - SUBJECTIVE AND OBJECTIVE BOX
Patient is a 53y old  Male who presents with a chief complaint of fever (28 Aug 2024 09:46)      Subjective:  INTERVAL HPI/OVERNIGHT EVENTS: Patient seen and examined at bedside. No overnight events occurred. Patient has no complaints at this time. Denies fevers, chills, headache, lightheadedness, chest pain, dyspnea, abdominal pain, n/v/d/c.    MEDICATIONS  (STANDING):  bacitracin   Ointment 1 Application(s) Topical daily  cephalexin 500 milliGRAM(s) Oral every 12 hours  chlorhexidine 0.12% Liquid 15 milliLiter(s) Swish and Spit two times a day  collagenase Ointment 1 Application(s) Topical daily  filgrastim-sndz (ZARXIO) Injectable 480 MICROGram(s) SubCutaneous daily  folic acid 1 milliGRAM(s) Oral daily  mupirocin 2% Nasal 1 Application(s) Both Nostrils two times a day  nystatin    Suspension 101927 Unit(s) Oral every 6 hours    MEDICATIONS  (PRN):  acetaminophen     Tablet .. 650 milliGRAM(s) Oral every 6 hours PRN Temp greater or equal to 38C (100.4F), Moderate Pain (4 - 6)  metoclopramide 10 milliGRAM(s) Oral every 6 hours PRN for nausea  sodium chloride 0.65% Nasal 1 Spray(s) Both Nostrils three times a day PRN Nasal Congestion      Allergies    Animal dander-Cat (Other)  No Known Drug Allergies    Intolerances        REVIEW OF SYSTEMS:  CONSTITUTIONAL: No fever or chills  HEENT:  No headache, no sore throat  RESPIRATORY: No cough, wheezing, or shortness of breath  CARDIOVASCULAR: No chest pain, palpitations  GASTROINTESTINAL: No abd pain, nausea, vomiting, or diarrhea  GENITOURINARY: No dysuria, frequency, or hematuria  NEUROLOGICAL: no focal weakness or dizziness  MUSCULOSKELETAL: no myalgias     Objective:  Vital Signs Last 24 Hrs  T(C): 36.8 (28 Aug 2024 04:54), Max: 36.8 (28 Aug 2024 04:54)  T(F): 98.3 (28 Aug 2024 04:54), Max: 98.3 (28 Aug 2024 04:54)  HR: 75 (28 Aug 2024 11:35) (74 - 77)  BP: 126/76 (28 Aug 2024 11:35) (123/71 - 145/76)  BP(mean): --  RR: 17 (28 Aug 2024 11:35) (17 - 18)  SpO2: 96% (28 Aug 2024 11:35) (93% - 97%)    Parameters below as of 28 Aug 2024 11:35  Patient On (Oxygen Delivery Method): room air        GENERAL: NAD, lying in bed comfortably  HEAD:  Atraumatic, Normocephalic  EYES: EOMI, PERRLA, conjunctiva and sclera clear  ENT: Moist mucous membranes  NECK: Supple, No JVD  CHEST/LUNG: Clear to auscultation bilaterally; No rales, rhonchi, wheezing, or rubs. Unlabored respirations  HEART: Regular rate and rhythm; No murmurs, rubs, or gallops  ABDOMEN: Bowel sounds present; Soft, Nontender, Nondistended. No hepatomegaly  EXTREMITIES:  2+ Peripheral Pulses, brisk capillary refill. No clubbing, cyanosis, or edema  NERVOUS SYSTEM:  Alert & Oriented X3, speech clear. No deficits   MSK: FROM all 4 extremities, full and equal strength  SKIN: No rashes or lesions    LABS:                        8.0    6.19  )-----------( 11       ( 28 Aug 2024 09:00 )             24.8     CBC Full  -  ( 28 Aug 2024 09:00 )  WBC Count : 6.19 K/uL  Hemoglobin : 8.0 g/dL  Hematocrit : 24.8 %  Platelet Count - Automated : 11 K/uL  Mean Cell Volume : 71.5 fl  Mean Cell Hemoglobin : 23.1 pg  Mean Cell Hemoglobin Concentration : 32.3 gm/dL  Auto Neutrophil # : 4.73 K/uL  Auto Lymphocyte # : 0.48 K/uL  Auto Monocyte # : 0.56 K/uL  Auto Eosinophil # : 0.00 K/uL  Auto Basophil # : 0.04 K/uL  Auto Neutrophil % : 76.5 %  Auto Lymphocyte % : 7.8 %  Auto Monocyte % : 9.0 %  Auto Eosinophil % : 0.0 %  Auto Basophil % : 0.6 %    28 Aug 2024 09:00    142    |  109    |  19     ----------------------------<  105    3.9     |  27     |  1.70     Ca    9.1        28 Aug 2024 09:00  Phos  2.7       28 Aug 2024 09:00  Mg     1.6       28 Aug 2024 09:00    TPro  7.0    /  Alb  3.1    /  TBili  0.5    /  DBili  x      /  AST  42     /  ALT  40     /  AlkPhos  63     28 Aug 2024 09:00    PT/INR - ( 28 Aug 2024 09:00 )   PT: 25.8 sec;   INR: 2.32 ratio           Urinalysis Basic - ( 28 Aug 2024 09:00 )    Color: x / Appearance: x / SG: x / pH: x  Gluc: 105 mg/dL / Ketone: x  / Bili: x / Urobili: x   Blood: x / Protein: x / Nitrite: x   Leuk Esterase: x / RBC: x / WBC x   Sq Epi: x / Non Sq Epi: x / Bacteria: x      CAPILLARY BLOOD GLUCOSE            Culture - Urine (collected 08-25-24 @ 13:42)  Source: Clean Catch Clean Catch (Midstream)  Final Report (08-26-24 @ 13:38):    No growth    Urinalysis with Rflx Culture (collected 08-24-24 @ 14:30)    Culture - Blood (collected 08-24-24 @ 11:50)  Source: .Blood Blood-Peripheral  Preliminary Report (08-27-24 @ 16:01):    No growth at 72 Hours    Culture - Blood (collected 08-24-24 @ 11:45)  Source: .Blood Blood-Peripheral  Preliminary Report (08-27-24 @ 16:01):    No growth at 72 Hours        RADIOLOGY & ADDITIONAL TESTS:    Personally reviewed.     Consultant(s) Notes Reviewed:  [x] YES  [ ] NO     Patient is a 53y old  Male who presents with a chief complaint of fever (28 Aug 2024 09:46)      Subjective:  INTERVAL HPI/OVERNIGHT EVENTS: Patient seen and examined at bedside this morning, resting comfortably. No overnight events occurred. Patient has no complaints at this time, denies any further epistaxis this AM. Denies fevers, chills, headache, lightheadedness, chest pain, dyspnea, abdominal pain, n/v/d/c.    MEDICATIONS  (STANDING):  bacitracin   Ointment 1 Application(s) Topical daily  cephalexin 500 milliGRAM(s) Oral every 12 hours  chlorhexidine 0.12% Liquid 15 milliLiter(s) Swish and Spit two times a day  collagenase Ointment 1 Application(s) Topical daily  filgrastim-sndz (ZARXIO) Injectable 480 MICROGram(s) SubCutaneous daily  folic acid 1 milliGRAM(s) Oral daily  mupirocin 2% Nasal 1 Application(s) Both Nostrils two times a day  nystatin    Suspension 937306 Unit(s) Oral every 6 hours    MEDICATIONS  (PRN):  acetaminophen     Tablet .. 650 milliGRAM(s) Oral every 6 hours PRN Temp greater or equal to 38C (100.4F), Moderate Pain (4 - 6)  metoclopramide 10 milliGRAM(s) Oral every 6 hours PRN for nausea  sodium chloride 0.65% Nasal 1 Spray(s) Both Nostrils three times a day PRN Nasal Congestion      Allergies    Animal dander-Cat (Other)  No Known Drug Allergies    Intolerances        REVIEW OF SYSTEMS:  CONSTITUTIONAL: No fever or chills  HEENT:  No headache, no sore throat  RESPIRATORY: No cough, wheezing, or shortness of breath  CARDIOVASCULAR: No chest pain, palpitations  GASTROINTESTINAL: No abd pain, nausea, vomiting, or diarrhea  GENITOURINARY: No dysuria, frequency, or hematuria  NEUROLOGICAL: no focal weakness or dizziness  MUSCULOSKELETAL: no myalgias     Objective:  Vital Signs Last 24 Hrs  T(C): 36.8 (28 Aug 2024 04:54), Max: 36.8 (28 Aug 2024 04:54)  T(F): 98.3 (28 Aug 2024 04:54), Max: 98.3 (28 Aug 2024 04:54)  HR: 75 (28 Aug 2024 11:35) (74 - 77)  BP: 126/76 (28 Aug 2024 11:35) (123/71 - 145/76)  RR: 17 (28 Aug 2024 11:35) (17 - 18)  SpO2: 96% (28 Aug 2024 11:35) (93% - 97%)    Parameters below as of 28 Aug 2024 11:35  Patient On (Oxygen Delivery Method): room air        PHYSICAL EXAM:  GENERAL: resting comfortably in NAD  HEENT:  anicteric, moist mucous membranes  CHEST/LUNG:  CTA b/l, no rales, wheezes, or rhonchi,  chronic non-healing wound on manubrium sterni  HEART:  RRR, S1, S2  ABDOMEN: soft, nontender, nondistended  EXTREMITIES: no edema, cyanosis, or calf tenderness  NERVOUS SYSTEM: answers questions and follows commands appropriately      LABS:                        8.0    6.19  )-----------( 11       ( 28 Aug 2024 09:00 )             24.8     CBC Full  -  ( 28 Aug 2024 09:00 )  WBC Count : 6.19 K/uL  Hemoglobin : 8.0 g/dL  Hematocrit : 24.8 %  Platelet Count - Automated : 11 K/uL  Mean Cell Volume : 71.5 fl  Mean Cell Hemoglobin : 23.1 pg  Mean Cell Hemoglobin Concentration : 32.3 gm/dL  Auto Neutrophil # : 4.73 K/uL  Auto Lymphocyte # : 0.48 K/uL  Auto Monocyte # : 0.56 K/uL  Auto Eosinophil # : 0.00 K/uL  Auto Basophil # : 0.04 K/uL  Auto Neutrophil % : 76.5 %  Auto Lymphocyte % : 7.8 %  Auto Monocyte % : 9.0 %  Auto Eosinophil % : 0.0 %  Auto Basophil % : 0.6 %    28 Aug 2024 09:00    142    |  109    |  19     ----------------------------<  105    3.9     |  27     |  1.70     Ca    9.1        28 Aug 2024 09:00  Phos  2.7       28 Aug 2024 09:00  Mg     1.6       28 Aug 2024 09:00    TPro  7.0    /  Alb  3.1    /  TBili  0.5    /  DBili  x      /  AST  42     /  ALT  40     /  AlkPhos  63     28 Aug 2024 09:00    PT/INR - ( 28 Aug 2024 09:00 )   PT: 25.8 sec;   INR: 2.32 ratio           Urinalysis Basic - ( 28 Aug 2024 09:00 )    Color: x / Appearance: x / SG: x / pH: x  Gluc: 105 mg/dL / Ketone: x  / Bili: x / Urobili: x   Blood: x / Protein: x / Nitrite: x   Leuk Esterase: x / RBC: x / WBC x   Sq Epi: x / Non Sq Epi: x / Bacteria: x      CAPILLARY BLOOD GLUCOSE            Culture - Urine (collected 08-25-24 @ 13:42)  Source: Clean Catch Clean Catch (Midstream)  Final Report (08-26-24 @ 13:38):    No growth    Urinalysis with Rflx Culture (collected 08-24-24 @ 14:30)    Culture - Blood (collected 08-24-24 @ 11:50)  Source: .Blood Blood-Peripheral  Preliminary Report (08-27-24 @ 16:01):    No growth at 72 Hours    Culture - Blood (collected 08-24-24 @ 11:45)  Source: .Blood Blood-Peripheral  Preliminary Report (08-27-24 @ 16:01):    No growth at 72 Hours          Consultant(s) Notes Reviewed:  [x] YES  [ ] NO

## 2024-08-28 NOTE — PROGRESS NOTE ADULT - PROBLEM SELECTOR PLAN 1
WBC 0.54 in ED and Tmax 101.4 2/2 recent chemo   - s/p vanc 1g and cefepime 2g in ED  - S/P: G-CSF   - WBC trend: 0.54-->0.98-->1.60-->3.36  - CXR 8/24/24: Linear opacity at the retrocardiac region likely atelectasis versus scarring. Patchy opacity right upper lobe.  - CT chest:  no pneumonia  - UA (+) protein, Bact., and Sq cell. UCx NG  - BCx NGTD  - c/w IV cefepime  - continue IVF  LR@100cc/hr x 24 hours  - c/w G-CSF  - monitor temps and WBC  - ID and heme/onc consult, recs appreciated WBC 0.54 in ED and Tmax 101.4 2/2 recent chemo   - s/p vanc 1g and cefepime 2g in ED, S/P: G-CSF  - CXR 8/24/24: Linear opacity at the retrocardiac region likely atelectasis versus scarring. Patchy opacity right upper lobe.  - CT chest: no pneumonia, UA (+) protein, Bact., and Sq cell. UCx NG  - BCx NGTD  - WBC 3.36 -> 6.19 this AM  - c/w G-CSF  - cont to monitor temps and WBC  - ID and heme/onc consult, recs appreciated  - Switch cefepime to Keflex 500mg BID x5 day course  - Staph PCR (+) -> start bactroban WBC 0.54 in ED and Tmax 101.4 2/2 recent chemo   - s/p vanc 1g and cefepime 2g in ED, S/P: G-CSF  - CXR 8/24/24: Linear opacity at the retrocardiac region likely atelectasis versus scarring. Patchy opacity right upper lobe.  - CT chest: no pneumonia, UA (+) protein, Bact., and Sq cell. UCx NG  - BCx NGTD  - WBC 3.36 -> 6.19 this AM  - c/w G-CSF  - cont to monitor temps and WBC  - ID and heme/onc consult, recs appreciated  - Switch cefepime to Keflex 500mg q6h x5 day course  - Staph PCR (+) -> start bactroban

## 2024-08-28 NOTE — PROGRESS NOTE ADULT - ASSESSMENT
JORDAN on CKD 2  Neutropenic Fever  Anemia  NSCLC    -Baseline Creatinine 1.2?  -JORDAN Likely multifactorial decreased EABV + Carboplatin chemo, now improving   -Urine studies reviewed   -No indication for dialysis at this time  -S/p PRBCs per primary/heme  -Creatinine improving  -Off IVF  -Awaiting today's lab reuslt

## 2024-08-28 NOTE — PROGRESS NOTE ADULT - PROBLEM SELECTOR PLAN 5
Met. Adenocarcinoma right lung w right supraclav and bran mets since 2021   (under care of Dr. Briscoe)   - on home Tagrisso   - S/P gamma knife   - S/P chemo (round 4), C#4 Tagrisso/Carbo/Alimta 8/13/24   - continue home folic acid  - cont to hold home tagrisso  - Heme/onc (Dr. Encarnacion) -> oncology recs appreciated Met. Adenocarcinoma right lung w right supraclav and bran mets since 2021   (under care of Dr. Briscoe), on home Tagrisso   - S/P gamma knife, S/P chemo (round 4), C#4 Tagrisso/Carbo/Alimta 8/13/24   - continue home folic acid  - cont to hold home tagrisso  - Heme/onc (Dr. Encarnacion) -> oncology recs appreciated

## 2024-08-28 NOTE — PROGRESS NOTE ADULT - ASSESSMENT
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 presented 2/2021 RUL lung mass w add nodule and med/hilar LADs, 6/2021 Rt supraclav LN bx met adenocarcinoma EGFR L858F mutation, also found w multi brain mets, started on Tagrisso, serial scans rel stable/SD, 10/2023 gamma RT knife to brain met for POD 6/11/24 added Carbo/Alimta to Tagrisso based on NGS after repeat bx right supracalv LN 4/2024)  Last chemo (C#4, planned last one) 8/13, cbc at that time wbc 2.19 hgb 7.2 mcv 63.2 plts 91 and also given 1U PRBC  Pt reports never given GCSF support, CBC each cycle seem worse  Traveled to [place] last 2 days and noted intermittent fever, day prior to adm also nose bleed seen at [place] ER, told Hgb 5.7 given 1u PRBC, bleed also stopped  Day of adm T 101.5, called his Onc and told to come to ER  ED COURSE:  Vitals: T 98.6   F , 90 HR  ,  /76 , 18 RR  , SpO2  99% on RA  Labs significant for: 0.54 wbc, hgb 7.1, plts 14, cr 2.5, egfr 30 INR 3.9 .2  UA 30protein neg Leuk, 3WBC 1RBC  Pt admits to incr bruise today, no nausea, vomit, SOB. Does have decr appetite w more cycle of chemo  Started on IV Cefepime, post Vanco  -------------------------  -met adenocarcinoma right lung w right supraclav and bran mets since 2021, under care Dr Briscoe, has been on Tagrisso, post gamma knife, post C#4 Tagrisso/Carbo/Alimta 8/13/24, adm w neutropenic fever, pancytopenia  -pancytopenia due to chemo effect  -post tx 2u PRBC and 1U plts 8/25  -CBC today stable, WBC increasing today 3.36 showing response, continue Zarxio  -continue monitor serial CBC  -continue abx as per ID  -cultures negative to date    -Lupus Anticoagulant, hx PE/DVT post IVC filter and on coumadin    -supratherapeutic INR on adm due to acute illness    -restarted on Coumadin, monitor serial INR and signs of excessive bleeds carefully as pt still thrombocytopenic    -thalassemia minor-likely reason for low MCV and RBC higher than expective for degree of anemia. No specific intervention needed    RECOMMENDATIONS    Neutropenic Fever  cont ZARXIO   Likely can DC tomorrow  Monitor for infection sx    Chest wall infection wound  Monitor   wound care  Keflex started per ID  On Neosporin ointment==>Bacitracin ointment to wound.   On nasal Bactroban/ mupirocin      ? switch to Bactroban to skin also    Thrombocytopenia  s/p transfusion 1U SDP  Increased risk for bleeding at these levels and with elevated INR  Monitor for improvement.   Check post transfusion CBC, plts count  If plts <50K then transfuse additional SDP    DVT/PE +LAC  HOLD Coumadin  Monitor INR  at risk for excess elevated INR due to recent abx    ONC  TO call office tomorrow and eval ability to get followup appts.    Thrush / Ulcers  COnt PO Nystatin

## 2024-08-28 NOTE — PROGRESS NOTE ADULT - PROBLEM SELECTOR PLAN 7
History of DVT with multiple PE ion 2002 on warfarin  - reports that his target INR is 2.5  - supra-therapeutic INR 3.9 on presentation, warfarin held  - INR this am 1.83 s/p warfarin 5mg x1 8/26 -> f/u heme/onc re: coumadin  - Will hold coumadin in setting of significant thrombocytopenia History of DVT with multiple PE in 2002 on warfarin  - reports that his target INR is 2.5  - supra-therapeutic INR 3.9 on presentation, warfarin held  - Heme/onc recommend cont to hold coumadin  - Will cont to hold coumadin in setting of significant thrombocytopenia   -F/u AM INR

## 2024-08-28 NOTE — CONSULT NOTE ADULT - PROBLEM SELECTOR RECOMMENDATION 9
santyl collagenase, gauze qd  May need to consider excisional bx  f/u 1 wk at [facility]

## 2024-08-29 ENCOUNTER — TRANSCRIPTION ENCOUNTER (OUTPATIENT)
Age: 54
End: 2024-08-29

## 2024-08-29 VITALS
TEMPERATURE: 98 F | OXYGEN SATURATION: 96 % | HEART RATE: 75 BPM | DIASTOLIC BLOOD PRESSURE: 71 MMHG | RESPIRATION RATE: 17 BRPM | SYSTOLIC BLOOD PRESSURE: 117 MMHG

## 2024-08-29 LAB
APTT BLD: 78.3 SEC — HIGH (ref 24.5–35.6)
BASOPHILS # BLD AUTO: 0 K/UL — SIGNIFICANT CHANGE UP (ref 0–0.2)
BASOPHILS NFR BLD AUTO: 0 % — SIGNIFICANT CHANGE UP (ref 0–2)
CULTURE RESULTS: SIGNIFICANT CHANGE UP
CULTURE RESULTS: SIGNIFICANT CHANGE UP
EOSINOPHIL # BLD AUTO: 0 K/UL — SIGNIFICANT CHANGE UP (ref 0–0.5)
EOSINOPHIL NFR BLD AUTO: 0 % — SIGNIFICANT CHANGE UP (ref 0–6)
HCT VFR BLD CALC: 22.8 % — LOW (ref 39–50)
HGB BLD-MCNC: 7.8 G/DL — LOW (ref 13–17)
INR BLD: 2.11 RATIO — HIGH (ref 0.85–1.18)
LYMPHOCYTES # BLD AUTO: 0.52 K/UL — LOW (ref 1–3.3)
LYMPHOCYTES # BLD AUTO: 5 % — LOW (ref 13–44)
MAGNESIUM SERPL-MCNC: 1.9 MG/DL — SIGNIFICANT CHANGE UP (ref 1.6–2.6)
MCHC RBC-ENTMCNC: 24 PG — LOW (ref 27–34)
MCHC RBC-ENTMCNC: 34.2 GM/DL — SIGNIFICANT CHANGE UP (ref 32–36)
MCV RBC AUTO: 70.2 FL — LOW (ref 80–100)
MONOCYTES # BLD AUTO: 0.83 K/UL — SIGNIFICANT CHANGE UP (ref 0–0.9)
MONOCYTES NFR BLD AUTO: 8 % — SIGNIFICANT CHANGE UP (ref 2–14)
NEUTROPHILS # BLD AUTO: 8.42 K/UL — HIGH (ref 1.8–7.4)
NEUTROPHILS NFR BLD AUTO: 68 % — SIGNIFICANT CHANGE UP (ref 43–77)
NRBC # BLD: SIGNIFICANT CHANGE UP /100 WBCS (ref 0–0)
PHOSPHATE SERPL-MCNC: 2.8 MG/DL — SIGNIFICANT CHANGE UP (ref 2.5–4.5)
PLATELET # BLD AUTO: 31 K/UL — LOW (ref 150–400)
PROTHROM AB SERPL-ACNC: 23.5 SEC — HIGH (ref 9.5–13)
RBC # BLD: 3.25 M/UL — LOW (ref 4.2–5.8)
RBC # FLD: 25.7 % — HIGH (ref 10.3–14.5)
SPECIMEN SOURCE: SIGNIFICANT CHANGE UP
SPECIMEN SOURCE: SIGNIFICANT CHANGE UP
WBC # BLD: 10.4 K/UL — SIGNIFICANT CHANGE UP (ref 3.8–10.5)
WBC # FLD AUTO: 10.4 K/UL — SIGNIFICANT CHANGE UP (ref 3.8–10.5)

## 2024-08-29 PROCEDURE — 86901 BLOOD TYPING SEROLOGIC RH(D): CPT

## 2024-08-29 PROCEDURE — 84133 ASSAY OF URINE POTASSIUM: CPT

## 2024-08-29 PROCEDURE — 83880 ASSAY OF NATRIURETIC PEPTIDE: CPT

## 2024-08-29 PROCEDURE — 84100 ASSAY OF PHOSPHORUS: CPT

## 2024-08-29 PROCEDURE — P9100: CPT

## 2024-08-29 PROCEDURE — 93005 ELECTROCARDIOGRAM TRACING: CPT

## 2024-08-29 PROCEDURE — 82607 VITAMIN B-12: CPT

## 2024-08-29 PROCEDURE — 85014 HEMATOCRIT: CPT

## 2024-08-29 PROCEDURE — 99285 EMERGENCY DEPT VISIT HI MDM: CPT

## 2024-08-29 PROCEDURE — 85027 COMPLETE CBC AUTOMATED: CPT

## 2024-08-29 PROCEDURE — 84300 ASSAY OF URINE SODIUM: CPT

## 2024-08-29 PROCEDURE — 84540 ASSAY OF URINE/UREA-N: CPT

## 2024-08-29 PROCEDURE — 81003 URINALYSIS AUTO W/O SCOPE: CPT

## 2024-08-29 PROCEDURE — 85610 PROTHROMBIN TIME: CPT

## 2024-08-29 PROCEDURE — 85730 THROMBOPLASTIN TIME PARTIAL: CPT

## 2024-08-29 PROCEDURE — 0225U NFCT DS DNA&RNA 21 SARSCOV2: CPT

## 2024-08-29 PROCEDURE — 83605 ASSAY OF LACTIC ACID: CPT

## 2024-08-29 PROCEDURE — 71250 CT THORAX DX C-: CPT | Mod: MC

## 2024-08-29 PROCEDURE — 86900 BLOOD TYPING SEROLOGIC ABO: CPT

## 2024-08-29 PROCEDURE — P9016: CPT

## 2024-08-29 PROCEDURE — 82747 ASSAY OF FOLIC ACID RBC: CPT

## 2024-08-29 PROCEDURE — 99239 HOSP IP/OBS DSCHRG MGMT >30: CPT | Mod: GC

## 2024-08-29 PROCEDURE — 84484 ASSAY OF TROPONIN QUANT: CPT

## 2024-08-29 PROCEDURE — P9037: CPT

## 2024-08-29 PROCEDURE — 80053 COMPREHEN METABOLIC PANEL: CPT

## 2024-08-29 PROCEDURE — 87640 STAPH A DNA AMP PROBE: CPT

## 2024-08-29 PROCEDURE — 36430 TRANSFUSION BLD/BLD COMPNT: CPT

## 2024-08-29 PROCEDURE — 81001 URINALYSIS AUTO W/SCOPE: CPT

## 2024-08-29 PROCEDURE — 87086 URINE CULTURE/COLONY COUNT: CPT

## 2024-08-29 PROCEDURE — 96374 THER/PROPH/DIAG INJ IV PUSH: CPT

## 2024-08-29 PROCEDURE — 83735 ASSAY OF MAGNESIUM: CPT

## 2024-08-29 PROCEDURE — 86850 RBC ANTIBODY SCREEN: CPT

## 2024-08-29 PROCEDURE — 82746 ASSAY OF FOLIC ACID SERUM: CPT

## 2024-08-29 PROCEDURE — 85025 COMPLETE CBC W/AUTO DIFF WBC: CPT

## 2024-08-29 PROCEDURE — 85018 HEMOGLOBIN: CPT

## 2024-08-29 PROCEDURE — 87641 MR-STAPH DNA AMP PROBE: CPT

## 2024-08-29 PROCEDURE — 80202 ASSAY OF VANCOMYCIN: CPT

## 2024-08-29 PROCEDURE — 83935 ASSAY OF URINE OSMOLALITY: CPT

## 2024-08-29 PROCEDURE — 84156 ASSAY OF PROTEIN URINE: CPT

## 2024-08-29 PROCEDURE — 36415 COLL VENOUS BLD VENIPUNCTURE: CPT

## 2024-08-29 PROCEDURE — 82570 ASSAY OF URINE CREATININE: CPT

## 2024-08-29 PROCEDURE — 71045 X-RAY EXAM CHEST 1 VIEW: CPT

## 2024-08-29 PROCEDURE — 87040 BLOOD CULTURE FOR BACTERIA: CPT

## 2024-08-29 PROCEDURE — 86923 COMPATIBILITY TEST ELECTRIC: CPT

## 2024-08-29 PROCEDURE — 82550 ASSAY OF CK (CPK): CPT

## 2024-08-29 RX ORDER — BACITRACIN 500 UNIT/G
1 OINTMENT (GRAM) TOPICAL
Qty: 1 | Refills: 0
Start: 2024-08-29 | End: 2024-09-27

## 2024-08-29 RX ORDER — CEPHALEXIN 500 MG
1 CAPSULE ORAL
Qty: 0 | Refills: 0 | DISCHARGE
Start: 2024-08-29

## 2024-08-29 RX ORDER — CEPHALEXIN 500 MG
1 CAPSULE ORAL
Qty: 12 | Refills: 0
Start: 2024-08-29 | End: 2024-08-31

## 2024-08-29 RX ORDER — COLLAGENASE SANTYL 250 [ARB'U]/G
1 OINTMENT TOPICAL
Qty: 1 | Refills: 0
Start: 2024-08-29 | End: 2024-09-27

## 2024-08-29 RX ORDER — BACITRACIN 500 UNIT/G
1 OINTMENT (GRAM) TOPICAL
Qty: 0 | Refills: 0 | DISCHARGE
Start: 2024-08-29

## 2024-08-29 RX ORDER — COLLAGENASE SANTYL 250 [ARB'U]/G
1 OINTMENT TOPICAL
Qty: 0 | Refills: 0 | DISCHARGE
Start: 2024-08-29

## 2024-08-29 RX ADMIN — Medication 500 MILLIGRAM(S): at 11:58

## 2024-08-29 RX ADMIN — COLLAGENASE SANTYL 1 APPLICATION(S): 250 OINTMENT TOPICAL at 11:59

## 2024-08-29 RX ADMIN — Medication 1 APPLICATION(S): at 05:59

## 2024-08-29 RX ADMIN — CHLORHEXIDINE GLUCONATE 15 MILLILITER(S): 40 SOLUTION TOPICAL at 06:00

## 2024-08-29 RX ADMIN — Medication 500000 UNIT(S): at 00:23

## 2024-08-29 RX ADMIN — Medication 1 MILLIGRAM(S): at 11:58

## 2024-08-29 RX ADMIN — Medication 500 MILLIGRAM(S): at 00:23

## 2024-08-29 RX ADMIN — Medication 500000 UNIT(S): at 06:01

## 2024-08-29 RX ADMIN — Medication 500 MILLIGRAM(S): at 05:59

## 2024-08-29 RX ADMIN — Medication 500000 UNIT(S): at 11:58

## 2024-08-29 NOTE — DISCHARGE NOTE NURSING/CASE MANAGEMENT/SOCIAL WORK - NSDCPEFALRISK_GEN_ALL_CORE
For information on Fall & Injury Prevention, visit: [email or web address] OR  [email or web address] OR  https://www.cdc.gov/steadi/patient.html

## 2024-08-29 NOTE — PROGRESS NOTE ADULT - SUBJECTIVE AND OBJECTIVE BOX
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 For his non-small cell lung cancer currently undergoing chemotherapy, had last chemo (round 3) on August 13, during which he received 1 unit of blood due to low numbers. Denies any cough/URI.  No acute shortness of breath.  No acute chest pain.  Patient was seen in ER yesterday in [place] after having a nosebleed.  The nosebleed was controlled, but the patient had a hemoglobin of 5.5.  The patient received 1 unit of blood.  No further bleeding since that time.  The patient called his doctor today, was told to go to the ER for the fever. Patient had a fever of 101.5 this morning at home.Patient did have a negative RVP yesterday at the outside ER. No abdominal pain.  No vomiting or diarrhea.  No dysuria/hematuria. Complains of painful sore on chest which is new where he had a past biopsy for his sarcoidosis, now mostly resolved.   Has not seen nephrologist on the past.  NO N/V/SOB.  He is urinating without issue.     NO N/V/SOB    PAST MEDICAL & SURGICAL HISTORY:  Antiphospholipid syndrome      Pulmonary embolism      Lung cancer      Sarcoidosis      Thalassemia minor      Calculus of gallbladder without cholecystitis without obstruction      S/P IVC filter      S/P shoulder surgery           FAMILY HISTORY:  FH: HTN (hypertension) (Father)    FH: thyroid disease (Mother, Sibling)    NC    Social History:Non smoker    MEDICATIONS  (STANDING):  filgrastim-sndz (ZARXIO) Injectable 480 MICROGram(s) SubCutaneous daily  folic acid 1 milliGRAM(s) Oral daily  sodium chloride 0.9%. 1000 milliLiter(s) (75 mL/Hr) IV Continuous <Continuous>    MEDICATIONS  (PRN):  metoclopramide 10 milliGRAM(s) Oral every 6 hours PRN for nausea   Meds reviewed    Allergies    Animal dander-Cat (Other)  No Known Drug Allergies    Intolerances         REVIEW OF SYSTEMS:    as above    ICU Vital Signs Last 24 Hrs  T(C): 36.9 (29 Aug 2024 11:22), Max: 37 (28 Aug 2024 20:41)  T(F): 98.4 (29 Aug 2024 11:22), Max: 98.6 (28 Aug 2024 20:41)  HR: 75 (29 Aug 2024 11:22) (71 - 94)  BP: 117/71 (29 Aug 2024 11:22) (116/69 - 131/78)  BP(mean): --  ABP: --  ABP(mean): --  RR: 17 (29 Aug 2024 11:22) (16 - 17)  SpO2: 96% (29 Aug 2024 11:22) (94% - 96%)    O2 Parameters below as of 29 Aug 2024 11:22  Patient On (Oxygen Delivery Method): room air            PHYSICAL EXAM:    GENERAL: NAD  HEAD:  Atraumatic, Normocephalic  EYES: EOMI, conjunctiva and sclera clear  ENMT: No Drainage from nares, No drainage from ears  NERVOUS SYSTEM:  Awake and Alert  SKIN: + rash      LABS:                                   7.8    10.40 )-----------( 31       ( 29 Aug 2024 07:45 )             22.8     08-28    142  |  109<H>  |  19  ----------------------------<  105<H>  3.9   |  27  |  1.70<H>    Ca    9.1      28 Aug 2024 09:00  Phos  2.8     08-29  Mg     1.9     08-29    TPro  7.0  /  Alb  3.1<L>  /  TBili  0.5  /  DBili  x   /  AST  42<H>  /  ALT  40  /  AlkPhos  63  08-28    PT/INR - ( 29 Aug 2024 07:45 )   PT: 23.5 sec;   INR: 2.11 ratio         PTT - ( 29 Aug 2024 07:45 )  PTT:78.3 sec  Urinalysis Basic - ( 28 Aug 2024 09:00 )    Color: x / Appearance: x / SG: x / pH: x  Gluc: 105 mg/dL / Ketone: x  / Bili: x / Urobili: x   Blood: x / Protein: x / Nitrite: x   Leuk Esterase: x / RBC: x / WBC x   Sq Epi: x / Non Sq Epi: x / Bacteria: x

## 2024-08-29 NOTE — PROGRESS NOTE ADULT - PROVIDER SPECIALTY LIST ADULT
Heme/Onc
Heme/Onc
Infectious Disease
Nephrology
Nephrology
Heme/Onc
Heme/Onc
Nephrology
Heme/Onc
Hospitalist

## 2024-08-29 NOTE — PROVIDER CONTACT NOTE (CRITICAL VALUE NOTIFICATION) - TEST AND RESULT REPORTED:
Platelets 11
13% Bands
Platelets 14
WBC 0.54  Hct20.7
PLT : 15
WBC 0.98, HGB 6.5, HCT 18.9, PLT 6
Hemoglobin 6.8 and hematocrit 19.8

## 2024-08-29 NOTE — PROVIDER CONTACT NOTE (CRITICAL VALUE NOTIFICATION) - ACTION/TREATMENT ORDERED:
Will speak with ID
Dr. Anne (resident) notified of above, no new orders at this time. Will continue to monitor.
Provider stated she may want to order PRBC
 notified of above and stated she would place new orders. Awaiting for new orders at this time.
1 unit Platelets tommyed

## 2024-08-29 NOTE — PROGRESS NOTE ADULT - ASSESSMENT
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 presented 2/2021 RUL lung mass w add nodule and med/hilar LADs, 6/2021 Rt supraclav LN bx met adenocarcinoma EGFR L858F mutation, also found w multi brain mets, started on Tagrisso, serial scans rel stable/[place], 10/2023 gamma RT knife to brain met for POD 6/11/24 added Carbo/Alimta to Tagrisso based on NGS after repeat bx right supracalv LN 4/2024)  Last chemo (C#4, planned last one) 8/13, cbc at that time wbc 2.19 hgb 7.2 mcv 63.2 plts 91 and also given 1U PRBC  Pt reports never given GCSF support, CBC each cycle seem worse  Traveled to [place] last 2 days and noted intermittent fever, day prior to adm also nose bleed seen at [place], told Hgb 5.7 given 1u PRBC, bleed also stopped  Day of adm T 101.5, called his Onc and told to come to ER  ED COURSE:  Vitals: T 98.6   F , 90 HR  ,  /76 , 18 RR  , SpO2  99% on RA  Labs significant for: 0.54 wbc, hgb 7.1, plts 14, cr 2.5, egfr 30 INR 3.9 .2  UA 30protein neg Leuk, 3WBC 1RBC  Pt admits to incr bruise today, no nausea, vomit, SOB. Does have decr appetite w more cycle of chemo  Started on IV Cefepime, post Vanco  -------------------------  -met adenocarcinoma right lung w right supraclav and bran mets since 2021, under care Dr Briscoe, has been on Tagrisso, post gamma knife, post C#4 Tagrisso/Carbo/Alimta 8/13/24, adm w neutropenic fever, pancytopenia  -pancytopenia due to chemo effect  -wbc further incr, today 10s, dc Zarxiom remains afebrile  -Hgb stable, plts reflect transfusion, expect to incr from marrow recovery over next few days  -clinically stable at this time    -Lupus Anticoagulant, hx PE/DVT post IVC filter and on coumadin  -supratherapeutic INR on adm due to acute illness  -latest INR 2.11, pt monitors own INR at home, also sees Heme Dr Art, can resume regimen, alerted signs of excessive bleed/bruise    -thalassemia minor-likely reason for low MCV and RBC higher than expective for degree of anemia. 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 presented 2/2021 RUL lung mass w add nodule and med/hilar LADs, 6/2021 Rt supraclav LN bx met adenocarcinoma EGFR L858F mutation, also found w multi brain mets, started on Tagrisso, serial scans rel stable/[place], 10/2023 gamma RT knife to brain met for POD 6/11/24 added Carbo/Alimta to Tagrisso based on NGS after repeat bx right supracalv LN 4/2024)  Last chemo (C#4, planned last one) 8/13, cbc at that time wbc 2.19 hgb 7.2 mcv 63.2 plts 91 and also given 1U PRBC  Pt reports never given GCSF support, CBC each cycle seem worse  Traveled to [place] last 2 days and noted intermittent fever, day prior to adm also [place] seen [place], told Hgb 5.7 given 1u PRBC, bleed also stopped  Day of adm T 101.5, called his Onc and told to come to ER  ED COURSE:  Vitals: T 98.6   F , 90 HR  ,  /76 , 18 RR  , SpO2  99% on RA  Labs significant for: 0.54 wbc, hgb 7.1, plts 14, cr 2.5, egfr 30 INR 3.9 .2  UA 30protein neg Leuk, 3WBC 1RBC  Pt admits to incr bruise today, no nausea, vomit, SOB. Does have decr appetite w more cycle of chemo  Started on IV Cefepime, post Vanco  -------------------------  -met adenocarcinoma right lung w right supraclav and bran mets since 2021, under care Dr Briscoe, has been on Tagrisso, post gamma knife, post C#4 Tagrisso/Carbo/Alimta 8/13/24, adm w neutropenic fever, pancytopenia  -pancytopenia due to chemo effect  -wbc further incr, today 10s, dc Zarxiom remains afebrile  -Hgb stable, plts reflect transfusion, expect to incr from marrow recovery over next few days  -clinically stable at this time    -Lupus Anticoagulant, hx PE/DVT post IVC filter and on coumadin  -supratherapeutic INR on adm due to acute illness  -latest INR 2.11, pt monitors own INR at home, also sees Heme Dr Art, can resume regimen, alerted signs of excessive bleed/bruise    -thalassemia minor-likely reason for low MCV and RBC higher than expective for degree of anemia. No specific intervention needed    stable from Heme/Onc standpoint for discharge  pt has appt w Dr Hailey Gillis 9AM  discussed w Medicine  discussed w pt

## 2024-08-29 NOTE — DISCHARGE NOTE NURSING/CASE MANAGEMENT/SOCIAL WORK - NSDCFUADDAPPT_GEN_ALL_CORE_FT
santyl collagenase, gauze qd  May need to consider excisional bx  f/u 1 wk at [facility].

## 2024-08-29 NOTE — CASE MANAGEMENT PROGRESS NOTE - NSCMPROGRESSNOTE_GEN_ALL_CORE
Patient is anticipated for DC home today. Patient has no DC needs identified at present. CM met with patient at bedside to discuss plan for home today with outpatient follow up. He verbalized understanding. Spouse will be transporting him home. Discharge notice reviewed and explained to patient; he verbalized understanding and is in agreement to DC plan. CM remains available. 
Per MD, medically ready for DC home today. No anticipated DC needs identified at present. Spouse to transport home. CM remains available. 
negative...

## 2024-08-29 NOTE — PROGRESS NOTE ADULT - SUBJECTIVE AND OBJECTIVE BOX
All interim records and events noted.    receiving another unit plts this AM, post one unit yesterday w plts incr from 11kto 41k, this AM 31k  pt feeling well  afebrile, no gross bleeds at this time,Coumadin on hold      MEDICATIONS  (STANDING):  bacitracin   Ointment 1 Application(s) Topical daily  cephalexin 500 milliGRAM(s) Oral four times a day  chlorhexidine 0.12% Liquid 15 milliLiter(s) Swish and Spit two times a day  collagenase Ointment 1 Application(s) Topical daily  folic acid 1 milliGRAM(s) Oral daily  mupirocin 2% Nasal 1 Application(s) Both Nostrils two times a day  nystatin    Suspension 809715 Unit(s) Oral every 6 hours    MEDICATIONS  (PRN):  acetaminophen     Tablet .. 650 milliGRAM(s) Oral every 6 hours PRN Temp greater or equal to 38C (100.4F), Moderate Pain (4 - 6)  metoclopramide 10 milliGRAM(s) Oral every 6 hours PRN for nausea  sodium chloride 0.65% Nasal 1 Spray(s) Both Nostrils three times a day PRN Nasal Congestion      Vital Signs Last 24 Hrs  T(C): 36.9 (29 Aug 2024 11:22), Max: 37 (28 Aug 2024 20:41)  T(F): 98.4 (29 Aug 2024 11:22), Max: 98.6 (28 Aug 2024 20:41)  HR: 75 (29 Aug 2024 11:22) (71 - 94)  BP: 117/71 (29 Aug 2024 11:22) (116/69 - 131/78)  BP(mean): --  RR: 17 (29 Aug 2024 11:22) (16 - 17)  SpO2: 96% (29 Aug 2024 11:22) (94% - 96%)    Parameters below as of 29 Aug 2024 11:22  Patient On (Oxygen Delivery Method): room air        PHYSICAL EXAM  General: in no acute distress  Head: atraumatic, normocephalic  ENT: sclera anicteric, buccal mucosa moist  Neck: supple, trachea midline  CV: S1 S2, regular rate and rhythm  Lungs: clear to auscultation, no wheezes/rhonchi  Abdomen: soft, nontender, bowel sounds present, no palpable nasses  Extrem: no clubbing/cyanosis/edema  Skin: no significant increased ecchymosis/petechiae  Neuro: alert and oriented X3,  no focal deficits      LABS:             7.8    10.40 )-----------( 31       ( 08-29 @ 07:45 )             22.8                8.2    7.77  )-----------( 41       ( 08-28 @ 19:20 )             25.3                8.0    6.19  )-----------( 11       ( 08-28 @ 09:00 )             24.8                8.3    3.36  )-----------( 15       ( 08-27 @ 08:20 )             24.2       08-28    142  |  109<H>  |  19  ----------------------------<  105<H>  3.9   |  27  |  1.70<H>    Ca    9.1      28 Aug 2024 09:00  Phos  2.8     08-29  Mg     1.9     08-29    TPro  7.0  /  Alb  3.1<L>  /  TBili  0.5  /  DBili  x   /  AST  42<H>  /  ALT  40  /  AlkPhos  63  08-28 08-29 @ 07:45  PT23.5 INR2.11  PTT78.3  08-28 @ 09:00  PT25.8 INR2.32  PTT--  08-27 @ 08:20  PT20.5 INR1.83  PTT--  08-26 @ 06:30  PT21.9 INR1.96  PTT--  08-25 @ 08:37  PT34.6 INR3.14  PTT92.9      RADIOLOGY & ADDITIONAL STUDIES:    IMPRESSION/RECOMMENDATIONS:

## 2024-08-29 NOTE — PROGRESS NOTE ADULT - ASSESSMENT
JORDAN on CKD 2  Neutropenic Fever  Anemia  NSCLC    -Baseline Creatinine 1.2?  -JORDAN Likely multifactorial decreased EABV + Carboplatin chemo, now improving   -Urine studies reviewed   -No indication for dialysis at this time  -S/p PRBCs per primary/heme  -Off IVF  -Creatine stablizing    8/29/24-d/w wife

## 2024-08-29 NOTE — DISCHARGE NOTE NURSING/CASE MANAGEMENT/SOCIAL WORK - PATIENT PORTAL LINK FT
You can access the FollowMyHealth Patient Portal offered by [facility] by registering at the following website: [email or web address]. By joining [facility]’s FollowMyHealth portal, you will also be able to view your health information using other applications (apps) compatible with our system.

## 2024-09-03 ENCOUNTER — RESULT REVIEW (OUTPATIENT)
Age: 54
End: 2024-09-03

## 2024-09-03 ENCOUNTER — APPOINTMENT (OUTPATIENT)
Dept: HEMATOLOGY ONCOLOGY | Facility: CLINIC | Age: 54
End: 2024-09-03

## 2024-09-03 LAB
ANISOCYTOSIS BLD QL: SIGNIFICANT CHANGE UP
BASOPHILS # BLD AUTO: 0 K/UL — SIGNIFICANT CHANGE UP (ref 0–0.2)
BASOPHILS NFR BLD AUTO: 0 % — SIGNIFICANT CHANGE UP (ref 0–2)
DACRYOCYTES BLD QL SMEAR: SLIGHT — SIGNIFICANT CHANGE UP
ELLIPTOCYTES BLD QL SMEAR: SLIGHT — SIGNIFICANT CHANGE UP
EOSINOPHIL # BLD AUTO: 0 K/UL — SIGNIFICANT CHANGE UP (ref 0–0.5)
EOSINOPHIL NFR BLD AUTO: 0 % — SIGNIFICANT CHANGE UP (ref 0–6)
HCT VFR BLD CALC: 25.8 % — LOW (ref 39–50)
HGB BLD-MCNC: 8.5 G/DL — LOW (ref 13–17)
HYPOCHROMIA BLD QL: SLIGHT — SIGNIFICANT CHANGE UP
LG PLATELETS BLD QL AUTO: SLIGHT — SIGNIFICANT CHANGE UP
LYMPHOCYTES # BLD AUTO: 0.44 K/UL — LOW (ref 1–3.3)
LYMPHOCYTES # BLD AUTO: 12 % — LOW (ref 13–44)
MCHC RBC-ENTMCNC: 23.7 PG — LOW (ref 27–34)
MCHC RBC-ENTMCNC: 32.9 G/DL — SIGNIFICANT CHANGE UP (ref 32–36)
MCV RBC AUTO: 71.9 FL — LOW (ref 80–100)
MICROCYTES BLD QL: SIGNIFICANT CHANGE UP
MONOCYTES # BLD AUTO: 0.47 K/UL — SIGNIFICANT CHANGE UP (ref 0–0.9)
MONOCYTES NFR BLD AUTO: 13 % — SIGNIFICANT CHANGE UP (ref 2–14)
NEUTROPHILS # BLD AUTO: 2.72 K/UL — SIGNIFICANT CHANGE UP (ref 1.8–7.4)
NEUTROPHILS NFR BLD AUTO: 75 % — SIGNIFICANT CHANGE UP (ref 43–77)
NRBC # BLD: 0 /100 WBCS — SIGNIFICANT CHANGE UP (ref 0–0)
NRBC # BLD: SIGNIFICANT CHANGE UP /100 WBCS (ref 0–0)
PLAT MORPH BLD: ABNORMAL
PLATELET # BLD AUTO: 39 K/UL — LOW (ref 150–400)
POIKILOCYTOSIS BLD QL AUTO: SIGNIFICANT CHANGE UP
RBC # BLD: 3.59 M/UL — LOW (ref 4.2–5.8)
RBC # FLD: 26.2 % — HIGH (ref 10.3–14.5)
RBC BLD AUTO: ABNORMAL
SCHISTOCYTES BLD QL AUTO: SLIGHT — SIGNIFICANT CHANGE UP
TARGETS BLD QL SMEAR: SLIGHT — SIGNIFICANT CHANGE UP
WBC # BLD: 3.63 K/UL — LOW (ref 3.8–10.5)
WBC # FLD AUTO: 3.63 K/UL — LOW (ref 3.8–10.5)

## 2024-09-04 ENCOUNTER — RESULT REVIEW (OUTPATIENT)
Age: 54
End: 2024-09-04

## 2024-09-04 ENCOUNTER — RX RENEWAL (OUTPATIENT)
Age: 54
End: 2024-09-04

## 2024-09-04 ENCOUNTER — APPOINTMENT (OUTPATIENT)
Dept: HEMATOLOGY ONCOLOGY | Facility: CLINIC | Age: 54
End: 2024-09-04

## 2024-09-04 ENCOUNTER — NON-APPOINTMENT (OUTPATIENT)
Age: 54
End: 2024-09-04

## 2024-09-04 ENCOUNTER — APPOINTMENT (OUTPATIENT)
Dept: CT IMAGING | Facility: IMAGING CENTER | Age: 54
End: 2024-09-04

## 2024-09-04 ENCOUNTER — APPOINTMENT (OUTPATIENT)
Dept: INFUSION THERAPY | Facility: HOSPITAL | Age: 54
End: 2024-09-04

## 2024-09-04 ENCOUNTER — APPOINTMENT (OUTPATIENT)
Dept: HEMATOLOGY ONCOLOGY | Facility: CLINIC | Age: 54
End: 2024-09-04
Payer: COMMERCIAL

## 2024-09-04 LAB
ALBUMIN SERPL ELPH-MCNC: 4 G/DL — SIGNIFICANT CHANGE UP (ref 3.3–5)
ALBUMIN SERPL ELPH-MCNC: 4.5 G/DL
ALP BLD-CCNC: 61 U/L
ALP SERPL-CCNC: 54 U/L — SIGNIFICANT CHANGE UP (ref 40–120)
ALT FLD-CCNC: 37 U/L — SIGNIFICANT CHANGE UP (ref 10–45)
ALT SERPL-CCNC: 37 U/L
ANION GAP SERPL CALC-SCNC: 13 MMOL/L
ANION GAP SERPL CALC-SCNC: 13 MMOL/L — SIGNIFICANT CHANGE UP (ref 5–17)
AST SERPL-CCNC: 33 U/L
AST SERPL-CCNC: 41 U/L — HIGH (ref 10–40)
BASOPHILS # BLD AUTO: 0.01 K/UL — SIGNIFICANT CHANGE UP (ref 0–0.2)
BASOPHILS NFR BLD AUTO: 0.2 % — SIGNIFICANT CHANGE UP (ref 0–2)
BILIRUB SERPL-MCNC: 0.2 MG/DL — SIGNIFICANT CHANGE UP (ref 0.2–1.2)
BILIRUB SERPL-MCNC: 0.3 MG/DL
BUN SERPL-MCNC: 25 MG/DL
BUN SERPL-MCNC: 27 MG/DL — HIGH (ref 7–23)
CALCIUM SERPL-MCNC: 9.4 MG/DL
CALCIUM SERPL-MCNC: 9.8 MG/DL — SIGNIFICANT CHANGE UP (ref 8.4–10.5)
CHLORIDE SERPL-SCNC: 103 MMOL/L
CHLORIDE SERPL-SCNC: 99 MMOL/L — SIGNIFICANT CHANGE UP (ref 96–108)
CO2 SERPL-SCNC: 23 MMOL/L — SIGNIFICANT CHANGE UP (ref 22–31)
CO2 SERPL-SCNC: 24 MMOL/L
CREAT SERPL-MCNC: 2.04 MG/DL — HIGH (ref 0.5–1.3)
CREAT SERPL-MCNC: 2.09 MG/DL
EGFR: 38 ML/MIN/1.73M2
EGFR: 39 ML/MIN/1.73M2 — LOW
EOSINOPHIL # BLD AUTO: 0.01 K/UL — SIGNIFICANT CHANGE UP (ref 0–0.5)
EOSINOPHIL NFR BLD AUTO: 0.2 % — SIGNIFICANT CHANGE UP (ref 0–6)
GLUCOSE SERPL-MCNC: 100 MG/DL — HIGH (ref 70–99)
GLUCOSE SERPL-MCNC: 123 MG/DL
HCT VFR BLD CALC: 23.3 % — LOW (ref 39–50)
HGB BLD-MCNC: 7.8 G/DL — LOW (ref 13–17)
IMM GRANULOCYTES NFR BLD AUTO: 3.2 % — HIGH (ref 0–0.9)
INR BLD: 2.14 RATIO — HIGH (ref 0.85–1.18)
INR PPP: 2 RATIO
LYMPHOCYTES # BLD AUTO: 0.55 K/UL — LOW (ref 1–3.3)
LYMPHOCYTES # BLD AUTO: 13.6 % — SIGNIFICANT CHANGE UP (ref 13–44)
MCHC RBC-ENTMCNC: 24 PG — LOW (ref 27–34)
MCHC RBC-ENTMCNC: 33.5 G/DL — SIGNIFICANT CHANGE UP (ref 32–36)
MCV RBC AUTO: 71.7 FL — LOW (ref 80–100)
MONOCYTES # BLD AUTO: 0.66 K/UL — SIGNIFICANT CHANGE UP (ref 0–0.9)
MONOCYTES NFR BLD AUTO: 16.3 % — HIGH (ref 2–14)
NEUTROPHILS # BLD AUTO: 2.69 K/UL — SIGNIFICANT CHANGE UP (ref 1.8–7.4)
NEUTROPHILS NFR BLD AUTO: 66.5 % — SIGNIFICANT CHANGE UP (ref 43–77)
NRBC # BLD: 0 /100 WBCS — SIGNIFICANT CHANGE UP (ref 0–0)
PLATELET # BLD AUTO: 48 K/UL — LOW (ref 150–400)
POTASSIUM SERPL-MCNC: 5.1 MMOL/L — SIGNIFICANT CHANGE UP (ref 3.5–5.3)
POTASSIUM SERPL-SCNC: 5.1 MMOL/L — SIGNIFICANT CHANGE UP (ref 3.5–5.3)
POTASSIUM SERPL-SCNC: 5.3 MMOL/L
PROT SERPL-MCNC: 7.6 G/DL
PROT SERPL-MCNC: 7.9 G/DL — SIGNIFICANT CHANGE UP (ref 6–8.3)
PROTHROM AB SERPL-ACNC: 23.7 SEC — HIGH (ref 9.5–13)
PT BLD: 22.1 SEC
RBC # BLD: 3.25 M/UL — LOW (ref 4.2–5.8)
RBC # FLD: 26.4 % — HIGH (ref 10.3–14.5)
SODIUM SERPL-SCNC: 135 MMOL/L — SIGNIFICANT CHANGE UP (ref 135–145)
SODIUM SERPL-SCNC: 141 MMOL/L
WBC # BLD: 4.05 K/UL — SIGNIFICANT CHANGE UP (ref 3.8–10.5)
WBC # FLD AUTO: 4.05 K/UL — SIGNIFICANT CHANGE UP (ref 3.8–10.5)

## 2024-09-04 PROCEDURE — 99214 OFFICE O/P EST MOD 30 MIN: CPT

## 2024-09-04 NOTE — PHYSICAL EXAM
[Restricted in physically strenuous activity but ambulatory and able to carry out work of a light or sedentary nature] : Status 1- Restricted in physically strenuous activity but ambulatory and able to carry out work of a light or sedentary nature, e.g., light house work, office work [Normal] : affect appropriate [de-identified] : rt supracalv adenopathy improved [de-identified] : acneiform rash on bilateral upper and lower extremities

## 2024-09-04 NOTE — HISTORY OF PRESENT ILLNESS
[Disease: _____________________] : Disease: [unfilled] [T: ___] : T[unfilled] [N: ___] : N[unfilled] [M: ___] : M[unfilled] [AJCC Stage: ____] : AJCC Stage: [unfilled] [de-identified] : Mr. Mclaughlin with hx of sarcoidosis is a pleasant 52-year-old male who started having allergy-like symptoms (cough) 5 months ago. He saw his PCP, Dr. Mo- referred to Dr. Trujillo after he was noted to have an abnormal chest CAT scan. He was referred for PET/CT which was done on 5/23 which showed  FDG avid right upper lobe masslike consolidation, considerably increased in size and coalesced with adjacent right upper lobe nodules and new FDG avid supraclavicular, mediastinal and hilar lymphadenopathy, as compared to CT chest January 13, 2021 concerning for malignancy although progression of sarcoidosis is also a consideration. rt supra clav bx was c/w lung adeno ca.   Of note, the patient has a complex medical history having had a diagnosis of sarcoid made in 2002 via a mediastinoscopy. At that time he was noted to have several pulmonary nodules but no treatment was required. 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 He was started on anticoagulation and a IVC filter was placed. He is also suspected of having lupus for which she is on hydroxychloroquine. He has a history of bronchial reactivity but is currently not on any inhaled bronchodilators.  He is a lifelong nonsmoker without occupational exposures. Other than a dry cough, he feels well. He denies any fever or, chest discomfort, dyspnea or peripheral edema.   6/22/21: Here for follow up. No complaints.    7/14/21: Patient seen for routine follow-up.  Patient is currently getting treatment for lupus and remains on hydrochorloquine and coumadin for anti-Cardiolipin antibod. Patient verbalized pain in the left foot that radiated from the small toe to the sole of feet, he takes advil with satisfactory pain control.  Patient also verbalized joint pain  that is related to lupus flare up.  Pt recently started on Tagrisso 80mg daily which he is tolerating well. Denies rash/ diarrhea. Pt met with Dr. Boudreaux for consideration of GK to brain mets.   9/3/21: Facial rash, gained weight, migrating joint pains better after starting steroids (prednisone 5 mg daily). Has had CT scans and MRI yesterday.   10/19/21: Feeling well. Mild facial rash, gained more weight. Restarted hydroxychloroquine for lupus with worsening joint pain in his wrists and fingers;  continues coumadin; plans f/u with his rheumatologist. Reports toe infection, on abx as per his podiatrist with improvement. Has f/u MRI Brain on 12/1.   12/7/21: Since August has been having toenail infection of R and L 1st and 2nd toes. Infection is painful, non pruritic. Has seen podiatrist who resected some of the toenails/skin. Using topical antibiotics with some improvement.   3/10/22: doing well. no new symptoms to report. Has some nail changes but all other AEs have improved. He does have episodic worsening of joint pains. Has gained some weight.   6/9/22: had a recent ER visit for acute swelling of rt knee and ankle- possibly related to occult trauma. Of note, pt is on Coumadin for VTE with optimal maintenance of INR. He has no other symptoms   9/15/22: Pt seem today for follow up. Continues on Tagrisso. Noted to have inflammation of the skin around nails and toe nails. Otherwise not having any additional AEs. Had CT scans which showed stable right upper lobe scarring at site of primary neoplasm. Stable 1.2 cm lingular nodule which could be scarring/atelectasis however as it is larger compared to more remote studies, 3 month follow-up is recommended. New subcentimeter right lower lobe groundglass nodule which can be reassessed on follow-up. Unchanged numerous predominantly subcentimeter retroperitoneal/pelvic lymph nodes.  12/15/22: Here for follow up.Continues on Tagrisso at 160 mg. Feet improved. cracked skin around finger nails. no diarrhea. Taking coumadin   6/15/23: Patient seen in clinic to follow up on his Osimertinib therapy. Patient is experiencing some nail toxicity on and off and muscle cramps, which he is using tonic water for, Denies diarrhea. He has fatigue. He notes that the texture of his scalp hair is different, but he is not bothered by it. He has no mucositis. Pt denies any neurological symptoms.   9/20/23: Skin, nail changes-was really bad, now subsided. Also needed abx as prescribed by podiatrist. Continues on Tagrisso at 160 mg  12/22/23: Patient accompanied by wife, here for follow up visit s/p mask gammaknife RT to cerebellar met 11/27/23, continues on tagrisso and has no complaints.   3/26/24: Pt recently had rt upper quadrant pain, went o ER a month ago and was noted to have gall stones. Was sent home after pain management. He was referred to the surgeon. There was a delay in surgery duen to elevated PTT. I received a call at that time and as documented, this was deemed to be sec to lupus anticoagulant.   6/6/24: feeling well. No new symptoms. Had PET/CT 1.  Extensive hypermetabolic lymphadenopathy extending from the neck into the supraclavicular regions, consistent with malignant involvement. This also includes a posterior LEFT shoulder node. 2.  Uptake at the RIGHT perihilar region and air bronchograms in the RIGHT UPPER lobe consistent with residual, findings of lung cancer. Continued follow-up suggested to exclude residual metabolically reactive disease. 3.  Small, minimally avid RIGHT axillary node, nonspecific.  7/2/24: Patient seen today for follow up while receiving treatment with carbo/alimta. He started this treatment on June 11th. He reports that after his first cycle he experienced some discomfort in his right upper thorax area, no rash indentified. He also experienced fatigue and felt he had a low grade temp 3 days following infusion. He feels his LAD improved since starting the treatment. He denies N/V/D.   7/23/24: Patient seen today for follow up while receiving C3 carbo/alimta. He continues on Tagrisso. He reports that following his last treatment he felt more fatigued. Did not experience thorax discomfort or fever. Notes rash on bilateral upper and lower extremities. He also has occasional hiccups resolved with reglan. His appetite is good and denies N/V/D, constipation. Breathing is stable. Remains active, golfs and continues to work.   8/13/24: Patient seen today for follow up in treatment room accompanied by his wife. Of note,  his hgb today is 7.2. We discussed necessity of 2UPRBCs transfusion however he is moving his kids into college out of [place] over the next two weeks and is leaving tomorrow so he will receivie 1U PRBC today. He and his wife report that following the third cycle of chemo, he felt very weak and tired and fatigued and was unable to participate in his normal activities. He continues to have acneiform rash on his upper and lower extremities. He also has dysgeusia. Denies hematuria, hemoptysis, melena.   9/4/24: Patient seen today for follow up accompanied by his wife. Of note, following his prior cycle of chemo, he was travelling in [place] and went to ED for nose bleed which was controlled and he was given 1U PRBCs for hgb 5.5. He then came back to [place] and reported fever and was referred to the ER. He went to [facility] ED where he was admitted for neutropenic fever and pancytopenia s/p zarxio, PRBC, and plt transfusions. He also had JORDAN which was thought to be due to volume depletion  He called the office yesterday reporting ongoing weakness and fatigue and thus Alimta will be held today and patient will receive 1U PRBC for hgb 8.4 Today they report that he is able to take more PO, specifically milk based drinks.  He is still feeling weak but his main issue at this time is abdominal pain which started 2 days ago, described as a constant ache in his mid upper abdomen not associated with N/V/D/C/, fever.     FM NGS on Ln bxEGFR L858R FGFR2 K659E KRAS amplification BRAF K483E - subclonal NKX2-1 amplification TP53 L130fs* [de-identified] : adeno ca

## 2024-09-04 NOTE — ASSESSMENT
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 MRI at baseline shows four subcentimeter parenchymal enhancing lesions in the cerebral and cerebellar hemispheres as detailed in the body the report. Findings were suspicious for the presence of intracranial metastases. Started Osimertinib in June 2021. 2 month scan on osi with excellent response in the CNS as well as lungs. CT C/A/P 12/02/21 with decreased RUL mass size. Scans from March 2022, reviewed personally- completely stable findings- sustained MO. Brain MRI read pending ANNALISE in December 2021. Scans done on 6/1/22 reviewed independently- sustained MO noted. Stable 1.1 cm right upper lobe nodule along a bandlike opacity. Stable mild tree-in-bud opacity within the lingula. The lungs are otherwise clear. No new or enlarging nodule. Numerous subcentimeter retroperitoneal lymph nodes are unchanged. Previously mentioned enlarged external iliac lymph nodes are not imaged on this exam. US doppler of LE which showed chronic DVT in rt LE. While systemic scans were stable as noted above, brain MRI showed POD. Therefore, decision was made to increase osimertinib dose to BID (160 mg) since this dosing is more effective in CNS/leptomeningeal disease. Scans from September 2022 reviewed and showed stable right upper lobe scarring, stable 1.2 cm lingular nodule which could be scarring/atelectasis. Unchanged retroperitoneal/pelvic lymph nodes.New subcentimeter right lower lobe ground glass nodule which was thought to be inflammatory. Scans from December 2022 which no new or enlarging lung nodule. Mild patchy ground glass opacity within lateral segment of right middle lobe likely inflammatory. Stable scarring within right upper lobe.Stable subcentimeter retroperitoneal lymph nodes. Pt had COVID around this time which can explain these changes.MRI brain at this time showed an excellent response. CT scans done in September 2023 shows excellent response- CR.  Scans from Dec 18 shows continued CR based on my review. Also, scan from March which has not been reported as yet seems to show continued CR based on my independent review.  Recent MR 10/30/23, + increase in left cerebellum lesion  now 6 mm (16:53), previously 3 mm sen by Dr. Boudreaux s/p mask gammaknife 11/27/23. Scans from Jan showed excellent response.  Continues on tagrisso 160 mg daily, tolerating well Of note, pt has microcytosis with mild anemia. He has a hx of thalassemia minor. Plt count slightly low and PS shows giant platelets. pt likely has chronic ITP which is commonly associated with lupus anticoagulant. He has not needed any treatment for this since his plt count is normal. Elevated PTT- sec to lupus anticoagulant. pt does not have a hematologist. Referral to Dr. Art pending.  Educated on proper care of nails and skin surrounding nails. Informed to soak hands and feet in one-to-one ratio of water to vinegar. Instructed to follow up with podiatry to manage left 2nd toe paronychia and other nail changes. Labs today including CBC, CMP, mag (for muscle cramps), CEA (previously elevated and now decreased to normal range), and INR (for coumadin monitoring- this has been controlled perfectly in the 2.5-3 range) All questions answered. Firm rt supraclav LN- Bx consistent with adeno ca NGS reveals a more complex pattern, concerning for Tagrisso resistance  Also, PET/CT shows diffuse disease Recommend adding Carbo AUC 5, pemetrexed 500 mg/m2 based on FLAURA-2. He started this treatment June 2024 and completed 4 cycles in August 2024.   Plan:  -Continue Tagrisso  -Was planned to start maintenance Alimta today however will hold due to ongoing weakness/fatigue  -Anemia is likely due to chemo. Will transfuse 1U PRBC today  -JORDAN: Likely due to poor PO intake. Continue to monitor. Will bring in for IVF hydration and repeat labs next week -Abdominal pain: concerning for acute intra-abdominal pathology and advised ER however patient and wife refuse. Will try to schedule STAT after infusion vs tomorrow at [facility] near their home  -Alternate options of Rybrevant+chemo (STEPHANIE regimen)   -B12 and folate supplementation  -Acneiform rash: Was previously referred to derm  but missed appt due to not feeling well and will rescehdule  OV with next treatment or sooner if needed

## 2024-09-05 ENCOUNTER — APPOINTMENT (OUTPATIENT)
Dept: CT IMAGING | Facility: CLINIC | Age: 54
End: 2024-09-05
Payer: COMMERCIAL

## 2024-09-05 ENCOUNTER — OUTPATIENT (OUTPATIENT)
Dept: OUTPATIENT SERVICES | Facility: HOSPITAL | Age: 54
LOS: 1 days | End: 2024-09-05
Payer: COMMERCIAL

## 2024-09-05 DIAGNOSIS — C34.90 MALIGNANT NEOPLASM OF UNSPECIFIED PART OF UNSPECIFIED BRONCHUS OR LUNG: ICD-10-CM

## 2024-09-05 LAB
CEA SERPL-MCNC: 1.7 NG/ML — SIGNIFICANT CHANGE UP (ref 0–3.8)
TSH SERPL-MCNC: 0.61 UIU/ML — SIGNIFICANT CHANGE UP (ref 0.27–4.2)

## 2024-09-05 PROCEDURE — 74176 CT ABD & PELVIS W/O CONTRAST: CPT

## 2024-09-05 PROCEDURE — 74176 CT ABD & PELVIS W/O CONTRAST: CPT | Mod: 26

## 2024-09-06 ENCOUNTER — APPOINTMENT (OUTPATIENT)
Dept: INTERNAL MEDICINE | Facility: CLINIC | Age: 54
End: 2024-09-06
Payer: COMMERCIAL

## 2024-09-06 VITALS — WEIGHT: 181 LBS | HEIGHT: 69 IN | BODY MASS INDEX: 26.81 KG/M2

## 2024-09-06 VITALS — DIASTOLIC BLOOD PRESSURE: 78 MMHG | SYSTOLIC BLOOD PRESSURE: 130 MMHG

## 2024-09-06 DIAGNOSIS — N18.32 CHRONIC KIDNEY DISEASE, STAGE 3B: ICD-10-CM

## 2024-09-06 DIAGNOSIS — D61.810 ANTINEOPLASTIC CHEMOTHERAPY INDUCED PANCYTOPENIA: ICD-10-CM

## 2024-09-06 PROCEDURE — G2211 COMPLEX E/M VISIT ADD ON: CPT | Mod: NC

## 2024-09-06 PROCEDURE — 99214 OFFICE O/P EST MOD 30 MIN: CPT

## 2024-09-06 NOTE — REVIEW OF SYSTEMS
[Skin Rash] : skin rash [Easy Bleeding] : easy bleeding [Swollen Glands] : swollen glands [Negative] : Gastrointestinal

## 2024-09-06 NOTE — HISTORY OF PRESENT ILLNESS
[de-identified] : This is a 53-year-old patient with a history of sarcoidosis, anticardiolipin antibodies, status post pulmonary embolus, primary lung carcinoma, pancytopenia secondary to chemotherapy and recently developed renal failure.

## 2024-09-06 NOTE — PHYSICAL EXAM
[Normal] : normal rate, regular rhythm, normal S1 and S2 and no murmur heard [de-identified] : Significant erythematous papular rash on both legs

## 2024-09-06 NOTE — ASSESSMENT
[FreeTextEntry1] : Problems Renal failure-the patient's most recent creatinine was 2.  I spoke to the patient about the importance of avoiding anti-inflammatory medications and the importance of hydration.  In addition I ordered a 24-hour urine for protein, creatinine and volume.  Also a renal artery duplex and renal sonogram were ordered.  Based on these results I will be able to calculate his GFR and assess more precisely the etiology of his renal failure. Pancytopenia-the patient was hospitalized and treated with multiple transfusions, Neupogen and Epogen.  His present white count is approximately 2000 platelet count is 44,000 and his hematocrit 24. Metastatic carcinoma-his chemotherapy is presently on hold Severe dermatitis secondary to chemotherapy-he was referred to dermatology

## 2024-09-06 NOTE — HEALTH RISK ASSESSMENT
[Never (0 pts)] : Never (0 points) [No] : In the past 12 months have you used drugs other than those required for medical reasons? No [No falls in past year] : Patient reported no falls in the past year [0] : 2) Feeling down, depressed, or hopeless: Not at all (0) [PHQ-2 Negative - No further assessment needed] : PHQ-2 Negative - No further assessment needed [[id number]] : 0 [Never] : Never

## 2024-09-07 LAB
APPEARANCE: CLEAR
BACTERIA: NEGATIVE /HPF
BILIRUBIN URINE: NEGATIVE
BLOOD URINE: NEGATIVE
CAST: 0 /LPF
COLOR: YELLOW
EPITHELIAL CELLS: 2 /HPF
GLUCOSE QUALITATIVE U: NEGATIVE MG/DL
KETONES URINE: NEGATIVE MG/DL
LEUKOCYTE ESTERASE URINE: NEGATIVE
MICROSCOPIC-UA: NORMAL
NITRITE URINE: NEGATIVE
PH URINE: 6
PROTEIN URINE: 30 MG/DL
RED BLOOD CELLS URINE: 0 /HPF
SPECIFIC GRAVITY URINE: 1.01
UROBILINOGEN URINE: 0.2 MG/DL
WHITE BLOOD CELLS URINE: 1 /HPF

## 2024-09-09 ENCOUNTER — APPOINTMENT (OUTPATIENT)
Dept: DERMATOLOGY | Facility: CLINIC | Age: 54
End: 2024-09-09
Payer: COMMERCIAL

## 2024-09-09 ENCOUNTER — TRANSCRIPTION ENCOUNTER (OUTPATIENT)
Age: 54
End: 2024-09-09

## 2024-09-09 ENCOUNTER — APPOINTMENT (OUTPATIENT)
Dept: INFUSION THERAPY | Facility: HOSPITAL | Age: 54
End: 2024-09-09

## 2024-09-09 VITALS — BODY MASS INDEX: 26.66 KG/M2 | HEIGHT: 69 IN | WEIGHT: 180 LBS

## 2024-09-09 DIAGNOSIS — D48.9 NEOPLASM OF UNCERTAIN BEHAVIOR, UNSPECIFIED: ICD-10-CM

## 2024-09-09 PROBLEM — L30.9 DERMATITIS: Status: ACTIVE | Noted: 2024-09-09

## 2024-09-09 PROCEDURE — 99204 OFFICE O/P NEW MOD 45 MIN: CPT | Mod: 25

## 2024-09-09 PROCEDURE — 11105 PUNCH BX SKIN EA SEP/ADDL: CPT

## 2024-09-09 PROCEDURE — 11104 PUNCH BX SKIN SINGLE LESION: CPT

## 2024-09-10 LAB
CREAT 24H UR-MCNC: 1 G/24 H
CREAT 24H UR-MCNC: 1 G/24 H
CREAT ?TM UR-MCNC: 51 MG/DL
CREAT ?TM UR-MCNC: 51 MG/DL
PROT 24H UR-MRATE: 11 MG/DL
PROT ?TM UR-MCNC: 24 HR
PROT ?TM UR-MCNC: 24 HR
PROT UR-MCNC: 220 MG/24 H
SPECIMEN VOL 24H UR: 2000 ML
SPECIMEN VOL 24H UR: 2000 ML

## 2024-09-12 PROBLEM — R23.4 ESCHAR: Status: ACTIVE | Noted: 2024-09-12

## 2024-09-12 RX ORDER — CLOBETASOL PROPIONATE 0.5 MG/G
0.05 OINTMENT TOPICAL
Qty: 1 | Refills: 11 | Status: ACTIVE | COMMUNITY
Start: 2024-09-12 | End: 1900-01-01

## 2024-09-13 ENCOUNTER — APPOINTMENT (OUTPATIENT)
Dept: HEMATOLOGY ONCOLOGY | Facility: CLINIC | Age: 54
End: 2024-09-13
Payer: COMMERCIAL

## 2024-09-13 VITALS
RESPIRATION RATE: 17 BRPM | BODY MASS INDEX: 26.64 KG/M2 | DIASTOLIC BLOOD PRESSURE: 84 MMHG | HEART RATE: 71 BPM | TEMPERATURE: 98 F | WEIGHT: 179.89 LBS | HEIGHT: 69 IN | SYSTOLIC BLOOD PRESSURE: 143 MMHG | OXYGEN SATURATION: 99 %

## 2024-09-13 DIAGNOSIS — C79.31 SECONDARY MALIGNANT NEOPLASM OF BRAIN: ICD-10-CM

## 2024-09-13 PROCEDURE — 99215 OFFICE O/P EST HI 40 MIN: CPT

## 2024-09-13 PROCEDURE — G2211 COMPLEX E/M VISIT ADD ON: CPT | Mod: NC

## 2024-09-14 ENCOUNTER — RESULT REVIEW (OUTPATIENT)
Age: 54
End: 2024-09-14

## 2024-09-14 ENCOUNTER — APPOINTMENT (OUTPATIENT)
Dept: INFUSION THERAPY | Facility: HOSPITAL | Age: 54
End: 2024-09-14

## 2024-09-14 LAB
ANISOCYTOSIS BLD QL: SLIGHT — SIGNIFICANT CHANGE UP
BASOPHILS # BLD AUTO: 0.01 K/UL — SIGNIFICANT CHANGE UP (ref 0–0.2)
BASOPHILS NFR BLD AUTO: 0.4 % — SIGNIFICANT CHANGE UP (ref 0–2)
DACRYOCYTES BLD QL SMEAR: SLIGHT — SIGNIFICANT CHANGE UP
ELLIPTOCYTES BLD QL SMEAR: SLIGHT — SIGNIFICANT CHANGE UP
EOSINOPHIL # BLD AUTO: 0 K/UL — SIGNIFICANT CHANGE UP (ref 0–0.5)
EOSINOPHIL NFR BLD AUTO: 0 % — SIGNIFICANT CHANGE UP (ref 0–6)
HCT VFR BLD CALC: 21.5 % — LOW (ref 39–50)
HGB BLD-MCNC: 7 G/DL — CRITICAL LOW (ref 13–17)
HYPOCHROMIA BLD QL: SLIGHT — SIGNIFICANT CHANGE UP
IMM GRANULOCYTES NFR BLD AUTO: 1.1 % — HIGH (ref 0–0.9)
IRON SATN MFR SERPL: 15 % — LOW (ref 16–55)
IRON SATN MFR SERPL: 46 UG/DL — SIGNIFICANT CHANGE UP (ref 45–165)
LYMPHOCYTES # BLD AUTO: 0.43 K/UL — LOW (ref 1–3.3)
LYMPHOCYTES # BLD AUTO: 16.2 % — SIGNIFICANT CHANGE UP (ref 13–44)
MCHC RBC-ENTMCNC: 24.8 PG — LOW (ref 27–34)
MCHC RBC-ENTMCNC: 32.6 G/DL — SIGNIFICANT CHANGE UP (ref 32–36)
MCV RBC AUTO: 76.2 FL — LOW (ref 80–100)
MONOCYTES # BLD AUTO: 0.56 K/UL — SIGNIFICANT CHANGE UP (ref 0–0.9)
MONOCYTES NFR BLD AUTO: 21.1 % — HIGH (ref 2–14)
NEUTROPHILS # BLD AUTO: 1.62 K/UL — LOW (ref 1.8–7.4)
NEUTROPHILS NFR BLD AUTO: 61.2 % — SIGNIFICANT CHANGE UP (ref 43–77)
NRBC # BLD: 0 /100 WBCS — SIGNIFICANT CHANGE UP (ref 0–0)
PLAT MORPH BLD: NORMAL — SIGNIFICANT CHANGE UP
PLATELET # BLD AUTO: 127 K/UL — LOW (ref 150–400)
POIKILOCYTOSIS BLD QL AUTO: SLIGHT — SIGNIFICANT CHANGE UP
RBC # BLD: 2.82 M/UL — LOW (ref 4.2–5.8)
RBC # FLD: SIGNIFICANT CHANGE UP (ref 10.3–14.5)
RBC BLD AUTO: ABNORMAL
TARGETS BLD QL SMEAR: SLIGHT — SIGNIFICANT CHANGE UP
TIBC SERPL-MCNC: 314 UG/DL — SIGNIFICANT CHANGE UP (ref 220–430)
UIBC SERPL-MCNC: 268 UG/DL — SIGNIFICANT CHANGE UP (ref 110–370)
WBC # BLD: 2.65 K/UL — LOW (ref 3.8–10.5)
WBC # FLD AUTO: 2.65 K/UL — LOW (ref 3.8–10.5)

## 2024-09-14 PROCEDURE — 86900 BLOOD TYPING SEROLOGIC ABO: CPT

## 2024-09-14 PROCEDURE — 86923 COMPATIBILITY TEST ELECTRIC: CPT

## 2024-09-14 PROCEDURE — 86901 BLOOD TYPING SEROLOGIC RH(D): CPT

## 2024-09-14 PROCEDURE — 86850 RBC ANTIBODY SCREEN: CPT

## 2024-09-14 NOTE — ASSESSMENT
[FreeTextEntry1] : 54 yo m, never-smoker with stage IV lung adeno ca (brain mets) - PDL1 negative, tumor, lood-based NGS reveals EGFR L858R mut along with mut in TP53 gene. MRI at baseline shows four subcentimeter parenchymal enhancing lesions in the cerebral and cerebellar hemispheres as detailed in the body the report. Findings were suspicious for the presence of intracranial metastases. Started Osimertinib in June 2021. 2 month scan on osi with excellent response in the CNS as well as lungs. CT C/A/P 12/02/21 with decreased RUL mass size. Scans from March 2022, reviewed personally- completely stable findings- sustained CT. Brain MRI read pending ANNALISE in December 2021. Scans done on 6/1/22 reviewed independently- sustained CT noted. Stable 1.1 cm right upper lobe nodule along a bandlike opacity. Stable mild tree-in-bud opacity within the lingula. The lungs are otherwise clear. No new or enlarging nodule. Numerous subcentimeter retroperitoneal lymph nodes are unchanged. Previously mentioned enlarged external iliac lymph nodes are not imaged on this exam. US doppler of LE which showed chronic DVT in rt LE. While systemic scans were stable as noted above, brain MRI showed POD. Therefore, decision was made to increase osimertinib dose to BID (160 mg) since this dosing is more effective in CNS/leptomeningeal disease. Scans from September 2022 reviewed and showed stable right upper lobe scarring, stable 1.2 cm lingular nodule which could be scarring/atelectasis. Unchanged retroperitoneal/pelvic lymph nodes.New subcentimeter right lower lobe ground glass nodule which was thought to be inflammatory. Scans from December 2022 which no new or enlarging lung nodule. Mild patchy ground glass opacity within lateral segment of right middle lobe likely inflammatory. Stable scarring within right upper lobe.Stable subcentimeter retroperitoneal lymph nodes. Pt had COVID around this time which can explain these changes.MRI brain at this time showed an excellent response. CT scans done in September 2023 shows excellent response- CR.  Scans from Dec 18 shows continued CR based on my review. Also, scan from March which has not been reported as yet seems to show continued CR based on my independent review.  Recent MR 10/30/23, + increase in left cerebellum lesion  now 6 mm (16:53), previously 3 mm sen by Dr. Goenka s/p mask gammaknife 11/27/23. Scans from Jan showed excellent response.  Continued on tagrisso 160 mg daily,  During a visit in April 2024, pt reported increasing rt SC adenopathy PET/CT done in May 2024 showed IMPRESSION: 1.  Extensive hypermetabolic lymphadenopathy extending from the neck into the supraclavicular regions, consistent with malignant involvement. This also includes a posterior LEFT shoulder node. 2.  Uptake at the RIGHT perihilar region and air bronchograms in the RIGHT UPPER lobe consistent with residual, findings of lung cancer. Continued follow-up suggested to exclude residual metabolically reactive disease. 3.  Small, minimally avid RIGHT axillary node, nonspecific. Based on diffuse POD, we started pt on  Educated on proper care of nails and skin surrounding nails. Informed to soak hands and feet in one-to-one ratio of water to vinegar. Instructed to follow up with podiatry to manage left 2nd toe paronychia and other nail changes. Labs today including CBC, CMP, mag (for muscle cramps), CEA (previously elevated and now decreased to normal range), and INR (for coumadin monitoring- this has been controlled perfectly in the 2.5-3 range) 4/23/24: Firm rt supraclav LN-Bx consistent with adeno ca NGS reveals a more complex pattern, concerning for Tagrisso resistance  Recommend adding Carbo AUC 5, pemetrexed 500 mg/m2 based on FLAURA-2. He started this treatment June 2024 and completed 4 cycles in August 2024.  Course was c/b pancytopenia, requiring multiple transfusions, mucositis, and dermatitis Pt has started to feel a little better. His last chemo was 4 weeks ago.  However, he still feels quite fatigued and has increased mucus causing decreased PO intake He has lost a lot of weight Skin bx showed lupus dermatitis, PAS stain was pos. Responding to topical steroids. Reached out to derm for dx and if tx with Alimta needs to be discontinued Given JORDAN and decrease PO fluid intake, will have pt come in for fluids 3-4 times over the next week Will check CBC and if needs transfusion, will arrange Anemia is compounded by the fact that pt has Thalassemia trait.  Will check iron profile, B12 and folate Increase Folic acid to 2 mg daily B12 tomorrow with IV hydration.  Hold chemo till condition improves Decrease Tagrisso to 40 mg daily.

## 2024-09-14 NOTE — HISTORY OF PRESENT ILLNESS
[FreeTextEntry1] : NPV: rash [de-identified] : 52yo M pmhx lung adenocarcinoma on tagrisso and completed 4 cycles of carbo/pemetrexed June-Aug 2024 here for rash on arms, back, legs that started in July. Reports arms/back started first, legs after. Arms have been asymptomatic. Legs initially were itchy bumps but now itch has resolved. Has applied some topical cortisone without relief. Initially thought rash on legs was due to low platelets but things haven't resolved despite transfusion. Pt reports being in and out of hospital recently due to pancytopenia and neutropenic fever. Also reports JORDAN due to chemo.   While admitted, noticed black eschar at former surgical site (~20 years old). PCP informed him it was necrosis of the skin due to a blood clot. Has been applying santyl collagenase per wound care consult helping slowly.   Pt has hx of sarcoidosis diagnosed in 2002 via mediastinoscopy. Never received systemic tx for it. Reports it burnt out on its own. Has not had any issue for the last 20 years. Also reports + lupus antibodies. On coumadin due to prior blood clot many years ago.

## 2024-09-14 NOTE — ASSESSMENT
[FreeTextEntry1] : #Dermatitis, unlikely to be due to tagrisso, carbo/pemetrexed Favor SCLE, pt has a hx of APL New diagnosis of uncertain prognosis Ddx includes SCLE, sarcoidosis, pemphigus foliaceous  Has known hx of bx proven sarcoidosis however reportedly not active and never treated Hx of rheumatologic work ups as below: APR2024 work up: - Anticardiolipin IgM 14.2, IgG >150 - Beta 2 glycoprotein 1 IgM 31.2, IgG 133.7 JAN2021 work up: - +JENN, dsDNA, low C4  PLAN: - START Clobetasol ointment BID to AA - Bx as below - RTC 2 weeks for suture removal  #Neoplasm of uncertain behavior of skin - Left arm x2 PUNCH BIOPSY OF LESION: Location: 2 sites: left upper abdomen, medial (H&E), left upper arm, lateral (DIF) Universal protocol prior to procedure confirmed by Zahra Mancini MD: site, side, full name, and full date of birth confirmed by patient. Specimen labels match patient identity. All equipment available-no special supplies required. No imaging or lab studies required prior to this procedure. Patient in supine position. Skin prep: Isopropyl Alcohol. Local Anesthetic: 1% lidocaine with epinephrine, 3 ml. Two, 3 mm punch biopsies performed. Closure: 4-0 Ethilon, simple interrupted x2 for 2 sites Estimated blood loss: <5 ml Complications: none. Wound care instructions given and reviewed. Specimen submitted for pathology. Follow up pending pathology.  #Chest Eschar at prior mediastinoscopy site- initial encounter - Managed by wound care inpatient  - Agree with continuation of santyl collagenase, gauze qd - Photos taken for monitoring - Consider excisional bx if not improving  RTC 2 weeks

## 2024-09-14 NOTE — REVIEW OF SYSTEMS
[Fever] : no fever [Night Sweats] : no night sweats [Fatigue] : fatigue [Recent Change In Weight] : ~T recent weight change [Cough] : no cough [Skin Rash] : skin rash [Negative] : Allergic/Immunologic [FreeTextEntry2] : sever fatigue with episodes of being in bed for 24 hours staright [FreeTextEntry7] : as above

## 2024-09-14 NOTE — HISTORY OF PRESENT ILLNESS
[Disease: _____________________] : Disease: [unfilled] [T: ___] : T[unfilled] [N: ___] : N[unfilled] [M: ___] : M[unfilled] [AJCC Stage: ____] : AJCC Stage: [unfilled] [de-identified] : Mr. Mclaughlin with hx of sarcoidosis is a pleasant 52-year-old male who started having allergy-like symptoms (cough) 5 months ago. He saw his PCP, Dr. Mo- referred to Dr. Trujillo after he was noted to have an abnormal chest CAT scan. He was referred for PET/CT which was done on 5/23 which showed  FDG avid right upper lobe masslike consolidation, considerably increased in size and coalesced with adjacent right upper lobe nodules and new FDG avid supraclavicular, mediastinal and hilar lymphadenopathy, as compared to CT chest January 13, 2021 concerning for malignancy although progression of sarcoidosis is also a consideration. rt supra clav bx was c/w lung adeno ca.   Of note, the patient has a complex medical history having had a diagnosis of sarcoid made in 2002 via a mediastinoscopy. At that time he was noted to have several pulmonary nodules but no treatment was required. 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 He was started on anticoagulation and a IVC filter was placed. He is also suspected of having lupus for which she is on hydroxychloroquine. He has a history of bronchial reactivity but is currently not on any inhaled bronchodilators.  He is a lifelong nonsmoker without occupational exposures. Other than a dry cough, he feels well. He denies any fever or, chest discomfort, dyspnea or peripheral edema.   6/22/21: Here for follow up. No complaints.    7/14/21: Patient seen for routine follow-up.  Patient is currently getting treatment for lupus and remains on hydrochorloquine and coumadin for anti-Cardiolipin antibod. Patient verbalized pain in the left foot that radiated from the small toe to the sole of feet, he takes advil with satisfactory pain control.  Patient also verbalized joint pain  that is related to lupus flare up.  Pt recently started on Tagrisso 80mg daily which he is tolerating well. Denies rash/ diarrhea. Pt met with Dr. Boudreaux for consideration of GK to brain mets.   9/3/21: Facial rash, gained weight, migrating joint pains better after starting steroids (prednisone 5 mg daily). Has had CT scans and MRI yesterday.   10/19/21: Feeling well. Mild facial rash, gained more weight. Restarted hydroxychloroquine for lupus with worsening joint pain in his wrists and fingers;  continues coumadin; plans f/u with his rheumatologist. Reports toe infection, on abx as per his podiatrist with improvement. Has f/u MRI Brain on 12/1.   12/7/21: Since August has been having toenail infection of R and L 1st and 2nd toes. Infection is painful, non pruritic. Has seen podiatrist who resected some of the toenails/skin. Using topical antibiotics with some improvement.   3/10/22: doing well. no new symptoms to report. Has some nail changes but all other AEs have improved. He does have episodic worsening of joint pains. Has gained some weight.   6/9/22: had a recent ER visit for acute swelling of rt knee and ankle- possibly related to occult trauma. Of note, pt is on Coumadin for VTE with optimal maintenance of INR. He has no other symptoms   9/15/22: Pt seem today for follow up. Continues on Tagrisso. Noted to have inflammation of the skin around nails and toe nails. Otherwise not having any additional AEs. Had CT scans which showed stable right upper lobe scarring at site of primary neoplasm. Stable 1.2 cm lingular nodule which could be scarring/atelectasis however as it is larger compared to more remote studies, 3 month follow-up is recommended. New subcentimeter right lower lobe groundglass nodule which can be reassessed on follow-up. Unchanged numerous predominantly subcentimeter retroperitoneal/pelvic lymph nodes.  12/15/22: Here for follow up.Continues on Tagrisso at 160 mg. Feet improved. cracked skin around finger nails. no diarrhea. Taking coumadin   6/15/23: Patient seen in clinic to follow up on his Osimertinib therapy. Patient is experiencing some nail toxicity on and off and muscle cramps, which he is using tonic water for, Denies diarrhea. He has fatigue. He notes that the texture of his scalp hair is different, but he is not bothered by it. He has no mucositis. Pt denies any neurological symptoms.   9/20/23: Skin, nail changes-was really bad, now subsided. Also needed abx as prescribed by podiatrist. Continues on Tagrisso at 160 mg  12/22/23: Patient accompanied by wife, here for follow up visit s/p mask gammaknife RT to cerebellar met 11/27/23, continues on tagrisso and has no complaints.   3/26/24: Pt recently had rt upper quadrant pain, went o ER a month ago and was noted to have gall stones. Was sent home after pain management. He was referred to the surgeon. There was a delay in surgery duen to elevated PTT. I received a call at that time and as documented, this was deemed to be sec to lupus anticoagulant.   6/6/24: feeling well. No new symptoms. Had PET/CT 1.  Extensive hypermetabolic lymphadenopathy extending from the neck into the supraclavicular regions, consistent with malignant involvement. This also includes a posterior LEFT shoulder node. 2.  Uptake at the RIGHT perihilar region and air bronchograms in the RIGHT UPPER lobe consistent with residual, findings of lung cancer. Continued follow-up suggested to exclude residual metabolically reactive disease. 3.  Small, minimally avid RIGHT axillary node, nonspecific.  7/2/24: Patient seen today for follow up while receiving treatment with carbo/alimta. He started this treatment on June 11th. He reports that after his first cycle he experienced some discomfort in his right upper thorax area, no rash indentified. He also experienced fatigue and felt he had a low grade temp 3 days following infusion. He feels his LAD improved since starting the treatment. He denies N/V/D.   7/23/24: Patient seen today for follow up while receiving C3 carbo/alimta. He continues on Tagrisso. He reports that following his last treatment he felt more fatigued. Did not experience thorax discomfort or fever. Notes rash on bilateral upper and lower extremities. He also has occasional hiccups resolved with reglan. His appetite is good and denies N/V/D, constipation. Breathing is stable. Remains active, golfs and continues to work.   8/13/24: Patient seen today for follow up in treatment room accompanied by his wife. Of note,  his hgb today is 7.2. We discussed necessity of 2UPRBCs transfusion however he is moving his kids into college out of state over the next two weeks and is leaving tomorrow so he will receivie 1U PRBC today. He and his wife report that following the third cycle of chemo, he felt very weak and tired and fatigued and was unable to participate in his normal activities. He continues to have acneiform rash on his upper and lower extremities. He also has dysgeusia. Denies hematuria, hemoptysis, melena.   9/4/24: Patient seen today for follow up accompanied by his wife. Of note, following his prior cycle of chemo, he was travelling in [place] and went to ED for nose bleed which was controlled and he was given 1U PRBCs for hgb 5.5. He then came back to [place] and reported fever and was referred to the ER. He went to [facility] ED where he was admitted for neutropenic fever and pancytopenia s/p zarxio, PRBC, and plt transfusions. He also had JORDAN which was thought to be due to volume depletion  He called the office yesterday reporting ongoing weakness and fatigue and thus Alimta will be held today and patient will receive 1U PRBC for hgb 8.4 Today they report that he is able to take more PO, specifically milk based drinks.  He is still feeling weak but his main issue at this time is abdominal pain which started 2 days ago, described as a constant ache in his mid upper abdomen not associated with N/V/D/C/, fever.   9/14/24: Pt here for urgent visit. C/o excessive mucus in the throat, odynophagia, fatigue and weight loss. As noted in prior documentations, pt's chemo course c/b cytopenia and JORDAN. He received transfusion again last week and notes improvement in how he feels. His major concern is thick mucus which prevents him from eating or drinking. He has lost considerable amount of weight. He continues to take Tagrisso. Rash noted above had worsened. He saw Dr. 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 He was prescribed topical steroids, which seems to be helping. LE rash is worse than UE. Pt also reports abd pain (epigastric) which is relieved when he eats something. he recently saw Dr. Mo who did some blood work in his office and pt was told that his creatinine is elevated and JORDAN work up was performed.     FM NGS on Ln bxEGFR L858R FGFR2 K659E KRAS amplification BRAF [id number] - subclonal NKX2-1 amplification TP53 L130fs* [de-identified] : adeno ca

## 2024-09-14 NOTE — PHYSICAL EXAM
[FreeTextEntry3] : A focused skin examination was performed per patient preference, and the following findings were noted:  coalescing annular plaques with central pink/hypopigmentation and scale on bl arms and back (upper>lower)  bl shins with coalescing round purpuric plaques with scale  black eschar with surrounding erythema at former mediastinoscopy site

## 2024-09-14 NOTE — PHYSICAL EXAM
[Restricted in physically strenuous activity but ambulatory and able to carry out work of a light or sedentary nature] : Status 1- Restricted in physically strenuous activity but ambulatory and able to carry out work of a light or sedentary nature, e.g., light house work, office work [Thin] : thin [Normal] : affect appropriate [de-identified] : rt supracalv adenopathy resolved [de-identified] : didduse hyperpigmented rash involving UE and LE, LE>UE

## 2024-09-14 NOTE — HISTORY OF PRESENT ILLNESS
[FreeTextEntry1] : NPV: rash [de-identified] : 52yo M pmhx lung adenocarcinoma on tagrisso and completed 4 cycles of carbo/pemetrexed June-Aug 2024 here for rash on arms, back, legs that started in July. Reports arms/back started first, legs after. Arms have been asymptomatic. Legs initially were itchy bumps but now itch has resolved. Has applied some topical cortisone without relief. Initially thought rash on legs was due to low platelets but things haven't resolved despite transfusion. Pt reports being in and out of hospital recently due to pancytopenia and neutropenic fever. Also reports JORDAN due to chemo.   While admitted, noticed black eschar at former surgical site (~20 years old). PCP informed him it was necrosis of the skin due to a blood clot. Has been applying santyl collagenase per wound care consult helping slowly.   Pt has hx of sarcoidosis diagnosed in 2002 via mediastinoscopy. Never received systemic tx for it. Reports it burnt out on its own. Has not had any issue for the last 20 years. Also reports + lupus antibodies. On coumadin due to prior blood clot many years ago.

## 2024-09-14 NOTE — HISTORY OF PRESENT ILLNESS
[FreeTextEntry1] : NPV: rash [de-identified] : 54yo M pmhx lung adenocarcinoma on tagrisso and completed 4 cycles of carbo/pemetrexed June-Aug 2024 here for rash on arms, back, legs that started in July. Reports arms/back started first, legs after. Arms have been asymptomatic. Legs initially were itchy bumps but now itch has resolved. Has applied some topical cortisone without relief. Initially thought rash on legs was due to low platelets but things haven't resolved despite transfusion. Pt reports being in and out of hospital recently due to pancytopenia and neutropenic fever. Also reports JORDAN due to chemo.   While admitted, noticed black eschar at former surgical site (~20 years old). PCP informed him it was necrosis of the skin due to a blood clot. Has been applying santyl collagenase per wound care consult helping slowly.   Pt has hx of sarcoidosis diagnosed in 2002 via mediastinoscopy. Never received systemic tx for it. Reports it burnt out on its own. Has not had any issue for the last 20 years. Also reports + lupus antibodies. On coumadin due to prior blood clot many years ago.

## 2024-09-15 LAB
ALBUMIN SERPL ELPH-MCNC: 3.7 G/DL — SIGNIFICANT CHANGE UP (ref 3.3–5)
ALP SERPL-CCNC: 47 U/L — SIGNIFICANT CHANGE UP (ref 40–120)
ALT FLD-CCNC: 37 U/L — SIGNIFICANT CHANGE UP (ref 10–45)
ANION GAP SERPL CALC-SCNC: 16 MMOL/L — SIGNIFICANT CHANGE UP (ref 5–17)
AST SERPL-CCNC: 41 U/L — HIGH (ref 10–40)
BILIRUB SERPL-MCNC: 0.2 MG/DL — SIGNIFICANT CHANGE UP (ref 0.2–1.2)
BUN SERPL-MCNC: 43 MG/DL — HIGH (ref 7–23)
CALCIUM SERPL-MCNC: 9.4 MG/DL — SIGNIFICANT CHANGE UP (ref 8.4–10.5)
CEA SERPL-MCNC: 1.8 NG/ML — SIGNIFICANT CHANGE UP (ref 0–3.8)
CHLORIDE SERPL-SCNC: 104 MMOL/L — SIGNIFICANT CHANGE UP (ref 96–108)
CO2 SERPL-SCNC: 16 MMOL/L — LOW (ref 22–31)
CREAT SERPL-MCNC: 3.03 MG/DL — HIGH (ref 0.5–1.3)
DERMATOLOGY BIOPSY: NORMAL
EGFR: 24 ML/MIN/1.73M2 — LOW
FERRITIN SERPL-MCNC: 2763 NG/ML — HIGH (ref 30–400)
FOLATE SERPL-MCNC: >20 NG/ML — SIGNIFICANT CHANGE UP
GLUCOSE SERPL-MCNC: 71 MG/DL — SIGNIFICANT CHANGE UP (ref 70–99)
MAGNESIUM SERPL-MCNC: 1.9 MG/DL — SIGNIFICANT CHANGE UP (ref 1.6–2.6)
POTASSIUM SERPL-MCNC: 4.8 MMOL/L — SIGNIFICANT CHANGE UP (ref 3.5–5.3)
POTASSIUM SERPL-SCNC: 4.8 MMOL/L — SIGNIFICANT CHANGE UP (ref 3.5–5.3)
PROT SERPL-MCNC: 7.6 G/DL — SIGNIFICANT CHANGE UP (ref 6–8.3)
SODIUM SERPL-SCNC: 136 MMOL/L — SIGNIFICANT CHANGE UP (ref 135–145)
TSH SERPL-MCNC: 1.13 UIU/ML — SIGNIFICANT CHANGE UP (ref 0.27–4.2)
VIT B12 SERPL-MCNC: >2000 PG/ML — HIGH (ref 232–1245)

## 2024-09-16 DIAGNOSIS — E86.0 DEHYDRATION: ICD-10-CM

## 2024-09-17 ENCOUNTER — APPOINTMENT (OUTPATIENT)
Dept: INFUSION THERAPY | Facility: HOSPITAL | Age: 54
End: 2024-09-17

## 2024-09-17 ENCOUNTER — NON-APPOINTMENT (OUTPATIENT)
Age: 54
End: 2024-09-17

## 2024-09-17 ENCOUNTER — APPOINTMENT (OUTPATIENT)
Dept: HEMATOLOGY ONCOLOGY | Facility: CLINIC | Age: 54
End: 2024-09-17
Payer: COMMERCIAL

## 2024-09-17 DIAGNOSIS — C80.1 SECONDARY MALIGNANT NEOPLASM OF OTHER PARTS OF NERVOUS SYSTEM: ICD-10-CM

## 2024-09-17 DIAGNOSIS — C79.49 SECONDARY MALIGNANT NEOPLASM OF OTHER PARTS OF NERVOUS SYSTEM: ICD-10-CM

## 2024-09-17 PROCEDURE — G2211 COMPLEX E/M VISIT ADD ON: CPT | Mod: NC

## 2024-09-17 PROCEDURE — 99214 OFFICE O/P EST MOD 30 MIN: CPT

## 2024-09-17 RX ORDER — OSIMERTINIB 40 1/1
40 TABLET, FILM COATED ORAL
Qty: 30 | Refills: 4 | Status: ACTIVE | COMMUNITY
Start: 2024-09-13 | End: 1900-01-01

## 2024-09-18 ENCOUNTER — APPOINTMENT (OUTPATIENT)
Dept: ULTRASOUND IMAGING | Facility: CLINIC | Age: 54
End: 2024-09-18
Payer: COMMERCIAL

## 2024-09-18 ENCOUNTER — OUTPATIENT (OUTPATIENT)
Dept: OUTPATIENT SERVICES | Facility: HOSPITAL | Age: 54
LOS: 1 days | End: 2024-09-18
Payer: COMMERCIAL

## 2024-09-18 DIAGNOSIS — N18.32 CHRONIC KIDNEY DISEASE, STAGE 3B: ICD-10-CM

## 2024-09-18 PROCEDURE — 93975 VASCULAR STUDY: CPT

## 2024-09-18 PROCEDURE — 93975 VASCULAR STUDY: CPT | Mod: 26

## 2024-09-19 ENCOUNTER — APPOINTMENT (OUTPATIENT)
Dept: DERMATOLOGY | Facility: CLINIC | Age: 54
End: 2024-09-19
Payer: COMMERCIAL

## 2024-09-19 ENCOUNTER — APPOINTMENT (OUTPATIENT)
Dept: INTERNAL MEDICINE | Facility: CLINIC | Age: 54
End: 2024-09-19
Payer: COMMERCIAL

## 2024-09-19 DIAGNOSIS — Z48.02 ENCOUNTER FOR REMOVAL OF SUTURES: ICD-10-CM

## 2024-09-19 DIAGNOSIS — L30.9 DERMATITIS, UNSPECIFIED: ICD-10-CM

## 2024-09-19 DIAGNOSIS — L93.2 OTHER LOCAL LUPUS ERYTHEMATOSUS: ICD-10-CM

## 2024-09-19 DIAGNOSIS — R23.4 CHANGES IN SKIN TEXTURE: ICD-10-CM

## 2024-09-19 DIAGNOSIS — D61.810 ANTINEOPLASTIC CHEMOTHERAPY INDUCED PANCYTOPENIA: ICD-10-CM

## 2024-09-19 PROCEDURE — G2211 COMPLEX E/M VISIT ADD ON: CPT | Mod: NC

## 2024-09-19 PROCEDURE — 99214 OFFICE O/P EST MOD 30 MIN: CPT

## 2024-09-20 NOTE — PHYSICAL EXAM
[Restricted in physically strenuous activity but ambulatory and able to carry out work of a light or sedentary nature] : Status 1- Restricted in physically strenuous activity but ambulatory and able to carry out work of a light or sedentary nature, e.g., light house work, office work [Normal] : affect appropriate [de-identified] : rt supracalv adenopathy improved [de-identified] : acneiform rash on bilateral upper and lower extremities

## 2024-09-20 NOTE — HISTORY OF PRESENT ILLNESS
[Disease: _____________________] : Disease: [unfilled] [T: ___] : T[unfilled] [N: ___] : N[unfilled] [M: ___] : M[unfilled] [AJCC Stage: ____] : AJCC Stage: [unfilled] [de-identified] : Mr. Mclaughlin with hx of sarcoidosis is a pleasant 53-year-old male who started having allergy-like symptoms (cough) 5 months ago. He saw his PCP, Dr. Mo- referred to Dr. Trujillo after he was noted to have an abnormal chest CAT scan. He was referred for PET/CT which was done on 5/23 which showed  FDG avid right upper lobe masslike consolidation, considerably increased in size and coalesced with adjacent right upper lobe nodules and new FDG avid supraclavicular, mediastinal and hilar lymphadenopathy, as compared to CT chest January 13, 2021 concerning for malignancy although progression of sarcoidosis is also a consideration. rt supra clav bx was c/w lung adeno ca.   Of note, the patient has a complex medical history having had a diagnosis of sarcoid made in 2002 via a mediastinoscopy. At that time he was noted to have several pulmonary nodules but no treatment was required. 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 He was started on anticoagulation and a IVC filter was placed. He is also suspected of having lupus for which she is on hydroxychloroquine. He has a history of bronchial reactivity but is currently not on any inhaled bronchodilators.  He is a lifelong nonsmoker without occupational exposures. Other than a dry cough, he feels well. He denies any fever or, chest discomfort, dyspnea or peripheral edema.   6/22/21: Here for follow up. No complaints.    7/14/21: Patient seen for routine follow-up.  Patient is currently getting treatment for lupus and remains on hydrochorloquine and coumadin for anti-Cardiolipin antibod. Patient verbalized pain in the left foot that radiated from the small toe to the sole of feet, he takes advil with satisfactory pain control.  Patient also verbalized joint pain  that is related to lupus flare up.  Pt recently started on Tagrisso 80mg daily which he is tolerating well. Denies rash/ diarrhea. Pt met with Dr. Boudreaux for consideration of GK to brain mets.   9/3/21: Facial rash, gained weight, migrating joint pains better after starting steroids (prednisone 5 mg daily). Has had CT scans and MRI yesterday.   10/19/21: Feeling well. Mild facial rash, gained more weight. Restarted hydroxychloroquine for lupus with worsening joint pain in his wrists and fingers;  continues coumadin; plans f/u with his rheumatologist. Reports toe infection, on abx as per his podiatrist with improvement. Has f/u MRI Brain on 12/1.   12/7/21: Since August has been having toenail infection of R and L 1st and 2nd toes. Infection is painful, non pruritic. Has seen podiatrist who resected some of the toenails/skin. Using topical antibiotics with some improvement.   3/10/22: doing well. no new symptoms to report. Has some nail changes but all other AEs have improved. He does have episodic worsening of joint pains. Has gained some weight.   6/9/22: had a recent ER visit for acute swelling of rt knee and ankle- possibly related to occult trauma. Of note, pt is on Coumadin for VTE with optimal maintenance of INR. He has no other symptoms   9/15/22: Pt seem today for follow up. Continues on Tagrisso. Noted to have inflammation of the skin around nails and toe nails. Otherwise not having any additional AEs. Had CT scans which showed stable right upper lobe scarring at site of primary neoplasm. Stable 1.2 cm lingular nodule which could be scarring/atelectasis however as it is larger compared to more remote studies, 3 month follow-up is recommended. New subcentimeter right lower lobe groundglass nodule which can be reassessed on follow-up. Unchanged numerous predominantly subcentimeter retroperitoneal/pelvic lymph nodes.  12/15/22: Here for follow up.Continues on Tagrisso at 160 mg. Feet improved. cracked skin around finger nails. no diarrhea. Taking coumadin   6/15/23: Patient seen in clinic to follow up on his Osimertinib therapy. Patient is experiencing some nail toxicity on and off and muscle cramps, which he is using tonic water for, Denies diarrhea. He has fatigue. He notes that the texture of his scalp hair is different, but he is not bothered by it. He has no mucositis. Pt denies any neurological symptoms.   9/20/23: Skin, nail changes-was really bad, now subsided. Also needed abx as prescribed by podiatrist. Continues on Tagrisso at 160 mg  12/22/23: Patient accompanied by wife, here for follow up visit s/p mask gammaknife RT to cerebellar met 11/27/23, continues on tagrisso and has no complaints.   3/26/24: Pt recently had rt upper quadrant pain, went o ER a month ago and was noted to have gall stones. Was sent home after pain management. He was referred to the surgeon. There was a delay in surgery duen to elevated PTT. I received a call at that time and as documented, this was deemed to be sec to lupus anticoagulant.   6/6/24: feeling well. No new symptoms. Had PET/CT 1.  Extensive hypermetabolic lymphadenopathy extending from the neck into the supraclavicular regions, consistent with malignant involvement. This also includes a posterior LEFT shoulder node. 2.  Uptake at the RIGHT perihilar region and air bronchograms in the RIGHT UPPER lobe consistent with residual, findings of lung cancer. Continued follow-up suggested to exclude residual metabolically reactive disease. 3.  Small, minimally avid RIGHT axillary node, nonspecific.  7/2/24: Patient seen today for follow up while receiving treatment with carbo/alimta. He started this treatment on June 11th. He reports that after his first cycle he experienced some discomfort in his right upper thorax area, no rash indentified. He also experienced fatigue and felt he had a low grade temp 3 days following infusion. He feels his LAD improved since starting the treatment. He denies N/V/D.   7/23/24: Patient seen today for follow up while receiving C3 carbo/alimta. He continues on Tagrisso. He reports that following his last treatment he felt more fatigued. Did not experience thorax discomfort or fever. Notes rash on bilateral upper and lower extremities. He also has occasional hiccups resolved with reglan. His appetite is good and denies N/V/D, constipation. Breathing is stable. Remains active, golfs and continues to work.   8/13/24: Patient seen today for follow up in treatment room accompanied by his wife. Of note,  his hgb today is 7.2. We discussed necessity of 2UPRBCs transfusion however he is moving his kids into college out of state over the next two weeks and is leaving tomorrow so he will receivie 1U PRBC today. He and his wife report that following the third cycle of chemo, he felt very weak and tired and fatigued and was unable to participate in his normal activities. He continues to have acneiform rash on his upper and lower extremities. He also has dysgeusia. Denies hematuria, hemoptysis, melena.   9/4/24: Patient seen today for follow up accompanied by his wife. Of note, following his prior cycle of chemo, he was travelling in [place] and went to ED for nose bleed which was controlled and he was given 1U PRBCs for hgb 5.5. He then came back to [place] and reported fever and was referred to the ER. He went to [facility] ED where he was admitted for neutropenic fever and pancytopenia s/p zarxio, PRBC, and plt transfusions. He also had JORDAN which was thought to be due to volume depletion  He called the office yesterday reporting ongoing weakness and fatigue and thus Alimta will be held today and patient will receive 1U PRBC for hgb 8.4 Today they report that he is able to take more PO, specifically milk based drinks.  He is still feeling weak but his main issue at this time is abdominal pain which started 2 days ago, described as a constant ache in his mid upper abdomen not associated with N/V/D/C/, fever.   9/17/24: Pt here for follow up. Called in to evaluate fatigue, dry mouth, JORDAN (creatinine up to 3 last visit). Symptoms have improved a little, but still significant to impact QoL. Last Alimta was almost 7 weeks ago. Still with cytopenias. Was transfused a few days ago and feels a little more energetic. Unable to tolerate PO liquid intake due to dysgeusia.   FM NGS on Ln bxEGFR L858R FGFR2 K659E KRAS amplification BRAF K483E - subclonal NKX2-1 amplification TP53 L130fs* [de-identified] : adeno ca

## 2024-09-20 NOTE — ASSESSMENT
[FreeTextEntry1] : This is a 53-year-old gentleman with a history of metastatic adenocarcinoma treated with immunotherapy and chemotherapy.  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  The patient developed severe pancytopenia with a white count of 400 low platelet count and severe anemia with a hematocrit of 21.  The patient was hospitalized at that point and received multiple transfusions and was started on Epogen and Neupogen.  Concomitant with this he was found to have an elevated BUN and creatinine which has steadily increased to a level now where it is 3.  I obtain 24-hour urines for creatinine clearance and based on that calculation his renal function is ranging 25 to 30% in addition a renal ultrasound was normal and renal artery Dopplers failed to reveal any evidence of renal artery stenosis my impression is that he probably has interstitial renal disease secondary to his therapies.  If his creatinine continues to rise he will need a kidney biopsy and hopefully his renal function will stable stabilize if not there is a possibility that he will need dialysis

## 2024-09-20 NOTE — HISTORY OF PRESENT ILLNESS
[de-identified] : This is a 53-year-old gentleman with metastatic lung cancer who is being treated with aggressive chemo and immunotherapy and was referred to me for increasing creatinine with creatinine now of close to 3.  Is here for  further evaluation of his kidney failure

## 2024-09-20 NOTE — ASSESSMENT
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 MRI at baseline shows four subcentimeter parenchymal enhancing lesions in the cerebral and cerebellar hemispheres as detailed in the body the report. Findings were suspicious for the presence of intracranial metastases. Started Osimertinib in June 2021. 2 month scan on osi with excellent response in the CNS as well as lungs. CT C/A/P 12/02/21 with decreased RUL mass size. Scans from March 2022, reviewed personally- completely stable findings- sustained [place]. Brain MRI read pending ANNALISE in December 2021. Scans done on 6/1/22 reviewed independently- sustained [place] noted. Stable 1.1 cm right upper lobe nodule along a bandlike opacity. Stable mild tree-in-bud opacity within the lingula. The lungs are otherwise clear. No new or enlarging nodule. Numerous subcentimeter retroperitoneal lymph nodes are unchanged. Previously mentioned enlarged external iliac lymph nodes are not imaged on this exam. US doppler of LE which showed chronic DVT in rt LE. While systemic scans were stable as noted above, brain MRI showed POD. Therefore, decision was made to increase osimertinib dose to BID (160 mg) since this dosing is more effective in CNS/leptomeningeal disease. Scans from September 2022 reviewed and showed stable right upper lobe scarring, stable 1.2 cm lingular nodule which could be scarring/atelectasis. Unchanged retroperitoneal/pelvic lymph nodes.New subcentimeter right lower lobe ground glass nodule which was thought to be inflammatory. Scans from December 2022 which no new or enlarging lung nodule. Mild patchy ground glass opacity within lateral segment of right middle lobe likely inflammatory. Stable scarring within right upper lobe.Stable subcentimeter retroperitoneal lymph nodes. Pt had COVID around this time which can explain these changes.MRI brain at this time showed an excellent response. CT scans done in September 2023 shows excellent response- CR. Scans from Dec 18 shows continued CR based on my review. Also, scan from March which has not been reported as yet seems to show continued CR based on my independent review. Recent MR 10/30/23, + increase in left cerebellum lesion now 6 mm (16:53), previously 3 mm sen by Dr. Boudreaux s/p mask gammaknife 11/27/23. Scans from Jan showed excellent response. Continued on tagrisso 160 mg daily, During a visit in April 2024, pt reported increasing rt SC adenopathy PET/CT done in May 2024 showed IMPRESSION: 1. Extensive hypermetabolic lymphadenopathy extending from the neck into the supraclavicular regions, consistent with malignant involvement. This also includes a posterior LEFT shoulder node. 2. Uptake at the RIGHT perihilar region and air bronchograms in the RIGHT UPPER lobe consistent with residual, findings of lung cancer. Continued follow-up suggested to exclude residual metabolically reactive disease. 3. Small, minimally avid RIGHT axillary node, nonspecific. Based on diffuse POD, we started pt on Educated on proper care of nails and skin surrounding nails. Informed to soak hands and feet in one-to-one ratio of water to vinegar. Instructed to follow up with podiatry to manage left 2nd toe paronychia and other nail changes. Labs today including CBC, CMP, mag (for muscle cramps), CEA (previously elevated and now decreased to normal range), and INR (for coumadin monitoring- this has been controlled perfectly in the 2.5-3 range) 4/23/24: Firm rt supraclav LN-Bx consistent with adeno ca NGS reveals a more complex pattern, concerning for Tagrisso resistance Recommend adding Carbo AUC 5, pemetrexed 500 mg/m2 based on FLAURA-2. He started this treatment June 2024 and completed 4 cycles in August 2024. Course was c/b pancytopenia, requiring multiple transfusions, mucositis, and dermatitis Pt has started to feel a little better. His last chemo was 4 weeks ago. However, he still feels quite fatigued and has increased mucus causing decreased PO intake He has lost a lot of weight Skin bx showed lupus dermatitis, PAS stain was pos. Responding to topical steroids.  Discussed possibility of drug-induced or drug-exacerbated lupus rash. This was thought to be less likely  Given JORDAN and decrease PO fluid intake, arranged for fluids 3-4 times over the next week Pt also noted to be anemic to Hgb of 7. This is unusual for such lingering Pemetrexed toxicity, but could be multifactorial sec to JORDAN which could be sec to pemetrexed or an intercurrent viral infection Nevertheless, he follows with Dr. Mo who is his PCP as well as a nephrologist Anemia is also compounded by the fact that pt has Thalassemia trait. Iron profile c/w anemia of chronic dz Normal B12 and folate Continue Folic acid to 2 mg daily B12 tomorrow with IV hydration. Hold chemo till condition improves. MAy need to discontinue pemetrexed permanently.  Decreased Tagrisso to 40 mg daily. Awaiting shipment from pharmacy. TIll then continue 80 mg Ct abd non-con- no acute pathology

## 2024-09-20 NOTE — HISTORY OF PRESENT ILLNESS
[de-identified] : This is a 53-year-old gentleman with metastatic lung cancer who is being treated with aggressive chemo and immunotherapy and was referred to me for increasing creatinine with creatinine now of close to 3.  Is here for  further evaluation of his kidney failure

## 2024-09-22 NOTE — ASSESSMENT
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 SED - ORDERED JENN, C3, C4, dsDNA, Mccabe, SSA/SSB - ORDERED UA, Protein/Cr Ratio - Patient to follow up with PCP/Nephrology for further workup of kidney function - Patient to follow up with Rheumatology pending labwork for cutaneous lupus follow up  - Photos taken for monitoring - might consider plaquneil  #Chest Eschar at prior mediastinoscopy site; chronic, improving - Managed by wound care inpatient  - Agree with continuation of santyl collagenase, gauze qd - Photos taken for monitoring - Consider excisional bx if not improving  RTC 4 weeks

## 2024-09-22 NOTE — HISTORY OF PRESENT ILLNESS
[FreeTextEntry1] : RPV: rash [de-identified] : 54yo M pmhx lung adenocarcinoma on Tagrisso and completed 4 cycles of carbo/pemetrexed June-Aug 2024 here for rash on arms, back, legs that started in July 2024.   Interval Hx, 9/19/2024: Patient here for suture removal. Endorses significant improvement in appearance of rash after applying Clobetasol ointment.  Endorses continued improvement in mediastinal eschar with application of santyl; non-bothersome otherwise.  Derm Hx: Reports arms/back started first, legs after. Arms have been asymptomatic. Legs initially were itchy bumps but now itch has resolved. Has applied some topical cortisone without relief. Initially thought rash on legs was due to low platelets but things haven't resolved despite transfusion. Pt reports being in and out of hospital recently due to pancytopenia and neutropenic fever. Also reports JORDAN due to chemo.   While admitted, noticed black eschar at former surgical site (~20 years old). PCP informed him it was necrosis of the skin due to a blood clot. Has been applying santyl collagenase per wound care consult helping slowly.   PMH: Pt has hx of sarcoidosis diagnosed in 2002 via mediastinoscopy. Never received systemic tx for it. Reports it burnt out on its own. Has not had any issue for the last 20 years. Also reports + lupus antibodies. On coumadin due to prior blood clot many years ago.

## 2024-09-22 NOTE — HISTORY OF PRESENT ILLNESS
[FreeTextEntry1] : RPV: rash [de-identified] : 52yo M pmhx lung adenocarcinoma on Tagrisso and completed 4 cycles of carbo/pemetrexed June-Aug 2024 here for rash on arms, back, legs that started in July 2024.   Interval Hx, 9/19/2024: Patient here for suture removal. Endorses significant improvement in appearance of rash after applying Clobetasol ointment.  Endorses continued improvement in mediastinal eschar with application of santyl; non-bothersome otherwise.  Derm Hx: Reports arms/back started first, legs after. Arms have been asymptomatic. Legs initially were itchy bumps but now itch has resolved. Has applied some topical cortisone without relief. Initially thought rash on legs was due to low platelets but things haven't resolved despite transfusion. Pt reports being in and out of hospital recently due to pancytopenia and neutropenic fever. Also reports JORDAN due to chemo.   While admitted, noticed black eschar at former surgical site (~20 years old). PCP informed him it was necrosis of the skin due to a blood clot. Has been applying santyl collagenase per wound care consult helping slowly.   PMH: Pt has hx of sarcoidosis diagnosed in 2002 via mediastinoscopy. Never received systemic tx for it. Reports it burnt out on its own. Has not had any issue for the last 20 years. Also reports + lupus antibodies. On coumadin due to prior blood clot many years ago.

## 2024-09-22 NOTE — PHYSICAL EXAM
[FreeTextEntry3] : A focused skin examination was performed per patient preference, and the following findings were noted:  coalescing annular plaques with central pink/hypopigmentation and scale on bl arms and back (upper>lower)  bl shins with coalescing round purpuric plaques with scale  black eschar with surrounding erythema at former mediastinoscopy site, improved relative to LV

## 2024-09-24 ENCOUNTER — APPOINTMENT (OUTPATIENT)
Dept: HEMATOLOGY ONCOLOGY | Facility: CLINIC | Age: 54
End: 2024-09-24

## 2024-09-24 ENCOUNTER — LABORATORY RESULT (OUTPATIENT)
Age: 54
End: 2024-09-24

## 2024-09-24 ENCOUNTER — APPOINTMENT (OUTPATIENT)
Dept: INTERNAL MEDICINE | Facility: CLINIC | Age: 54
End: 2024-09-24
Payer: COMMERCIAL

## 2024-09-24 ENCOUNTER — APPOINTMENT (OUTPATIENT)
Dept: INFUSION THERAPY | Facility: HOSPITAL | Age: 54
End: 2024-09-24

## 2024-09-24 VITALS — BODY MASS INDEX: 26.51 KG/M2 | WEIGHT: 179 LBS | HEIGHT: 69 IN

## 2024-09-24 VITALS — SYSTOLIC BLOOD PRESSURE: 130 MMHG | DIASTOLIC BLOOD PRESSURE: 80 MMHG

## 2024-09-24 DIAGNOSIS — D68.9 COAGULATION DEFECT, UNSPECIFIED: ICD-10-CM

## 2024-09-24 DIAGNOSIS — C34.90 MALIGNANT NEOPLASM OF UNSPECIFIED PART OF UNSPECIFIED BRONCHUS OR LUNG: ICD-10-CM

## 2024-09-24 PROCEDURE — 36415 COLL VENOUS BLD VENIPUNCTURE: CPT

## 2024-09-24 PROCEDURE — G2211 COMPLEX E/M VISIT ADD ON: CPT | Mod: NC

## 2024-09-24 PROCEDURE — 99214 OFFICE O/P EST MOD 30 MIN: CPT

## 2024-09-24 NOTE — ASSESSMENT
[FreeTextEntry1] : Problems Lung carcinoma with metastases Anticardiolipin antibodies Pulmonary embolus Chronic long-term Coumadin therapy Pancytopenia Acute renal failure Assessment The patient's blood pressure physical examination were normal.  My concern is that there is progression of his renal failure and he may be uremic.  Bloods were drawn and sent stat for BUN creatinine and potassium level.  If indeed he is in renal failure I will refer him to the hospital for evaluation kidney biopsy and possible dialysis

## 2024-09-24 NOTE — HISTORY OF PRESENT ILLNESS
[de-identified] : This is a 53-year-old gentleman with a history of metastatic lung carcinoma who recently underwent immunotherapy and subsequently developed significant pancytopenia that required blood transfusions Procrit and Neupogen.  Concomitant with that he has had a progressive rise of his creatinine from 1 to his last visit here of 3.  Today the patient is complaining of weakness and having episodes of vomiting.  He is making urine he denies any abdominal pains itching or somnolence.

## 2024-09-25 ENCOUNTER — NON-APPOINTMENT (OUTPATIENT)
Age: 54
End: 2024-09-25

## 2024-09-25 LAB
ALBUMIN SERPL ELPH-MCNC: 4.3 G/DL
ALP BLD-CCNC: 56 U/L
ALT SERPL-CCNC: 22 U/L
ANION GAP SERPL CALC-SCNC: 15 MMOL/L
APPEARANCE: CLEAR
AST SERPL-CCNC: 25 U/L
BACTERIA: NEGATIVE /HPF
BILIRUB SERPL-MCNC: 0.3 MG/DL
BILIRUBIN URINE: NEGATIVE
BLOOD URINE: NEGATIVE
BUN SERPL-MCNC: 41 MG/DL
BUN SERPL-MCNC: 43 MG/DL
C3 SERPL-MCNC: 162 MG/DL
C4 SERPL-MCNC: 22 MG/DL
CALCIUM SERPL-MCNC: 9.3 MG/DL
CAST: 0 /LPF
CHLORIDE SERPL-SCNC: 108 MMOL/L
CHOLEST SERPL-MCNC: 162 MG/DL
CO2 SERPL-SCNC: 20 MMOL/L
COLOR: YELLOW
CREAT SERPL-MCNC: 3.28 MG/DL
CREAT SERPL-MCNC: 3.32 MG/DL
DSDNA AB SER-ACNC: 6 IU/ML
EGFR: 22 ML/MIN/1.73M2
EGFR: 22 ML/MIN/1.73M2
ENA RNP AB SER IA-ACNC: 0.8 AL
ENA SM AB SER IA-ACNC: <0.2 AL
ENA SS-A AB SER IA-ACNC: >8 AL
ENA SS-B AB SER IA-ACNC: 7.7 AL
EPITHELIAL CELLS: 1 /HPF
ESTIMATED AVERAGE GLUCOSE: 123 MG/DL
GLUCOSE QUALITATIVE U: NEGATIVE MG/DL
GLUCOSE SERPL-MCNC: 83 MG/DL
HBA1C MFR BLD HPLC: 5.9 %
HDLC SERPL-MCNC: 36 MG/DL
KETONES URINE: NEGATIVE MG/DL
LDLC SERPL CALC-MCNC: 91 MG/DL
LEUKOCYTE ESTERASE URINE: NEGATIVE
MICROSCOPIC-UA: NORMAL
NITRITE URINE: NEGATIVE
NONHDLC SERPL-MCNC: 126 MG/DL
PH URINE: 5.5
POTASSIUM SERPL-SCNC: 4.8 MMOL/L
POTASSIUM SERPL-SCNC: 4.9 MMOL/L
POTASSIUM SERPL-SCNC: 5 MMOL/L
PROT SERPL-MCNC: 7.7 G/DL
PROTEIN URINE: 30 MG/DL
RED BLOOD CELLS URINE: 0 /HPF
SODIUM SERPL-SCNC: 143 MMOL/L
SPECIFIC GRAVITY URINE: 1.01
T3RU NFR SERPL: 1.2 TBI
T4 SERPL-MCNC: 8.6 UG/DL
TRIGL SERPL-MCNC: 203 MG/DL
TSH SERPL-ACNC: 0.75 UIU/ML
URATE SERPL-MCNC: 8 MG/DL
UROBILINOGEN URINE: 0.2 MG/DL
WHITE BLOOD CELLS URINE: 0 /HPF

## 2024-09-26 LAB
ANA PAT FLD IF-IMP: ABNORMAL
ANA PATTERN: ABNORMAL
ANA SER IF-ACNC: ABNORMAL
ANA TITER: ABNORMAL
BASOPHILS # BLD AUTO: 0.03 K/UL
BASOPHILS NFR BLD AUTO: 0.8 %
EOSINOPHIL # BLD AUTO: 0.05 K/UL
EOSINOPHIL NFR BLD AUTO: 1.3 %
HCT VFR BLD CALC: 29.3 %
HGB BLD-MCNC: 9.2 G/DL
IMM GRANULOCYTES NFR BLD AUTO: 0.5 %
LYMPHOCYTES # BLD AUTO: 0.51 K/UL
LYMPHOCYTES NFR BLD AUTO: 13 %
MAN DIFF?: NORMAL
MCHC RBC-ENTMCNC: 25.8 PG
MCHC RBC-ENTMCNC: 31.4 GM/DL
MCV RBC AUTO: 82.3 FL
MONOCYTES # BLD AUTO: 0.75 K/UL
MONOCYTES NFR BLD AUTO: 19.2 %
NEUTROPHILS # BLD AUTO: 2.55 K/UL
NEUTROPHILS NFR BLD AUTO: 65.2 %
PLATELET # BLD AUTO: 108 K/UL
RBC # BLD: 3.56 M/UL
RBC # FLD: 24.9 %
WBC # FLD AUTO: 3.91 K/UL

## 2024-09-27 ENCOUNTER — OUTPATIENT (OUTPATIENT)
Dept: OUTPATIENT SERVICES | Facility: HOSPITAL | Age: 54
LOS: 1 days | Discharge: HOME | End: 2024-09-27

## 2024-09-27 ENCOUNTER — NON-APPOINTMENT (OUTPATIENT)
Age: 54
End: 2024-09-27

## 2024-09-27 DIAGNOSIS — D64.9 ANEMIA, UNSPECIFIED: ICD-10-CM

## 2024-09-27 DIAGNOSIS — Z95.828 PRESENCE OF OTHER VASCULAR IMPLANTS AND GRAFTS: Chronic | ICD-10-CM

## 2024-09-27 DIAGNOSIS — Z98.890 OTHER SPECIFIED POSTPROCEDURAL STATES: Chronic | ICD-10-CM

## 2024-10-01 ENCOUNTER — LABORATORY RESULT (OUTPATIENT)
Age: 54
End: 2024-10-01

## 2024-10-01 ENCOUNTER — APPOINTMENT (OUTPATIENT)
Dept: INTERNAL MEDICINE | Facility: CLINIC | Age: 54
End: 2024-10-01
Payer: COMMERCIAL

## 2024-10-01 VITALS — DIASTOLIC BLOOD PRESSURE: 90 MMHG | SYSTOLIC BLOOD PRESSURE: 160 MMHG

## 2024-10-01 DIAGNOSIS — B37.0 CANDIDAL STOMATITIS: ICD-10-CM

## 2024-10-01 DIAGNOSIS — N18.32 CHRONIC KIDNEY DISEASE, STAGE 3B: ICD-10-CM

## 2024-10-01 DIAGNOSIS — L27.0 GENERALIZED SKIN ERUPTION DUE TO DRUGS AND MEDICAMENTS TAKEN INTERNALLY: ICD-10-CM

## 2024-10-01 DIAGNOSIS — I10 ESSENTIAL (PRIMARY) HYPERTENSION: ICD-10-CM

## 2024-10-01 PROCEDURE — G2211 COMPLEX E/M VISIT ADD ON: CPT | Mod: NC

## 2024-10-01 PROCEDURE — 99214 OFFICE O/P EST MOD 30 MIN: CPT

## 2024-10-01 PROCEDURE — 36415 COLL VENOUS BLD VENIPUNCTURE: CPT

## 2024-10-01 RX ORDER — NYSTATIN 100000 [USP'U]/ML
100000 SUSPENSION ORAL 4 TIMES DAILY
Qty: 1 | Refills: 0 | Status: ACTIVE | COMMUNITY
Start: 2024-10-01 | End: 1900-01-01

## 2024-10-01 RX ORDER — AMLODIPINE BESYLATE 5 MG/1
5 TABLET ORAL DAILY
Qty: 30 | Refills: 2 | Status: ACTIVE | COMMUNITY
Start: 2024-10-01 | End: 1900-01-01

## 2024-10-02 ENCOUNTER — OUTPATIENT (OUTPATIENT)
Dept: OUTPATIENT SERVICES | Facility: HOSPITAL | Age: 54
LOS: 1 days | Discharge: HOME | End: 2024-10-02

## 2024-10-02 DIAGNOSIS — Z98.890 OTHER SPECIFIED POSTPROCEDURAL STATES: Chronic | ICD-10-CM

## 2024-10-02 DIAGNOSIS — D64.9 ANEMIA, UNSPECIFIED: ICD-10-CM

## 2024-10-02 DIAGNOSIS — Z95.828 PRESENCE OF OTHER VASCULAR IMPLANTS AND GRAFTS: Chronic | ICD-10-CM

## 2024-10-02 LAB
ALBUMIN SERPL ELPH-MCNC: 4.4 G/DL
ALP BLD-CCNC: 54 U/L
ALT SERPL-CCNC: 19 U/L
ANION GAP SERPL CALC-SCNC: 12 MMOL/L
APPEARANCE: CLEAR
AST SERPL-CCNC: 24 U/L
BACTERIA: NEGATIVE /HPF
BASOPHILS # BLD AUTO: 0.02 K/UL
BASOPHILS NFR BLD AUTO: 0.5 %
BILIRUB SERPL-MCNC: 0.2 MG/DL
BILIRUBIN URINE: NEGATIVE
BLOOD URINE: NEGATIVE
BUN SERPL-MCNC: 42 MG/DL
CALCIUM SERPL-MCNC: 9.4 MG/DL
CAST: 0 /LPF
CHLORIDE SERPL-SCNC: 104 MMOL/L
CO2 SERPL-SCNC: 23 MMOL/L
COLOR: YELLOW
CREAT SERPL-MCNC: 2.98 MG/DL
EGFR: 25 ML/MIN/1.73M2
EOSINOPHIL # BLD AUTO: 0.06 K/UL
EOSINOPHIL NFR BLD AUTO: 1.4 %
EPITHELIAL CELLS: 1 /HPF
GLUCOSE QUALITATIVE U: NEGATIVE MG/DL
GLUCOSE SERPL-MCNC: 88 MG/DL
HCT VFR BLD CALC: 25.6 %
HGB BLD-MCNC: 8.5 G/DL
IMM GRANULOCYTES NFR BLD AUTO: 0.7 %
KETONES URINE: NEGATIVE MG/DL
LEUKOCYTE ESTERASE URINE: NEGATIVE
LYMPHOCYTES # BLD AUTO: 0.61 K/UL
LYMPHOCYTES NFR BLD AUTO: 14.1 %
MAN DIFF?: NORMAL
MCHC RBC-ENTMCNC: 26.2 PG
MCHC RBC-ENTMCNC: 33.2 GM/DL
MCV RBC AUTO: 79 FL
MICROSCOPIC-UA: NORMAL
MONOCYTES # BLD AUTO: 0.72 K/UL
MONOCYTES NFR BLD AUTO: 16.6 %
NEUTROPHILS # BLD AUTO: 2.89 K/UL
NEUTROPHILS NFR BLD AUTO: 66.7 %
NITRITE URINE: NEGATIVE
PH URINE: 6
PLATELET # BLD AUTO: 87 K/UL
POTASSIUM SERPL-SCNC: 4.2 MMOL/L
PROT SERPL-MCNC: 7.5 G/DL
PROTEIN URINE: 30 MG/DL
RBC # BLD: 3.24 M/UL
RBC # FLD: 22.4 %
RED BLOOD CELLS URINE: 0 /HPF
SODIUM SERPL-SCNC: 140 MMOL/L
SPECIFIC GRAVITY URINE: 1.01
UROBILINOGEN URINE: 0.2 MG/DL
WBC # FLD AUTO: 4.33 K/UL
WHITE BLOOD CELLS URINE: 0 /HPF

## 2024-10-07 DIAGNOSIS — D61.810 ANTINEOPLASTIC CHEMOTHERAPY INDUCED PANCYTOPENIA: ICD-10-CM

## 2024-10-09 ENCOUNTER — APPOINTMENT (OUTPATIENT)
Dept: HEMATOLOGY ONCOLOGY | Facility: CLINIC | Age: 54
End: 2024-10-09

## 2024-10-10 ENCOUNTER — APPOINTMENT (OUTPATIENT)
Dept: INTERNAL MEDICINE | Facility: CLINIC | Age: 54
End: 2024-10-10

## 2024-10-11 ENCOUNTER — LABORATORY RESULT (OUTPATIENT)
Age: 54
End: 2024-10-11

## 2024-10-11 ENCOUNTER — APPOINTMENT (OUTPATIENT)
Dept: NEPHROLOGY | Facility: CLINIC | Age: 54
End: 2024-10-11
Payer: COMMERCIAL

## 2024-10-11 VITALS
SYSTOLIC BLOOD PRESSURE: 171 MMHG | HEART RATE: 78 BPM | OXYGEN SATURATION: 98 % | TEMPERATURE: 98.6 F | BODY MASS INDEX: 25.92 KG/M2 | HEIGHT: 69 IN | DIASTOLIC BLOOD PRESSURE: 90 MMHG | WEIGHT: 175 LBS

## 2024-10-11 DIAGNOSIS — D68.61 ANTIPHOSPHOLIPID SYNDROME: ICD-10-CM

## 2024-10-11 DIAGNOSIS — N17.9 ACUTE KIDNEY FAILURE, UNSPECIFIED: ICD-10-CM

## 2024-10-11 PROCEDURE — 99204 OFFICE O/P NEW MOD 45 MIN: CPT

## 2024-10-12 LAB
25(OH)D3 SERPL-MCNC: 26.6 NG/ML
ALBUMIN SERPL ELPH-MCNC: 4.6 G/DL
ANION GAP SERPL CALC-SCNC: 19 MMOL/L
APPEARANCE: CLEAR
APTT BLD: 106.7 SEC
ASO AB SER LA-ACNC: 93 IU/ML
BACTERIA: NEGATIVE /HPF
BASOPHILS # BLD AUTO: 0.01 K/UL
BASOPHILS NFR BLD AUTO: 0.3 %
BILIRUB DIRECT SERPL-MCNC: 0.1 MG/DL
BILIRUB SERPL-MCNC: 0.3 MG/DL
BILIRUBIN URINE: NEGATIVE
BLOOD URINE: NEGATIVE
BUN SERPL-MCNC: 37 MG/DL
C3 SERPL-MCNC: 142 MG/DL
C4 SERPL-MCNC: 21 MG/DL
CALCIUM SERPL-MCNC: 10.1 MG/DL
CALCIUM SERPL-MCNC: 10.1 MG/DL
CAST: 1 /LPF
CHLORIDE SERPL-SCNC: 102 MMOL/L
CHOLEST SERPL-MCNC: 181 MG/DL
CMV DNA SPEC QL NAA+PROBE: NOT DETECTED IU/ML
CMVPCR LOG: NOT DETECTED LOG10IU/ML
CO2 SERPL-SCNC: 20 MMOL/L
COLOR: YELLOW
CREAT SERPL-MCNC: 2.94 MG/DL
CREAT SPEC-SCNC: 67 MG/DL
CREAT/PROT UR: 0.6 RATIO
EGFR: 25 ML/MIN/1.73M2
EOSINOPHIL # BLD AUTO: 0.13 K/UL
EOSINOPHIL NFR BLD AUTO: 3.3 %
EPITHELIAL CELLS: 1 /HPF
FERRITIN SERPL-MCNC: 1999 NG/ML
FOLATE SERPL-MCNC: 15 NG/ML
GLUCOSE QUALITATIVE U: NEGATIVE MG/DL
GLUCOSE SERPL-MCNC: 90 MG/DL
HAPTOGLOB SERPL-MCNC: 271 MG/DL
HBV CORE IGG+IGM SER QL: NONREACTIVE
HBV CORE IGM SER QL: NONREACTIVE
HBV SURFACE AB SER QL: NONREACTIVE
HBV SURFACE AB SERPL IA-ACNC: <3 MIU/ML
HBV SURFACE AG SER QL: NONREACTIVE
HCT VFR BLD CALC: 23.9 %
HCV AB SER QL: NONREACTIVE
HCV S/CO RATIO: 0.07 S/CO
HCYS SERPL-MCNC: 26 UMOL/L
HDLC SERPL-MCNC: 46 MG/DL
HGB BLD-MCNC: 7.8 G/DL
HIV1+2 AB SPEC QL IA.RAPID: NONREACTIVE
IMM GRANULOCYTES NFR BLD AUTO: 0.3 %
INR PPP: 2.24 RATIO
IRON SATN MFR SERPL: 25 %
IRON SERPL-MCNC: 76 UG/DL
KETONES URINE: NEGATIVE MG/DL
LDH SERPL-CCNC: 293 U/L
LDLC SERPL CALC-MCNC: 113 MG/DL
LEUKOCYTE ESTERASE URINE: NEGATIVE
LYMPHOCYTES # BLD AUTO: 0.46 K/UL
LYMPHOCYTES NFR BLD AUTO: 11.5 %
MAGNESIUM SERPL-MCNC: 2 MG/DL
MAN DIFF?: NORMAL
MCHC RBC-ENTMCNC: 26.1 PG
MCHC RBC-ENTMCNC: 32.6 GM/DL
MCV RBC AUTO: 79.9 FL
MICROSCOPIC-UA: NORMAL
MONOCYTES # BLD AUTO: 0.54 K/UL
MONOCYTES NFR BLD AUTO: 13.5 %
NEUTROPHILS # BLD AUTO: 2.85 K/UL
NEUTROPHILS NFR BLD AUTO: 71.1 %
NITRITE URINE: NEGATIVE
NONHDLC SERPL-MCNC: 136 MG/DL
PARATHYROID HORMONE INTACT: 71 PG/ML
PH URINE: 6.5
PHOSPHATE SERPL-MCNC: 3.9 MG/DL
PLATELET # BLD AUTO: 77 K/UL
POTASSIUM SERPL-SCNC: 3.9 MMOL/L
PROT UR-MCNC: 38 MG/DL
PROTEIN URINE: 30 MG/DL
PT BLD: 26.5 SEC
RBC # BLD: 2.99 M/UL
RBC # FLD: 20.6 %
RED BLOOD CELLS URINE: 1 /HPF
RHEUMATOID FACT SER QL: <10 IU/ML
SODIUM SERPL-SCNC: 140 MMOL/L
SPECIFIC GRAVITY URINE: 1.01
TIBC SERPL-MCNC: 303 UG/DL
TRIGL SERPL-MCNC: 125 MG/DL
UIBC SERPL-MCNC: 227 UG/DL
UROBILINOGEN URINE: 0.2 MG/DL
VIT B12 SERPL-MCNC: 1343 PG/ML
WBC # FLD AUTO: 4 K/UL
WHITE BLOOD CELLS URINE: 0 /HPF

## 2024-10-13 LAB
B2 GLYCOPROT1 IGA SERPL IA-ACNC: >65 U/ML
B2 GLYCOPROT1 IGG SER-ACNC: >112 U/ML
B2 GLYCOPROT1 IGM SER-ACNC: 23.7 U/ML
CARDIOLIPIN IGM SER-MCNC: 20.7 MPL U/ML
CARDIOLIPIN IGM SER-MCNC: >112 GPL U/ML
CREAT SPEC-SCNC: 67 MG/DL
DEPRECATED CARDIOLIPIN IGA SER: >65 APL U/ML
DSDNA AB SER-ACNC: 6 IU/ML
ENA RNP AB SER IA-ACNC: 0.7 AL
ENA SM AB SER IA-ACNC: <0.2 AL
MICROALBUMIN 24H UR DL<=1MG/L-MCNC: 10.3 MG/DL
MICROALBUMIN/CREAT 24H UR-RTO: 153 MG/G
T PALLIDUM AB SER QL IA: NEGATIVE

## 2024-10-14 ENCOUNTER — INPATIENT (INPATIENT)
Facility: HOSPITAL | Age: 54
LOS: 9 days | Discharge: HOME | DRG: 683 | End: 2024-10-24
Attending: STUDENT IN AN ORGANIZED HEALTH CARE EDUCATION/TRAINING PROGRAM | Admitting: STUDENT IN AN ORGANIZED HEALTH CARE EDUCATION/TRAINING PROGRAM
Payer: COMMERCIAL

## 2024-10-14 VITALS
TEMPERATURE: 98 F | HEART RATE: 106 BPM | WEIGHT: 177.91 LBS | HEIGHT: 69 IN | RESPIRATION RATE: 18 BRPM | DIASTOLIC BLOOD PRESSURE: 91 MMHG | OXYGEN SATURATION: 98 % | SYSTOLIC BLOOD PRESSURE: 199 MMHG

## 2024-10-14 DIAGNOSIS — D68.61 ANTIPHOSPHOLIPID SYNDROME: ICD-10-CM

## 2024-10-14 DIAGNOSIS — N17.9 ACUTE KIDNEY FAILURE, UNSPECIFIED: ICD-10-CM

## 2024-10-14 DIAGNOSIS — Z98.890 OTHER SPECIFIED POSTPROCEDURAL STATES: Chronic | ICD-10-CM

## 2024-10-14 DIAGNOSIS — R11.2 NAUSEA WITH VOMITING, UNSPECIFIED: ICD-10-CM

## 2024-10-14 DIAGNOSIS — Z86.711 PERSONAL HISTORY OF PULMONARY EMBOLISM: ICD-10-CM

## 2024-10-14 DIAGNOSIS — I10 ESSENTIAL (PRIMARY) HYPERTENSION: ICD-10-CM

## 2024-10-14 DIAGNOSIS — Z95.828 PRESENCE OF OTHER VASCULAR IMPLANTS AND GRAFTS: Chronic | ICD-10-CM

## 2024-10-14 LAB
ACANTHOCYTES BLD QL SMEAR: SLIGHT — SIGNIFICANT CHANGE UP
ALBUMIN SERPL ELPH-MCNC: 4.6 G/DL — SIGNIFICANT CHANGE UP (ref 3.3–5)
ALDOSTERONE SERUM: 56 NG/DL
ALP SERPL-CCNC: 60 U/L — SIGNIFICANT CHANGE UP (ref 40–120)
ALT FLD-CCNC: 48 U/L — HIGH (ref 10–45)
ANION GAP SERPL CALC-SCNC: 15 MMOL/L — SIGNIFICANT CHANGE UP (ref 5–17)
ANISOCYTOSIS BLD QL: SLIGHT — SIGNIFICANT CHANGE UP
APPEARANCE UR: CLEAR — SIGNIFICANT CHANGE UP
APTT BLD: 122.9 SEC — CRITICAL HIGH (ref 24.5–35.6)
APTT BLD: 142.5 SEC — CRITICAL HIGH (ref 24.5–35.6)
AST SERPL-CCNC: 45 U/L — HIGH (ref 10–40)
B19V DNA FLD QL NAA+PROBE: NOT DETECTED IU/ML
BACTERIA # UR AUTO: NEGATIVE /HPF — SIGNIFICANT CHANGE UP
BASOPHILS # BLD AUTO: 0 K/UL — SIGNIFICANT CHANGE UP (ref 0–0.2)
BASOPHILS NFR BLD AUTO: 0 % — SIGNIFICANT CHANGE UP (ref 0–2)
BILIRUB SERPL-MCNC: 0.5 MG/DL — SIGNIFICANT CHANGE UP (ref 0.2–1.2)
BILIRUB UR-MCNC: NEGATIVE — SIGNIFICANT CHANGE UP
BKV DNA SPEC QL NAA+PROBE: NOT DETECTED IU/ML
BUN SERPL-MCNC: 37 MG/DL — HIGH (ref 7–23)
CALCIUM SERPL-MCNC: 9.9 MG/DL — SIGNIFICANT CHANGE UP (ref 8.4–10.5)
CAST: 3 /LPF — SIGNIFICANT CHANGE UP (ref 0–4)
CH50 SERPL-MCNC: 99 U/ML
CHLORIDE SERPL-SCNC: 102 MMOL/L — SIGNIFICANT CHANGE UP (ref 96–108)
CO2 SERPL-SCNC: 20 MMOL/L — LOW (ref 22–31)
COLOR SPEC: YELLOW — SIGNIFICANT CHANGE UP
COMPLEMENT, ALTERNATE PATHWAY (AH50): 101
CONFIRM: 50.4 SEC
CREAT SERPL-MCNC: 2.99 MG/DL — HIGH (ref 0.5–1.3)
DACRYOCYTES BLD QL SMEAR: SLIGHT — SIGNIFICANT CHANGE UP
DIFF PNL FLD: NEGATIVE — SIGNIFICANT CHANGE UP
DRVVT IMM 1:2 NP PPP: ABNORMAL
DRVVT SCREEN TO CONFIRM RATIO: 2.7 RATIO
EGFR: 24 ML/MIN/1.73M2 — LOW
ELLIPTOCYTES BLD QL SMEAR: SLIGHT — SIGNIFICANT CHANGE UP
EOSINOPHIL # BLD AUTO: 0.21 K/UL — SIGNIFICANT CHANGE UP (ref 0–0.5)
EOSINOPHIL NFR BLD AUTO: 4.4 % — SIGNIFICANT CHANGE UP (ref 0–6)
FLUAV AG NPH QL: SIGNIFICANT CHANGE UP
FLUBV AG NPH QL: SIGNIFICANT CHANGE UP
GIANT PLATELETS BLD QL SMEAR: PRESENT — SIGNIFICANT CHANGE UP
GLUCOSE SERPL-MCNC: 109 MG/DL — HIGH (ref 70–99)
GLUCOSE UR QL: NEGATIVE MG/DL — SIGNIFICANT CHANGE UP
HCT VFR BLD CALC: 23 % — LOW (ref 39–50)
HGB BLD-MCNC: 7.7 G/DL — LOW (ref 13–17)
INR BLD: 2.08 RATIO — HIGH (ref 0.85–1.16)
KETONES UR-MCNC: NEGATIVE MG/DL — SIGNIFICANT CHANGE UP
LEUKOCYTE ESTERASE UR-ACNC: NEGATIVE — SIGNIFICANT CHANGE UP
LIDOCAIN IGE QN: 26 U/L — SIGNIFICANT CHANGE UP (ref 7–60)
LYMPHOCYTES # BLD AUTO: 0.42 K/UL — LOW (ref 1–3.3)
LYMPHOCYTES # BLD AUTO: 8.7 % — LOW (ref 13–44)
MAGNESIUM SERPL-MCNC: 1.8 MG/DL — SIGNIFICANT CHANGE UP (ref 1.6–2.6)
MANUAL SMEAR VERIFICATION: SIGNIFICANT CHANGE UP
MCHC RBC-ENTMCNC: 26.1 PG — LOW (ref 27–34)
MCHC RBC-ENTMCNC: 33.5 GM/DL — SIGNIFICANT CHANGE UP (ref 32–36)
MCV RBC AUTO: 78 FL — LOW (ref 80–100)
MICROCYTES BLD QL: SLIGHT — SIGNIFICANT CHANGE UP
MONOCYTES # BLD AUTO: 0.25 K/UL — SIGNIFICANT CHANGE UP (ref 0–0.9)
MONOCYTES NFR BLD AUTO: 5.2 % — SIGNIFICANT CHANGE UP (ref 2–14)
NEUTROPHILS # BLD AUTO: 3.97 K/UL — SIGNIFICANT CHANGE UP (ref 1.8–7.4)
NEUTROPHILS NFR BLD AUTO: 81.7 % — HIGH (ref 43–77)
NITRITE UR-MCNC: NEGATIVE — SIGNIFICANT CHANGE UP
PH UR: 6.5 — SIGNIFICANT CHANGE UP (ref 5–8)
PHOSPHOLIPASE A2 RECEPTOR ELISA: <1.8 RU/ML
PLAT MORPH BLD: ABNORMAL
PLATELET # BLD AUTO: 70 K/UL — LOW (ref 150–400)
POIKILOCYTOSIS BLD QL AUTO: SLIGHT — SIGNIFICANT CHANGE UP
POLYCHROMASIA BLD QL SMEAR: SLIGHT — SIGNIFICANT CHANGE UP
POTASSIUM SERPL-MCNC: 3.7 MMOL/L — SIGNIFICANT CHANGE UP (ref 3.5–5.3)
POTASSIUM SERPL-SCNC: 3.7 MMOL/L — SIGNIFICANT CHANGE UP (ref 3.5–5.3)
PROT SERPL-MCNC: 8.4 G/DL — HIGH (ref 6–8.3)
PROT UR-MCNC: 30 MG/DL
PROTHROM AB SERPL-ACNC: 23.5 SEC — HIGH (ref 9.9–13.4)
RBC # BLD: 2.95 M/UL — LOW (ref 4.2–5.8)
RBC # FLD: 19.2 % — HIGH (ref 10.3–14.5)
RBC BLD AUTO: ABNORMAL
RBC CASTS # UR COMP ASSIST: 0 /HPF — SIGNIFICANT CHANGE UP (ref 0–4)
RSV RNA NPH QL NAA+NON-PROBE: SIGNIFICANT CHANGE UP
SARS-COV-2 RNA SPEC QL NAA+PROBE: SIGNIFICANT CHANGE UP
SCREEN DRVVT: 177 SEC
SODIUM SERPL-SCNC: 137 MMOL/L — SIGNIFICANT CHANGE UP (ref 135–145)
SP GR SPEC: 1.01 — SIGNIFICANT CHANGE UP (ref 1–1.03)
SQUAMOUS # UR AUTO: 1 /HPF — SIGNIFICANT CHANGE UP (ref 0–5)
TARGETS BLD QL SMEAR: SLIGHT — SIGNIFICANT CHANGE UP
UROBILINOGEN FLD QL: 0.2 MG/DL — SIGNIFICANT CHANGE UP (ref 0.2–1)
WBC # BLD: 4.86 K/UL — SIGNIFICANT CHANGE UP (ref 3.8–10.5)
WBC # FLD AUTO: 4.86 K/UL — SIGNIFICANT CHANGE UP (ref 3.8–10.5)
WBC UR QL: 1 /HPF — SIGNIFICANT CHANGE UP (ref 0–5)

## 2024-10-14 PROCEDURE — 99221 1ST HOSP IP/OBS SF/LOW 40: CPT

## 2024-10-14 PROCEDURE — 99223 1ST HOSP IP/OBS HIGH 75: CPT

## 2024-10-14 PROCEDURE — 70553 MRI BRAIN STEM W/O & W/DYE: CPT | Mod: 26

## 2024-10-14 PROCEDURE — 99233 SBSQ HOSP IP/OBS HIGH 50: CPT

## 2024-10-14 PROCEDURE — 99222 1ST HOSP IP/OBS MODERATE 55: CPT | Mod: GC

## 2024-10-14 PROCEDURE — 99285 EMERGENCY DEPT VISIT HI MDM: CPT

## 2024-10-14 RX ORDER — TRIAMCINOLONE ACETONIDE/L.S.B. 0.5 %
1 CREAM (GRAM) TOPICAL
Refills: 0 | Status: DISCONTINUED | OUTPATIENT
Start: 2024-10-14 | End: 2024-10-24

## 2024-10-14 RX ORDER — HEPARIN SODIUM 10000 [USP'U]/ML
800 INJECTION INTRAVENOUS; SUBCUTANEOUS
Qty: 25000 | Refills: 0 | Status: DISCONTINUED | OUTPATIENT
Start: 2024-10-14 | End: 2024-10-15

## 2024-10-14 RX ORDER — OSIMERTINIB 80 1/1
1 TABLET, FILM COATED ORAL
Refills: 0 | DISCHARGE

## 2024-10-14 RX ORDER — ACETAMINOPHEN 500 MG
1000 TABLET ORAL ONCE
Refills: 0 | Status: COMPLETED | OUTPATIENT
Start: 2024-10-14 | End: 2024-10-14

## 2024-10-14 RX ORDER — AMLODIPINE BESYLATE 10 MG
1 TABLET ORAL
Refills: 0 | DISCHARGE

## 2024-10-14 RX ORDER — CARVEDILOL 25 MG/1
3.12 TABLET, FILM COATED ORAL EVERY 12 HOURS
Refills: 0 | Status: DISCONTINUED | OUTPATIENT
Start: 2024-10-14 | End: 2024-10-15

## 2024-10-14 RX ORDER — AMLODIPINE BESYLATE 10 MG
10 TABLET ORAL DAILY
Refills: 0 | Status: DISCONTINUED | OUTPATIENT
Start: 2024-10-14 | End: 2024-10-14

## 2024-10-14 RX ORDER — SODIUM CHLORIDE 9 MG/ML
1000 INJECTION, SOLUTION INTRAMUSCULAR; INTRAVENOUS; SUBCUTANEOUS
Refills: 0 | Status: DISCONTINUED | OUTPATIENT
Start: 2024-10-14 | End: 2024-10-18

## 2024-10-14 RX ORDER — WARFARIN SODIUM 4 MG
1 TABLET ORAL
Refills: 0 | DISCHARGE

## 2024-10-14 RX ORDER — SODIUM CHLORIDE 9 MG/ML
250 INJECTION, SOLUTION INTRAMUSCULAR; INTRAVENOUS; SUBCUTANEOUS ONCE
Refills: 0 | Status: COMPLETED | OUTPATIENT
Start: 2024-10-14 | End: 2024-10-14

## 2024-10-14 RX ORDER — PETROLATUM 987.89 MG/G
1 OINTMENT TOPICAL THREE TIMES A DAY
Refills: 0 | Status: DISCONTINUED | OUTPATIENT
Start: 2024-10-14 | End: 2024-10-24

## 2024-10-14 RX ORDER — AMLODIPINE BESYLATE 10 MG
5 TABLET ORAL ONCE
Refills: 0 | Status: DISCONTINUED | OUTPATIENT
Start: 2024-10-14 | End: 2024-10-19

## 2024-10-14 RX ORDER — AMLODIPINE BESYLATE 10 MG
10 TABLET ORAL DAILY
Refills: 0 | Status: DISCONTINUED | OUTPATIENT
Start: 2024-10-15 | End: 2024-10-24

## 2024-10-14 RX ADMIN — Medication 400 MILLIGRAM(S): at 08:50

## 2024-10-14 RX ADMIN — Medication 1000 MILLIGRAM(S): at 10:43

## 2024-10-14 RX ADMIN — HEPARIN SODIUM 8 UNIT(S)/HR: 10000 INJECTION INTRAVENOUS; SUBCUTANEOUS at 15:18

## 2024-10-14 RX ADMIN — SODIUM CHLORIDE 500 MILLILITER(S): 9 INJECTION, SOLUTION INTRAMUSCULAR; INTRAVENOUS; SUBCUTANEOUS at 17:47

## 2024-10-14 RX ADMIN — CARVEDILOL 3.12 MILLIGRAM(S): 25 TABLET, FILM COATED ORAL at 13:09

## 2024-10-14 NOTE — ED PROVIDER NOTE - ATTENDING CONTRIBUTION TO CARE
53-year-old male with metastatic lung cancer, prior DVT and PE status post IVC filter and anticardiolipin antibody on Coumadin who presents to the emergency department with admission for kidney biopsy.  Patient with worsening creatinine has history of lung cancer just finished chemotherapy treatment in August 2024 for his symptoms.  Patient is currently on oral chemo.  He has no abdominal pain no chest pain reports chronic nausea denies headache, vision changes, arm or leg weakness or numbness on exam patient is awake alert oriented x 3 has clear lungs to auscultation bilaterally has soft abdomen has been bilateral rash on arms and legs consistent with lupus per patient.  Patient will labs and admission with transition to heparin.

## 2024-10-14 NOTE — H&P ADULT - PROBLEM SELECTOR PLAN 5
Hematology consult called on when to star Hep gtt and how to monitor given elevated PTT prior to starting Hep see above

## 2024-10-14 NOTE — CONSULT NOTE ADULT - ATTENDING COMMENTS
Pt with advanced NSCLC with progression on single agent Tagrisso and had been on combination of Tagrisso and carboplatin/ Alimta with last treatment few months ago. Stopped due to low blood counts (Grade 4 thrombocytopenia). He has been having also lupus type rash during the chemotherapy over the extremities and having uncontrolled HTN. Has history of APLS and on AC but currently on heparin gtt in preparation for renal biopsy. On exam, macular rash over the extremities, lungs clear, HR RRR, abdomen NT. Counts have recovered with plts around 70 K/microl after chemotherapy. If any bleeding after biopsy: can give platelets x1. Monitor H/H: bone marrow recovery may also be affected by lupus.

## 2024-10-14 NOTE — CONSULT NOTE ADULT - PROBLEM SELECTOR RECOMMENDATION 9
Pt. with JORDAN possibly secondary to underlying GN. On review of Sonia TINSLEY, Scr 1.26 on 7/23/24. Scr 2.99 on admission labs (10/14/24). UA with mild proteinuria but no hematuria (10/14/24).    Pt. likely has a systemic process that is leading to a lupus like disease. Please obtain an IR consult for renal biopsy. Obtain coagulation studies prior. Pt. however, still on coumadin and may need bridging. Given complex medical history, would also recommend heme/onc evaluation for input on AC. Please control blood pressure appropriately. Documented /71. Consider beginning coreg 6.25mg BID and adjust amlodipine to once a day. 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 Please also obtain renal duplex US.     If you have any questions, please feel free to contact me  Eulalio Anderson  Nephrology Fellow  Page [phone] / [employer] Teams (Preferred)  (After 4pm or on weekends please page the on-call fellow) Pt. 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 On review of Sonia TINSLEY, Scr 1.26 on 7/23/24, prior to that in early July it was normal at 0.9 mg/dl. Since late July he has had progressive worsening  Scr 2.99 on admission labs (10/14/24). UA with mild proteinuria ( 0.5 gms)  but no hematuria (10/14/24).    Pt. likely has a systemic process ? Lupus involvement of the kidney ?Immune complex GN  He has a remote H/O sarcoidosis for which he never required any treatment as well as Lupus ( at the time of APLS  diagnosis several years ago ) which was only treated with hydroxychloroquine and never had any organ involvement. Recently noted to have rash and JORDAN since he started chemo in addition to Tagrisso     Please obtain an IR consult for renal biopsy. Obtain coagulation studies. Pt. will likely need bridging with heparin. Given complex medical history, would also recommend heme/onc evaluation for input regarding anticoagulation. Please control blood pressure appropriately. Documented /71. Consider beginning coreg 6.25mg BID and adjust amlodipine to once a day.  Please also obtain renal duplex US to r/o RVT ( given H/O APLS)     If you have any questions, please feel free to contact me  Eulalio Anderson  Nephrology Fellow  Page [phone] / [employer] Teams (Preferred)  (After 4pm or on weekends please page the on-call fellow)

## 2024-10-14 NOTE — CONSULT NOTE ADULT - ATTENDING COMMENTS
Unclear if worsening rash and renal fxn are related but given hx of APLS, ?SLE and steroid responsive rash with CTD features on skin bx put rheum processes high in ddx. W/u as above for now, agree with renal bx. ?utility for skin biopsy but renal may provide etiology so would hold off for now.

## 2024-10-14 NOTE — CONSULT NOTE ADULT - SUBJECTIVE AND OBJECTIVE BOX
HPI:    53 M h/o DVT/PE/IVC filter, sarcoid,  skin SLE, APLS on Coumadin, NSCLC c brain met (s/p cyberknife) last chemo 8/2024 sent in for kidney biopsy. Stopped Coumadin 2 days ago. Daily nausea c one episode of vomiting (yellow-brown) daily x 3-4 weeks. +BMs. Altered taste since chemo 8/2024. +good urine output.       PAST MEDICAL & SURGICAL HISTORY:  Antiphospholipid syndrome      Pulmonary embolism      Lung cancer      Sarcoidosis      Thalassemia minor      Calculus of gallbladder without cholecystitis without obstruction      S/P IVC filter      S/P shoulder surgery          Allergies    No Known Drug Allergies  Animal dander-Cat (Other)    Intolerances        MEDICATIONS  (STANDING):  amLODIPine   Tablet 5 milliGRAM(s) Oral once  carvedilol 3.125 milliGRAM(s) Oral every 12 hours  heparin  Infusion 800 Unit(s)/Hr (8 mL/Hr) IV Continuous <Continuous>  sodium chloride 0.9%. 1000 milliLiter(s) (75 mL/Hr) IV Continuous <Continuous>    MEDICATIONS  (PRN):      FAMILY HISTORY:  FH: HTN (hypertension) (Father)    FH: thyroid disease (Mother, Sibling)        SOCIAL HISTORY: No EtOH, no tobacco    REVIEW OF SYSTEMS:    CONSTITUTIONAL: No weakness, fevers or chills  EYES/ENT: No visual changes   NECK: No pain or stiffness  RESPIRATORY: No cough, wheezing, shortness of breath  CARDIOVASCULAR: No chest pain or palpitations  GASTROINTESTINAL: No abdominal or epigastric pain. + nausea, vomiting, or hematemesis   GENITOURINARY: No dysuria, frequency or hematuria  NEUROLOGICAL: No numbness or weakness  SKIN: No itching, burning, + rashes, or lesions   All other review of systems is negative unless indicated above.    Height (cm): 175.3 (10-14 @ 08:06)  Weight (kg): 80.3 (10-14 @ 10:35)  BMI (kg/m2): 26.1 (10-14 @ 10:35)  BSA (m2): 1.96 (10-14 @ 10:35)    T(F): 98.4 (10-14-24 @ 14:53), Max: 98.6 (10-14-24 @ 11:05)  HR: 78 (10-14-24 @ 14:53)  BP: 139/85 (10-14-24 @ 14:53)  RR: 18 (10-14-24 @ 14:53)  SpO2: 98% (10-14-24 @ 14:53)  Wt(kg): --    GENERAL: NAD   HEAD:  Atraumatic, Normocephalic  EYES: EOMI, PERRLA, conjunctiva and sclera clear  NECK: Supple, No JVD  CHEST/LUNG: Clear to auscultation bilaterally   HEART: Regular rate and rhythm   ABDOMEN: Soft, Nontender, Nondistended   EXTREMITIES:  2+ Peripheral pulses, No clubbing, cyanosis, or edema  NEUROLOGY: A&Ox3, non-focal  SKIN: Rash on bilateral upper and lower extremities                           7.7    4.86  )-----------( 70       ( 14 Oct 2024 08:57 )             23.0       10-14    137  |  102  |  37[H]  ----------------------------<  109[H]  3.7   |  20[L]  |  2.99[H]    Ca    9.9      14 Oct 2024 08:57  Mg     1.8     10-14    TPro  8.4[H]  /  Alb  4.6  /  TBili  0.5  /  DBili  x   /  AST  45[H]  /  ALT  48[H]  /  AlkPhos  60  10-14      Magnesium: 1.8 mg/dL (10-14 @ 08:57)      PT/INR - ( 14 Oct 2024 08:57 )   PT: 23.5 sec;   INR: 2.08 ratio         PTT - ( 14 Oct 2024 08:57 )  PTT:122.9 sec    Clean Catch Clean Catch (Midstream)  08-25 @ 13:42   No growth  --  --      .Blood Blood-Peripheral  08-24 @ 11:50   No growth at 5 days  --  --      .Blood Blood-Peripheral  08-24 @ 11:45   No growth at 5 days  --  --       HPI:    53 M with thalassemia minor, sarcoidosis, APLS on Coumadin, metastatic NSCLC (last chemo 8/2024) presenting with worsening rash in his bilateral upper and lower extremities. He states that he had a biopsy of these lesions done that was consistent with lupus dermatitis. He has a history of metastatic NSCLC (diagnosed in 2021, follows with Dr. Hart) with EGFR L858F mutation, along with multiple brain metastases. He was started on Tagrisso, with subsequent scans showing stable disease/partial response. He underwent gamma knife radiation for progression of a brain metastasis in 10/2023. He had a repeat right supraclavicular LN biopsy in 4/2024 that was concerning for Tagrisso resistance so Carbo/Alimta was added to Tagrisso. Course was complicated by pancytopenia, requiring multipe transfusions and dermatitis, that was consistent with lupus dermitis. Over the past 2 weeks, he reports ongoing nausea, worsening dermatitis, HTN, and worsening renal function concerning for lupus nephritis.       PAST MEDICAL & SURGICAL HISTORY:  Antiphospholipid syndrome      Pulmonary embolism      Lung cancer      Sarcoidosis      Thalassemia minor      Calculus of gallbladder without cholecystitis without obstruction      S/P IVC filter      S/P shoulder surgery          Allergies    No Known Drug Allergies  Animal dander-Cat (Other)    Intolerances        MEDICATIONS  (STANDING):  amLODIPine   Tablet 5 milliGRAM(s) Oral once  carvedilol 3.125 milliGRAM(s) Oral every 12 hours  heparin  Infusion 800 Unit(s)/Hr (8 mL/Hr) IV Continuous <Continuous>  sodium chloride 0.9%. 1000 milliLiter(s) (75 mL/Hr) IV Continuous <Continuous>    MEDICATIONS  (PRN):      FAMILY HISTORY:  FH: HTN (hypertension) (Father)    FH: thyroid disease (Mother, Sibling)        SOCIAL HISTORY: No EtOH, no tobacco    REVIEW OF SYSTEMS:    CONSTITUTIONAL: No weakness, fevers or chills  EYES/ENT: No visual changes   NECK: No pain or stiffness  RESPIRATORY: No cough, wheezing, shortness of breath  CARDIOVASCULAR: No chest pain or palpitations  GASTROINTESTINAL: No abdominal or epigastric pain. + nausea, vomiting, or hematemesis   GENITOURINARY: No dysuria, frequency or hematuria  NEUROLOGICAL: No numbness or weakness  SKIN: No itching, burning, + rashes, or lesions   All other review of systems is negative unless indicated above.    Height (cm): 175.3 (10-14 @ 08:06)  Weight (kg): 80.3 (10-14 @ 10:35)  BMI (kg/m2): 26.1 (10-14 @ 10:35)  BSA (m2): 1.96 (10-14 @ 10:35)    T(F): 98.4 (10-14-24 @ 14:53), Max: 98.6 (10-14-24 @ 11:05)  HR: 78 (10-14-24 @ 14:53)  BP: 139/85 (10-14-24 @ 14:53)  RR: 18 (10-14-24 @ 14:53)  SpO2: 98% (10-14-24 @ 14:53)  Wt(kg): --    GENERAL: NAD   HEAD:  Atraumatic, Normocephalic  EYES: EOMI, PERRLA, conjunctiva and sclera clear  NECK: Supple, No JVD  CHEST/LUNG: Clear to auscultation bilaterally   HEART: Regular rate and rhythm   ABDOMEN: Soft, Nontender, Nondistended   EXTREMITIES:  2+ Peripheral pulses, No clubbing, cyanosis, or edema  NEUROLOGY: A&Ox3, non-focal  SKIN: Rash on bilateral upper and lower extremities                           7.7    4.86  )-----------( 70       ( 14 Oct 2024 08:57 )             23.0       10-14    137  |  102  |  37[H]  ----------------------------<  109[H]  3.7   |  20[L]  |  2.99[H]    Ca    9.9      14 Oct 2024 08:57  Mg     1.8     10-14    TPro  8.4[H]  /  Alb  4.6  /  TBili  0.5  /  DBili  x   /  AST  45[H]  /  ALT  48[H]  /  AlkPhos  60  10-14      Magnesium: 1.8 mg/dL (10-14 @ 08:57)      PT/INR - ( 14 Oct 2024 08:57 )   PT: 23.5 sec;   INR: 2.08 ratio         PTT - ( 14 Oct 2024 08:57 )  PTT:122.9 sec    Clean Catch Clean Catch (Midstream)  08-25 @ 13:42   No growth  --  --      .Blood Blood-Peripheral  08-24 @ 11:50   No growth at 5 days  --  --      .Blood Blood-Peripheral  08-24 @ 11:45   No growth at 5 days  --  --

## 2024-10-14 NOTE — ED PROVIDER NOTE - PHYSICAL EXAMINATION
Constitutional: Well-appearing, well-nourished, comfortable appearing.   Head: Normocephalic, atraumatic.   Eyes: PERRL. EOMI. No conjunctival pallor.   ENT: Dry mucous membranes. Uvula midline. No pharyngeal erythema or exudates.  Neck: No LAD. Supple.  CVS: Tachycardic rate, regular rhythm. Normal S1, S2. No murmurs, rubs, or gallops. No peripheral edema noted.   Respiratory: No respiratory distress. Clear to auscultation bilaterally. No wheezing, rales, or rhonchi.   Abdomen: Abd is soft and non-distended. No tenderness. No rebound, guarding, or rigidity.   MSK: No CVA tenderness bilaterally.   Neuro: Alert and oriented to person, place, and time. Follows commands. Moves all extremities.   Skin: Warm and dry.   Psych: Normal affect, cooperative.

## 2024-10-14 NOTE — CONSULT NOTE ADULT - SUBJECTIVE AND OBJECTIVE BOX
[facility] DIVISION OF KIDNEY DISEASES AND HYPERTENSION -- [phone]  -- INITIAL CONSULT NOTE  --------------------------------------------------------------------------------  HPI: Pt. is a 53 y.o. M w/ PMHx HTN, anticardiolipin antibody, lupus antibody, DVT/PE currently on Coumadin and status post IVC filter, sarcoidosis, NSCLC (adeno) on oral chemo (last chemoinfusion 2 months ago) p/w elevated creatinine. Nephrology consulted for JORDAN on CKD.     Pt. seen and examined in the ED earlier today. Pt. states that he saw saw Dr. Fleming in the office on 10/11. Pt. received two cycles of chemo and developed a rash and JORDAN. Pt. had a skin biopsy done which was consistent with connective tissue disease (lupus). Pt. underwent imaging at [facility] 2 weeks ago and renal US was negative. Pt. also has serologic w/u done there which revealed JENN+, +DsDNA, and JORDAN without proteinuria and hematuria. IgG, IgG4, C3, fibrinogen, IgA, and IgM were negative. Pt. was also noted to have cytopenia likely secondary to chemotherapy. In the last two months, pt. also developed HTN resulting in being started on amlodipine. Currently no fever, chills, CP, SOB, or LE swelling. +LE rash, L>R.     PAST HISTORY  --------------------------------------------------------------------------------  PAST MEDICAL & SURGICAL HISTORY:  Antiphospholipid syndrome    Pulmonary embolism    Lung cancer    Sarcoidosis    Thalassemia minor    Calculus of gallbladder without cholecystitis without obstruction    S/P IVC filter    S/P shoulder surgery    FAMILY HISTORY:  FH: HTN (hypertension) (Father)    FH: thyroid disease (Mother, Sibling)    PAST SOCIAL HISTORY:    ALLERGIES & MEDICATIONS  --------------------------------------------------------------------------------  Allergies    No Known Drug Allergies  Animal dander-Cat (Other)    Intolerances    Standing Inpatient Medications    PRN Inpatient Medications    REVIEW OF SYSTEMS  --------------------------------------------------------------------------------  Gen: No fevers/chills  Skin: + b/l LE rash, L > R  Head/Eyes/Ears: No headache  Respiratory: No dyspnea  CV: No chest pain  GI: No abdominal pain  : No dysuria  MSK: No edema  Heme: No easy bruising or bleeding    All other systems were reviewed and are negative, except as noted.    VITALS/PHYSICAL EXAM  --------------------------------------------------------------------------------  T(C): 37 (10-14-24 @ 11:05), Max: 37 (10-14-24 @ 11:05)  HR: 82 (10-14-24 @ 11:05) (82 - 106)  BP: 153/71 (10-14-24 @ 11:05) (136/82 - 199/91)  RR: 18 (10-14-24 @ 11:05) (14 - 18)  SpO2: 98% (10-14-24 @ 11:05) (97% - 98%)  Wt(kg): --  Height (cm): 175.3 (10-14-24 @ 08:06)  Weight (kg): 80.3 (10-14-24 @ 10:35)  BMI (kg/m2): 26.1 (10-14-24 @ 10:35)  BSA (m2): 1.96 (10-14-24 @ 10:35)    Physical Exam:  Gen: NAD  HEENT: MMM  Pulm: CTA B/L  CV: S1S2  Abd: Soft, +BS   Ext: + LE rash B/L, L>R  Neuro: Awake  Skin: Warm and dry  Vascular access: peripheral IV    LABS/STUDIES  --------------------------------------------------------------------------------              7.7    4.86  >-----------<  70       [10-14-24 @ 08:57]              23.0     137  |  102  |  37  ----------------------------<  109      [10-14-24 @ 08:57]  3.7   |  20  |  2.99        Ca     9.9     [10-14-24 @ 08:57]      Mg     1.8     [10-14-24 @ 08:57]    TPro  8.4  /  Alb  4.6  /  TBili  0.5  /  DBili  x   /  AST  45  /  ALT  48  /  AlkPhos  60  [10-14-24 @ 08:57]    PT/INR: PT 23.5 , INR 2.08       [10-14-24 @ 08:57]  PTT: 122.9      [10-14-24 @ 08:57]    Creatinine Trend:  SCr 2.99 [10-14 @ 08:57]    Iron 46, TIBC 314, %sat 15      [09-14-24 @ 08:47]  Ferritin 2763      [09-14-24 @ 08:47]  TSH 1.13      [09-14-24 @ 08:47]    HBsAb Nonreact      [06-11-24 @ 15:27]  HBsAg Nonreact      [06-11-24 @ 15:27]  HBcAb Nonreact      [06-11-24 @ 15:27]  HCV 0.05, Nonreact      [06-11-24 @ 15:27] [facility] DIVISION OF KIDNEY DISEASES AND HYPERTENSION -- [phone]  -- INITIAL CONSULT NOTE  --------------------------------------------------------------------------------  HPI: Pt. is a 53 y.o. M w/ PMHx HTN, anticardiolipin antibody, lupus antibody, DVT/PE currently on Coumadin and status post IVC filter, sarcoidosis, NSCLC (adeno) on oral chemo (last chemoinfusion 2 months ago) p/w elevated creatinine. Nephrology consulted for JORDAN     Pt. seen and examined in the ED earlier today. Pt. states that he saw saw Dr. Fleming in the office on 10/11.  H/O Lung cancer diagnosed in 2021 and as been on Tagrisso since then. Recent scans with e/o metastatic disease and he was started on carboplatin and premetrexed in July Pt. received only  two cycles of chemo so far  and developed a rash and JORDAN. Pt. had a skin biopsy done which was consistent with connective tissue disease (lupus). Pt. underwent imaging at [facility] 2 weeks ago and renal US was negative. Pt. also has serologic w/u done there which revealed JENN+, +DsDNA, and JORDAN without proteinuria and hematuria. IgG, IgG4, C3, fibrinogen, IgA, and IgM were negative. Pt. was also noted to have cytopenia likely secondary to chemotherapy. In the last two months, pt. also developed HTN resulting in being started on amlodipine. Currently no fever, chills, CP, SOB, or LE swelling. +LE rash, L>R.     PAST HISTORY  --------------------------------------------------------------------------------  PAST MEDICAL & SURGICAL HISTORY:  Antiphospholipid syndrome    Pulmonary embolism    Lung cancer    Sarcoidosis    Thalassemia minor    Calculus of gallbladder without cholecystitis without obstruction    S/P IVC filter    S/P shoulder surgery    FAMILY HISTORY:  FH: HTN (hypertension) (Father)    FH: thyroid disease (Mother, Sibling)    PAST SOCIAL HISTORY:    ALLERGIES & MEDICATIONS  --------------------------------------------------------------------------------  Allergies    No Known Drug Allergies  Animal dander-Cat (Other)    Intolerances    Standing Inpatient Medications    PRN Inpatient Medications    REVIEW OF SYSTEMS  --------------------------------------------------------------------------------  Gen: No fevers/chills  Skin: + b/l LE rash, L > R  Head/Eyes/Ears: No headache  Respiratory: No dyspnea  CV: No chest pain  GI: No abdominal pain  : No dysuria  MSK: No edema  Heme: No easy bruising or bleeding    All other systems were reviewed and are negative, except as noted.    VITALS/PHYSICAL EXAM  --------------------------------------------------------------------------------  T(C): 37 (10-14-24 @ 11:05), Max: 37 (10-14-24 @ 11:05)  HR: 82 (10-14-24 @ 11:05) (82 - 106)  BP: 153/71 (10-14-24 @ 11:05) (136/82 - 199/91)  RR: 18 (10-14-24 @ 11:05) (14 - 18)  SpO2: 98% (10-14-24 @ 11:05) (97% - 98%)  Wt(kg): --  Height (cm): 175.3 (10-14-24 @ 08:06)  Weight (kg): 80.3 (10-14-24 @ 10:35)  BMI (kg/m2): 26.1 (10-14-24 @ 10:35)  BSA (m2): 1.96 (10-14-24 @ 10:35)    Physical Exam:  Gen: NAD  HEENT: MMM  Pulm: CTA B/L  CV: S1S2  Abd: Soft, +BS   Ext: + LE rash B/L, L>R  Neuro: Awake  Skin: Warm and dry  Vascular access: peripheral IV    LABS/STUDIES  --------------------------------------------------------------------------------              7.7    4.86  >-----------<  70       [10-14-24 @ 08:57]              23.0     137  |  102  |  37  ----------------------------<  109      [10-14-24 @ 08:57]  3.7   |  20  |  2.99        Ca     9.9     [10-14-24 @ 08:57]      Mg     1.8     [10-14-24 @ 08:57]    TPro  8.4  /  Alb  4.6  /  TBili  0.5  /  DBili  x   /  AST  45  /  ALT  48  /  AlkPhos  60  [10-14-24 @ 08:57]    PT/INR: PT 23.5 , INR 2.08       [10-14-24 @ 08:57]  PTT: 122.9      [10-14-24 @ 08:57]    Creatinine Trend:  SCr 2.99 [10-14 @ 08:57]    Iron 46, TIBC 314, %sat 15      [09-14-24 @ 08:47]  Ferritin 2763      [09-14-24 @ 08:47]  TSH 1.13      [09-14-24 @ 08:47]    HBsAb Nonreact      [06-11-24 @ 15:27]  HBsAg Nonreact      [06-11-24 @ 15:27]  HBcAb Nonreact      [06-11-24 @ 15:27]  HCV 0.05, Nonreact      [06-11-24 @ 15:27]

## 2024-10-14 NOTE — ED PROVIDER NOTE - CLINICAL SUMMARY MEDICAL DECISION MAKING FREE TEXT BOX
Fellow MD Michael Cuello: 53-year-old male with past medical history of HTN, anticardiolipin antibody, lupus antibody, DVT/PE currently on Coumadin and status post IVC filter, sarcoidosis, NSCLC on oral chemo (last chemoinfusion 2 months ago) presenting to the ED for admission for kidney biopsy.  As per patient, he states that he has been experiencing worsening kidney function secondary to current chemotherapy regimen.  Patient is due to have a procedure done on Wednesday however is on Coumadin and needs to be bridged to heparin given kidney dysfunction.  Patient also reports worsening hypertension over the past several days.  Patient reports baseline abdominal pain with 1 episode of nonbloody nonbilious vomiting earlier today.  Patient states that this happens daily secondary to his chemo regimen. Pt still able to produce urine.  He denies any fever, chills, diarrhea, melena, chest pain, shortness of breath, cough, dysuria, hematuria, and any additional complaints at this time.  No recent travel or sick contacts.    On arrival to the ED, the patient is well-appearing and in no acute distress.  Patient is ambulating without difficulty.  He is hypertensive, mildly tachycardic, afebrile, and satting 98% on room air.  Abdomen is soft and nontender.  No CVA tenderness bilaterally.  Mucous membranes are dry.  Lungs are clear to auscultation bilaterally.  Plan to obtain labs, coags, IV Ofirmev for pain, and admit.

## 2024-10-14 NOTE — CONSULT NOTE ADULT - ASSESSMENT
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 Rheumatology consulted to evaluate for SLE.    #APS  #JORDAN on CKD  #rash  -Pt diagnosed with APS in 2002 from DVT and PE, s/p IVC filter. Pt did not have DVT/PE after being started on coumadin. Per chart review, Beta2 glycoprotein and anticardiolipin positive in 2016  -Questionable history of SLE per chart review, pt had high titer dsDNA in 2021 (476) and was briefly on plaquenil. Pt denies any clinical symptoms of SLE and did not follow up further with rheumatologist.  -Now presenting with JORDAN on CKD as well as petechial confluent rash on b/l LE. Skin bx outpt showed dermatitis/connective tissue disease that may be lupus, but had negative staining  -Given history of APS, differentials for JORDAN include TMA vs lupus nephritis. Skin bx results may also be seen in dermatomyositis, however pt does not have muscle weakness     Recommendations:  -Please obtain ANCA, MPO, PR3, anticardiolipin ab, anti GBM, G6PD, quant gold, hepatitis panel, CK, aldolase (ordered)  -Agree with renal biopsy and renal doppler  -Will follow    Case discussed with Dr. Shahab Jay MD  Rheumatology Fellow     53M PMH APS (DVT/PE 2002 s/p IVC filter and coumadin), sarcoid dx 2002, NSCLC dx 2021 presenting with JORDAN on CKD and rash. Rheumatology consulted to evaluate for SLE.    #APS  #JORDAN on CKD  #rash  -Pt diagnosed with APS in 2002 from DVT and PE, s/p IVC filter. Pt did not have DVT/PE after being started on coumadin. Per chart review, Beta2 glycoprotein and anticardiolipin positive in 2016  -Questionable history of SLE per chart review, pt had high titer dsDNA in 2021 (476) and was briefly on Plaquenil Pt denies any clinical symptoms of SLE and did not follow up further with rheumatologist.  -Now presenting with JORDAN on CKD as well as petechial confluent rash on b/l LE. Skin bx outpt showed dermatitis/connective tissue disease that may be lupus, but had negative staining  -Given history of APS, differentials for JORDAN include TMA vs lupus nephritis. Skin bx results may also be seen in dermatomyositis, however pt does not have muscle weakness     Recommendations:  -Please obtain ANCA, MPO, PR3, anticardiolipin ab, anti GBM, G6PD, quant gold, hepatitis panel, CK, aldolase (ordered)  -Agree with renal biopsy and renal doppler  -Will follow    Case discussed with Dr. Shahab Jay MD  Rheumatology Fellow

## 2024-10-14 NOTE — CONSULT NOTE ADULT - SUBJECTIVE AND OBJECTIVE BOX
TIFFANY ESCAMILLA  [id number]    HISTORY OF PRESENT ILLNESS:  "53 M h/o DVT/PE/IVC filter, sarcoid,  skin SLE, APLS on Coumadin, NSCLC c brain met (s/p cyberknife) last chemo 2024 sent in for kidney biopsy. Stopped Coumadin 2 days ago. Daily nausea c one episode of vomiting (yellow-brown) daily x 3-4 weeks. +BMs. Altered taste since chemo 2024. +good urine output."    Rheum history:  APS diagnosed  when pt had a DVT then PE. IVC filter placed and started on coumadin. Since being on coumadin, has not had any further DVTs or PEs. Sarcoidosis also diagnosed , by biopsy through mediastinoscopy. No symptoms of sarcoid and pt was not treated for sarcoid. Per chart review, pt had dsDNA elevated to 476 back in . Per notes, pt was started on HCQ. Pt states that he saw Dr. Wilmar Magaña, but was not told he definitively has lupus. Did not have any symptoms of SLE at that time and recalls he only to HCQ for about 2 months.    Pt was diagnosed with NSCLC  with mets to the brain. Has been on Tagrisso, and started on carbo/alimpta this summer. Started to develop rash b/l LE and UE that was itchy, not painful that resolved with steroids ointments. A skin bx of LUE showed "connective tissue disease" that with clinical context could be SLE. Pt completed 4 total cycles. Rash returned last week in the lower legs. Pt also with JORDAN on CKD, seen by Dr. Cowan nephrologist outpt and then sent in for renal bx.      Rheum ROS    Denies fevers/chills//alopecia/oral ulcers/vision/Raynauds/SOB/joint pain/swelling/morning stiffness    Endorses sicca symptoms/rash/nausea      MEDICATIONS  (STANDING):  amLODIPine   Tablet 5 milliGRAM(s) Oral once  carvedilol 3.125 milliGRAM(s) Oral every 12 hours  heparin  Infusion 800 Unit(s)/Hr (8 mL/Hr) IV Continuous <Continuous>  sodium chloride 0.9%. 1000 milliLiter(s) (75 mL/Hr) IV Continuous <Continuous>    MEDICATIONS  (PRN):      Allergies    No Known Drug Allergies  Animal dander-Cat (Other)    Intolerances        PERTINENT MEDICATION HISTORY: none    SOCIAL HISTORY: no smoking   OCCUPATION: did not obtain  TRAVEL HISTORY: none recent    FAMILY HISTORY: no FH of autoimmune diseases  FH: HTN (hypertension) (Father)    FH: thyroid disease (Mother, Sibling)        Vital Signs Last 24 Hrs  T(C): 37 (14 Oct 2024 19:08), Max: 37 (14 Oct 2024 11:05)  T(F): 98.6 (14 Oct 2024 19:08), Max: 98.6 (14 Oct 2024 11:05)  HR: 72 (14 Oct 2024 19:08) (72 - 106)  BP: 158/76 (14 Oct 2024 19:08) (136/82 - 199/91)  BP(mean): --  RR: 18 (14 Oct 2024 19:08) (14 - 18)  SpO2: 99% (14 Oct 2024 19:08) (97% - 99%)    Parameters below as of 14 Oct 2024 19:08  Patient On (Oxygen Delivery Method): room air        ROS as noted above     Physical Exam:  General: No apparent distress  HEENT: EOMI, MMM, no oral ulcers  CVS: +S1/S2, RRR, no murmurs/rubs/gallops  Resp: CTA b/l. No crackles/wheezing  GI: Soft, NT/ND +BS  MSK: no swelling/warmth/erythema of the joints of the UE/LE  Neuro: AAOx3  Skin: +confluent, non blanching petechial rash on b/l LE only affecting calves, not feet or thighs. hyperpigmentation on b/l UE  LABS:                        7.7    4.86  )-----------( 70       ( 14 Oct 2024 08:57 )             23.0     10-14    137  |  102  |  37[H]  ----------------------------<  109[H]  3.7   |  20[L]  |  2.99[H]    Ca    9.9      14 Oct 2024 08:57  Mg     1.8     10-14    TPro  8.4[H]  /  Alb  4.6  /  TBili  0.5  /  DBili  x   /  AST  45[H]  /  ALT  48[H]  /  AlkPhos  60  10-14    PT/INR - ( 14 Oct 2024 08:57 )   PT: 23.5 sec;   INR: 2.08 ratio         PTT - ( 14 Oct 2024 08:57 )  PTT:122.9 sec  Urinalysis Basic - ( 14 Oct 2024 10:04 )    Color: Yellow / Appearance: Clear / S.011 / pH: x  Gluc: x / Ketone: Negative mg/dL  / Bili: Negative / Urobili: 0.2 mg/dL   Blood: x / Protein: 30 mg/dL / Nitrite: Negative   Leuk Esterase: Negative / RBC: 0 /HPF / WBC 1 /HPF   Sq Epi: x / Non Sq Epi: 1 /HPF / Bacteria: Negative /HPF            Rheumatology Work Up    Protein/Creatinine Ratio Calculation: 0.3 Ratio *H* [0.0 - 0.2] (24 @ 08:00)  Creatine Kinase, Serum: 150 U/L [26 - 308] (23 @ 11:17)            RADIOLOGY & ADDITIONAL STUDIES:     TIFFANY ESCAMILLA  [id number]    HISTORY OF PRESENT ILLNESS:  "53 M h/o DVT/PE/IVC filter, sarcoid,  skin SLE, APLS on Coumadin, NSCLC c brain met (s/p cyberknife) last chemo 2024 sent in for kidney biopsy. Stopped Coumadin 2 days ago. Daily nausea c one episode of vomiting (yellow-brown) daily x 3-4 weeks. +BMs. Altered taste since chemo 2024. +good urine output."    Rheum history:  APS diagnosed  when pt had a DVT then PE. IVC filter placed and started on coumadin. Since being on coumadin, has not had any further DVTs or PEs. Sarcoidosis also diagnosed , by biopsy through mediastinoscopy. No symptoms of sarcoid and pt was not treated for sarcoid. Per chart review, pt had dsDNA elevated to 476 back in . Per notes, pt was started on HCQ. Pt states that he saw Dr. Wilmar Magaña, but was not told he definitively has lupus. Did not have any symptoms of SLE at that time and recalls he only to HCQ for about 2 months.    Pt was diagnosed with NSCLC  with mets to the brain. Has been on Tagrisso, and started on carbo/alimpta this summer. Pt completed 4 total cycles. Started to develop rash b/l LE and UE that was itchy, not painful that resolved with steroids ointments. A skin bx of LUE showed "connective tissue disease" that with clinical context could be SLE. Rash returned last week in the lower legs without clear trigger. Pt also with JORDAN on CKD, seen by Dr. Cowan nephrologist outpt and then sent in for renal bx.    Rheum ROS  Denies fevers/chills//alopecia/oral ulcers/vision/Raynauds/SOB/joint pain/swelling/morning stiffness    Endorses sicca symptoms/rash/nausea    MEDICATIONS  (STANDING):  amLODIPine   Tablet 5 milliGRAM(s) Oral once  carvedilol 3.125 milliGRAM(s) Oral every 12 hours  heparin  Infusion 800 Unit(s)/Hr (8 mL/Hr) IV Continuous <Continuous>  sodium chloride 0.9%. 1000 milliLiter(s) (75 mL/Hr) IV Continuous <Continuous>    MEDICATIONS  (PRN):    Allergies    No Known Drug Allergies  Animal dander-Cat (Other)    Intolerances    PERTINENT MEDICATION HISTORY: none    SOCIAL HISTORY: no smoking   OCCUPATION: did not obtain  TRAVEL HISTORY: none recent    FAMILY HISTORY: no FH of autoimmune diseases  FH: HTN (hypertension) (Father)    FH: thyroid disease (Mother, Sibling)    Vital Signs Last 24 Hrs  T(C): 37 (14 Oct 2024 19:08), Max: 37 (14 Oct 2024 11:05)  T(F): 98.6 (14 Oct 2024 19:08), Max: 98.6 (14 Oct 2024 11:05)  HR: 72 (14 Oct 2024 19:08) (72 - 106)  BP: 158/76 (14 Oct 2024 19:08) (136/82 - 199/91)  RR: 18 (14 Oct 2024 19:08) (14 - 18)  SpO2: 99% (14 Oct 2024 19:08) (97% - 99%)    Parameters below as of 14 Oct 2024 19:08  Patient On (Oxygen Delivery Method): room air    ROS as noted above     Physical Exam:  General: No apparent distress  HEENT: EOMI, MMM, no oral ulcers  CVS: +S1/S2, RRR, no murmurs/rubs/gallops  Resp: CTA b/l. No crackles/wheezing  GI: Soft, NT/ND +BS  MSK: no swelling/warmth/erythema of the joints of the UE/LE  Neuro: AAOx3  Skin: +confluent, non blanching petechial rash on b/l LE only affecting calves, not feet or thighs. hyperpigmentation on b/l UE    LABS:                        7.7    4.86  )-----------( 70       ( 14 Oct 2024 08:57 )             23.0     10-14    137  |  102  |  37[H]  ----------------------------<  109[H]  3.7   |  20[L]  |  2.99[H]    Ca    9.9      14 Oct 2024 08:57  Mg     1.8     10-14    TPro  8.4[H]  /  Alb  4.6  /  TBili  0.5  /  DBili  x   /  AST  45[H]  /  ALT  48[H]  /  AlkPhos  60  10-14    PT/INR - ( 14 Oct 2024 08:57 )   PT: 23.5 sec;   INR: 2.08 ratio         PTT - ( 14 Oct 2024 08:57 )  PTT:122.9 sec  Urinalysis Basic - ( 14 Oct 2024 10:04 )    Color: Yellow / Appearance: Clear / S.011 / pH: x  Gluc: x / Ketone: Negative mg/dL  / Bili: Negative / Urobili: 0.2 mg/dL   Blood: x / Protein: 30 mg/dL / Nitrite: Negative   Leuk Esterase: Negative / RBC: 0 /HPF / WBC 1 /HPF   Sq Epi: x / Non Sq Epi: 1 /HPF / Bacteria: Negative /HPF    Rheumatology Work Up    Protein/Creatinine Ratio Calculation: 0.3 Ratio *H* [0.0 - 0.2] (24 @ 08:00)  Creatine Kinase, Serum: 150 U/L [26 - 308] (23 @ 11:17)    RADIOLOGY & ADDITIONAL STUDIES:

## 2024-10-14 NOTE — H&P ADULT - ASSESSMENT
53 M h/o DVT/PE/IVC filter, sarcoid,  skin SLE, APLS on Coumadin, NSCLC last chemo 8/2024 sent in for kidney biopsy. Stopped Coumadin 2 days ago. Daily nausea c one episode of vomiting (yellow-brown) daily x 3-4 weeks. +BMs. Altered taste since chemo 8/2024. +good urine output.

## 2024-10-14 NOTE — ED ADULT NURSE NOTE - NSFALLUNIVINTERV_ED_ALL_ED
Bed/Stretcher in lowest position, wheels locked, appropriate side rails in place/Call bell, personal items and telephone in reach/Instruct patient to call for assistance before getting out of bed/chair/stretcher/Non-slip footwear applied when patient is off stretcher/[place] to call system/Physically safe environment - no spills, clutter or unnecessary equipment/Purposeful proactive rounding/Room/bathroom lighting operational, light cord in reach

## 2024-10-14 NOTE — H&P ADULT - PROBLEM SELECTOR PLAN 4
Hematology consult called on when to star Hep gtt and how to monitor given elevated PTT prior to starting Hep Hematology consult called on when to start Hep gtt and how to monitor given elevated PTT prior to starting Hep and they recommend following pharmacist recs based on anti-Xa levels. Case d/w pharmacist Berenice ([phone]) and Hep gtt ordered with anti-Xa levels Hematology consult called on when to start Hep gtt and how to monitor given elevated PTT prior to starting Hep and they recommend following pharmacist recs based on anti-Xa levels. Case d/w pharmacist eBrenice ([phone]) and Hep gtt ordered with anti-Xa levels for possible goal of 0.4-0.6.

## 2024-10-14 NOTE — H&P ADULT - NSHPREVIEWOFSYSTEMS_GEN_ALL_CORE
REVIEW OF SYMPTOMS:  CONSTITUTIONAL: per HPI  EYES: No eye pain, visual disturbances, or discharge  ENMT:  No difficulty hearing, tinnitus, vertigo; No sinus or throat pain  NECK: No pain or stiffness  BREASTS: No pain, masses, or nipple discharge  RESPIRATORY: No cough, wheezing, chills or hemoptysis; No shortness of breath  CARDIOVASCULAR: No chest pain, palpitations, dizziness, or leg swelling  GASTROINTESTINAL: per HPI  GENITOURINARY: No dysuria, frequency, hematuria, or incontinence  NEUROLOGICAL: No headaches, memory loss, loss of strength, numbness, or tremors  SKIN: No itching, burning, rashes, or lesions   LYMPH NODES: No enlarged glands  ENDOCRINE: No heat or cold intolerance; No hair loss  MUSCULOSKELETAL: No joint pain or swelling; No muscle, back, or extremity pain  PSYCHIATRIC: No depression, anxiety, mood swings, or difficulty sleeping  HEME/LYMPH: No easy bruising, or bleeding gums  ALLERGIC AND IMMUNOLOGIC: No hives or eczema

## 2024-10-14 NOTE — CONSULT NOTE ADULT - ASSESSMENT
53 M with thalassemia minor, sarcoidosis, APLS on Coumadin, metastatic NSCLC (last chemo 8/2024) presenting with worsening rash in his bilateral upper and lower extremities. He states that he had a biopsy of these lesions done that was consistent with lupus dermatitis. He has a history of metastatic NSCLC (diagnosed in 2021, follows with Dr. Hart) with EGFR L858F mutation, along with multiple brain metastases. He was started on Tagrisso, with subsequent scans showing stable disease/partial response. He underwent gamma knife radiation for progression of a brain metastasis in 10/2023. He had a repeat right supraclavicular LN biopsy in 4/2024 that was concerning for Tagrisso resistance so Carbo/Alimta was added to Tagrisso. Course was complicated by pancytopenia, requiring multipe transfusions and dermatitis, that was consistent with lupus dermitis. Over the past 2 weeks, he reports ongoing nausea, worsening dermatitis, HTN, and worsening renal function concerning for lupus nephritis.     # Metastatic NSCLC   # Lupus dermatitis   # APLS   # JORDAN with concern for lupus nephritis     Recommendations  - Hold tagrisso  - Please obtain MRI brain w/wo contrast given persistent nausea despite the last chemotherapy being in August 2024. While this could be related to renal issues, will need to rule out intracranial process  - Planned for kidney biopsy. Per hematology, hold warfarin and continue heparin gtt with anti-Xa level target 0.4-0.5      Corona Matos MD MS  Hematology/Oncology Fellow PGY-4  [employer] [facility] [employer] at \[facility]""/[facility] | [facility] | [facility]  Available on Teams  53 M with thalassemia minor, sarcoidosis, APLS on Coumadin, metastatic NSCLC (last chemo 8/2024) presenting with worsening rash in his bilateral upper and lower extremities. He states that he had a biopsy of these lesions done that was consistent with lupus dermatitis. He has a history of metastatic NSCLC (diagnosed in 2021, follows with Dr. Hart) with EGFR L858F mutation, along with multiple brain metastases. He was started on Tagrisso, with subsequent scans showing stable disease/partial response. He underwent gamma knife radiation for progression of a brain metastasis in 10/2023. He had a repeat right supraclavicular LN biopsy in 4/2024 that was concerning for Tagrisso resistance so Carbo/Alimta was added to Tagrisso. Course was complicated by pancytopenia, requiring multiple transfusions and dermatitis, that was consistent with lupus dermitis. Over the past 2 weeks, he reports ongoing nausea, worsening dermatitis, HTN, and worsening renal function concerning for lupus nephritis.     # Metastatic NSCLC   # Lupus dermatitis   # APLS   # JORDAN with concern for lupus nephritis     Recommendations  -ondansetron 8 mg every 8 hours as needed for nausea   - Hold tagrisso  - Please obtain MRI brain w/wo contrast given persistent nausea despite the last chemotherapy being in August 2024. While this could be related to renal issues, will need to rule out intracranial process  - Planned for kidney biopsy. Per hematology, hold warfarin and continue heparin gtt with anti-Xa level target 0.4-0.5      Corona Matos MD MS  Hematology/Oncology Fellow PGY-4  [employer] [facility] [employer] at [facility] | [facility] | [facility]  Available on Teams

## 2024-10-14 NOTE — ED ADULT NURSE NOTE - OBJECTIVE STATEMENT
Pt is a 54 yo M w/ PMHx of lung cancer, PE, DM, and lupus complaining of abnormal kidney function lab levels. Pt states he went to see his doctor for lab work and Friday and was made aware of his abnormal lab results. Pt reports he was referred to ED by MD. Pt also reports he has a renal biopsy on Wednesday. Pt is aox4, airway clear and patent, equal chest rise and fall, pulses intact, skin warm and dry, redness to lower left extremity secondary to lupus, mild tenderness to lower back, ABD non tender and non distended x4 quadrants. Pt denies LOC, SOB, chest pain, D/N/V, or any other pain. Pt placed in locked lowered stretcher w. rails raised.

## 2024-10-14 NOTE — ED ADULT TRIAGE NOTE - CHIEF COMPLAINT QUOTE
sent for admission, scheduledforakidneybiopsy secondarytoelevated kidney fxn, h/oLung  Cancer ,just  finished chemo treatment

## 2024-10-14 NOTE — CONSULT NOTE ADULT - SUBJECTIVE AND OBJECTIVE BOX
Interventional Radiology    Evaluate for Procedure: Renal biopsy    HPI: 53y Male with PMHx HTN, anticardiolipin antibody, lupus antibody, DVT/PE currently on Coumadin and status post IVC filter, sarcoidosis, NSCLC (adeno) on oral chemo (last chemoinfusion 2 months ago) p/w elevated creatinine. Nephrology following for JORDAN on CKD. IR consulted for renal biopsy.     Allergies: No Known Drug Allergies  Animal dander-Cat (Other)    Medications (Abx/Cardiac/Anticoagulation/Blood Products)  carvedilol: 3.125 milliGRAM(s) Oral (10-14 @ 13:09)  heparin  Infusion: 8 mL/Hr IV Continuous (10-14 @ 13:17)    Data:  175.3  80.3  T(C): 36.9  HR: 78  BP: 139/85  RR: 18  SpO2: 98%    -WBC 4.86 / HgB 7.7 / Hct 23.0 / Plt 70  -Na 137 / Cl 102 / BUN 37 / Glucose 109  -K 3.7 / CO2 20 / Cr 2.99  -ALT 48 / Alk Phos 60 / T.Bili 0.5  -INR 2.08 / .9    Radiology:     Assessment/Plan:  53y Male with PMHx HTN, anticardiolipin antibody, lupus antibody, DVT/PE currently on Coumadin and status post IVC filter, sarcoidosis, NSCLC (adeno) on oral chemo (last chemoinfusion 2 months ago) p/w elevated creatinine. Nephrology following for JORDAN on CKD. IR consulted for renal biopsy.     -Will plan for Renal biopsy on Wed 10/16  -Please place IR procedure order under JARED Mckeon  -Keep patient NPO after midnight Tues 10/15  -STAT am labs  -Continue to hold coumadin, will hold hep gtt 4 hrs pre procedure. IR will coordinate day of.   -INR goal <1.5  -Plt goal >50  -BP Goal <150/90  -Maintain active T&S  -Above d/w primary team      Any questions or concerns regarding above please reach out to IR:   -Available on [employer] teams  -During working hours (7a-5p): call [phone]  -Emergent issues after 5pm: page: [phone]  -Non-emergent consults: Please place a [facility] order "IR Consult" with an appropriate callback number  -Scheduling questions: [phone]  -Clinic/Outpatient [phone]

## 2024-10-14 NOTE — H&P ADULT - HISTORY OF PRESENT ILLNESS
53 M h/o DVT/PE/IVC filter, sarcoid,  skin SLE, APLS on Coumadin, NSCLC last chemo 8/2024 sent in for kidney biopsy. Stopped Coumadin 2 days ago. Daily nausea c one episode of vomiting (yellow-brown) daily x 3-4 weeks. +BMs. Altered taste since chemo 8/2024. +good urine output.  53 M h/o DVT/PE/IVC filter, sarcoid,  skin SLE, APLS on Coumadin, NSCLC c brain met (s/p cyberknife) last chemo 8/2024 sent in for kidney biopsy. Stopped Coumadin 2 days ago. Daily nausea c one episode of vomiting (yellow-brown) daily x 3-4 weeks. +BMs. Altered taste since chemo 8/2024. +good urine output.

## 2024-10-15 ENCOUNTER — APPOINTMENT (OUTPATIENT)
Dept: INFUSION THERAPY | Facility: HOSPITAL | Age: 54
End: 2024-10-15

## 2024-10-15 ENCOUNTER — APPOINTMENT (OUTPATIENT)
Dept: HEMATOLOGY ONCOLOGY | Facility: CLINIC | Age: 54
End: 2024-10-15

## 2024-10-15 DIAGNOSIS — C34.90 MALIGNANT NEOPLASM OF UNSPECIFIED PART OF UNSPECIFIED BRONCHUS OR LUNG: ICD-10-CM

## 2024-10-15 LAB
ADD ON TEST-SPECIMEN IN LAB: SIGNIFICANT CHANGE UP
ALBUMIN SERPL ELPH-MCNC: 4.4 G/DL — SIGNIFICANT CHANGE UP (ref 3.3–5)
ALP SERPL-CCNC: 63 U/L — SIGNIFICANT CHANGE UP (ref 40–120)
ALT FLD-CCNC: 66 U/L — HIGH (ref 10–45)
ANION GAP SERPL CALC-SCNC: 16 MMOL/L — SIGNIFICANT CHANGE UP (ref 5–17)
ANION GAP SERPL CALC-SCNC: 16 MMOL/L — SIGNIFICANT CHANGE UP (ref 5–17)
APTT BLD: >200 SEC — CRITICAL HIGH (ref 24.5–35.6)
AST SERPL-CCNC: 59 U/L — HIGH (ref 10–40)
BASOPHILS # BLD AUTO: 0.01 K/UL — SIGNIFICANT CHANGE UP (ref 0–0.2)
BASOPHILS NFR BLD AUTO: 0.2 % — SIGNIFICANT CHANGE UP (ref 0–2)
BILIRUB SERPL-MCNC: 0.5 MG/DL — SIGNIFICANT CHANGE UP (ref 0.2–1.2)
BUN SERPL-MCNC: 32 MG/DL — HIGH (ref 7–23)
BUN SERPL-MCNC: 33 MG/DL — HIGH (ref 7–23)
CALCIUM SERPL-MCNC: 9.8 MG/DL — SIGNIFICANT CHANGE UP (ref 8.4–10.5)
CALCIUM SERPL-MCNC: 9.8 MG/DL — SIGNIFICANT CHANGE UP (ref 8.4–10.5)
CARDIOLIPIN IGM SER-MCNC: 20.1 MPL U/ML — HIGH
CARDIOLIPIN IGM SER-MCNC: >112 GPL U/ML — HIGH
CHLORIDE SERPL-SCNC: 101 MMOL/L — SIGNIFICANT CHANGE UP (ref 96–108)
CHLORIDE SERPL-SCNC: 99 MMOL/L — SIGNIFICANT CHANGE UP (ref 96–108)
CK SERPL-CCNC: 63 U/L — SIGNIFICANT CHANGE UP (ref 30–200)
CO2 SERPL-SCNC: 21 MMOL/L — LOW (ref 22–31)
CO2 SERPL-SCNC: 21 MMOL/L — LOW (ref 22–31)
CREAT SERPL-MCNC: 2.82 MG/DL — HIGH (ref 0.5–1.3)
CREAT SERPL-MCNC: 2.88 MG/DL — HIGH (ref 0.5–1.3)
CRYOGLOB SERPL-MCNC: NEGATIVE
CULTURE RESULTS: NO GROWTH — SIGNIFICANT CHANGE UP
DEPRECATED CARDIOLIPIN IGA SER: >65 APL U/ML — HIGH
EGFR: 26 ML/MIN/1.73M2 — LOW
EGFR: 26 ML/MIN/1.73M2 — LOW
EOSINOPHIL # BLD AUTO: 0.11 K/UL — SIGNIFICANT CHANGE UP (ref 0–0.5)
EOSINOPHIL NFR BLD AUTO: 2.5 % — SIGNIFICANT CHANGE UP (ref 0–6)
GLUCOSE SERPL-MCNC: 102 MG/DL — HIGH (ref 70–99)
GLUCOSE SERPL-MCNC: 121 MG/DL — HIGH (ref 70–99)
HADV DNA SERPL QL NAA+PROBE: NOT DETECTED COPIES/ML
HAV IGM SER-ACNC: SIGNIFICANT CHANGE UP
HBV CORE AB SER-ACNC: SIGNIFICANT CHANGE UP
HBV CORE IGM SER-ACNC: SIGNIFICANT CHANGE UP
HBV SURFACE AG SER-ACNC: SIGNIFICANT CHANGE UP
HCT VFR BLD CALC: 22.9 % — LOW (ref 39–50)
HCT VFR BLD CALC: 23.1 % — LOW (ref 39–50)
HCV AB S/CO SERPL IA: 0.06 S/CO — SIGNIFICANT CHANGE UP (ref 0–0.99)
HCV AB SERPL-IMP: SIGNIFICANT CHANGE UP
HGB BLD-MCNC: 7.5 G/DL — LOW (ref 13–17)
HGB BLD-MCNC: 7.6 G/DL — LOW (ref 13–17)
IMM GRANULOCYTES NFR BLD AUTO: 0.7 % — SIGNIFICANT CHANGE UP (ref 0–0.9)
INR BLD: 1.76 RATIO — HIGH (ref 0.85–1.16)
LACTATE SERPL-SCNC: 1.2 MMOL/L — SIGNIFICANT CHANGE UP (ref 0.5–2)
LYMPHOCYTES # BLD AUTO: 0.61 K/UL — LOW (ref 1–3.3)
LYMPHOCYTES # BLD AUTO: 13.7 % — SIGNIFICANT CHANGE UP (ref 13–44)
MCHC RBC-ENTMCNC: 25.6 PG — LOW (ref 27–34)
MCHC RBC-ENTMCNC: 25.8 PG — LOW (ref 27–34)
MCHC RBC-ENTMCNC: 32.8 GM/DL — SIGNIFICANT CHANGE UP (ref 32–36)
MCHC RBC-ENTMCNC: 32.9 GM/DL — SIGNIFICANT CHANGE UP (ref 32–36)
MCV RBC AUTO: 78.2 FL — LOW (ref 80–100)
MCV RBC AUTO: 78.3 FL — LOW (ref 80–100)
MONOCYTES # BLD AUTO: 0.41 K/UL — SIGNIFICANT CHANGE UP (ref 0–0.9)
MONOCYTES NFR BLD AUTO: 9.2 % — SIGNIFICANT CHANGE UP (ref 2–14)
MYELOPEROXIDASE AB SER-ACNC: <5 UNITS — SIGNIFICANT CHANGE UP
MYELOPEROXIDASE CELLS FLD QL: NEGATIVE — SIGNIFICANT CHANGE UP
NEUTROPHILS # BLD AUTO: 3.27 K/UL — SIGNIFICANT CHANGE UP (ref 1.8–7.4)
NEUTROPHILS NFR BLD AUTO: 73.7 % — SIGNIFICANT CHANGE UP (ref 43–77)
NRBC # BLD: 0 /100 WBCS — SIGNIFICANT CHANGE UP (ref 0–0)
NRBC # BLD: 0 /100 WBCS — SIGNIFICANT CHANGE UP (ref 0–0)
PLATELET # BLD AUTO: 59 K/UL — LOW (ref 150–400)
PLATELET # BLD AUTO: 66 K/UL — LOW (ref 150–400)
POTASSIUM SERPL-MCNC: 3.6 MMOL/L — SIGNIFICANT CHANGE UP (ref 3.5–5.3)
POTASSIUM SERPL-MCNC: 3.8 MMOL/L — SIGNIFICANT CHANGE UP (ref 3.5–5.3)
POTASSIUM SERPL-SCNC: 3.6 MMOL/L — SIGNIFICANT CHANGE UP (ref 3.5–5.3)
POTASSIUM SERPL-SCNC: 3.8 MMOL/L — SIGNIFICANT CHANGE UP (ref 3.5–5.3)
PROT SERPL-MCNC: 8.2 G/DL — SIGNIFICANT CHANGE UP (ref 6–8.3)
PROTEINASE3 AB FLD-ACNC: <5 UNITS — SIGNIFICANT CHANGE UP
PROTEINASE3 AB SER-ACNC: NEGATIVE — SIGNIFICANT CHANGE UP
PROTHROM AB SERPL-ACNC: 20.1 SEC — HIGH (ref 9.9–13.4)
RBC # BLD: 2.93 M/UL — LOW (ref 4.2–5.8)
RBC # BLD: 2.95 M/UL — LOW (ref 4.2–5.8)
RBC # FLD: 18.9 % — HIGH (ref 10.3–14.5)
RBC # FLD: 18.9 % — HIGH (ref 10.3–14.5)
SODIUM SERPL-SCNC: 136 MMOL/L — SIGNIFICANT CHANGE UP (ref 135–145)
SODIUM SERPL-SCNC: 138 MMOL/L — SIGNIFICANT CHANGE UP (ref 135–145)
SPECIMEN SOURCE: SIGNIFICANT CHANGE UP
UFH PPP CHRO-ACNC: 0.12 IU/ML — LOW (ref 0.3–0.7)
UFH PPP CHRO-ACNC: 0.26 IU/ML — LOW (ref 0.3–0.7)
UFH PPP CHRO-ACNC: 0.45 IU/ML — SIGNIFICANT CHANGE UP (ref 0.3–0.7)
UFH PPP CHRO-ACNC: 0.61 IU/ML — SIGNIFICANT CHANGE UP (ref 0.3–0.7)
WBC # BLD: 4.44 K/UL — SIGNIFICANT CHANGE UP (ref 3.8–10.5)
WBC # BLD: 5.65 K/UL — SIGNIFICANT CHANGE UP (ref 3.8–10.5)
WBC # FLD AUTO: 4.44 K/UL — SIGNIFICANT CHANGE UP (ref 3.8–10.5)
WBC # FLD AUTO: 5.65 K/UL — SIGNIFICANT CHANGE UP (ref 3.8–10.5)

## 2024-10-15 PROCEDURE — 99232 SBSQ HOSP IP/OBS MODERATE 35: CPT | Mod: GC

## 2024-10-15 PROCEDURE — 74176 CT ABD & PELVIS W/O CONTRAST: CPT | Mod: 26

## 2024-10-15 PROCEDURE — 99233 SBSQ HOSP IP/OBS HIGH 50: CPT

## 2024-10-15 PROCEDURE — 93975 VASCULAR STUDY: CPT | Mod: 26

## 2024-10-15 RX ORDER — CLONIDINE HYDROCHLORIDE 0.2 MG/1
0.1 TABLET ORAL
Refills: 0 | Status: DISCONTINUED | OUTPATIENT
Start: 2024-10-15 | End: 2024-10-16

## 2024-10-15 RX ORDER — ACETAMINOPHEN 500 MG
1000 TABLET ORAL ONCE
Refills: 0 | Status: COMPLETED | OUTPATIENT
Start: 2024-10-15 | End: 2024-10-15

## 2024-10-15 RX ORDER — TRAMADOL HYDROCHLORIDE 50 MG/1
25 TABLET, COATED ORAL EVERY 6 HOURS
Refills: 0 | Status: DISCONTINUED | OUTPATIENT
Start: 2024-10-15 | End: 2024-10-15

## 2024-10-15 RX ORDER — CARVEDILOL 25 MG/1
3.12 TABLET, FILM COATED ORAL ONCE
Refills: 0 | Status: COMPLETED | OUTPATIENT
Start: 2024-10-15 | End: 2024-10-15

## 2024-10-15 RX ORDER — TRAMADOL HYDROCHLORIDE 50 MG/1
50 TABLET, COATED ORAL EVERY 6 HOURS
Refills: 0 | Status: DISCONTINUED | OUTPATIENT
Start: 2024-10-15 | End: 2024-10-16

## 2024-10-15 RX ORDER — ACETAMINOPHEN 500 MG
650 TABLET ORAL EVERY 6 HOURS
Refills: 0 | Status: DISCONTINUED | OUTPATIENT
Start: 2024-10-15 | End: 2024-10-24

## 2024-10-15 RX ORDER — ACETAMINOPHEN 500 MG
1000 TABLET ORAL ONCE
Refills: 0 | Status: DISCONTINUED | OUTPATIENT
Start: 2024-10-15 | End: 2024-10-15

## 2024-10-15 RX ORDER — HEPARIN SODIUM 10000 [USP'U]/ML
1000 INJECTION INTRAVENOUS; SUBCUTANEOUS
Qty: 25000 | Refills: 0 | Status: DISCONTINUED | OUTPATIENT
Start: 2024-10-15 | End: 2024-10-15

## 2024-10-15 RX ORDER — AMLODIPINE BESYLATE 10 MG
10 TABLET ORAL ONCE
Refills: 0 | Status: COMPLETED | OUTPATIENT
Start: 2024-10-15 | End: 2024-10-15

## 2024-10-15 RX ORDER — ACETAMINOPHEN 500 MG
650 TABLET ORAL ONCE
Refills: 0 | Status: COMPLETED | OUTPATIENT
Start: 2024-10-15 | End: 2024-10-15

## 2024-10-15 RX ORDER — CARVEDILOL 25 MG/1
6.25 TABLET, FILM COATED ORAL EVERY 12 HOURS
Refills: 0 | Status: DISCONTINUED | OUTPATIENT
Start: 2024-10-15 | End: 2024-10-24

## 2024-10-15 RX ORDER — ONDANSETRON HYDROCHLORIDE 2 MG/ML
4 INJECTION, SOLUTION INTRAMUSCULAR; INTRAVENOUS EVERY 8 HOURS
Refills: 0 | Status: DISCONTINUED | OUTPATIENT
Start: 2024-10-15 | End: 2024-10-24

## 2024-10-15 RX ORDER — POLYETHYLENE GLYCOL 3350 17 G/17G
17 POWDER, FOR SOLUTION ORAL DAILY
Refills: 0 | Status: DISCONTINUED | OUTPATIENT
Start: 2024-10-15 | End: 2024-10-24

## 2024-10-15 RX ORDER — HEPARIN SODIUM 10000 [USP'U]/ML
1000 INJECTION INTRAVENOUS; SUBCUTANEOUS
Qty: 25000 | Refills: 0 | Status: DISCONTINUED | OUTPATIENT
Start: 2024-10-15 | End: 2024-10-17

## 2024-10-15 RX ORDER — SENNA 187 MG
2 TABLET ORAL AT BEDTIME
Refills: 0 | Status: DISCONTINUED | OUTPATIENT
Start: 2024-10-15 | End: 2024-10-24

## 2024-10-15 RX ADMIN — CARVEDILOL 3.12 MILLIGRAM(S): 25 TABLET, FILM COATED ORAL at 00:26

## 2024-10-15 RX ADMIN — Medication 650 MILLIGRAM(S): at 00:26

## 2024-10-15 RX ADMIN — HEPARIN SODIUM 10 UNIT(S)/HR: 10000 INJECTION INTRAVENOUS; SUBCUTANEOUS at 07:28

## 2024-10-15 RX ADMIN — TRAMADOL HYDROCHLORIDE 25 MILLIGRAM(S): 50 TABLET, COATED ORAL at 10:49

## 2024-10-15 RX ADMIN — Medication 2 TABLET(S): at 21:05

## 2024-10-15 RX ADMIN — PETROLATUM 1 APPLICATION(S): 987.89 OINTMENT TOPICAL at 06:10

## 2024-10-15 RX ADMIN — Medication 1 APPLICATION(S): at 06:10

## 2024-10-15 RX ADMIN — HEPARIN SODIUM 15 UNIT(S)/HR: 10000 INJECTION INTRAVENOUS; SUBCUTANEOUS at 10:04

## 2024-10-15 RX ADMIN — CLONIDINE HYDROCHLORIDE 0.1 MILLIGRAM(S): 0.2 TABLET ORAL at 18:00

## 2024-10-15 RX ADMIN — TRAMADOL HYDROCHLORIDE 50 MILLIGRAM(S): 50 TABLET, COATED ORAL at 14:50

## 2024-10-15 RX ADMIN — TRAMADOL HYDROCHLORIDE 50 MILLIGRAM(S): 50 TABLET, COATED ORAL at 15:11

## 2024-10-15 RX ADMIN — Medication 650 MILLIGRAM(S): at 06:57

## 2024-10-15 RX ADMIN — HEPARIN SODIUM 12 UNIT(S)/HR: 10000 INJECTION INTRAVENOUS; SUBCUTANEOUS at 19:21

## 2024-10-15 RX ADMIN — Medication 400 MILLIGRAM(S): at 23:55

## 2024-10-15 RX ADMIN — PETROLATUM 1 APPLICATION(S): 987.89 OINTMENT TOPICAL at 00:27

## 2024-10-15 RX ADMIN — TRAMADOL HYDROCHLORIDE 25 MILLIGRAM(S): 50 TABLET, COATED ORAL at 11:55

## 2024-10-15 RX ADMIN — HEPARIN SODIUM 10 UNIT(S)/HR: 10000 INJECTION INTRAVENOUS; SUBCUTANEOUS at 03:01

## 2024-10-15 RX ADMIN — PETROLATUM 1 APPLICATION(S): 987.89 OINTMENT TOPICAL at 21:04

## 2024-10-15 RX ADMIN — CARVEDILOL 3.12 MILLIGRAM(S): 25 TABLET, FILM COATED ORAL at 12:40

## 2024-10-15 RX ADMIN — CARVEDILOL 6.25 MILLIGRAM(S): 25 TABLET, FILM COATED ORAL at 18:00

## 2024-10-15 RX ADMIN — Medication 400 MILLIGRAM(S): at 18:05

## 2024-10-15 RX ADMIN — Medication 10 MILLIGRAM(S): at 18:01

## 2024-10-15 RX ADMIN — Medication 650 MILLIGRAM(S): at 08:11

## 2024-10-15 NOTE — PROGRESS NOTE ADULT - ASSESSMENT
Pt. with JORDAN. 53 y.o. 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  H/O Lung cancer diagnosed in 2021 and as been on Tagrisso since then. Recent scans with e/o metastatic disease and he was started on carboplatin and premetrexed in July Pt. received only  two cycles of chemo so far  and developed a rash and JORDAN. Pt. had a skin biopsy done which was consistent with connective tissue disease (lupus)

## 2024-10-15 NOTE — PROGRESS NOTE ADULT - PROBLEM SELECTOR PLAN 1
Pt. with JORDAN spanning over the last few months possibly secondary to underlying GN given positive serologies ( +JENN, +DsDNA, +APLS/Sjogren's and also a positive skin biopsy c/w Lupus erythrematosis with negative IF) Negative Rh factor/RNP. On review of Sonia TINSLEY, Scr 1.26 on 7/23/24, prior to that in early July it was normal at 0.9 mg/dl. Since late July he has had progressive worsening  Scr 2.99 on admission labs (10/14/24). UA with mild proteinuria ( 0.5 gms)  but no hematuria (10/14/24).    Pt. likely has a systemic process ? 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 Recently noted to have rash and JORDAN since he started chemo in addition to Tagrisso    Scr mildly improved/stable at 2.88 today. Plan for renal biopsy 10/16. Please ensure tight BP control. Coagulation studies as per IR. Heme/onc and rheum. Documented BP still elevated. Please increase coreg to 6.25mg BID. Duplex US negative. Continue to monitor urine output and labs. Dose medications as per eGFR.      If you have any questions, please feel free to contact me  Eulalio Anderson  Nephrology Fellow  Page [phone] / [employer] Teams (Preferred)  (After 4pm or on weekends please page the on-call fellow). Pt. with JORDAN spanning over the last few months possibly secondary to underlying GN given positive serologies ( +JENN, +DsDNA, +APLS/Sjogren's and also a positive skin biopsy c/w Lupus erythrematosis with negative IF) Negative Rh factor/RNP. On review of Sonia TINSLEY, Scr 1.26 on 7/23/24, prior to that in early July it was normal at 0.9 mg/dl. Since late July he has had progressive worsening  Scr 2.99 on admission labs (10/14/24). UA with mild proteinuria ( 0.5 gms)  but no hematuria (10/14/24).    Pt. likely has a systemic process ? 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 Recently noted to have rash and JORDAN since he started chemo in addition to Tagrisso    Scr mildly improved/stable at 2.88 today. Renal duplex negative for thrombosis. Plan for renal biopsy 10/16. Please ensure tight BP control. Coagulation studies as per IR. Heme/onc and rheum. Documented BP still elevated. Please increase coreg to 6.25mg BID. Continue to monitor urine output and labs. Dose medications as per eGFR.      If you have any questions, please feel free to contact me  Eulalio Anderson  Nephrology Fellow  Page [phone] / [employer] Teams (Preferred)  (After 4pm or on weekends please page the on-call fellow). Pt. with JORDAN spanning over the last few months possibly secondary to underlying GN given positive serologies ( +JENN, +DsDNA, +APLS/Sjogren's and also a positive skin biopsy c/w Lupus erythrematosis with negative IF) Negative Rh factor/RNP. On review of Sonia TINSLEY, Scr 1.26 on 7/23/24, prior to that in early July it was normal at 0.9 mg/dl. Since late July he has had progressive worsening  Scr 2.99 on admission labs (10/14/24). UA with mild proteinuria ( 0.5 gms)  but no hematuria (10/14/24).    Pt. likely has a systemic process ? 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 Recently noted to have rash and JORDAN since he started chemo in addition to Tagrisso    Scr mildly improved/stable at 2.88 today. Renal duplex negative for thrombosis. Plan for renal biopsy 10/16. Please ensure tight BP control. Coagulation studies as per IR. Heme/onc and rheum. Documented BP still elevated. Please increase coreg to 6.25mg BID. Can also start clonidine 0.1 mg BID . Continue to monitor urine output and labs. Dose medications as per eGFR.      If you have any questions, please feel free to contact me  Eulalio Anderson  Nephrology Fellow  Page [phone] / [employer] Teams (Preferred)  (After 4pm or on weekends please page the on-call fellow). Pt. with JORDAN spanning over the last few months possibly secondary to underlying GN given positive serologies ( +JENN, +DsDNA, +APLS/Sjogren's and also a positive skin biopsy c/w Lupus erythrematosis with negative IF) Negative Rh factor/RNP. On review of Sonia TINSLEY, Scr 1.26 on 7/23/24, prior to that in early July it was normal at 0.9 mg/dl. Since late July he has had progressive worsening  Scr 2.99 on admission labs (10/14/24). UA with mild proteinuria ( 0.5 gms)  but no hematuria (10/14/24).    Pt. likely has a systemic process ? 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 Recently noted to have rash and JORDAN since he started chemo in addition to Tagrisso    Scr mildly improved/stable at 2.88 today. Renal duplex negative for thrombosis. Plan for renal biopsy 10/16. Please ensure tight BP control and also needs heme clearance. Coagulation studies as per IR. Heme/onc and rheum. Documented BP still elevated. Please increase coreg to 6.25mg BID. Can also start clonidine 0.1 mg BID . Continue to monitor urine output and labs. Dose medications as per eGFR.      If you have any questions, please feel free to contact me  Eulalio Anderson  Nephrology Fellow  Page [phone] / [employer] Teams (Preferred)  (After 4pm or on weekends please page the on-call fellow).

## 2024-10-15 NOTE — PROGRESS NOTE ADULT - PROBLEM SELECTOR PLAN 2
- MRI brain with no new or enlarging metastases ; stable right temporal lobe , left Meckel's cave and right occipital lesions  - also c/o abd pain ; f/up CT Abd/pelvis - MRI brain with no new or enlarging metastases ; stable right temporal lobe , left Meckel's cave and right occipital lesions  - also c/o abd pain ; f/up CT Abd/pelvis  - start Tramadol 50mg po q6 prn

## 2024-10-15 NOTE — PROGRESS NOTE ADULT - PROBLEM SELECTOR PLAN 4
- c/w Heparin gtt   - given elevated PTT prior to starting Heparin titration is based on  anti-Xa levels per pharmacist recs   - per pharmacist  ([phone]) ;  titrate dosing to anti-Xa levels  goal of 0.4-0.6.

## 2024-10-15 NOTE — PROGRESS NOTE ADULT - PROBLEM SELECTOR PLAN 5
- Pt with advanced NSCLC with progression on single agent Tagrisso   - had been on combination of Tagrisso and carboplatin/ Alimta with last treatment few months ago  - Stopped due to low blood counts (Grade 4 thrombocytopenia).   - per Heme -Onc ; If any bleeding after biopsy: can give platelets x1  - Monitor H/H

## 2024-10-15 NOTE — PROGRESS NOTE ADULT - SUBJECTIVE AND OBJECTIVE BOX
[facility] Division of Kidney Diseases & Hypertension  FOLLOW UP NOTE  [phone]--------------------------------------------------------------------------------  Chief Complaint: Acute renal failure    24 hour events/subjective: Pt. seen and examined, just returned from [place]. States he had some abdominal pain and back pain on the left side overnight. No fever, chills, CP, SOB, or LE swelling. +Rash on LEs.     PAST HISTORY  --------------------------------------------------------------------------------  No significant changes to PMH, PSH, FHx, SHx, unless otherwise noted    ALLERGIES & MEDICATIONS  --------------------------------------------------------------------------------  Allergies    No Known Drug Allergies  Animal dander-Cat (Other)    Intolerances    Standing Inpatient Medications  amLODIPine   Tablet 5 milliGRAM(s) Oral once  amLODIPine   Tablet 10 milliGRAM(s) Oral daily  AQUAPHOR (petrolatum Ointment) 1 Application(s) Topical three times a day  carvedilol 3.125 milliGRAM(s) Oral every 12 hours  heparin  Infusion 1000 Unit(s)/Hr IV Continuous <Continuous>  senna 2 Tablet(s) Oral at bedtime  sodium chloride 0.9%. 1000 milliLiter(s) IV Continuous <Continuous>  triamcinolone 0.1% Cream 1 Application(s) Topical two times a day    PRN Inpatient Medications  acetaminophen     Tablet .. 650 milliGRAM(s) Oral every 6 hours PRN  ondansetron Injectable 4 milliGRAM(s) IV Push every 8 hours PRN  polyethylene glycol 3350 17 Gram(s) Oral daily PRN  traMADol 25 milliGRAM(s) Oral every 6 hours PRN    REVIEW OF SYSTEMS  --------------------------------------------------------------------------------  Gen: No fevers/chills  Skin: + b/l LE rash, L > R  Head/Eyes/Ears: No headache  Respiratory: No dyspnea  CV: No chest pain  GI: No abdominal pain  : No dysuria  MSK: No edema  Heme: No easy bruising or bleeding    All other systems were reviewed and are negative, except as noted.    VITALS/PHYSICAL EXAM  --------------------------------------------------------------------------------  T(C): 37.1 (10-15-24 @ 05:13), Max: 37.1 (10-15-24 @ 05:13)  HR: 76 (10-15-24 @ 05:13) (72 - 79)  BP: 157/77 (10-15-24 @ 05:13) (139/85 - 169/84)  RR: 18 (10-15-24 @ 05:13) (18 - 18)  SpO2: 97% (10-15-24 @ 05:13) (97% - 99%)  Wt(kg): --  Height (cm): 175.3 (10-14-24 @ 08:06)  Weight (kg): 80.3 (10-14-24 @ 10:35)  BMI (kg/m2): 26.1 (10-14-24 @ 10:35)  BSA (m2): 1.96 (10-14-24 @ 10:35)    10-14-24 @ 07:01  -  10-15-24 @ 07:00  --------------------------------------------------------  IN: 150 mL / OUT: 0 mL / NET: 150 mL    10-15-24 @ 07:01  -  10-15-24 @ 11:39  --------------------------------------------------------  IN: 480 mL / OUT: 0 mL / NET: 480 mL    Physical Exam:  Gen: NAD  HEENT: MMM  Pulm: CTA B/L  CV: S1S2  Abd: Soft, +BS   Ext: + LE rash B/L, L>R  Neuro: Awake  Skin: Warm and dry  Vascular access: peripheral IV    LABS/STUDIES  --------------------------------------------------------------------------------              7.5    4.44  >-----------<  66       [10-15-24 @ 07:15]              22.9     138  |  101  |  33  ----------------------------<  102      [10-15-24 @ 07:03]  3.8   |  21  |  2.88        Ca     9.8     [10-15-24 @ 07:03]      Mg     1.8     [10-14-24 @ 08:57]    TPro  8.4  /  Alb  4.6  /  TBili  0.5  /  DBili  x   /  AST  45  /  ALT  48  /  AlkPhos  60  [10-14-24 @ 08:57]    PT/INR: PT 20.1 , INR 1.76       [10-15-24 @ 07:15]  PTT: >200.0      [10-15-24 @ 07:15]    Creatinine Trend:  SCr 2.88 [10-15 @ 07:03]  SCr 2.99 [10-14 @ 08:57]

## 2024-10-15 NOTE — PROGRESS NOTE ADULT - SUBJECTIVE AND OBJECTIVE BOX
Patient is a 53y old  Male who presents with a chief complaint of kidney biopsy (15 Oct 2024 11:39)      SUBJECTIVE / OVERNIGHT EVENTS:  Pt seen and examined. No acute events overnight. He c/o diffuse abd pain radiating to the left flank. Also c/o nausea.    MEDICATIONS  (STANDING):  amLODIPine   Tablet 5 milliGRAM(s) Oral once  amLODIPine   Tablet 10 milliGRAM(s) Oral daily  AQUAPHOR (petrolatum Ointment) 1 Application(s) Topical three times a day  carvedilol 3.125 milliGRAM(s) Oral once  carvedilol 6.25 milliGRAM(s) Oral every 12 hours  heparin  Infusion 1000 Unit(s)/Hr (15 mL/Hr) IV Continuous <Continuous>  senna 2 Tablet(s) Oral at bedtime  sodium chloride 0.9%. 1000 milliLiter(s) (75 mL/Hr) IV Continuous <Continuous>  triamcinolone 0.1% Cream 1 Application(s) Topical two times a day    MEDICATIONS  (PRN):  acetaminophen     Tablet .. 650 milliGRAM(s) Oral every 6 hours PRN Mild Pain (1 - 3)  ondansetron Injectable 4 milliGRAM(s) IV Push every 8 hours PRN Nausea and/or Vomiting  polyethylene glycol 3350 17 Gram(s) Oral daily PRN Constipation  traMADol 25 milliGRAM(s) Oral every 6 hours PRN Moderate Pain (4 - 6)      Vital Signs Last 24 Hrs  T(C): 37.1 (15 Oct 2024 05:13), Max: 37.1 (15 Oct 2024 05:13)  T(F): 98.7 (15 Oct 2024 05:13), Max: 98.7 (15 Oct 2024 05:13)  HR: 76 (15 Oct 2024 05:13) (72 - 79)  BP: 157/77 (15 Oct 2024 05:13) (139/85 - 169/84)  BP(mean): --  RR: 18 (15 Oct 2024 05:13) (18 - 18)  SpO2: 97% (15 Oct 2024 05:13) (97% - 99%)    Parameters below as of 15 Oct 2024 11:13  Patient On (Oxygen Delivery Method): room air      CAPILLARY BLOOD GLUCOSE        I&O's Summary    14 Oct 2024 07:01  -  15 Oct 2024 07:00  --------------------------------------------------------  IN: 150 mL / OUT: 0 mL / NET: 150 mL    15 Oct 2024 07:01  -  15 Oct 2024 12:38  --------------------------------------------------------  IN: 480 mL / OUT: 0 mL / NET: 480 mL        PHYSICAL EXAM:  GENERAL: NAD, well-groomed  HEAD:  Atraumatic, Normocephalic  EYES: EOMI, PERRLA, conjunctiva and sclera clear  NECK: Supple, No JVD  CHEST/LUNG: Clear to auscultation bilaterally; No wheeze  HEART: Regular rate and rhythm; No murmurs, rubs, or gallops  ABDOMEN: Soft, diffuse TTP, Nondistended; Bowel sounds present  EXTREMITIES:  2+ Peripheral Pulses, No clubbing, cyanosis, or edema  PSYCH: AAOx3  NEUROLOGY: non-focal  SKIN: No rashes or lesions    LABS:                        7.5    4.44  )-----------( 66       ( 15 Oct 2024 07:15 )             22.9     10-15    138  |  101  |  33[H]  ----------------------------<  102[H]  3.8   |  21[L]  |  2.88[H]    Ca    9.8      15 Oct 2024 07:03  Mg     1.8     10-14    TPro  8.4[H]  /  Alb  4.6  /  TBili  0.5  /  DBili  x   /  AST  45[H]  /  ALT  48[H]  /  AlkPhos  60  10-14    PT/INR - ( 15 Oct 2024 07:15 )   PT: 20.1 sec;   INR: 1.76 ratio         PTT - ( 15 Oct 2024 07:15 )  PTT:>200.0 sec      Urinalysis Basic - ( 15 Oct 2024 07:03 )    Color: x / Appearance: x / SG: x / pH: x  Gluc: 102 mg/dL / Ketone: x  / Bili: x / Urobili: x   Blood: x / Protein: x / Nitrite: x   Leuk Esterase: x / RBC: x / WBC x   Sq Epi: x / Non Sq Epi: x / Bacteria: x        RADIOLOGY & ADDITIONAL TESTS:    Imaging Personally Reviewed:  RENAL US 10/15  No evidence of renal vein thrombosis.    MRI BRAIN 10/14  No MRI evidence of acute intracranial pathology. No new or enlarging   metastases are identified.    An approximately 1.5 mm enhancing lesion in the anterior medial aspect of   the right temporal lobe appears decreased in size from 2-3 mm on   04/30/2024.    An approximately 1.5 mm enhancing lesion in the left Meckel's cave   appears grossly stable from 04/30/2034.    Three additional sub-2 mm foci of enhancement previously seen in the left   Meckel's cave, right Meckel's cave and right occipital lobe on the prior   MRIs of 04/30/2024 and 01/23/2024 are not visualized on the current exam.    A 2-3 mm enhancing lesion previously seen in the left cerebellar   hemisphere on MRI of 01/23/2024 is not visualized on the current exam      Consultant(s) Notes Reviewed:  Nephrology, Heme-Onc, Rheum,IR

## 2024-10-15 NOTE — CHART NOTE - NSCHARTNOTEFT_GEN_A_CORE
Patient with antiphospholipid syndrome on hep gtt pending kidney biopsy on 10/16, Anti-Xa goal 0.4-0.5. Anti-Xa noted 0.26. Patient with persistent complaint of LT sided flank/abdominal pain, unchanged from previous. No other complaints at this time. Patient awake and alert, A&Ox4, neuro exam unremarkable, abdominal exam with mild TTP LLQ, no flank tenderness, no peritoneal signs. Discussed dosing with Pharmacist Berenice Hemphill, hep gtt dose increased to 15 mL/hr (no bolus), repeat Anti-Xa in 6 hours to continue to monitor closely. Discussed with Dr. Thomason who is in agreement with above.     Kellen Chanel PA-C    Addendum #1: Anti-Xa 0.61. Patient resting comfortably in bed, persistent LT sided abdominal pain improving with IV tylenol. No other complaints or changes in physical exam. Discussed dosing with Pharmacist Berenice Hemphill, hep gtt dose decreased to 12 mL/hr. Repeat Anti-Xa in 6 hours to continue to monitor closely. Discussed with Dr. Thomason who is in agreement with above.

## 2024-10-15 NOTE — PROGRESS NOTE ADULT - PROBLEM SELECTOR PLAN 3
- c/w Amlodipine 10mg po qd  - uptitrate Carvedilol to 6.25mg po bid per Renal reccs - c/w Amlodipine 10mg po qd  - Renal reccs appreciated ; uptitrate Carvedilol to 6.25mg po bid   - add Clonidine 0.1mg po bid

## 2024-10-15 NOTE — PROGRESS NOTE ADULT - PROBLEM SELECTOR PLAN 1
- JORDAN over the last few months possibly 2/2 underlying GN given positive serologies ( +JENN, +DsDNA, +APLS/Sjogren's)  - Recently noted to have rash and JORDAN since he started chemo in addition to Tagrisso  - Scr mildly improved/stable at 2.88 today. Was 2.99 on presentation  - Renal duplex negative for thrombosis  -  Plan for renal biopsy 10/16  - Renal reccs appreciated ; ensure tight BP control. ; will increase Coreg to 6.25mg po bid  - Rheum reccs appreciated ;f/up ANCA, MPO, PR3, anticardiolipin ab, anti GBM, G6PD, quant gold, hepatitis panel, CK, aldolase - JORDAN over the last few months possibly 2/2 underlying GN given positive serologies ( +JENN, +DsDNA, +APLS/Sjogren's)  - Recently noted to have rash and JORDAN since he started chemo in addition to Tagrisso  - Scr mildly improved/stable at 2.88 today. Was 2.99 on presentation  - Renal duplex negative for thrombosis  -  Plan for renal biopsy 10/16  - Renal reccs appreciated ; ensure tight BP control. ; will increase Coreg to 6.25mg po bid and add Clonidine 0.1mg po bid  - Rheum reccs appreciated ;f/up ANCA, MPO, PR3, anticardiolipin ab, anti GBM, G6PD, quant gold, hepatitis panel, CK, aldolase

## 2024-10-16 LAB
ADAMTS13 ACTIVITY: 127 %
ADD ON TEST-SPECIMEN IN LAB: SIGNIFICANT CHANGE UP
ALBUMIN SERPL ELPH-MCNC: 4.3 G/DL — SIGNIFICANT CHANGE UP (ref 3.3–5)
ALDOLASE SERPL-CCNC: 6.1 U/L — SIGNIFICANT CHANGE UP (ref 3.3–10.3)
ALP SERPL-CCNC: 64 U/L — SIGNIFICANT CHANGE UP (ref 40–120)
ALT FLD-CCNC: 74 U/L — HIGH (ref 10–45)
ANA PAT FLD IF-IMP: ABNORMAL
ANA SER IF-ACNC: ABNORMAL
ANION GAP SERPL CALC-SCNC: 16 MMOL/L — SIGNIFICANT CHANGE UP (ref 5–17)
APTT BLD: >200 SEC — CRITICAL HIGH (ref 24.5–35.6)
AST SERPL-CCNC: 63 U/L — HIGH (ref 10–40)
AUTO DIFF PNL BLD: ABNORMAL
BILIRUB SERPL-MCNC: 0.5 MG/DL — SIGNIFICANT CHANGE UP (ref 0.2–1.2)
BUN SERPL-MCNC: 33 MG/DL — HIGH (ref 7–23)
C-ANCA SER-ACNC: NEGATIVE — SIGNIFICANT CHANGE UP
CALCIUM SERPL-MCNC: 9.8 MG/DL — SIGNIFICANT CHANGE UP (ref 8.4–10.5)
CHLORIDE SERPL-SCNC: 97 MMOL/L — SIGNIFICANT CHANGE UP (ref 96–108)
CO2 SERPL-SCNC: 21 MMOL/L — LOW (ref 22–31)
CREAT SERPL-MCNC: 2.73 MG/DL — HIGH (ref 0.5–1.3)
EGFR: 27 ML/MIN/1.73M2 — LOW
GBM IGG SER-ACNC: <0.2 — SIGNIFICANT CHANGE UP (ref 0–0.9)
GLUCOSE SERPL-MCNC: 106 MG/DL — HIGH (ref 70–99)
HCT VFR BLD CALC: 24.4 % — LOW (ref 39–50)
HGB BLD-MCNC: 8 G/DL — LOW (ref 13–17)
INR BLD: 1.58 RATIO — HIGH (ref 0.85–1.16)
MAGNESIUM SERPL-MCNC: 1.8 MG/DL — SIGNIFICANT CHANGE UP (ref 1.6–2.6)
MCHC RBC-ENTMCNC: 25.8 PG — LOW (ref 27–34)
MCHC RBC-ENTMCNC: 32.8 GM/DL — SIGNIFICANT CHANGE UP (ref 32–36)
MCV RBC AUTO: 78.7 FL — LOW (ref 80–100)
METANEPHRINE, PL: 40.2 PG/ML
NORMETANEPHRINE, PL: 125.8 PG/ML
NRBC # BLD: 0 /100 WBCS — SIGNIFICANT CHANGE UP (ref 0–0)
P-ANCA SER-ACNC: NEGATIVE — SIGNIFICANT CHANGE UP
PHOSPHATE SERPL-MCNC: 3.7 MG/DL — SIGNIFICANT CHANGE UP (ref 2.5–4.5)
PLATELET # BLD AUTO: 47 K/UL — LOW (ref 150–400)
POTASSIUM SERPL-MCNC: 3.6 MMOL/L — SIGNIFICANT CHANGE UP (ref 3.5–5.3)
POTASSIUM SERPL-SCNC: 3.6 MMOL/L — SIGNIFICANT CHANGE UP (ref 3.5–5.3)
PROT SERPL-MCNC: 8.2 G/DL — SIGNIFICANT CHANGE UP (ref 6–8.3)
PROTHROM AB SERPL-ACNC: 18.1 SEC — HIGH (ref 9.9–13.4)
RBC # BLD: 3.1 M/UL — LOW (ref 4.2–5.8)
RBC # FLD: 19 % — HIGH (ref 10.3–14.5)
SODIUM SERPL-SCNC: 134 MMOL/L — LOW (ref 135–145)
UFH PPP CHRO-ACNC: 0.06 IU/ML — LOW (ref 0.3–0.7)
UFH PPP CHRO-ACNC: 0.38 IU/ML — SIGNIFICANT CHANGE UP (ref 0.3–0.7)
UFH PPP CHRO-ACNC: 0.38 IU/ML — SIGNIFICANT CHANGE UP (ref 0.3–0.7)
UFH PPP CHRO-ACNC: 0.5 IU/ML — SIGNIFICANT CHANGE UP (ref 0.3–0.7)
WBC # BLD: 5.46 K/UL — SIGNIFICANT CHANGE UP (ref 3.8–10.5)
WBC # FLD AUTO: 5.46 K/UL — SIGNIFICANT CHANGE UP (ref 3.8–10.5)

## 2024-10-16 PROCEDURE — 99233 SBSQ HOSP IP/OBS HIGH 50: CPT | Mod: GC

## 2024-10-16 PROCEDURE — 99233 SBSQ HOSP IP/OBS HIGH 50: CPT

## 2024-10-16 RX ORDER — HYDROMORPHONE HCL/0.9% NACL/PF 6 MG/30 ML
0.5 PATIENT CONTROLLED ANALGESIA SYRINGE INTRAVENOUS ONCE
Refills: 0 | Status: DISCONTINUED | OUTPATIENT
Start: 2024-10-16 | End: 2024-10-16

## 2024-10-16 RX ORDER — CLONIDINE HYDROCHLORIDE 0.2 MG/1
0.1 TABLET ORAL THREE TIMES A DAY
Refills: 0 | Status: DISCONTINUED | OUTPATIENT
Start: 2024-10-16 | End: 2024-10-19

## 2024-10-16 RX ORDER — OXYCODONE HYDROCHLORIDE 30 MG/1
5 TABLET ORAL EVERY 6 HOURS
Refills: 0 | Status: DISCONTINUED | OUTPATIENT
Start: 2024-10-16 | End: 2024-10-17

## 2024-10-16 RX ORDER — LORAZEPAM 2 MG
1 TABLET ORAL ONCE
Refills: 0 | Status: DISCONTINUED | OUTPATIENT
Start: 2024-10-16 | End: 2024-10-23

## 2024-10-16 RX ORDER — OXYCODONE HYDROCHLORIDE 30 MG/1
5 TABLET ORAL ONCE
Refills: 0 | Status: DISCONTINUED | OUTPATIENT
Start: 2024-10-16 | End: 2024-10-16

## 2024-10-16 RX ORDER — ACETAMINOPHEN 500 MG
1000 TABLET ORAL EVERY 6 HOURS
Refills: 0 | Status: COMPLETED | OUTPATIENT
Start: 2024-10-16 | End: 2024-10-17

## 2024-10-16 RX ADMIN — PETROLATUM 1 APPLICATION(S): 987.89 OINTMENT TOPICAL at 21:50

## 2024-10-16 RX ADMIN — PETROLATUM 1 APPLICATION(S): 987.89 OINTMENT TOPICAL at 05:23

## 2024-10-16 RX ADMIN — OXYCODONE HYDROCHLORIDE 5 MILLIGRAM(S): 30 TABLET ORAL at 11:54

## 2024-10-16 RX ADMIN — OXYCODONE HYDROCHLORIDE 5 MILLIGRAM(S): 30 TABLET ORAL at 03:38

## 2024-10-16 RX ADMIN — CARVEDILOL 6.25 MILLIGRAM(S): 25 TABLET, FILM COATED ORAL at 17:31

## 2024-10-16 RX ADMIN — Medication 2 TABLET(S): at 21:50

## 2024-10-16 RX ADMIN — CARVEDILOL 6.25 MILLIGRAM(S): 25 TABLET, FILM COATED ORAL at 05:22

## 2024-10-16 RX ADMIN — Medication 1 APPLICATION(S): at 05:23

## 2024-10-16 RX ADMIN — Medication 400 MILLIGRAM(S): at 17:47

## 2024-10-16 RX ADMIN — HEPARIN SODIUM 12 UNIT(S)/HR: 10000 INJECTION INTRAVENOUS; SUBCUTANEOUS at 01:55

## 2024-10-16 RX ADMIN — HEPARIN SODIUM 12 UNIT(S)/HR: 10000 INJECTION INTRAVENOUS; SUBCUTANEOUS at 07:16

## 2024-10-16 RX ADMIN — HEPARIN SODIUM 12 UNIT(S)/HR: 10000 INJECTION INTRAVENOUS; SUBCUTANEOUS at 09:55

## 2024-10-16 RX ADMIN — Medication 0.5 MILLIGRAM(S): at 20:15

## 2024-10-16 RX ADMIN — Medication 1000 MILLIGRAM(S): at 00:55

## 2024-10-16 RX ADMIN — OXYCODONE HYDROCHLORIDE 5 MILLIGRAM(S): 30 TABLET ORAL at 17:44

## 2024-10-16 RX ADMIN — PETROLATUM 1 APPLICATION(S): 987.89 OINTMENT TOPICAL at 14:17

## 2024-10-16 RX ADMIN — Medication 1 APPLICATION(S): at 21:50

## 2024-10-16 RX ADMIN — CLONIDINE HYDROCHLORIDE 0.1 MILLIGRAM(S): 0.2 TABLET ORAL at 05:22

## 2024-10-16 RX ADMIN — HEPARIN SODIUM 12 UNIT(S)/HR: 10000 INJECTION INTRAVENOUS; SUBCUTANEOUS at 22:35

## 2024-10-16 RX ADMIN — OXYCODONE HYDROCHLORIDE 5 MILLIGRAM(S): 30 TABLET ORAL at 04:38

## 2024-10-16 RX ADMIN — CLONIDINE HYDROCHLORIDE 0.1 MILLIGRAM(S): 0.2 TABLET ORAL at 21:50

## 2024-10-16 RX ADMIN — Medication 400 MILLIGRAM(S): at 23:49

## 2024-10-16 RX ADMIN — POLYETHYLENE GLYCOL 3350 17 GRAM(S): 17 POWDER, FOR SOLUTION ORAL at 11:54

## 2024-10-16 RX ADMIN — Medication 10 MILLIGRAM(S): at 05:22

## 2024-10-16 RX ADMIN — Medication 0.5 MILLIGRAM(S): at 19:30

## 2024-10-16 RX ADMIN — Medication 400 MILLIGRAM(S): at 12:27

## 2024-10-16 NOTE — PROGRESS NOTE ADULT - PROBLEM SELECTOR PLAN 2
- MRI brain with no new or enlarging metastases ; stable right temporal lobe , left Meckel's cave and right occipital lesions  - also c/o abd pain ;  CT Abd/pelvis unremarkable  - Tramadol did not help  - will start IV Tylenol 1g q 6 ATC and Oxy ir 5mg po q 6 ATC x 24 hours

## 2024-10-16 NOTE — PROGRESS NOTE ADULT - PROBLEM SELECTOR PLAN 1
Pt. with JORDAN spanning over the last few months possibly secondary to underlying GN given positive serologies ( +JENN, +DsDNA, +APLS/Sjogren's and also a positive skin biopsy c/w Lupus erythrematosis with negative IF) Negative Rh factor/RNP. On review of Sonia TINSLEY, Scr 1.26 on 7/23/24, prior to that in early July it was normal at 0.9 mg/dl. Since late July he has had progressive worsening  Scr 2.99 on admission labs (10/14/24). UA with mild proteinuria ( 0.5 gms)  but no hematuria (10/14/24).    Pt. likely has a systemic process ? 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 Recently noted to have rash and JORDAN since he started chemo in addition to Tagrisso    Scr mildly improved/stable at 2.73 today. Renal duplex negative for thrombosis. Plan for renal biopsy 10/16. BP elevated overnight, possibly secondary to pain. Please ensure adequate pain control and BP control. Coagulation studies as per IR, pending renal biopsy if cleared by IR. Heme/onc and rheum following. C/w coreg 6.25mg BID and amlodipine. Can also start clonidine 0.1 mg BID. Continue to monitor urine output and labs. Dose medications as per eGFR.      If you have any questions, please feel free to contact me  Eulalio Anderson  Nephrology Fellow  Page [phone] / [employer] Teams (Preferred)  (After 4pm or on weekends please page the on-call fellow). Pt. 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 On review of Sonia TINSLEY, Scr 1.26 on 7/23/24, prior to that in early July it was normal at 0.9 mg/dl. Since late July he has had progressive worsening  Scr 2.99 on admission labs (10/14/24). UA with mild proteinuria ( 0.5 gms)  but no hematuria (10/14/24).    Pt. likely has a systemic process ? 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 Recently noted to have rash and JORDAN since he started chemo in addition to Tagrisso    Scr mildly improved/stable at 2.73 today. Renal duplex negative for thrombosis. Plan for renal biopsy 10/16. BP elevated overnight, possibly secondary to pain. Please ensure adequate pain control and BP control. Coagulation studies as per IR, pending renal biopsy if cleared by IR. Heme/onc and rheum following. C/w coreg 6.25mg BID and amlodipine. Can also start clonidine 0.1 mg BID. Continue to monitor urine output and labs. Dose medications as per eGFR.      If you have any questions, please feel free to contact me  Eulalio Anderson  Nephrology Fellow  Page [phone] / [employer] Teams (Preferred)  (After 4pm or on weekends please page the on-call fellow).

## 2024-10-16 NOTE — PROGRESS NOTE ADULT - ASSESSMENT
53 y.o. 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  H/O Lung cancer diagnosed in 2021 and as been on Tagrisso since then. Recent scans with e/o metastatic disease and he was started on carboplatin and premetrexed in July Pt. received only  two cycles of chemo so far  and developed a rash and JORDAN. Pt. had a skin biopsy done which was consistent with connective tissue disease (lupus)

## 2024-10-16 NOTE — PROGRESS NOTE ADULT - PROBLEM SELECTOR PLAN 1
- JORDAN over the last few months possibly 2/2 underlying GN given positive serologies ( +JENN, +DsDNA, +APLS/Sjogren's)  - Recently noted to have rash and JORDAN since he started chemo in addition to Tagrisso  - Scr mildly improved at 2.7 today. Was 2.99 on presentation  - Renal duplex negative for thrombosis  -  Plan for renal biopsy 10/17  - Renal reccs appreciated ; ensure tight BP control. ; will increase Coreg to 6.25mg po bid and add Clonidine 0.1mg po bid  - Rheum reccs appreciated ;f/up ANCA, MPO, PR3, anticardiolipin ab, anti GBM, G6PD, quant gold, hepatitis panel, CK, aldolase

## 2024-10-16 NOTE — CHART NOTE - NSCHARTNOTEFT_GEN_A_CORE
Medicine [phone];notified by RN IV  Left lower arm removed earlier  today oozing small amt. blood.   RN  applied DSD/compression x 10 minutes to site but oozing continued & notified NP    VSS.  P/E :   Ext:  left lower arm medial aspect @ prior IV site continuous oozing saturated 4x4.  New DSD/compression bandage with tape applied to site by NP  Site re-eval. 1 hr. later; oozing continued.  Attending  & Hospital Anticoagulation pharmacist Aleksandra notified; c/w heparin gtt @ present dose since Anti Xa Assay therapeutic(0.38)    1530;  NP re-eval prior IV site; still oozing small amt. blood. Medical Attending notified;  -Heparin gtt turned off; to be  held x 3 hrs  -If bleeding resolved before 3 hrs may resume Heparin gtt  -if bleeding not resolved after 3 hrs call night Hospitalist for further orders re. anticoagulation  -monitor CBC; ordered with AM labs  Medical Attending agreed with plan  report given to night NP

## 2024-10-16 NOTE — PROGRESS NOTE ADULT - ATTENDING COMMENTS
PT complains of left sided abd discomfort  CT abd negative  Relieved with one dose of oxy and tylenol IV  BP control and can increase clonidine to TID along with other antiHTN  Renal biopsy pending    Alaina Hernandes MD  Off: [phone]  contact me on teams    (After 5 pm or on weekends please page the on-call fellow/attending, can check [email or web address] for schedule. Login is melyssa olivas, schedule under [facility] medicine, psych, derm)

## 2024-10-16 NOTE — PROGRESS NOTE ADULT - SUBJECTIVE AND OBJECTIVE BOX
Patient is a 53y old  Male who presents with a chief complaint of kidney biospy (16 Oct 2024 09:38)      SUBJECTIVE / OVERNIGHT EVENTS:  Pt seen and examined. No acute events overnight. c/o severe abd pain. Renal biopsy cancelled this AM on account of elevated BP.    MEDICATIONS  (STANDING):  acetaminophen   IVPB .. 1000 milliGRAM(s) IV Intermittent every 6 hours  amLODIPine   Tablet 10 milliGRAM(s) Oral daily  amLODIPine   Tablet 5 milliGRAM(s) Oral once  AQUAPHOR (petrolatum Ointment) 1 Application(s) Topical three times a day  carvedilol 6.25 milliGRAM(s) Oral every 12 hours  cloNIDine 0.1 milliGRAM(s) Oral two times a day  heparin  Infusion 1000 Unit(s)/Hr (12 mL/Hr) IV Continuous <Continuous>  oxyCODONE    IR 5 milliGRAM(s) Oral every 6 hours  senna 2 Tablet(s) Oral at bedtime  sodium chloride 0.9%. 1000 milliLiter(s) (75 mL/Hr) IV Continuous <Continuous>  triamcinolone 0.1% Cream 1 Application(s) Topical two times a day    MEDICATIONS  (PRN):  acetaminophen     Tablet .. 650 milliGRAM(s) Oral every 6 hours PRN Mild Pain (1 - 3)  ondansetron Injectable 4 milliGRAM(s) IV Push every 8 hours PRN Nausea and/or Vomiting  polyethylene glycol 3350 17 Gram(s) Oral daily PRN Constipation      Vital Signs Last 24 Hrs  T(C): 36.4 (16 Oct 2024 12:27), Max: 36.9 (15 Oct 2024 16:02)  T(F): 97.6 (16 Oct 2024 12:27), Max: 98.4 (15 Oct 2024 16:02)  HR: 86 (16 Oct 2024 12:27) (62 - 86)  BP: 158/80 (16 Oct 2024 12:27) (135/77 - 172/86)  BP(mean): --  RR: 18 (16 Oct 2024 12:27) (18 - 18)  SpO2: 98% (16 Oct 2024 12:27) (98% - 98%)    Parameters below as of 16 Oct 2024 12:27  Patient On (Oxygen Delivery Method): room air      CAPILLARY BLOOD GLUCOSE        I&O's Summary    15 Oct 2024 07:01  -  16 Oct 2024 07:00  --------------------------------------------------------  IN: 1260 mL / OUT: 0 mL / NET: 1260 mL    16 Oct 2024 07:01  -  16 Oct 2024 14:49  --------------------------------------------------------  IN: 150 mL / OUT: 0 mL / NET: 150 mL        PHYSICAL EXAM:  GENERAL: NAD, well-groomed  HEAD:  Atraumatic, Normocephalic  EYES: EOMI, PERRLA, conjunctiva and sclera clear  NECK: Supple, No JVD  CHEST/LUNG: Clear to auscultation bilaterally; No wheeze  HEART: Regular rate and rhythm; No murmurs, rubs, or gallops  ABDOMEN: Soft, diffuse TTP, Nondistended; Bowel sounds present  EXTREMITIES:  2+ Peripheral Pulses, No clubbing, cyanosis, or edema  PSYCH: AAOx3  NEUROLOGY: non-focal  SKIN: No rashes or lesions    LABS:                        8.0    5.46  )-----------( 47       ( 16 Oct 2024 07:11 )             24.4     10-16    134[L]  |  97  |  33[H]  ----------------------------<  106[H]  3.6   |  21[L]  |  2.73[H]    Ca    9.8      16 Oct 2024 07:09  Phos  3.7     10-16  Mg     1.8     10-16    TPro  8.2  /  Alb  4.3  /  TBili  0.5  /  DBili  x   /  AST  63[H]  /  ALT  74[H]  /  AlkPhos  64  10-16    PT/INR - ( 16 Oct 2024 07:11 )   PT: 18.1 sec;   INR: 1.58 ratio         PTT - ( 16 Oct 2024 07:11 )  PTT:>200.0 sec      Urinalysis Basic - ( 16 Oct 2024 07:09 )    Color: x / Appearance: x / SG: x / pH: x  Gluc: 106 mg/dL / Ketone: x  / Bili: x / Urobili: x   Blood: x / Protein: x / Nitrite: x   Leuk Esterase: x / RBC: x / WBC x   Sq Epi: x / Non Sq Epi: x / Bacteria: x          Care Discussed with Consultants/Other Providers: Nephrology, IR

## 2024-10-16 NOTE — PROGRESS NOTE ADULT - NSPROGADDITIONALINFOA_GEN_ALL_CORE
time spent reviewing prior charts, meds, discussing plan with patient= 53 minutes    d/w MICHELLE Moyer

## 2024-10-16 NOTE — PROGRESS NOTE ADULT - SUBJECTIVE AND OBJECTIVE BOX
[facility] Division of Kidney Diseases & Hypertension  FOLLOW UP NOTE  [phone]--------------------------------------------------------------------------------  Chief Complaint: Acute renal failure    24 hour events/subjective: Pt. seen and examined, states he had a lot of abdominal and back pain on the left side overnight. Received IV tylenol and oxycodone as per pt. Now feels less pain. No fever, chills, CP, SOB, or LE swelling. +abdominal and back pain, +rash LEs b/l L>R.     PAST HISTORY  --------------------------------------------------------------------------------  No significant changes to PMH, PSH, FHx, SHx, unless otherwise noted    ALLERGIES & MEDICATIONS  --------------------------------------------------------------------------------  Allergies    No Known Drug Allergies  Animal dander-Cat (Other)    Intolerances    Standing Inpatient Medications  amLODIPine   Tablet 5 milliGRAM(s) Oral once  amLODIPine   Tablet 10 milliGRAM(s) Oral daily  AQUAPHOR (petrolatum Ointment) 1 Application(s) Topical three times a day  carvedilol 6.25 milliGRAM(s) Oral every 12 hours  cloNIDine 0.1 milliGRAM(s) Oral two times a day  heparin  Infusion 1000 Unit(s)/Hr IV Continuous <Continuous>  senna 2 Tablet(s) Oral at bedtime  sodium chloride 0.9%. 1000 milliLiter(s) IV Continuous <Continuous>  triamcinolone 0.1% Cream 1 Application(s) Topical two times a day    PRN Inpatient Medications  acetaminophen     Tablet .. 650 milliGRAM(s) Oral every 6 hours PRN  ondansetron Injectable 4 milliGRAM(s) IV Push every 8 hours PRN  polyethylene glycol 3350 17 Gram(s) Oral daily PRN  traMADol 50 milliGRAM(s) Oral every 6 hours PRN    REVIEW OF SYSTEMS  --------------------------------------------------------------------------------  Gen: No fevers/chills  Skin: + b/l LE rash, L > R  Head/Eyes/Ears: No headache  Respiratory: No dyspnea  CV: No chest pain  GI: +abdominal pain  : No dysuria  MSK: No edema, +back pain  Heme: No easy bruising or bleeding    All other systems were reviewed and are negative, except as noted.    VITALS/PHYSICAL EXAM  --------------------------------------------------------------------------------  T(C): 36.8 (10-16-24 @ 08:36), Max: 36.9 (10-15-24 @ 11:13)  HR: 69 (10-16-24 @ 08:36) (62 - 89)  BP: 135/77 (10-16-24 @ 08:36) (135/77 - 177/87)  RR: 18 (10-16-24 @ 08:36) (18 - 18)  SpO2: 98% (10-16-24 @ 08:36) (97% - 98%)  Wt(kg): --    Weight (kg): 80.3 (10-14-24 @ 10:35)      10-15-24 @ 07:01  -  10-16-24 @ 07:00  --------------------------------------------------------  IN: 1260 mL / OUT: 0 mL / NET: 1260 mL    Physical Exam:  Gen: NAD  HEENT: MMM  Pulm: CTA B/L  CV: S1S2  Abd: Soft, +BS   Ext: + LE rash B/L, L>R  Neuro: Awake  Skin: Warm and dry  Vascular access: peripheral IV    LABS/STUDIES  --------------------------------------------------------------------------------              8.0    5.46  >-----------<  47       [10-16-24 @ 07:11]              24.4     134  |  97  |  33  ----------------------------<  106      [10-16-24 @ 07:09]  3.6   |  21  |  2.73        Ca     9.8     [10-16-24 @ 07:09]      Mg     1.8     [10-16-24 @ 07:09]      Phos  3.7     [10-16-24 @ 07:09]    TPro  8.2  /  Alb  4.3  /  TBili  0.5  /  DBili  x   /  AST  63  /  ALT  74  /  AlkPhos  64  [10-16-24 @ 07:09]    PT/INR: PT 18.1 , INR 1.58       [10-16-24 @ 07:11]  PTT: >200.0      [10-16-24 @ 07:11]    Creatinine Trend:  SCr 2.73 [10-16 @ 07:09]  SCr 2.82 [10-15 @ 17:49]  SCr 2.88 [10-15 @ 07:03]  SCr 2.99 [10-14 @ 08:57]    HBsAg Nonreact      [10-15-24 @ 07:15]  HBcAb Nonreact      [10-15-24 @ 07:15]  HCV 0.06, Nonreact      [10-15-24 @ 07:15]    MPO-ANCA <5.0, interpretation: Negative      [10-15-24 @ 07:15]  PR3-ANCA <5.0, interpretation: Negative      [10-15-24 @ 07:15]

## 2024-10-16 NOTE — PROGRESS NOTE ADULT - PROBLEM SELECTOR PLAN 4
- c/w Heparin gtt   - given elevated PTT prior to starting Heparin titration is based on  anti-Xa levels per pharmacist recs   - per pharmacist  ([phone]) ;  titrate dosing to anti-Xa levels  goal of 0.4-0.6.

## 2024-10-16 NOTE — PROGRESS NOTE ADULT - PROBLEM SELECTOR PLAN 3
- c/w Amlodipine 10mg po qd  - Renal reccs appreciated ; c/w Carvedilol 6.25mg po bid   - increase Clonidine to 0.1mg po tid

## 2024-10-17 ENCOUNTER — RESULT REVIEW (OUTPATIENT)
Age: 54
End: 2024-10-17

## 2024-10-17 LAB
ADD ON TEST-SPECIMEN IN LAB: SIGNIFICANT CHANGE UP
ADD ON TEST-SPECIMEN IN LAB: SIGNIFICANT CHANGE UP
ALBUMIN MFR SERPL ELPH: 55.4 %
ALBUMIN SERPL ELPH-MCNC: 4.7 G/DL — SIGNIFICANT CHANGE UP (ref 3.3–5)
ALBUMIN SERPL-MCNC: 4.3 G/DL
ALBUMIN/GLOB SERPL: 1.2 RATIO
ALP SERPL-CCNC: 77 U/L — SIGNIFICANT CHANGE UP (ref 40–120)
ALPHA1 GLOB MFR SERPL ELPH: 5 %
ALPHA1 GLOB SERPL ELPH-MCNC: 0.4 G/DL
ALPHA2 GLOB MFR SERPL ELPH: 11.7 %
ALPHA2 GLOB SERPL ELPH-MCNC: 0.9 G/DL
ALT FLD-CCNC: 91 U/L — HIGH (ref 10–45)
ANION GAP SERPL CALC-SCNC: 19 MMOL/L — HIGH (ref 5–17)
APTT BLD: 116.8 SEC — HIGH (ref 24.5–35.6)
AST SERPL-CCNC: 73 U/L — HIGH (ref 10–40)
B-GLOBULIN MFR SERPL ELPH: 11.9 %
B-GLOBULIN SERPL ELPH-MCNC: 0.9 G/DL
BILIRUB SERPL-MCNC: 0.6 MG/DL — SIGNIFICANT CHANGE UP (ref 0.2–1.2)
BUN SERPL-MCNC: 35 MG/DL — HIGH (ref 7–23)
CALCIUM SERPL-MCNC: 10.1 MG/DL — SIGNIFICANT CHANGE UP (ref 8.4–10.5)
CHLORIDE SERPL-SCNC: 95 MMOL/L — LOW (ref 96–108)
CO2 SERPL-SCNC: 21 MMOL/L — LOW (ref 22–31)
CREAT SERPL-MCNC: 3 MG/DL — HIGH (ref 0.5–1.3)
DEPRECATED KAPPA LC FREE/LAMBDA SER: 1.14 RATIO
EGFR: 24 ML/MIN/1.73M2 — LOW
GAMMA GLOB FLD ELPH-MCNC: 1.2 G/DL
GAMMA GLOB MFR SERPL ELPH: 16 %
GAMMA INTERFERON BACKGROUND BLD IA-ACNC: 0.02 IU/ML — SIGNIFICANT CHANGE UP
GLUCOSE SERPL-MCNC: 106 MG/DL — HIGH (ref 70–99)
HAPTOGLOB SERPL-MCNC: 253 MG/DL — HIGH (ref 34–200)
HCT VFR BLD CALC: 22.9 % — LOW (ref 39–50)
HCT VFR BLD CALC: 23.6 % — LOW (ref 39–50)
HGB BLD-MCNC: 7.5 G/DL — LOW (ref 13–17)
HGB BLD-MCNC: 7.8 G/DL — LOW (ref 13–17)
IGA SER QL IEP: 253 MG/DL
IGG SER QL IEP: 1154 MG/DL
IGM SER QL IEP: 339 MG/DL
INR BLD: 1.4 RATIO — HIGH (ref 0.85–1.16)
INTERPRETATION SERPL IEP-IMP: NORMAL
KAPPA LC CSF-MCNC: 4.84 MG/DL
KAPPA LC SERPL-MCNC: 5.52 MG/DL
LDH SERPL L TO P-CCNC: 287 U/L — HIGH (ref 50–242)
M PROTEIN MFR SERPL ELPH: NORMAL
M PROTEIN SPEC IFE-MCNC: NORMAL
M TB IFN-G BLD-IMP: ABNORMAL
M TB IFN-G CD4+ BCKGRND COR BLD-ACNC: 0 IU/ML — SIGNIFICANT CHANGE UP
M TB IFN-G CD4+CD8+ BCKGRND COR BLD-ACNC: 0 IU/ML — SIGNIFICANT CHANGE UP
MCHC RBC-ENTMCNC: 25.5 PG — LOW (ref 27–34)
MCHC RBC-ENTMCNC: 25.5 PG — LOW (ref 27–34)
MCHC RBC-ENTMCNC: 32.8 GM/DL — SIGNIFICANT CHANGE UP (ref 32–36)
MCHC RBC-ENTMCNC: 33.1 GM/DL — SIGNIFICANT CHANGE UP (ref 32–36)
MCV RBC AUTO: 77.1 FL — LOW (ref 80–100)
MCV RBC AUTO: 77.9 FL — LOW (ref 80–100)
MONOCLON BAND OBS SERPL: NORMAL
NRBC # BLD: 0 /100 WBCS — SIGNIFICANT CHANGE UP (ref 0–0)
NRBC # BLD: 0 /100 WBCS — SIGNIFICANT CHANGE UP (ref 0–0)
PLATELET # BLD AUTO: 38 K/UL — LOW (ref 150–400)
PLATELET # BLD AUTO: 67 K/UL — LOW (ref 150–400)
POTASSIUM SERPL-MCNC: 3.7 MMOL/L — SIGNIFICANT CHANGE UP (ref 3.5–5.3)
POTASSIUM SERPL-SCNC: 3.7 MMOL/L — SIGNIFICANT CHANGE UP (ref 3.5–5.3)
PROT SERPL-MCNC: 7.8 G/DL
PROT SERPL-MCNC: 7.8 G/DL
PROT SERPL-MCNC: 8.6 G/DL — HIGH (ref 6–8.3)
PROTHROM AB SERPL-ACNC: 16.1 SEC — HIGH (ref 9.9–13.4)
QUANT TB PLUS MITOGEN MINUS NIL: 0.11 IU/ML — SIGNIFICANT CHANGE UP
RBC # BLD: 2.94 M/UL — LOW (ref 4.2–5.8)
RBC # BLD: 3.06 M/UL — LOW (ref 4.2–5.8)
RBC # FLD: 18.2 % — HIGH (ref 10.3–14.5)
RBC # FLD: 18.4 % — HIGH (ref 10.3–14.5)
RENIN ACTIVITY, PLASMA: 11.96 NG/ML/HR
RETICS #: 23.5 K/UL — LOW (ref 25–125)
RETICS/RBC NFR: 0.8 % — SIGNIFICANT CHANGE UP (ref 0.5–2.5)
SODIUM SERPL-SCNC: 135 MMOL/L — SIGNIFICANT CHANGE UP (ref 135–145)
UFH PPP CHRO-ACNC: 0.28 IU/ML — LOW (ref 0.3–0.7)
UFH PPP CHRO-ACNC: <0.04 IU/ML — LOW (ref 0.3–0.7)
WBC # BLD: 5.44 K/UL — SIGNIFICANT CHANGE UP (ref 3.8–10.5)
WBC # BLD: 5.73 K/UL — SIGNIFICANT CHANGE UP (ref 3.8–10.5)
WBC # FLD AUTO: 5.44 K/UL — SIGNIFICANT CHANGE UP (ref 3.8–10.5)
WBC # FLD AUTO: 5.73 K/UL — SIGNIFICANT CHANGE UP (ref 3.8–10.5)

## 2024-10-17 PROCEDURE — 50200 RENAL BIOPSY PERQ: CPT | Mod: LT

## 2024-10-17 PROCEDURE — 99232 SBSQ HOSP IP/OBS MODERATE 35: CPT | Mod: GC

## 2024-10-17 PROCEDURE — 99223 1ST HOSP IP/OBS HIGH 75: CPT

## 2024-10-17 PROCEDURE — 88350 IMFLUOR EA ADDL 1ANTB STN PX: CPT | Mod: 26

## 2024-10-17 PROCEDURE — 88305 TISSUE EXAM BY PATHOLOGIST: CPT | Mod: 26

## 2024-10-17 PROCEDURE — 76942 ECHO GUIDE FOR BIOPSY: CPT | Mod: 26

## 2024-10-17 PROCEDURE — 99233 SBSQ HOSP IP/OBS HIGH 50: CPT | Mod: GC

## 2024-10-17 PROCEDURE — 88346 IMFLUOR 1ST 1ANTB STAIN PX: CPT | Mod: 26

## 2024-10-17 PROCEDURE — 74018 RADEX ABDOMEN 1 VIEW: CPT | Mod: 26

## 2024-10-17 PROCEDURE — 88348 ELECTRON MICROSCOPY DX: CPT | Mod: 26

## 2024-10-17 PROCEDURE — 88313 SPECIAL STAINS GROUP 2: CPT | Mod: 26

## 2024-10-17 PROCEDURE — 99233 SBSQ HOSP IP/OBS HIGH 50: CPT

## 2024-10-17 RX ORDER — ACETAMINOPHEN 500 MG
1000 TABLET ORAL EVERY 6 HOURS
Refills: 0 | Status: COMPLETED | OUTPATIENT
Start: 2024-10-17 | End: 2024-10-18

## 2024-10-17 RX ORDER — HYDROMORPHONE HCL/0.9% NACL/PF 6 MG/30 ML
0.5 PATIENT CONTROLLED ANALGESIA SYRINGE INTRAVENOUS ONCE
Refills: 0 | Status: DISCONTINUED | OUTPATIENT
Start: 2024-10-17 | End: 2024-10-17

## 2024-10-17 RX ORDER — LIDOCAINE HYDROCHLORIDE 40 MG/ML
1 SOLUTION TOPICAL ONCE
Refills: 0 | Status: COMPLETED | OUTPATIENT
Start: 2024-10-17 | End: 2024-10-17

## 2024-10-17 RX ORDER — HYDROMORPHONE HCL/0.9% NACL/PF 6 MG/30 ML
0.5 PATIENT CONTROLLED ANALGESIA SYRINGE INTRAVENOUS EVERY 4 HOURS
Refills: 0 | Status: DISCONTINUED | OUTPATIENT
Start: 2024-10-17 | End: 2024-10-18

## 2024-10-17 RX ORDER — DESMOPRESSIN ACETATE 4 UG/ML
30 INJECTION INTRAVENOUS ONCE
Refills: 0 | Status: DISCONTINUED | OUTPATIENT
Start: 2024-10-17 | End: 2024-10-24

## 2024-10-17 RX ADMIN — CARVEDILOL 6.25 MILLIGRAM(S): 25 TABLET, FILM COATED ORAL at 19:06

## 2024-10-17 RX ADMIN — CLONIDINE HYDROCHLORIDE 0.1 MILLIGRAM(S): 0.2 TABLET ORAL at 06:06

## 2024-10-17 RX ADMIN — Medication 10 MILLIGRAM(S): at 06:06

## 2024-10-17 RX ADMIN — Medication 0.5 MILLIGRAM(S): at 21:19

## 2024-10-17 RX ADMIN — PETROLATUM 1 APPLICATION(S): 987.89 OINTMENT TOPICAL at 21:45

## 2024-10-17 RX ADMIN — Medication 1 APPLICATION(S): at 06:07

## 2024-10-17 RX ADMIN — Medication 2 TABLET(S): at 21:20

## 2024-10-17 RX ADMIN — Medication 1000 MILLIGRAM(S): at 07:48

## 2024-10-17 RX ADMIN — OXYCODONE HYDROCHLORIDE 5 MILLIGRAM(S): 30 TABLET ORAL at 03:00

## 2024-10-17 RX ADMIN — HEPARIN SODIUM 12.5 UNIT(S)/HR: 10000 INJECTION INTRAVENOUS; SUBCUTANEOUS at 06:07

## 2024-10-17 RX ADMIN — OXYCODONE HYDROCHLORIDE 5 MILLIGRAM(S): 30 TABLET ORAL at 02:32

## 2024-10-17 RX ADMIN — Medication 0.5 MILLIGRAM(S): at 22:00

## 2024-10-17 RX ADMIN — HEPARIN SODIUM 12.5 UNIT(S)/HR: 10000 INJECTION INTRAVENOUS; SUBCUTANEOUS at 04:26

## 2024-10-17 RX ADMIN — CARVEDILOL 6.25 MILLIGRAM(S): 25 TABLET, FILM COATED ORAL at 06:06

## 2024-10-17 RX ADMIN — Medication 400 MILLIGRAM(S): at 19:06

## 2024-10-17 RX ADMIN — CLONIDINE HYDROCHLORIDE 0.1 MILLIGRAM(S): 0.2 TABLET ORAL at 21:20

## 2024-10-17 RX ADMIN — ONDANSETRON HYDROCHLORIDE 4 MILLIGRAM(S): 2 INJECTION, SOLUTION INTRAMUSCULAR; INTRAVENOUS at 23:11

## 2024-10-17 RX ADMIN — Medication 0.5 MILLIGRAM(S): at 12:42

## 2024-10-17 RX ADMIN — Medication 400 MILLIGRAM(S): at 23:32

## 2024-10-17 RX ADMIN — Medication 0.5 MILLIGRAM(S): at 22:09

## 2024-10-17 RX ADMIN — LIDOCAINE HYDROCHLORIDE 1 PATCH: 40 SOLUTION TOPICAL at 23:49

## 2024-10-17 RX ADMIN — ONDANSETRON HYDROCHLORIDE 4 MILLIGRAM(S): 2 INJECTION, SOLUTION INTRAMUSCULAR; INTRAVENOUS at 06:22

## 2024-10-17 RX ADMIN — Medication 0.5 MILLIGRAM(S): at 11:07

## 2024-10-17 RX ADMIN — Medication 1000 MILLIGRAM(S): at 20:00

## 2024-10-17 RX ADMIN — PETROLATUM 1 APPLICATION(S): 987.89 OINTMENT TOPICAL at 06:07

## 2024-10-17 RX ADMIN — Medication 400 MILLIGRAM(S): at 06:07

## 2024-10-17 RX ADMIN — Medication 1000 MILLIGRAM(S): at 00:00

## 2024-10-17 NOTE — PROGRESS NOTE ADULT - SUBJECTIVE AND OBJECTIVE BOX
Patient is a 53y old  Male who presents with a chief complaint of kidney biospy (17 Oct 2024 11:05)      SUBJECTIVE / OVERNIGHT EVENTS:  Pt seen and examined. No acute events overnight. He c/o severe abd pain, reports that Oxycodone is not helping. Currently getting 1u platelets for Plt count of 38 this morning.    MEDICATIONS  (STANDING):  amLODIPine   Tablet 5 milliGRAM(s) Oral once  amLODIPine   Tablet 10 milliGRAM(s) Oral daily  AQUAPHOR (petrolatum Ointment) 1 Application(s) Topical three times a day  carvedilol 6.25 milliGRAM(s) Oral every 12 hours  cloNIDine 0.1 milliGRAM(s) Oral three times a day  desmopressin Injectable 30 MICROGram(s) IV Push once  LORazepam     Tablet 1 milliGRAM(s) Oral once  senna 2 Tablet(s) Oral at bedtime  sodium chloride 0.9%. 1000 milliLiter(s) (75 mL/Hr) IV Continuous <Continuous>  triamcinolone 0.1% Cream 1 Application(s) Topical two times a day    MEDICATIONS  (PRN):  acetaminophen     Tablet .. 650 milliGRAM(s) Oral every 6 hours PRN Mild Pain (1 - 3)  HYDROmorphone  Injectable 0.5 milliGRAM(s) IV Push every 4 hours PRN Severe Pain (7 - 10)  ondansetron Injectable 4 milliGRAM(s) IV Push every 8 hours PRN Nausea and/or Vomiting  polyethylene glycol 3350 17 Gram(s) Oral daily PRN Constipation      Vital Signs Last 24 Hrs  T(C): 36.7 (17 Oct 2024 09:00), Max: 37 (17 Oct 2024 04:59)  T(F): 98.1 (17 Oct 2024 09:00), Max: 98.6 (17 Oct 2024 04:59)  HR: 71 (17 Oct 2024 09:00) (71 - 86)  BP: 134/78 (17 Oct 2024 09:00) (124/72 - 158/80)  BP(mean): --  RR: 18 (17 Oct 2024 09:00) (18 - 18)  SpO2: 98% (17 Oct 2024 09:00) (97% - 98%)    Parameters below as of 17 Oct 2024 09:00  Patient On (Oxygen Delivery Method): room air      CAPILLARY BLOOD GLUCOSE        I&O's Summary    16 Oct 2024 07:01  -  17 Oct 2024 07:00  --------------------------------------------------------  IN: 150 mL / OUT: 0 mL / NET: 150 mL        PHYSICAL EXAM:  GENERAL: NAD, well-groomed  HEAD:  Atraumatic, Normocephalic  EYES: conjunctiva and sclera clear  NECK: Supple, No JVD  CHEST/LUNG: Clear to auscultation bilaterally; No wheeze  HEART: Regular rate and rhythm; No murmurs, rubs, or gallops  ABDOMEN: Soft, tender to palpation, Nondistended; Bowel sounds present  EXTREMITIES:  2+ Peripheral Pulses, No clubbing, cyanosis, or edema  PSYCH: AAOx3  NEUROLOGY: non-focal  SKIN: No rashes or lesions    LABS:                        7.5    5.73  )-----------( 67       ( 17 Oct 2024 11:46 )             22.9     10-17    135  |  95[L]  |  35[H]  ----------------------------<  106[H]  3.7   |  21[L]  |  3.00[H]    Ca    10.1      17 Oct 2024 07:19  Phos  3.7     10-16  Mg     1.8     10-16    TPro  8.6[H]  /  Alb  4.7  /  TBili  0.6  /  DBili  x   /  AST  73[H]  /  ALT  91[H]  /  AlkPhos  77  10-17    PT/INR - ( 16 Oct 2024 07:11 )   PT: 18.1 sec;   INR: 1.58 ratio         PTT - ( 16 Oct 2024 07:11 )  PTT:>200.0 sec      Urinalysis Basic - ( 17 Oct 2024 07:19 )    Color: x / Appearance: x / SG: x / pH: x  Gluc: 106 mg/dL / Ketone: x  / Bili: x / Urobili: x   Blood: x / Protein: x / Nitrite: x   Leuk Esterase: x / RBC: x / WBC x   Sq Epi: x / Non Sq Epi: x / Bacteria: x        Consultant(s) Notes Reviewed:  Nephrology, Heme, Rheum,    Care Discussed with Consultants/Other Providers: Nephrology, Heme, Rheum, IR

## 2024-10-17 NOTE — PROGRESS NOTE ADULT - ASSESSMENT
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 Rheumatology consulted to evaluate for SLE.    #APS  #JORDAN on CKD  #rash  -Pt diagnosed with APS in 2002 from DVT and PE, s/p IVC filter. Pt did not have DVT/PE after being started on coumadin. Per chart review, Beta2 glycoprotein and anticardiolipin positive in 2016  -Questionable history of SLE per chart review, pt had high titer dsDNA in 2021 (476) and was briefly on Plaquenil Pt denies any clinical symptoms of SLE and did not follow up further with rheumatologist.  -Now presenting with JORDAN on CKD as well as petechial confluent rash on b/l LE. Skin bx outpt showed dermatitis/connective tissue disease that may be lupus, but had negative staining  -Given history of APS, differentials for JORDAN include TMA vs lupus nephritis. Skin bx results may also be seen in dermatomyositis, however pt does not have muscle weakness       Note incomplete

## 2024-10-17 NOTE — CONSULT NOTE ADULT - SUBJECTIVE AND OBJECTIVE BOX
HEMATOLOGY ONCOLOGY CONSULT     Patient is a 53y old  Male who presents with a chief complaint of kidney biospy (16 Oct 2024 14:49)      HPI:  53 M h/o DVT/PE/IVC filter, sarcoid,  skin SLE, APLS on Coumadin, NSCLC c brain met (s/p cyberknife) last chemo 8/2024 sent in for kidney biopsy. Stopped Coumadin 2 days ago. Daily nausea c one episode of vomiting (yellow-brown) daily x 3-4 weeks. +BMs. Altered taste since chemo 8/2024. +good urine output.  (14 Oct 2024 12:38)     Hematology consulted for concern of hemolysis and renal limited TMA.      ROS:  Negative except for:    PAST MEDICAL & SURGICAL HISTORY:  Antiphospholipid syndrome      Pulmonary embolism      Lung cancer      Sarcoidosis      Thalassemia minor      Calculus of gallbladder without cholecystitis without obstruction      S/P IVC filter      S/P shoulder surgery          SOCIAL HISTORY:    FAMILY HISTORY:  FH: HTN (hypertension) (Father)    FH: thyroid disease (Mother, Sibling)        MEDICATIONS  (STANDING):  amLODIPine   Tablet 5 milliGRAM(s) Oral once  amLODIPine   Tablet 10 milliGRAM(s) Oral daily  AQUAPHOR (petrolatum Ointment) 1 Application(s) Topical three times a day  carvedilol 6.25 milliGRAM(s) Oral every 12 hours  cloNIDine 0.1 milliGRAM(s) Oral three times a day  desmopressin Injectable 30 MICROGram(s) IV Push once  LORazepam     Tablet 1 milliGRAM(s) Oral once  oxyCODONE    IR 5 milliGRAM(s) Oral every 6 hours  senna 2 Tablet(s) Oral at bedtime  sodium chloride 0.9%. 1000 milliLiter(s) (75 mL/Hr) IV Continuous <Continuous>  triamcinolone 0.1% Cream 1 Application(s) Topical two times a day    MEDICATIONS  (PRN):  acetaminophen     Tablet .. 650 milliGRAM(s) Oral every 6 hours PRN Mild Pain (1 - 3)  ondansetron Injectable 4 milliGRAM(s) IV Push every 8 hours PRN Nausea and/or Vomiting  polyethylene glycol 3350 17 Gram(s) Oral daily PRN Constipation      Allergies    No Known Drug Allergies  Animal dander-Cat (Other)    Intolerances        Vital Signs Last 24 Hrs  T(C): 36.7 (17 Oct 2024 09:00), Max: 37 (17 Oct 2024 04:59)  T(F): 98.1 (17 Oct 2024 09:00), Max: 98.6 (17 Oct 2024 04:59)  HR: 71 (17 Oct 2024 09:00) (71 - 86)  BP: 134/78 (17 Oct 2024 09:00) (124/72 - 158/80)  BP(mean): --  RR: 18 (17 Oct 2024 09:00) (18 - 18)  SpO2: 98% (17 Oct 2024 09:00) (97% - 98%)    Parameters below as of 17 Oct 2024 09:00  Patient On (Oxygen Delivery Method): room air        PHYSICAL EXAM  General: adult in NAD  HEENT: clear oropharynx, anicteric sclera, pink conjunctiva  Neck: supple  CV: normal S1/S2 with no murmur rubs or gallops  Lungs: positive air movement b/l ant lungs,clear to auscultation, no wheezes, no rales  Abdomen: soft non-tender non-distended, no hepatosplenomegaly  Ext: no clubbing cyanosis or edema  Skin: no rashes and no petechiae  Neuro: alert and oriented X 4, no focal deficits      10-16-24 @ 07:01  -  10-17-24 @ 07:00  --------------------------------------------------------  IN: 150 mL / OUT: 0 mL / NET: 150 mL      LABS:                          7.8    5.44  )-----------( 38       ( 17 Oct 2024 03:13 )             23.6         Mean Cell Volume : 77.1 fl  Mean Cell Hemoglobin : 25.5 pg  Mean Cell Hemoglobin Concentration : 33.1 gm/dL  Auto Neutrophil # : x  Auto Lymphocyte # : x  Auto Monocyte # : x  Auto Eosinophil # : x  Auto Basophil # : x  Auto Neutrophil % : x  Auto Lymphocyte % : x  Auto Monocyte % : x  Auto Eosinophil % : x  Auto Basophil % : x      10-17    135  |  95[L]  |  35[H]  ----------------------------<  106[H]  3.7   |  21[L]  |  3.00[H]    Ca    10.1      17 Oct 2024 07:19  Phos  3.7     10-16  Mg     1.8     10-16    TPro  8.6[H]  /  Alb  4.7  /  TBili  0.6  /  DBili  x   /  AST  73[H]  /  ALT  91[H]  /  AlkPhos  77  10-17      PT/INR - ( 16 Oct 2024 07:11 )   PT: 18.1 sec;   INR: 1.58 ratio         PTT - ( 16 Oct 2024 07:11 )  PTT:>200.0 sec                BLOOD SMEAR INTERPRETATION:       RADIOLOGY & ADDITIONAL STUDIES:       HEMATOLOGY ONCOLOGY CONSULT     Patient is a 53y old  Male who presents with a chief complaint of kidney biospy (16 Oct 2024 14:49)      HPI:  53 M h/o DVT/PE/IVC filter, sarcoid,  skin SLE, APLS on Coumadin, NSCLC c brain met (s/p cyberknife) last chemo 8/2024 sent in for kidney biopsy. Stopped Coumadin 2 days ago. Daily nausea c one episode of vomiting (yellow-brown) daily x 3-4 weeks. +BMs. Altered taste since chemo 8/2024. +good urine output.  (14 Oct 2024 12:38)     Hematology consulted for concern of hemolysis and renal limited TMA.      ROS:  Negative except for:    PAST MEDICAL & SURGICAL HISTORY:  Antiphospholipid syndrome      Pulmonary embolism      Lung cancer      Sarcoidosis      Thalassemia minor      Calculus of gallbladder without cholecystitis without obstruction      S/P IVC filter      S/P shoulder surgery          SOCIAL HISTORY:    FAMILY HISTORY:  FH: HTN (hypertension) (Father)    FH: thyroid disease (Mother, Sibling)        MEDICATIONS  (STANDING):  amLODIPine   Tablet 5 milliGRAM(s) Oral once  amLODIPine   Tablet 10 milliGRAM(s) Oral daily  AQUAPHOR (petrolatum Ointment) 1 Application(s) Topical three times a day  carvedilol 6.25 milliGRAM(s) Oral every 12 hours  cloNIDine 0.1 milliGRAM(s) Oral three times a day  desmopressin Injectable 30 MICROGram(s) IV Push once  LORazepam     Tablet 1 milliGRAM(s) Oral once  oxyCODONE    IR 5 milliGRAM(s) Oral every 6 hours  senna 2 Tablet(s) Oral at bedtime  sodium chloride 0.9%. 1000 milliLiter(s) (75 mL/Hr) IV Continuous <Continuous>  triamcinolone 0.1% Cream 1 Application(s) Topical two times a day    MEDICATIONS  (PRN):  acetaminophen     Tablet .. 650 milliGRAM(s) Oral every 6 hours PRN Mild Pain (1 - 3)  ondansetron Injectable 4 milliGRAM(s) IV Push every 8 hours PRN Nausea and/or Vomiting  polyethylene glycol 3350 17 Gram(s) Oral daily PRN Constipation      Allergies    No Known Drug Allergies  Animal dander-Cat (Other)    Intolerances        Vital Signs Last 24 Hrs  T(C): 36.7 (17 Oct 2024 09:00), Max: 37 (17 Oct 2024 04:59)  T(F): 98.1 (17 Oct 2024 09:00), Max: 98.6 (17 Oct 2024 04:59)  HR: 71 (17 Oct 2024 09:00) (71 - 86)  BP: 134/78 (17 Oct 2024 09:00) (124/72 - 158/80)  BP(mean): --  RR: 18 (17 Oct 2024 09:00) (18 - 18)  SpO2: 98% (17 Oct 2024 09:00) (97% - 98%)    Parameters below as of 17 Oct 2024 09:00  Patient On (Oxygen Delivery Method): room air        PHYSICAL EXAM  General: adult in NAD  HEENT: clear oropharynx, anicteric sclera, pink conjunctiva  Neck: supple  CV: normal S1/S2 with no murmur rubs or gallops  Lungs: positive air movement b/l ant lungs,clear to auscultation, no wheezes, no rales  Abdomen: soft non-tender non-distended, no hepatosplenomegaly  Ext: no clubbing cyanosis or edema  Skin: scaly rash b/l LE  Neuro: alert and oriented X 4, no focal deficits      10-16-24 @ 07:01  -  10-17-24 @ 07:00  --------------------------------------------------------  IN: 150 mL / OUT: 0 mL / NET: 150 mL      LABS:                          7.8    5.44  )-----------( 38       ( 17 Oct 2024 03:13 )             23.6         Mean Cell Volume : 77.1 fl  Mean Cell Hemoglobin : 25.5 pg  Mean Cell Hemoglobin Concentration : 33.1 gm/dL  Auto Neutrophil # : x  Auto Lymphocyte # : x  Auto Monocyte # : x  Auto Eosinophil # : x  Auto Basophil # : x  Auto Neutrophil % : x  Auto Lymphocyte % : x  Auto Monocyte % : x  Auto Eosinophil % : x  Auto Basophil % : x      10-17    135  |  95[L]  |  35[H]  ----------------------------<  106[H]  3.7   |  21[L]  |  3.00[H]    Ca    10.1      17 Oct 2024 07:19  Phos  3.7     10-16  Mg     1.8     10-16    TPro  8.6[H]  /  Alb  4.7  /  TBili  0.6  /  DBili  x   /  AST  73[H]  /  ALT  91[H]  /  AlkPhos  77  10-17      PT/INR - ( 16 Oct 2024 07:11 )   PT: 18.1 sec;   INR: 1.58 ratio         PTT - ( 16 Oct 2024 07:11 )  PTT:>200.0 sec                BLOOD SMEAR INTERPRETATION:       RADIOLOGY & ADDITIONAL STUDIES:

## 2024-10-17 NOTE — PRE PROCEDURE NOTE - PRE PROCEDURE EVALUATION
Interventional Radiology    HPI: 53y Male with PMHx HTN, anticardiolipin antibody, lupus antibody, DVT/PE currently on Coumadin and status post IVC filter, sarcoidosis, NSCLC (adeno) on oral chemo (last chemoinfusion 2 months ago) p/w elevated creatinine. Nephrology following for JORDAN on CKD. IR consulted for renal biopsy.     Hep gtt off at 0830    Allergies: No Known Drug Allergies  Animal dander-Cat (Other)    Medications (Abx/Cardiac/Anticoagulation/Blood Products)    amLODIPine   Tablet: 10 milliGRAM(s) Oral (10-15 @ 18:01)  amLODIPine   Tablet: 10 milliGRAM(s) Oral (10-17 @ 06:06)  carvedilol: 3.125 milliGRAM(s) Oral (10-15 @ 12:40)  carvedilol: 6.25 milliGRAM(s) Oral (10-17 @ 06:06)  cloNIDine: 0.1 milliGRAM(s) Oral (10-16 @ 05:22)  cloNIDine: 0.1 milliGRAM(s) Oral (10-17 @ 06:06)  heparin  Infusion: 12.5 mL/Hr IV Continuous (10-17 @ 04:27)    Data:    T(C): 36.7  HR: 71  BP: 134/78  RR: 18  SpO2: 98%    Exam  General: No acute distress, A&Ox3  Chest: Non labored breathing    -WBC 5.44 / HgB 7.8 / Hct 23.6 / Plt 38  -Na 135 / Cl 95 / BUN 35 / Glucose 106  -K 3.7 / CO2 21 / Cr 3.00  -ALT 91 / Alk Phos 77 / T.Bili 0.6  -INR1.58    Imaging:     Plan: 53y Male presents for Renal biopsy  -Risks/Benefits/alternatives explained with the patient and/or healthcare proxy and witnessed informed consent obtained.   -s/p 1 U plt  -Pending repeat cbc and INR   Interventional Radiology    HPI: 53y Male with PMHx HTN, anticardiolipin antibody, lupus antibody, DVT/PE currently on Coumadin and status post IVC filter, sarcoidosis, NSCLC (adeno) on oral chemo (last chemoinfusion 2 months ago) p/w elevated creatinine. Nephrology following for JORDAN on CKD. IR consulted for renal biopsy.     Hep gtt off at 0830    Allergies: No Known Drug Allergies  Animal dander-Cat (Other)    Medications (Abx/Cardiac/Anticoagulation/Blood Products)    amLODIPine   Tablet: 10 milliGRAM(s) Oral (10-15 @ 18:01)  amLODIPine   Tablet: 10 milliGRAM(s) Oral (10-17 @ 06:06)  carvedilol: 3.125 milliGRAM(s) Oral (10-15 @ 12:40)  carvedilol: 6.25 milliGRAM(s) Oral (10-17 @ 06:06)  cloNIDine: 0.1 milliGRAM(s) Oral (10-16 @ 05:22)  cloNIDine: 0.1 milliGRAM(s) Oral (10-17 @ 06:06)  heparin  Infusion: 12.5 mL/Hr IV Continuous (10-17 @ 04:27)    Data:    T(C): 36.7  HR: 71  BP: 134/78  RR: 18  SpO2: 98%    Exam  General: No acute distress, A&Ox3  Chest: Non labored breathing    -WBC 5.44 / HgB 7.8 / Hct 23.6 / Plt 38  -Na 135 / Cl 95 / BUN 35 / Glucose 106  -K 3.7 / CO2 21 / Cr 3.00  -ALT 91 / Alk Phos 77 / T.Bili 0.6  -INR1.58    Imaging:     Plan: 53y Male presents for Renal biopsy  -Risks/Benefits/alternatives explained with the patient and/or healthcare proxy and witnessed informed consent obtained.   -s/p 1 U plt  -Pending repeat cbc and INR      Attestation Statement:   I fully participated in the care of this patient.  I have made amendments to the documentation where necessary, and agree with the history and plan as documented by the Student/Resident/Fellow/ACP, with changes as below:     sp 1U plt,  hepgtt held but ptt remains elevated, per hematology, in setting of APLS: " - APTT is elevated but this is not reliable since lupus anticoagulant prolongs PTT. Monitoring for sufficient time off anticoagulation for procedure can be done via anti-Xa level. "  repeat direct anti-Xa <.04  SBP now within acceptable parameters for renal biopsy

## 2024-10-17 NOTE — PROGRESS NOTE ADULT - PROBLEM SELECTOR PLAN 4
- c/w Heparin gtt   - given elevated PTT prior to starting Heparin titration is based on  anti-Xa levels per pharmacist recs   - per pharmacist  ([phone]) ;  titrate dosing to anti-Xa levels  goal of 0.4-0.6. - positive APLS panel on repeat  - at home pt is on coumadin, currently being held and transitioned to heparin gtt for renal biopsy ; on hold for procedure  - monitoring via Anti-Xa per protocol given PTT not reliable iso lupus ac (goal of 0.4-0.6.) Pharmacy ([phone])  - appreciate Rheumatology recs for workup for APLS with lupus

## 2024-10-17 NOTE — PROGRESS NOTE ADULT - PROBLEM SELECTOR PLAN 3
- c/w Amlodipine 10mg po qd  - Renal reccs appreciated ; c/w Carvedilol 6.25mg po bid   - increase Clonidine to 0.1mg po tid well comtrolled  - c/w Amlodipine 10mg po qd  -  c/w Carvedilol 6.25mg po bid   - c/w Clonidine 0.1mg po tid

## 2024-10-17 NOTE — PROGRESS NOTE ADULT - ATTENDING COMMENTS
APL  Worsening JORDAN and thrombocytopenia  SLE? vs TMA  He is very upset today about the delay of biopsy.   I discussed the complexity of his situation and this is a high risk procedure griselda if platelets are dropping. He is getting platelets now  Repeat platelets for IR biospy  CT abd negative- doppler neg for renal v thrombosis  BP better controlled   Rheum and heme following and appreciate their input  Possibly will need longer inpt management for his acute condition  Dw Dr Fleming and Med team.     Alaina Hernandes MD  Off: [phone]  contact me on teams    (After 5 pm or on weekends please page the on-call fellow/attending, can check [email or web address] for schedule. Login is melyssa olivas, schedule under [facility] medicine, psych, derm)

## 2024-10-17 NOTE — PROGRESS NOTE ADULT - SUBJECTIVE AND OBJECTIVE BOX
TIFFANY QURESHINA  [id number]    INTERVAL HPI/OVERNIGHT EVENTS: Pt seen after kidney bx    MEDICATIONS  (STANDING):  acetaminophen   IVPB .. 1000 milliGRAM(s) IV Intermittent every 6 hours  amLODIPine   Tablet 10 milliGRAM(s) Oral daily  amLODIPine   Tablet 5 milliGRAM(s) Oral once  AQUAPHOR (petrolatum Ointment) 1 Application(s) Topical three times a day  carvedilol 6.25 milliGRAM(s) Oral every 12 hours  cloNIDine 0.1 milliGRAM(s) Oral three times a day  desmopressin Injectable 30 MICROGram(s) IV Push once  LORazepam     Tablet 1 milliGRAM(s) Oral once  senna 2 Tablet(s) Oral at bedtime  sodium chloride 0.9%. 1000 milliLiter(s) (75 mL/Hr) IV Continuous <Continuous>  triamcinolone 0.1% Cream 1 Application(s) Topical two times a day    MEDICATIONS  (PRN):  acetaminophen     Tablet .. 650 milliGRAM(s) Oral every 6 hours PRN Mild Pain (1 - 3)  HYDROmorphone  Injectable 0.5 milliGRAM(s) IV Push every 4 hours PRN Severe Pain (7 - 10)  ondansetron Injectable 4 milliGRAM(s) IV Push every 8 hours PRN Nausea and/or Vomiting  polyethylene glycol 3350 17 Gram(s) Oral daily PRN Constipation      Allergies    No Known Drug Allergies  Animal dander-Cat (Other)    Intolerances        Review of Systems:   General: No fevers/chills, no fatigue  HEENT: No blurry vision, dysphagia, or odynophagia  CVS: No CP/palpitations  Resp: No SOB/wheezing  GI: +abdominal pain  MSK: No joint pain  Skin: No new rashes  Neuro: No headaches      Vital Signs Last 24 Hrs  T(C): 36.3 (17 Oct 2024 19:08), Max: 37 (17 Oct 2024 04:59)  T(F): 97.4 (17 Oct 2024 19:08), Max: 98.6 (17 Oct 2024 04:59)  HR: 70 (17 Oct 2024 19:08) (70 - 83)  BP: 113/72 (17 Oct 2024 19:08) (113/72 - 156/77)  BP(mean): 94 (17 Oct 2024 18:55) (86 - 94)  RR: 20 (17 Oct 2024 19:08) (16 - 20)  SpO2: 96% (17 Oct 2024 19:08) (96% - 99%)    Parameters below as of 17 Oct 2024 19:08  Patient On (Oxygen Delivery Method): room air        Physical Exam:  General: No apparent distress  HEENT: EOMI, MMM, no oral ulcers  CVS: +S1/S2, RRR, no murmurs/rubs/gallops  Resp: CTA b/l. No crackles/wheezing  GI: Soft, NT/ND +BS  MSK: no swelling/warmth/erythema of the joints of the UE/LE  Neuro: AAOx3  Skin: +confluent, non blanching petechial rash on b/l LE only affecting calves, not feet or thighs. hyperpigmentation on b/l UE      LABS:                        7.5    5.73  )-----------( 67       ( 17 Oct 2024 11:46 )             22.9     10-17    135  |  95[L]  |  35[H]  ----------------------------<  106[H]  3.7   |  21[L]  |  3.00[H]    Ca    10.1      17 Oct 2024 07:19  Phos  3.7     10-16  Mg     1.8     10-16    TPro  8.6[H]  /  Alb  4.7  /  TBili  0.6  /  DBili  x   /  AST  73[H]  /  ALT  91[H]  /  AlkPhos  77  10-17    PT/INR - ( 17 Oct 2024 11:46 )   PT: 16.1 sec;   INR: 1.40 ratio         PTT - ( 17 Oct 2024 11:46 )  PTT:116.8 sec  Urinalysis Basic - ( 17 Oct 2024 07:19 )    Color: x / Appearance: x / SG: x / pH: x  Gluc: 106 mg/dL / Ketone: x  / Bili: x / Urobili: x   Blood: x / Protein: x / Nitrite: x   Leuk Esterase: x / RBC: x / WBC x   Sq Epi: x / Non Sq Epi: x / Bacteria: x          RADIOLOGY & ADDITIONAL TESTS:  < from: US Duplex Kidneys (US Doppler Renal) (10.15.24 @ 09:10) >  IMPRESSION:    No evidence of renal vein thrombosis.    < end of copied text >   TIFFANY FRANCINE  [id number]    INTERVAL HPI/OVERNIGHT EVENTS: Pt seen after kidney bx, feeling well, rash improving with topicals.     MEDICATIONS  (STANDING):  acetaminophen   IVPB .. 1000 milliGRAM(s) IV Intermittent every 6 hours  amLODIPine   Tablet 10 milliGRAM(s) Oral daily  amLODIPine   Tablet 5 milliGRAM(s) Oral once  AQUAPHOR (petrolatum Ointment) 1 Application(s) Topical three times a day  carvedilol 6.25 milliGRAM(s) Oral every 12 hours  cloNIDine 0.1 milliGRAM(s) Oral three times a day  desmopressin Injectable 30 MICROGram(s) IV Push once  LORazepam     Tablet 1 milliGRAM(s) Oral once  senna 2 Tablet(s) Oral at bedtime  sodium chloride 0.9%. 1000 milliLiter(s) (75 mL/Hr) IV Continuous <Continuous>  triamcinolone 0.1% Cream 1 Application(s) Topical two times a day    MEDICATIONS  (PRN):  acetaminophen     Tablet .. 650 milliGRAM(s) Oral every 6 hours PRN Mild Pain (1 - 3)  HYDROmorphone  Injectable 0.5 milliGRAM(s) IV Push every 4 hours PRN Severe Pain (7 - 10)  ondansetron Injectable 4 milliGRAM(s) IV Push every 8 hours PRN Nausea and/or Vomiting  polyethylene glycol 3350 17 Gram(s) Oral daily PRN Constipation      Allergies    No Known Drug Allergies  Animal dander-Cat (Other)    Intolerances    Review of Systems:   General: No fevers/chills, no fatigue  HEENT: No blurry vision, dysphagia, or odynophagia  CVS: No CP/palpitations  Resp: No SOB/wheezing  GI: +abdominal pain  MSK: No joint pain  Skin: as above   Neuro: No headaches    Vital Signs Last 24 Hrs  T(C): 36.3 (17 Oct 2024 19:08), Max: 37 (17 Oct 2024 04:59)  T(F): 97.4 (17 Oct 2024 19:08), Max: 98.6 (17 Oct 2024 04:59)  HR: 70 (17 Oct 2024 19:08) (70 - 83)  BP: 113/72 (17 Oct 2024 19:08) (113/72 - 156/77)  BP(mean): 94 (17 Oct 2024 18:55) (86 - 94)  RR: 20 (17 Oct 2024 19:08) (16 - 20)  SpO2: 96% (17 Oct 2024 19:08) (96% - 99%)    Parameters below as of 17 Oct 2024 19:08  Patient On (Oxygen Delivery Method): room air    Physical Exam:  General: No apparent distress  HEENT: EOMI, MMM, no oral ulcers  CVS: +S1/S2, RRR, no murmurs/rubs/gallops  Resp: CTA b/l. No crackles/wheezing  GI: Soft, NT/ND +BS  MSK: no swelling/warmth/erythema of the joints of the UE/LE  Neuro: AAOx3  Skin: +confluent, non blanching petechial rash on b/l LE only affecting calves, not feet or thighs. hyperpigmentation on b/l UE - improving     LABS:                        7.5    5.73  )-----------( 67       ( 17 Oct 2024 11:46 )             22.9     10-17    135  |  95[L]  |  35[H]  ----------------------------<  106[H]  3.7   |  21[L]  |  3.00[H]    Ca    10.1      17 Oct 2024 07:19  Phos  3.7     10-16  Mg     1.8     10-16    TPro  8.6[H]  /  Alb  4.7  /  TBili  0.6  /  DBili  x   /  AST  73[H]  /  ALT  91[H]  /  AlkPhos  77  10-17    PT/INR - ( 17 Oct 2024 11:46 )   PT: 16.1 sec;   INR: 1.40 ratio         PTT - ( 17 Oct 2024 11:46 )  PTT:116.8 sec  Urinalysis Basic - ( 17 Oct 2024 07:19 )    Color: x / Appearance: x / SG: x / pH: x  Gluc: 106 mg/dL / Ketone: x  / Bili: x / Urobili: x   Blood: x / Protein: x / Nitrite: x   Leuk Esterase: x / RBC: x / WBC x   Sq Epi: x / Non Sq Epi: x / Bacteria: x    RADIOLOGY & ADDITIONAL TESTS:  < from: US Duplex Kidneys (US Doppler Renal) (10.15.24 @ 09:10) >  IMPRESSION:    No evidence of renal vein thrombosis.    < end of copied text >

## 2024-10-17 NOTE — CONSULT NOTE ADULT - ASSESSMENT
53 M with thalassemia minor, sarcoidosis, APLS on Coumadin, metastatic NSCLC (last chemo 8/2024) presenting with worsening rash in his bilateral upper and lower extremities. He states that he had a biopsy of these lesions done that was consistent with lupus dermatitis. He has a history of metastatic NSCLC (diagnosed in 2021, follows with Dr. Hart) with EGFR L858F mutation, along with multiple brain metastases. He was started on Tagrisso, with subsequent scans showing stable disease/partial response. He underwent gamma knife radiation for progression of a brain metastasis in 10/2023. He had a repeat right supraclavicular LN biopsy in 4/2024 that was concerning for Tagrisso resistance so Carbo/Alimta was added to Tagrisso. Course was complicated by pancytopenia, requiring multiple transfusions and dermatitis, that was consistent with lupus dermitis. Over the past 2 weeks, he reports ongoing nausea, worsening dermatitis, HTN, and worsening renal function concerning for lupus nephritis. Hematology consulted for peripheral smear review due to concern for renal TMA and thrombocytopenia.     #Thrombocytopenia   #Anemia  - CBC 10/17: Hb 7.8 (MCV 78.8), Plt 38  - baseline Hb~7-10, Plt  fluctuating within past year  - Tbili 0.6, lower suspicion for hemolysis  - hepatitis neg, HIV neg  - Anemia likely 2/2 CKD and thalassemia minor  - appreciate rheum recs for c/f lupus nephritis    #APLS  - positive APLS panel on repeat  -at home pt is on coumadin, currently being held and transitioned to heparin gtt fro renal biopsy  -monitoring via Anti-Xa per protocol given PTT not reliable iso lupus ac    Recommendations:  [ ] check LDH, haptoglobin, reticulocyte count  - peripheral smear to be reviewed by hematology  - transfuse for Hb<7 and Plt<50 given impending renal biopsy   - appreciate rheumatology recs for workup for APLS with lupus    Recommendations are preliminary. To be seen and discussed with attending.     Melani Blake MD PGY-4  Hematology/Oncology Fellow  Available on MS TEAMS  Pagers: CHARLEY [phone]; [facility] [phone]  53 M with thalassemia minor, sarcoidosis, APLS on Coumadin, metastatic NSCLC (last chemo 8/2024) presenting with worsening rash in his bilateral upper and lower extremities. He states that he had a biopsy of these lesions done that was consistent with lupus dermatitis. He has a history of metastatic NSCLC (diagnosed in 2021, follows with Dr. Hart) with EGFR L858F mutation, along with multiple brain metastases. He was started on Tagrisso, with subsequent scans showing stable disease/partial response. He underwent gamma knife radiation for progression of a brain metastasis in 10/2023. He had a repeat right supraclavicular LN biopsy in 4/2024 that was concerning for Tagrisso resistance so Carbo/Alimta was added to Tagrisso. Course was complicated by pancytopenia, requiring multiple transfusions and dermatitis, that was consistent with lupus dermitis. Over the past 2 weeks, he reports ongoing nausea, worsening dermatitis, HTN, and worsening renal function concerning for lupus nephritis. Hematology consulted for peripheral smear review due to concern for renal TMA and thrombocytopenia.     #NSCLC  #Thrombocytopenia   #Anemia  - CBC 10/17: Hb 7.8 (MCV 78.8), Plt 38  - baseline Hb~7-10, Plt  fluctuating within past year. -Pt had been on carboplatin+pemetrexed+ tagrisso--carbo/pem held since august for thrombocytopenia but pt had still been on tagrisso  - Tbili 0.6, lower suspicion for hemolysis  - hepatitis neg, HIV neg  - Anemia likely 2/2 CKD and thalassemia minor  - Thrombocytopenia ddx is broad including chemo/tagrisso AE, BM suppression iso lupus flare, renal TMA      #APLS  - positive APLS panel on repeat  -at home pt is on coumadin, currently being held and transitioned to heparin gtt fro renal biopsy  -monitoring via Anti-Xa per protocol given PTT not reliable iso lupus ac    Recommendations:  [ ] check LDH, haptoglobin, reticulocyte count  - peripheral smear to be reviewed by hematology  - transfuse for Hb<7 and Plt<50 given impending renal biopsy   - appreciate rheumatology recs for workup for APLS with lupus  - rest of care per oncology team, primary onc Dr Hart    Recommendations are preliminary. To be seen and discussed with attending.     Melani Blake MD PGY-4  Hematology/Oncology Fellow  Available on MS TEAMS  Pagers: [facility] [phone]; [facility] [phone]  53 M with thalassemia minor, sarcoidosis, APLS on Coumadin, metastatic NSCLC (last chemo 8/2024) presenting with worsening rash in his bilateral upper and lower extremities. He states that he had a biopsy of these lesions done that was consistent with lupus dermatitis. He has a history of metastatic NSCLC (diagnosed in 2021, follows with Dr. Hart) with EGFR L858F mutation, along with multiple brain metastases. He was started on Tagrisso, with subsequent scans showing stable disease/partial response. He underwent gamma knife radiation for progression of a brain metastasis in 10/2023. He had a repeat right supraclavicular LN biopsy in 4/2024 that was concerning for Tagrisso resistance so Carbo/Alimta was added to Tagrisso. Course was complicated by pancytopenia, requiring multiple transfusions and dermatitis, that was consistent with lupus dermitis. Over the past 2 weeks, he reports ongoing nausea, worsening dermatitis, HTN, and worsening renal function concerning for lupus nephritis. Hematology consulted for peripheral smear review due to concern for renal TMA and thrombocytopenia.     #NSCLC  #Thrombocytopenia   #Anemia  - CBC 10/17: Hb 7.8 (MCV 78.8), Plt 38  - baseline Hb~7-10, Plt  fluctuating within past year. -Pt had been on carboplatin+pemetrexed+ tagrisso--carbo/pem held since august for thrombocytopenia but pt had still been on tagrisso  - Tbili 0.6, lower suspicion for hemolysis  - hepatitis neg, HIV neg  - Anemia likely 2/2 CKD and thalassemia minor  - Thrombocytopenia ddx is broad including chemo/tagrisso AE, BM suppression iso lupus flare, renal TMA      #APLS  - positive APLS panel on repeat  -at home pt is on coumadin, currently being held and transitioned to heparin gtt fro renal biopsy  -monitoring via Anti-Xa per protocol given PTT not reliable iso lupus ac    Recommendations:  [ ] check LDH, haptoglobin, reticulocyte count  - peripheral smear 10/17 showing 0-2 schistocytes/hpf, rare elliptocytes. No evidence of ongoing hemolysis.  - transfuse for Hb<7 and Plt<50 given impending renal biopsy   - appreciate rheumatology recs for workup for APLS with lupus  - rest of care per oncology team, primary onc Dr Hart    Recommendations are preliminary. To be seen and discussed with attending.     Melani Blake MD PGY-4  Hematology/Oncology Fellow  Available on MS TEAMS  Pagers: CHARLEY [phone]; [facility] [phone]  53 M with thalassemia minor, sarcoidosis, APLS on Coumadin, metastatic NSCLC (last chemo 8/2024) presenting with worsening rash in his bilateral upper and lower extremities. He states that he had a biopsy of these lesions done that was consistent with lupus dermatitis. He has a history of metastatic NSCLC (diagnosed in 2021, follows with Dr. Hart) with EGFR L858F mutation, along with multiple brain metastases. He was started on Tagrisso, with subsequent scans showing stable disease/partial response. He underwent gamma knife radiation for progression of a brain metastasis in 10/2023. He had a repeat right supraclavicular LN biopsy in 4/2024 that was concerning for Tagrisso resistance so Carbo/Alimta was added to Tagrisso. Course was complicated by pancytopenia, requiring multiple transfusions and dermatitis, that was consistent with lupus dermitis. Over the past 2 weeks, he reports ongoing nausea, worsening dermatitis, HTN, and worsening renal function concerning for lupus nephritis. Hematology consulted for peripheral smear review due to concern for renal TMA and thrombocytopenia.     #NSCLC  #Thrombocytopenia   #Anemia  - CBC 10/17: Hb 7.8 (MCV 78.8), Plt 38  - baseline Hb~7-10, Plt  fluctuating within past year. -Pt had been on carboplatin+pemetrexed+ tagrisso--carbo/pem held since august for thrombocytopenia but pt had still been on tagrisso  - Tbili 0.6, retic 0.6, lower suspicion for hemolysis  - hepatitis neg, HIV neg  - Anemia likely 2/2 CKD and thalassemia minor  - peripheral smear 10/17 showing 0-2 schistocytes/hpf, few target cells, rare elliptocytes. No evidence of ongoing hemolysis.  - Thrombocytopenia ddx is broad including chemo/tagrisso AE, BM suppression iso lupus flare, renal TMA. No evidence of overt hemolysis.    #APLS  - positive APLS panel on repeat  -at home pt is on coumadin, currently being held and transitioned to heparin gtt fro renal biopsy  -monitoring via Anti-Xa per protocol given PTT not reliable iso lupus ac    Recommendations:  [ ] check LDH, haptoglobin  - transfuse for Hb<7 and Plt<50 given impending renal biopsy   - f/u renal biopsy results  - appreciate rheumatology recs for workup for APLS with lupus and any treatment for APLS  - rest of care per oncology team, primary onc Dr Hart    Recommendations are preliminary. To be seen and discussed with attending.     Melani Blake MD PGY-4  Hematology/Oncology Fellow  Available on MS TEAMS  Pagers: [place]QU[place] [phone]; [facility] [phone]  53 M with thalassemia minor, sarcoidosis, APLS on Coumadin, metastatic NSCLC (last chemo 8/2024) presenting with worsening rash in his bilateral upper and lower extremities. He states that he had a biopsy of these lesions done that was consistent with lupus dermatitis. He has a history of metastatic NSCLC (diagnosed in 2021, follows with Dr. Hart) with EGFR L858F mutation, along with multiple brain metastases. He was started on Tagrisso, with subsequent scans showing stable disease/partial response. He underwent gamma knife radiation for progression of a brain metastasis in 10/2023. He had a repeat right supraclavicular LN biopsy in 4/2024 that was concerning for Tagrisso resistance so Carbo/Alimta was added to Tagrisso. Course was complicated by pancytopenia, requiring multiple transfusions and dermatitis, that was consistent with lupus dermitis. Over the past 2 weeks, he reports ongoing nausea, worsening dermatitis, HTN, and worsening renal function concerning for lupus nephritis. Hematology consulted for peripheral smear review due to concern for renal TMA and thrombocytopenia.     #NSCLC  #Thrombocytopenia   #Anemia  - CBC 10/17: Hb 7.8 (MCV 78.8), Plt 38  - baseline Hb~7-10, Plt  fluctuating within past year. -Pt had been on carboplatin+pemetrexed+ tagrisso--carbo/pem held since august for thrombocytopenia but pt had still been on tagrisso  - Tbili 0.6, retic 0.6, lower suspicion for hemolysis  - hepatitis neg, HIV neg  - Anemia likely 2/2 CKD and thalassemia minor  - peripheral smear 10/17 showing 0-2 schistocytes/hpf, few target cells, rare elliptocytes. No evidence of ongoing hemolysis.  - Thrombocytopenia ddx is broad including chemo/tagrisso AE, BM suppression iso lupus flare, renal TMA. No evidence of overt hemolysis.    #APLS  - positive APLS panel on repeat  -at home pt is on coumadin, currently being held and transitioned to heparin gtt fro renal biopsy  -monitoring via Anti-Xa per protocol  - APTT is elevated but this is not reliable since lupus anticoagulant prolongs PTT. Monitoring for sufficient time off anticoagulation for procedure can be done via anti-Xa level. Last Anti-Xa level 0.28 subtherapeutic.     Recommendations:  - check LDH, haptoglobin  - transfuse for Hb<7 and Plt<50 given impending renal biopsy   - f/u renal biopsy results  - appreciate rheumatology recs for workup for APLS with lupus and any treatment for APLS  - rest of care per oncology team, primary onc Dr Hart    Seen and discussed with attending Dr Ariel Blake MD PGY-4  Hematology/Oncology Fellow  Available on MS TEAMS  Pagers: [facility] [phone]; [facility] [phone]

## 2024-10-17 NOTE — CHART NOTE - NSCHARTNOTEFT_GEN_A_CORE
Medicine PA Note     TIFFANY ESCAMILLA  MRN-[id number]  Allergies    No Known Drug Allergies  Animal dander-Cat (Other)    Intolerances      Notified by RN pt experiencing severe L flank pain. Pt is s/p L renal biopsy today 10/17 w/ IR. Pt seen and examined at bedside, appears to be visibly in pain. Pt states they are experiencing severe pain in their left lower back where the biopsy was done and that it is radiating to the front of their abdomen on the left lower side. Pt states the IV dilaudid he has received for pain has not helped at all. Pt denies headache, chest pain, sob, n/v/d, weakness, dizziness.    Vital Signs Last 24 Hrs  T(C): 37.3 (10-17-24 @ 19:20), Max: 37.3 (10-17-24 @ 19:20)  T(F): 99.2 (10-17-24 @ 19:20), Max: 99.2 (10-17-24 @ 19:20)  HR: 75 (10-17-24 @ 19:20) (70 - 83)  BP: 132/70 (10-17-24 @ 21:24) (113/72 - 152/92)  BP(mean): 94 (10-17-24 @ 18:55) (86 - 94)  RR: 18 (10-17-24 @ 19:20) (16 - 20)  SpO2: 95% (10-17-24 @ 19:20) (95% - 99%)                        7.5    5.73  )-----------( 67       ( 17 Oct 2024 11:46 )             22.9     10-17    135  |  95[L]  |  35[H]  ----------------------------<  106[H]  3.7   |  21[L]  |  3.00[H]    Ca    10.1      17 Oct 2024 07:19  Phos  3.7     10-16  Mg     1.8     10-16    TPro  8.6[H]  /  Alb  4.7  /  TBili  0.6  /  DBili  x   /  AST  73[H]  /  ALT  91[H]  /  AlkPhos  77  10-17    PT/INR - ( 17 Oct 2024 11:46 )   PT: 16.1 sec;   INR: 1.40 ratio         PTT - ( 17 Oct 2024 11:46 )  PTT:116.8 sec      PHYSICAL EXAM:  GENERAL: NAD, well-developed  CHEST/LUNG: Clear to auscultation bilaterally; No wheezes, rhonchi or rales appreciated  HEART: Regular rate and rhythm; No murmurs, rubs, or gallops  ABDOMEN: Soft, Nontender, Nondistended; TTP along LLQ; Bowel sounds present. No rebound, or guarding noted.  BACK: TTP along L flank   EXTREMITIES:  2+ Peripheral Pulses, No clubbing, cyanosis, or edema  NEUROLOGY: CN 2-12 grossly intact, Muscle strength 5/5 in all extremities and sensation intact in all extremities.      Assessment/Plan: HPI:  53 M h/o DVT/PE/IVC filter, sarcoid,  skin SLE, APLS on Coumadin, NSCLC c brain met (s/p cyberknife) last chemo 8/2024 sent in for kidney biopsy. Stopped Coumadin 2 days ago. Daily nausea c one episode of vomiting (yellow-brown) daily x 3-4 weeks. +BMs. Altered taste since chemo 8/2024. +good urine output.  (14 Oct 2024 12:38)    Now p/w severe L flank pain.     #R/o RP bleed   > VS hemodynamically stable  > Discussed w/ overnight hospitalist -- recommending STAT CBC and CT A/P to r/o RP bleed s/p renal biopsy; can give additional dilaudid 1 mg IVP for pain   > Will endorse to AM team, attending to follow    Meng Centeno PA-C,   Department of Medicine Medicine PA Note     TIFFANY ESCAMILLA  MRN-[id number]  Allergies    No Known Drug Allergies  Animal dander-Cat (Other)    Intolerances      Notified by RN pt experiencing severe L flank pain. Pt is s/p L renal biopsy today 10/17 w/ IR. Pt seen and examined at bedside, appears to be visibly in pain. Pt states they are experiencing severe pain in their left lower back where the biopsy was done and that it is radiating to the front of their abdomen on the left lower side. Pt states the IV dilaudid he has received for pain has not helped at all. Pt denies headache, chest pain, sob, n/v/d, weakness, dizziness.    Vital Signs Last 24 Hrs  T(C): 37.3 (10-17-24 @ 19:20), Max: 37.3 (10-17-24 @ 19:20)  T(F): 99.2 (10-17-24 @ 19:20), Max: 99.2 (10-17-24 @ 19:20)  HR: 75 (10-17-24 @ 19:20) (70 - 83)  BP: 132/70 (10-17-24 @ 21:24) (113/72 - 152/92)  BP(mean): 94 (10-17-24 @ 18:55) (86 - 94)  RR: 18 (10-17-24 @ 19:20) (16 - 20)  SpO2: 95% (10-17-24 @ 19:20) (95% - 99%)                        7.5    5.73  )-----------( 67       ( 17 Oct 2024 11:46 )             22.9     10-17    135  |  95[L]  |  35[H]  ----------------------------<  106[H]  3.7   |  21[L]  |  3.00[H]    Ca    10.1      17 Oct 2024 07:19  Phos  3.7     10-16  Mg     1.8     10-16    TPro  8.6[H]  /  Alb  4.7  /  TBili  0.6  /  DBili  x   /  AST  73[H]  /  ALT  91[H]  /  AlkPhos  77  10-17    PT/INR - ( 17 Oct 2024 11:46 )   PT: 16.1 sec;   INR: 1.40 ratio         PTT - ( 17 Oct 2024 11:46 )  PTT:116.8 sec      PHYSICAL EXAM:  GENERAL: NAD, well-developed  CHEST/LUNG: Clear to auscultation bilaterally; No wheezes, rhonchi or rales appreciated  HEART: Regular rate and rhythm; No murmurs, rubs, or gallops  ABDOMEN: Soft, Nontender, Nondistended; TTP along LLQ; Bowel sounds present. No rebound, or guarding noted.  BACK: TTP along L flank   EXTREMITIES:  2+ Peripheral Pulses, No clubbing, cyanosis, or edema  NEUROLOGY: CN 2-12 grossly intact, Muscle strength 5/5 in all extremities and sensation intact in all extremities.      Assessment/Plan: HPI:  53 M h/o DVT/PE/IVC filter, sarcoid,  skin SLE, APLS on Coumadin, NSCLC c brain met (s/p cyberknife) last chemo 8/2024 sent in for kidney biopsy. Stopped Coumadin 2 days ago. Daily nausea c one episode of vomiting (yellow-brown) daily x 3-4 weeks. +BMs. Altered taste since chemo 8/2024. +good urine output.  (14 Oct 2024 12:38)    Now p/w severe L flank pain.     #R/o RP bleed   > VS hemodynamically stable  > Discussed w/ overnight hospitalist -- recommending STAT CBC and CT A/P to r/o RP bleed s/p renal biopsy; can give additional dilaudid 1 mg IVP for pain   > Will endorse to AM team, attending to follow    Meng Centeno PA-C,   Department of Medicine      ADDENDUM: STAT CBC w/ H/H 7.0/20.7. Spoke to overnight hospitalist; recommending transfusion of 1U PRBCs and to recheck CBC post-transfusion. 1U PRBCs ordered. Will f/u repeat CBC. Medicine PA Note     TIFFANY ESCAMILLA  MRN-[id number]  Allergies    No Known Drug Allergies  Animal dander-Cat (Other)    Intolerances      Notified by RN pt experiencing severe L flank pain. Pt is s/p L renal biopsy today 10/17 w/ IR. Pt seen and examined at bedside, appears to be visibly in pain. Pt states they are experiencing severe pain in their left lower back where the biopsy was done and that it is radiating to the front of their abdomen on the left lower side. Pt states the IV dilaudid he has received for pain has not helped at all. Pt denies headache, chest pain, sob, n/v/d, weakness, dizziness.    Vital Signs Last 24 Hrs  T(C): 37.3 (10-17-24 @ 19:20), Max: 37.3 (10-17-24 @ 19:20)  T(F): 99.2 (10-17-24 @ 19:20), Max: 99.2 (10-17-24 @ 19:20)  HR: 75 (10-17-24 @ 19:20) (70 - 83)  BP: 132/70 (10-17-24 @ 21:24) (113/72 - 152/92)  BP(mean): 94 (10-17-24 @ 18:55) (86 - 94)  RR: 18 (10-17-24 @ 19:20) (16 - 20)  SpO2: 95% (10-17-24 @ 19:20) (95% - 99%)                        7.5    5.73  )-----------( 67       ( 17 Oct 2024 11:46 )             22.9     10-17    135  |  95[L]  |  35[H]  ----------------------------<  106[H]  3.7   |  21[L]  |  3.00[H]    Ca    10.1      17 Oct 2024 07:19  Phos  3.7     10-16  Mg     1.8     10-16    TPro  8.6[H]  /  Alb  4.7  /  TBili  0.6  /  DBili  x   /  AST  73[H]  /  ALT  91[H]  /  AlkPhos  77  10-17    PT/INR - ( 17 Oct 2024 11:46 )   PT: 16.1 sec;   INR: 1.40 ratio         PTT - ( 17 Oct 2024 11:46 )  PTT:116.8 sec      PHYSICAL EXAM:  GENERAL: NAD, well-developed  CHEST/LUNG: Clear to auscultation bilaterally; No wheezes, rhonchi or rales appreciated  HEART: Regular rate and rhythm; No murmurs, rubs, or gallops  ABDOMEN: Soft, Nontender, Nondistended; TTP along LLQ; Bowel sounds present. No rebound, or guarding noted.  BACK: TTP along L flank   EXTREMITIES:  2+ Peripheral Pulses, No clubbing, cyanosis, or edema  NEUROLOGY: CN 2-12 grossly intact, Muscle strength 5/5 in all extremities and sensation intact in all extremities.      Assessment/Plan: HPI:  53 M h/o DVT/PE/IVC filter, sarcoid,  skin SLE, APLS on Coumadin, NSCLC c brain met (s/p cyberknife) last chemo 8/2024 sent in for kidney biopsy. Stopped Coumadin 2 days ago. Daily nausea c one episode of vomiting (yellow-brown) daily x 3-4 weeks. +BMs. Altered taste since chemo 8/2024. +good urine output.  (14 Oct 2024 12:38)    Now p/w severe L flank pain.     #R/o RP bleed   > VS hemodynamically stable  > Discussed w/ overnight hospitalist -- recommending STAT CBC and CT A/P to r/o RP bleed s/p renal biopsy; can give additional dilaudid 1 mg IVP for pain   > Will endorse to AM team, attending to follow    Meng Centeno PA-C,   Department of Medicine      ADDENDUM: STAT CBC w/ H/H 7.0/20.7. Spoke to overnight hospitalist; recommending transfusion of 1U PRBCs and to recheck CBC post-transfusion. 1U PRBCs ordered. Will f/u repeat CBC.  05:39 -- CT Abdomen & Pelvis done; per radiologist, pt w/ new L renal subcapsular hematoma w/o evidence of RP bleed. IR provider on call contacted as per overnight hospitalist's recommendations; states nothing acutely to do at this time as pt is currently hemodynamically stable. States to place an IR consult so team can assess in AM. Will continue to monitor pt closely.

## 2024-10-17 NOTE — PROGRESS NOTE ADULT - PROBLEM SELECTOR PLAN 5
- Pt with advanced NSCLC with progression on single agent Tagrisso   - had been on combination of Tagrisso and carboplatin/ Alimta with last treatment few months ago  - Stopped due to low blood counts (Grade 4 thrombocytopenia).   - per Heme -Onc ; If any bleeding after biopsy: can give platelets x1  - Monitor H/H - Pt with advanced NSCLC with progression on single agent Tagrisso   - had been on combination of Tagrisso and carboplatin/ Alimta with last treatment few months ago  - Stopped due to low blood counts (Grade 4 thrombocytopenia).   - Heme -Onc following ; reccs appreciated  - giving 1 unit platelets today ; goal >50  - peripheral smear 10/17 showing 0-2 schistocytes/hpf, rare elliptocytes. No evidence of ongoing hemolysis.  - transfuse for Hb<7 and Plt<50 given impending renal biopsy   - Monitor H/H

## 2024-10-17 NOTE — PROGRESS NOTE ADULT - ASSESSMENT
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 Rheumatology consulted to evaluate for SLE.    #APS  #JORDAN on CKD  #rash  -Pt diagnosed with APS in 2002 from DVT and PE, s/p IVC filter. Pt did not have DVT/PE after being started on coumadin. Per chart review, Beta2 glycoprotein and anticardiolipin positive in 2016  -Questionable history of SLE per chart review, pt had high titer dsDNA in 2021 (476) and was briefly on Plaquenil Pt denies any clinical symptoms of SLE and did not follow up further with rheumatologist.  -Now presenting with JORDAN on CKD as well as petechial confluent rash on b/l LE. Skin bx outpt showed dermatitis/connective tissue disease that may be lupus, but had negative staining  -Given history of APS, differentials for JORDAN include TMA vs lupus nephritis. Skin bx results may also be seen in dermatomyositis, however pt does not have muscle weakness. Less likely TMA, has neg blood smear for hemolysis  -s/p renal bx 10/17    Recommendations:  -Please give H flu, neisseria, strep pneumo vaccines   -Will follow up renal bx  -Quant is indeterminate, please repeat (ordered  -Will follow    Case discussed with Dr. Shahab Jay MD  Rheumatology Fellow       53M PMH APS (DVT/PE 2002 s/p IVC filter and coumadin), sarcoid dx 2002, NSCLC dx 2021 presenting with JORDAN on CKD and rash. Rheumatology consulted to evaluate for SLE.    #APS  #JORDAN on CKD  #rash  -Pt diagnosed with APS in 2002 from DVT and PE, s/p IVC filter. Pt did not have DVT/PE after being started on coumadin. Per chart review, Beta2 glycoprotein and anticardiolipin positive in 2016  -Questionable history of SLE per chart review, pt had high titer dsDNA in 2021 (476) and was briefly on Plaquenil. Pt denies any clinical symptoms of SLE and did not follow up further with rheumatologist.  -Now presenting with JORDAN on CKD as well as petechial confluent rash on b/l LE. Skin bx outpt showed dermatitis/connective tissue disease that may be lupus, but had negative staining  -Given history of APS, differentials for JORDAN include TMA vs lupus nephritis. Skin bx results may also be seen in dermatomyositis, however pt does not have muscle weakness. Less likely TMA, has neg blood smear for hemolysis  -s/p renal bx 10/17    Recommendations:  -Please give H flu, neisseria, strep pneumo vaccines in preparaton for possible treatment if TMA identified on renal bx  -Will follow up renal bx  -Quant is indeterminate, please repeat (ordered)  -Will follow    Case discussed with Dr. Shahab Jay MD  Rheumatology Fellow

## 2024-10-17 NOTE — PROGRESS NOTE ADULT - PROBLEM SELECTOR PLAN 1
Pt. 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 [facility] Scr 1.26 on 7/23/24, prior to that in early July it was normal at 0.9 mg/dl. Since late July he has had progressive worsening  Scr 2.99 on admission labs (10/14/24). UA with mild proteinuria   (0.5 gms)  but no hematuria (10/14/24).    Pt. likely has a systemic process ? 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 Rash and JORDAN since he started chemo in addition to Tagrisso    Scr 3 today. Renal duplex negative for thrombosis.   Biospy cancelled yesterday bc of HTN  Plan for renal biopsy today if platelets improve.   Pain control   Coagulation studies as per IR, pending renal biopsy if cleared by IR.   Heme/onc and rheum following. C/w coreg 6.25mg BID and amlodipine, clonidine 0.1 mg TID.   Continue to monitor urine output and labs. Dose medications as per eGFR.

## 2024-10-17 NOTE — PROGRESS NOTE ADULT - ATTENDING COMMENTS
S/p renal bx today, will check with renal and path re expedited preliminary read, vaccinations as above if TMA treatment needs to be initiated. Pending bx results will determine if steroids needed.

## 2024-10-17 NOTE — CONSULT NOTE ADULT - ATTENDING COMMENTS
53-yr-old man with h/o mNSCLC and APLS (on VKA) presented with rash (biopsy: Lupus), renal failure which is suspected to be lupus nephritis vs chemotherapy induced vs renal TMA. Patient platelet counts are down-trending, HGB is low which raises the suspicion of primary or secondary TTP. However, peripheral smear is not suggestive of fragmentation hemolysis. Please obtain LDH, haptoglobin and [id number] level. Patient is currently on Heparin gtt; please monitor FXa level (PTT falsely elevated in LA positive state). Patient is planned for a kidney biopsy. Please hold heparin and repeat FXa level; safe to proceed with procedure when FXa level <0.2. Transfuse to keep platelet count >50K in the periprocedural period. We will follow up. 53-yr-old man with h/o mNSCLC and APLS (on VKA) presented with rash (biopsy: Lupus), renal failure which is suspected to be lupus nephritis vs chemotherapy induced vs renal TMA. Patient's platelet counts are down-trending, HGB is low which raises the suspicion of primary or secondary TTP. However, peripheral smear is not suggestive of fragmentation hemolysis. Please obtain LDH, haptoglobin and [id number] level. Patient is currently on Heparin gtt; please monitor FXa level (PTT falsely elevated in LA positive state). Patient is planned for a kidney biopsy. Please hold heparin and repeat FXa level; safe to proceed with procedure when FXa level <0.2. Transfuse to keep platelet count >50K in the periprocedural period. We will follow up.

## 2024-10-17 NOTE — PROCEDURE NOTE - PROCEDURE FINDINGS AND DETAILS
Successful 18G core x2 bx of L. kidney w/gel foam tract embo  -2hr recovery  -4hr bedrest Successful 18G core x2 bx of L. kidney w/gel foam tract embo  -2hr recovery  -4hr bedrest  -ok to resume AC 24hrs post procedure, pending stable cbc and no evidence of hematoma on physical exam

## 2024-10-17 NOTE — CHART NOTE - NSCHARTNOTEFT_GEN_A_CORE
Platelets 38 in AM, Dr. Thomason recommended to call for urgent hematology consult regarding platelets transfusion. Spoke with Cindy, Dr. Berger who recommended to give 1 unit of platelets now and recheck the blood work. Dr. Thomason notified and 1 unit of platelets ordered now.    Carrie Chou NP, # [id number]

## 2024-10-17 NOTE — CHART NOTE - NSCHARTNOTEFT_GEN_A_CORE
Followed up on the oozing blood after IV removal from  left arm during the day. Heparin drip was on hold for 3 hrs as per day team.  Pt seen at the bedside at 8:30 PM,  no bleeding. Anti xa was tested  and it was below required range, d/w pharmacist and restarted heparin drip at previous dose 1200 units/hr. Anti-Xa was checked after 6 hrs and resulted 0.28, dose increased to 1250 units/hr as per Pharmacist Sinsil  recommendation. Will follow scheduled labs.    Carrie Chou NP, #[id number]

## 2024-10-17 NOTE — PROGRESS NOTE ADULT - PROBLEM SELECTOR PLAN 1
- JORDAN over the last few months possibly 2/2 underlying GN given positive serologies ( +JENN, +DsDNA, +APLS/Sjogren's)  - Recently noted to have rash and JORDAN since he started chemo in addition to Tagrisso  - Scr mildly improved at 2.7 today. Was 2.99 on presentation  - Renal duplex negative for thrombosis  -  Plan for renal biopsy 10/17  - Renal reccs appreciated ; ensure tight BP control. ; will increase Coreg to 6.25mg po bid and add Clonidine 0.1mg po bid  - Rheum reccs appreciated ;f/up ANCA, MPO, PR3, anticardiolipin ab, anti GBM, G6PD, quant gold, hepatitis panel, CK, aldolase - JORDAN over the last few months possibly 2/2 underlying GN given positive serologies ( +JENN, +DsDNA, +APLS/Sjogren's)  -SCr 3.0 today. Was 2.99 on presentation  - Renal duplex negative for thrombosis  -  Planned  for renal biopsy today per IR  - Renal reccs appreciated ; ensure tight BP control with  Coreg/ Clonidine/Amlodipine  - Rheum reccs appreciated ; ANCA, MPO,  anti GBM, G6PD, quant gold, hepatitis panel, CK, aldolase negative  - Anticardiolipin ab positive

## 2024-10-17 NOTE — PROGRESS NOTE ADULT - NSPROGADDITIONALINFOA_GEN_ALL_CORE
time spent reviewing prior charts, meds, discussing plan with patient= 53 minutes    d/w MICHELLE Moyer time spent reviewing prior charts, meds, discussing plan with patient= 53 minutes    d/w ACP Ruby

## 2024-10-17 NOTE — PROGRESS NOTE ADULT - SUBJECTIVE AND OBJECTIVE BOX
TIFFANY ESCAMILLA  [id number]    INTERVAL HPI/OVERNIGHT EVENTS:    MEDICATIONS  (STANDING):  amLODIPine   Tablet 10 milliGRAM(s) Oral daily  amLODIPine   Tablet 5 milliGRAM(s) Oral once  AQUAPHOR (petrolatum Ointment) 1 Application(s) Topical three times a day  carvedilol 6.25 milliGRAM(s) Oral every 12 hours  cloNIDine 0.1 milliGRAM(s) Oral three times a day  desmopressin Injectable 30 MICROGram(s) IV Push once  LORazepam     Tablet 1 milliGRAM(s) Oral once  senna 2 Tablet(s) Oral at bedtime  sodium chloride 0.9%. 1000 milliLiter(s) (75 mL/Hr) IV Continuous <Continuous>  triamcinolone 0.1% Cream 1 Application(s) Topical two times a day    MEDICATIONS  (PRN):  acetaminophen     Tablet .. 650 milliGRAM(s) Oral every 6 hours PRN Mild Pain (1 - 3)  HYDROmorphone  Injectable 0.5 milliGRAM(s) IV Push every 4 hours PRN Severe Pain (7 - 10)  ondansetron Injectable 4 milliGRAM(s) IV Push every 8 hours PRN Nausea and/or Vomiting  polyethylene glycol 3350 17 Gram(s) Oral daily PRN Constipation      Allergies    No Known Drug Allergies  Animal dander-Cat (Other)    Intolerances        Review of Systems:   General: No fevers/chills, no fatigue  HEENT: No blurry vision, dysphagia, or odynophagia  CVS: No CP/palpitations  Resp: No SOB/wheezing  GI: No N/V/C/D/abdominal pain  MSK:   Skin: No new rashes  Neuro: No headaches      Vital Signs Last 24 Hrs  T(C): 36.7 (17 Oct 2024 09:00), Max: 37 (17 Oct 2024 04:59)  T(F): 98.1 (17 Oct 2024 09:00), Max: 98.6 (17 Oct 2024 04:59)  HR: 71 (17 Oct 2024 09:00) (71 - 86)  BP: 134/78 (17 Oct 2024 09:00) (124/72 - 158/80)  BP(mean): --  RR: 18 (17 Oct 2024 09:00) (18 - 18)  SpO2: 98% (17 Oct 2024 09:00) (97% - 98%)    Parameters below as of 17 Oct 2024 09:00  Patient On (Oxygen Delivery Method): room air        Physical Exam:  General: NAD  HEENT: EOMI, MMM  Cardio: +S1/S2, RRR  Resp: CTA b/l  GI: +BS, soft, NT/ND  MSK:  Neuro: AAOx3  Psych: wnl    LABS:                        7.8    5.44  )-----------( 38       ( 17 Oct 2024 03:13 )             23.6     10-17    135  |  95[L]  |  35[H]  ----------------------------<  106[H]  3.7   |  21[L]  |  3.00[H]    Ca    10.1      17 Oct 2024 07:19  Phos  3.7     10-16  Mg     1.8     10-16    TPro  8.6[H]  /  Alb  4.7  /  TBili  0.6  /  DBili  x   /  AST  73[H]  /  ALT  91[H]  /  AlkPhos  77  10-17    PT/INR - ( 16 Oct 2024 07:11 )   PT: 18.1 sec;   INR: 1.58 ratio         PTT - ( 16 Oct 2024 07:11 )  PTT:>200.0 sec  Urinalysis Basic - ( 17 Oct 2024 07:19 )    Color: x / Appearance: x / SG: x / pH: x  Gluc: 106 mg/dL / Ketone: x  / Bili: x / Urobili: x   Blood: x / Protein: x / Nitrite: x   Leuk Esterase: x / RBC: x / WBC x   Sq Epi: x / Non Sq Epi: x / Bacteria: x          RADIOLOGY & ADDITIONAL TESTS:  < from: US Duplex Kidneys (US Doppler Renal) (10.15.24 @ 09:10) >    IMPRESSION:    No evidence of renal vein thrombosis.    < end of copied text >

## 2024-10-17 NOTE — PROGRESS NOTE ADULT - SUBJECTIVE AND OBJECTIVE BOX
[facility] Division of Kidney Diseases & Hypertension  FOLLOW UP NOTE  --------------------------------------------------------------------------------  Chief Complaint:    24 hour events/subjective:    pt pain controlled    PAST HISTORY  --------------------------------------------------------------------------------  No significant changes to PMH, PSH, FHx, SHx, unless otherwise noted    ALLERGIES & MEDICATIONS  --------------------------------------------------------------------------------  Allergies    No Known Drug Allergies  Animal dander-Cat (Other)    Intolerances      Standing Inpatient Medications  amLODIPine   Tablet 5 milliGRAM(s) Oral once  amLODIPine   Tablet 10 milliGRAM(s) Oral daily  AQUAPHOR (petrolatum Ointment) 1 Application(s) Topical three times a day  carvedilol 6.25 milliGRAM(s) Oral every 12 hours  cloNIDine 0.1 milliGRAM(s) Oral three times a day  desmopressin Injectable 30 MICROGram(s) IV Push once  LORazepam     Tablet 1 milliGRAM(s) Oral once  senna 2 Tablet(s) Oral at bedtime  sodium chloride 0.9%. 1000 milliLiter(s) IV Continuous <Continuous>  triamcinolone 0.1% Cream 1 Application(s) Topical two times a day    PRN Inpatient Medications  acetaminophen     Tablet .. 650 milliGRAM(s) Oral every 6 hours PRN  HYDROmorphone  Injectable 0.5 milliGRAM(s) IV Push every 4 hours PRN  ondansetron Injectable 4 milliGRAM(s) IV Push every 8 hours PRN  polyethylene glycol 3350 17 Gram(s) Oral daily PRN      REVIEW OF SYSTEMS  --------------------------------------------------------------------------------  Gen: No weight changes, fatigue, fevers/chills, weakness  Skin: rashes  Head/Eyes/Ears/Mouth: No headache; Normal hearing; Normal vision w/o blurriness; No sinus pain/discomfort, sore throat  Respiratory: No dyspnea, cough, wheezing, hemoptysis  CV: No chest pain, PND, orthopnea  GI: abd discomfort improved with med  : No increased frequency, dysuria, hematuria, nocturia  MSK: No joint pain/swelling; no back pain; no edema  Neuro: No dizziness/lightheadedness, weakness, seizures, numbness, tingling  Heme: No easy bruising or bleeding  Endo: No heat/cold intolerance  Psych: No significant nervousness, anxiety, stress, depression    All other systems were reviewed and are negative, except as noted.    VITALS/PHYSICAL EXAM  --------------------------------------------------------------------------------  T(C): 36.7 (10-17-24 @ 09:00), Max: 37 (10-17-24 @ 04:59)  HR: 71 (10-17-24 @ 09:00) (71 - 86)  BP: 134/78 (10-17-24 @ 09:00) (124/72 - 158/80)  RR: 18 (10-17-24 @ 09:00) (18 - 18)  SpO2: 98% (10-17-24 @ 09:00) (97% - 98%)  Wt(kg): --        10-16-24 @ 07:01  -  10-17-24 @ 07:00  --------------------------------------------------------  IN: 150 mL / OUT: 0 mL / NET: 150 mL      Physical Exam:  	Gen: NAD, well-appearing  	Pulm: CTA B/L  	CV: RRR, S1S2  	Abd: +BS, soft,   	UE: Warm  	LE: Warm, FROM,  no edema  	Neuro: No focal deficits, intact gait  	Psych: Normal affect and mood  	Skin: Warm, without rashes    LABS/STUDIES  --------------------------------------------------------------------------------              7.8    5.44  >-----------<  38       [10-17-24 @ 03:13]              23.6     135  |  95  |  35  ----------------------------<  106      [10-17-24 @ 07:19]  3.7   |  21  |  3.00        Ca     10.1     [10-17-24 @ 07:19]      Mg     1.8     [10-16-24 @ 07:09]      Phos  3.7     [10-16-24 @ 07:09]    TPro  8.6  /  Alb  4.7  /  TBili  0.6  /  DBili  x   /  AST  73  /  ALT  91  /  AlkPhos  77  [10-17-24 @ 07:19]    PT/INR: PT 18.1 , INR 1.58       [10-16-24 @ 07:11]  PTT: >200.0      [10-16-24 @ 07:11]      Creatinine Trend:  SCr 3.00 [10-17 @ 07:19]  SCr 2.73 [10-16 @ 07:09]  SCr 2.82 [10-15 @ 17:49]  SCr 2.88 [10-15 @ 07:03]  SCr 2.99 [10-14 @ 08:57]    Urinalysis - [10-17-24 @ 07:19]      Color  / Appearance  / SG  / pH       Gluc 106 / Ketone   / Bili  / Urobili        Blood  / Protein  / Leuk Est  / Nitrite       RBC  / WBC  / Hyaline  / Gran  / Sq Epi  / Non Sq Epi  / Bacteria         HBsAg Nonreact      [10-15-24 @ 07:15]  HBcAb Nonreact      [10-15-24 @ 07:15]  HCV 0.06, Nonreact      [10-15-24 @ 07:15]    ANCA: cANCA Negative, pANCA Negative, atypical ANCA Indeterminate Method interference due to JENN Fluorescence      [10-15-24 @ 07:15]  MPO-ANCA <5.0, interpretation: Negative      [10-15-24 @ 07:15]  PR3-ANCA <5.0, interpretation: Negative      [10-15-24 @ 07:15]  anti-GBM <0.2      [10-15-24 @ 07:15]

## 2024-10-18 DIAGNOSIS — D69.6 THROMBOCYTOPENIA, UNSPECIFIED: ICD-10-CM

## 2024-10-18 LAB
ALBUMIN SERPL ELPH-MCNC: 4.5 G/DL — SIGNIFICANT CHANGE UP (ref 3.3–5)
ALP SERPL-CCNC: 90 U/L — SIGNIFICANT CHANGE UP (ref 40–120)
ALT FLD-CCNC: 117 U/L — HIGH (ref 10–45)
ANION GAP SERPL CALC-SCNC: 18 MMOL/L — HIGH (ref 5–17)
APTT BLD: 87.9 SEC — HIGH (ref 24.5–35.6)
AST SERPL-CCNC: 85 U/L — HIGH (ref 10–40)
BILIRUB SERPL-MCNC: 1 MG/DL — SIGNIFICANT CHANGE UP (ref 0.2–1.2)
BUN SERPL-MCNC: 42 MG/DL — HIGH (ref 7–23)
CALCIUM SERPL-MCNC: 10.1 MG/DL — SIGNIFICANT CHANGE UP (ref 8.4–10.5)
CHLORIDE SERPL-SCNC: 97 MMOL/L — SIGNIFICANT CHANGE UP (ref 96–108)
CO2 SERPL-SCNC: 20 MMOL/L — LOW (ref 22–31)
CREAT SERPL-MCNC: 3.54 MG/DL — HIGH (ref 0.5–1.3)
EGFR: 20 ML/MIN/1.73M2 — LOW
GLUCOSE SERPL-MCNC: 116 MG/DL — HIGH (ref 70–99)
HCT VFR BLD CALC: 20.7 % — CRITICAL LOW (ref 39–50)
HCT VFR BLD CALC: 25.8 % — LOW (ref 39–50)
HGB BLD-MCNC: 7 G/DL — CRITICAL LOW (ref 13–17)
HGB BLD-MCNC: 8.7 G/DL — LOW (ref 13–17)
INR BLD: 1.42 RATIO — HIGH (ref 0.85–1.16)
MCHC RBC-ENTMCNC: 26 PG — LOW (ref 27–34)
MCHC RBC-ENTMCNC: 26.3 PG — LOW (ref 27–34)
MCHC RBC-ENTMCNC: 33.7 GM/DL — SIGNIFICANT CHANGE UP (ref 32–36)
MCHC RBC-ENTMCNC: 33.8 GM/DL — SIGNIFICANT CHANGE UP (ref 32–36)
MCV RBC AUTO: 77 FL — LOW (ref 80–100)
MCV RBC AUTO: 77.9 FL — LOW (ref 80–100)
NRBC # BLD: 0 /100 WBCS — SIGNIFICANT CHANGE UP (ref 0–0)
NRBC # BLD: 0 /100 WBCS — SIGNIFICANT CHANGE UP (ref 0–0)
PLATELET # BLD AUTO: 48 K/UL — LOW (ref 150–400)
PLATELET # BLD AUTO: 53 K/UL — LOW (ref 150–400)
POTASSIUM SERPL-MCNC: 4.1 MMOL/L — SIGNIFICANT CHANGE UP (ref 3.5–5.3)
POTASSIUM SERPL-SCNC: 4.1 MMOL/L — SIGNIFICANT CHANGE UP (ref 3.5–5.3)
PROT SERPL-MCNC: 8.5 G/DL — HIGH (ref 6–8.3)
PROTHROM AB SERPL-ACNC: 16.1 SEC — HIGH (ref 9.9–13.4)
RBC # BLD: 2.69 M/UL — LOW (ref 4.2–5.8)
RBC # BLD: 3.31 M/UL — LOW (ref 4.2–5.8)
RBC # FLD: 17 % — HIGH (ref 10.3–14.5)
RBC # FLD: 18.2 % — HIGH (ref 10.3–14.5)
SODIUM SERPL-SCNC: 135 MMOL/L — SIGNIFICANT CHANGE UP (ref 135–145)
UFH PPP CHRO-ACNC: 0.08 IU/ML — LOW (ref 0.3–0.7)
WBC # BLD: 7.9 K/UL — SIGNIFICANT CHANGE UP (ref 3.8–10.5)
WBC # BLD: 8.24 K/UL — SIGNIFICANT CHANGE UP (ref 3.8–10.5)
WBC # FLD AUTO: 7.9 K/UL — SIGNIFICANT CHANGE UP (ref 3.8–10.5)
WBC # FLD AUTO: 8.24 K/UL — SIGNIFICANT CHANGE UP (ref 3.8–10.5)

## 2024-10-18 PROCEDURE — 99233 SBSQ HOSP IP/OBS HIGH 50: CPT

## 2024-10-18 PROCEDURE — 99233 SBSQ HOSP IP/OBS HIGH 50: CPT | Mod: GC

## 2024-10-18 PROCEDURE — 74176 CT ABD & PELVIS W/O CONTRAST: CPT | Mod: 26

## 2024-10-18 PROCEDURE — 93975 VASCULAR STUDY: CPT | Mod: 26

## 2024-10-18 RX ORDER — PNEUMOCOCCAL 20-VALENT CONJUGATE VACCINE 2.2; 2.2; 2.2; 2.2; 2.2; 2.2; 2.2; 2.2; 2.2; 2.2; 2.2; 2.2; 2.2; 2.2; 2.2; 2.2; 4.4; 2.2; 2.2; 2.2 UG/.5ML; UG/.5ML; UG/.5ML; UG/.5ML; UG/.5ML; UG/.5ML; UG/.5ML; UG/.5ML; UG/.5ML; UG/.5ML; UG/.5ML; UG/.5ML; UG/.5ML; UG/.5ML; UG/.5ML; UG/.5ML; UG/.5ML; UG/.5ML; UG/.5ML; UG/.5ML
0.5 INJECTION, SUSPENSION INTRAMUSCULAR ONCE
Refills: 0 | Status: DISCONTINUED | OUTPATIENT
Start: 2024-10-18 | End: 2024-10-18

## 2024-10-18 RX ORDER — NEISSERIA MENINGITIDIS SEROGROUP B NHBA FUSION PROTEIN ANTIGEN, NEISSERIA MENINGITIDIS SEROGROUP B FHBP FUSION PROTEIN ANTIGEN AND NEISSERIA MENINGITIDIS SEROGROUP B NADA PROTEIN ANTIGEN 50; 50; 50; 25 UG/.5ML; UG/.5ML; UG/.5ML; UG/.5ML
0.5 INJECTION, SUSPENSION INTRAMUSCULAR ONCE
Refills: 0 | Status: DISCONTINUED | OUTPATIENT
Start: 2024-10-18 | End: 2024-10-18

## 2024-10-18 RX ORDER — HYDROMORPHONE HCL/0.9% NACL/PF 6 MG/30 ML
1 PATIENT CONTROLLED ANALGESIA SYRINGE INTRAVENOUS ONCE
Refills: 0 | Status: DISCONTINUED | OUTPATIENT
Start: 2024-10-18 | End: 2024-10-18

## 2024-10-18 RX ORDER — MENINGOCOCCAL (GROUPS A, C, Y AND W-135) OLIGOSACCHARIDE DIPHTHERIA CRM197 CONJUGATE VACCINE 10; 5; 5; 5 UG/.5ML; UG/.5ML; UG/.5ML; UG/.5ML
0.5 INJECTION, SOLUTION INTRAMUSCULAR ONCE
Refills: 0 | Status: DISCONTINUED | OUTPATIENT
Start: 2024-10-18 | End: 2024-10-18

## 2024-10-18 RX ORDER — HYDROMORPHONE HCL/0.9% NACL/PF 6 MG/30 ML
1 PATIENT CONTROLLED ANALGESIA SYRINGE INTRAVENOUS
Refills: 0 | Status: DISCONTINUED | OUTPATIENT
Start: 2024-10-18 | End: 2024-10-22

## 2024-10-18 RX ORDER — HYDROMORPHONE HCL/0.9% NACL/PF 6 MG/30 ML
1 PATIENT CONTROLLED ANALGESIA SYRINGE INTRAVENOUS EVERY 4 HOURS
Refills: 0 | Status: DISCONTINUED | OUTPATIENT
Start: 2024-10-18 | End: 2024-10-18

## 2024-10-18 RX ORDER — DEXAMETHASONE 1.5 MG 1.5 MG/1
40 TABLET ORAL DAILY
Refills: 0 | Status: COMPLETED | OUTPATIENT
Start: 2024-10-18 | End: 2024-10-22

## 2024-10-18 RX ADMIN — Medication 100 MILLILITER(S): at 20:02

## 2024-10-18 RX ADMIN — CARVEDILOL 6.25 MILLIGRAM(S): 25 TABLET, FILM COATED ORAL at 20:01

## 2024-10-18 RX ADMIN — PETROLATUM 1 APPLICATION(S): 987.89 OINTMENT TOPICAL at 05:05

## 2024-10-18 RX ADMIN — Medication 1000 MILLIGRAM(S): at 14:19

## 2024-10-18 RX ADMIN — Medication 0.5 MILLIGRAM(S): at 05:05

## 2024-10-18 RX ADMIN — Medication 1 MILLIGRAM(S): at 10:55

## 2024-10-18 RX ADMIN — Medication 2 TABLET(S): at 20:57

## 2024-10-18 RX ADMIN — Medication 10 MILLIGRAM(S): at 08:26

## 2024-10-18 RX ADMIN — Medication 0.5 MILLIGRAM(S): at 01:45

## 2024-10-18 RX ADMIN — Medication 0.5 MILLIGRAM(S): at 01:05

## 2024-10-18 RX ADMIN — Medication 400 MILLIGRAM(S): at 13:23

## 2024-10-18 RX ADMIN — CARVEDILOL 6.25 MILLIGRAM(S): 25 TABLET, FILM COATED ORAL at 08:27

## 2024-10-18 RX ADMIN — Medication 400 MILLIGRAM(S): at 06:37

## 2024-10-18 RX ADMIN — Medication 1 APPLICATION(S): at 05:05

## 2024-10-18 RX ADMIN — Medication 0.5 MILLIGRAM(S): at 06:46

## 2024-10-18 RX ADMIN — Medication 1 MILLIGRAM(S): at 15:18

## 2024-10-18 RX ADMIN — Medication 1 MILLIGRAM(S): at 17:48

## 2024-10-18 RX ADMIN — Medication 1 MILLIGRAM(S): at 18:17

## 2024-10-18 RX ADMIN — CLONIDINE HYDROCHLORIDE 0.1 MILLIGRAM(S): 0.2 TABLET ORAL at 20:57

## 2024-10-18 RX ADMIN — Medication 1 MILLIGRAM(S): at 20:57

## 2024-10-18 RX ADMIN — DEXAMETHASONE 1.5 MG 100 MILLIGRAM(S): 1.5 TABLET ORAL at 17:48

## 2024-10-18 RX ADMIN — CLONIDINE HYDROCHLORIDE 0.1 MILLIGRAM(S): 0.2 TABLET ORAL at 13:23

## 2024-10-18 RX ADMIN — Medication 1 MILLIGRAM(S): at 09:13

## 2024-10-18 RX ADMIN — Medication 1 MILLIGRAM(S): at 14:31

## 2024-10-18 RX ADMIN — Medication 1 MILLIGRAM(S): at 03:13

## 2024-10-18 RX ADMIN — LIDOCAINE HYDROCHLORIDE 1 PATCH: 40 SOLUTION TOPICAL at 11:26

## 2024-10-18 RX ADMIN — Medication 1 MILLIGRAM(S): at 01:48

## 2024-10-18 RX ADMIN — Medication 1000 MILLIGRAM(S): at 07:45

## 2024-10-18 RX ADMIN — CLONIDINE HYDROCHLORIDE 0.1 MILLIGRAM(S): 0.2 TABLET ORAL at 08:27

## 2024-10-18 RX ADMIN — LIDOCAINE HYDROCHLORIDE 1 PATCH: 40 SOLUTION TOPICAL at 08:45

## 2024-10-18 RX ADMIN — Medication 1000 MILLIGRAM(S): at 01:15

## 2024-10-18 NOTE — PROGRESS NOTE ADULT - SUBJECTIVE AND OBJECTIVE BOX
Interventional Radiology Follow-Up Note    This is a 53y Male s/p renal bx on 10/17/24 in Interventional Radiology with Dr. Nilesh Navarrete.     S: Patient seen and examined @ bedside. Pt reports significant left lower back pain.    IR called last night for drop in pt's Hg (7.5 --> 7) and significant left flank pain. CT obtained demonstrating perinephric subcapsular hematoma.    Medication:  amLODIPine   Tablet: (10-18)  carvedilol: (10-18)  cloNIDine: (10-18)  heparin  Infusion: (10-17)    Vitals:   T(F): 98.6, Max: 99.2 (19:20)  HR: 98  BP: 151/76  RR: 18  SpO2: 95%    Physical Exam:  General: Nontoxic, in NAD, A&O x3.  Abdomen: soft, NTND, no peritoneal signs.  Flank: Mild tenderness to palpation over the left lower flank. Access site is well healing without evidence of erythema, redness, irritation or hematoma.     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 Nephrology following for JORDAN on CKD. IR consulted for renal biopsy.     Pt most recently s/p renal bx on 10/17/24.     - pt s/p 1 u of pRBC this morning, Hg from 7 --> 8.7. Given pt's hemodynamic stability at this time, recommend continued medical management.   -continue global management per primary team  -monitor h/h; transfuse as needed  -trend vs/labs  - if pt's clinical status changes or decompensates, please contact IR immediate for evaluation.  - IR will continue to follow. Case discussed with Dr. Cuba Krueger.     Please call IR at extension [phone] with any questions, concerns, or issues regarding above.

## 2024-10-18 NOTE — PROGRESS NOTE ADULT - ASSESSMENT
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 Rheumatology consulted to evaluate for SLE.    #APS  #JORDAN on CKD  #rash  -Pt diagnosed with APS in 2002 from DVT and PE, s/p IVC filter. Pt did not have DVT/PE after being started on coumadin. Per chart review, Beta2 glycoprotein and anticardiolipin positive in 2016  -Questionable history of SLE per chart review, pt had high titer dsDNA in 2021 (476) and was briefly on Plaquenil. Pt denies any clinical symptoms of SLE and did not follow up further with rheumatologist.  -Now presenting with JORDAN on CKD as well as petechial confluent rash on b/l LE. Skin bx outpt showed dermatitis/connective tissue disease that may be lupus, but had negative staining  -Given history of APS, differentials for JORDAN include TMA vs lupus nephritis. Skin bx results may also be seen in dermatomyositis, however pt does not have muscle weakness. Less likely TMA, has neg blood smear for hemolysis  -s/p renal bx 10/17, CCB L renal hematoma with mass effect    Note incomplete     53M PMH APS (DVT/PE 2002 s/p IVC filter and coumadin), sarcoid dx 2002, NSCLC dx 2021 presenting with JORDAN on CKD and rash. Rheumatology consulted to evaluate for SLE.    #APS  #JORDAN on CKD  #rash  -Pt diagnosed with APS in 2002 from DVT and PE, s/p IVC filter. Pt did not have DVT/PE after being started on coumadin. Per chart review, Beta2 glycoprotein and anticardiolipin positive in 2016  -Questionable history of SLE per chart review, pt had high titer dsDNA in 2021 (476) and was briefly on Plaquenil. Pt denies any clinical symptoms of SLE and did not follow up further with rheumatologist.  -Now presenting with JORDAN on CKD as well as petechial confluent rash on b/l LE. Skin bx outpt showed dermatitis/connective tissue disease that may be lupus, but had negative staining  -s/p renal bx 10/17, CCB L renal hematoma with mass effect  -prelim renal bx shows "very mild tubular injury, moderate arteriolar hyalinosis, 20% IFTA, 0/19 GS, unremarkable glomeruli, no TMA, negative IF" and pending EM. Therefore, pt does not have TMA or lupus nephritis. Possibility of drug induced ATN     Recommendations:  -Please do not give vaccinations as pt no longer needs solaris  -At this time, there is no role for steroids from rheum perspective as kidney bx does not show lupus nephritis and has normal glomeruli and skin is improving with topical steroids. Steroids as per heme onc    Case discussed with Dr. Shaahb Jay MD   Rheumatology Fellow     53M PMH APS (DVT/PE 2002 s/p IVC filter and coumadin), sarcoid dx 2002, NSCLC dx 2021 presenting with JORDAN on CKD and rash. Rheumatology consulted to evaluate for SLE.    #APS  #JORDAN on CKD  #rash  -Pt diagnosed with APS in 2002 from DVT and PE, s/p IVC filter. Pt did not have DVT/PE after being started on coumadin. Per chart review, Beta2 glycoprotein and anticardiolipin positive in 2016  -Questionable history of SLE per chart review, pt had high titer dsDNA in 2021 (476) and was briefly on Plaquenil. Pt denies any clinical symptoms of SLE and did not follow up further with rheumatologist.  -Now presenting with JORDAN on CKD as well as petechial confluent rash on b/l LE. Skin bx outpt showed dermatitis/connective tissue disease that may be lupus, but had negative staining  -s/p renal bx 10/17, CCB L renal hematoma with mass effect  -prelim renal bx shows "very mild tubular injury, moderate arteriolar hyalinosis, 20% IFTA, 0/19 GS, unremarkable glomeruli, no TMA, negative IF" and pending EM. Therefore, pt does not have TMA or lupus nephritis. Possibility of drug induced ATN     Recommendations:  -Please do not give vaccinations as pt no longer needs solaris given lack of TMA on renal bx and smear. If worsening parameters and there remains concern for TMA, a skin biopsy would be next step.   -At this time, there is no role for steroids from rheum perspective as kidney bx does not show lupus nephritis and has normal glomeruli and skin is improving with topical steroids. Paucity of other active lupus features on exam and C3/4 neg with very low dsDNA on OP labs.     Case discussed with Dr. Shahab Jay MD   Rheumatology Fellow

## 2024-10-18 NOTE — PROGRESS NOTE ADULT - PROBLEM SELECTOR PLAN 1
- JORDAN over the last few months possibly 2/2 underlying GN given positive serologies ( +JENN, +DsDNA, +APLS/Sjogren's)  -SCr uptrending 3.54 . Was 2.99 on presentation  - Renal duplex negative for thrombosis  -  s/p L renal biopsy 10/17, c/b subcapsular hematoma  - Renal reccs appreciated ; prelim pathology shows  mild ATN, moderate arteriolar hylanosis with unremarkable gloms and no signs of Renal TMA  Negative IF staining  Pending EM  - per Nephrology ; JORDAN may be ATN from pemetrexed?   - Rheum reccs noted   - Heme reccs Decadron 40mg IV q d x 4 days

## 2024-10-18 NOTE — PROGRESS NOTE ADULT - PROBLEM SELECTOR PLAN 3
well comtrolled  - c/w Amlodipine 10mg po qd  -  c/w Carvedilol 6.25mg po bid   - c/w Clonidine 0.1mg po tid

## 2024-10-18 NOTE — PROGRESS NOTE ADULT - PROBLEM SELECTOR PLAN 5
- Pt with advanced NSCLC with progression on single agent Tagrisso   - had been on combination of Tagrisso and carboplatin/ Alimta with last treatment few months ago  - Stopped due to low blood counts (Grade 4 thrombocytopenia).   - Heme -Onc following ; reccs appreciated  - peripheral smear 10/17 showing 0-2 schistocytes/hpf, rare elliptocytes. No evidence of ongoing hemolysis.

## 2024-10-18 NOTE — PROGRESS NOTE ADULT - PROBLEM SELECTOR PLAN 2
- MRI brain with no new or enlarging metastases ; stable right temporal lobe , left Meckel's cave and right occipital lesions  - c/w Zofran prn  -  c/o severe abd pain ;  CT Abd/pelvis showing left subcapsular hematoma  - claire increase IV Diluadid to 1mg  IVP Q4 PRN  - bowel regimen

## 2024-10-18 NOTE — PROGRESS NOTE ADULT - SUBJECTIVE AND OBJECTIVE BOX
[facility] Division of Kidney Diseases & Hypertension  FOLLOW UP NOTE  [phone]--------------------------------------------------------------------------------  Chief Complaint:Acute renal failure    24 hour events/subjective: Pt seen and evaluated bedside this morning. Complains of excruciating pain in left flank and site of Bx. No skin changes noted. Overnight patient received increasing doses of dilaudid with minimal improvement in pain.      PAST HISTORY  --------------------------------------------------------------------------------  No significant changes to PMH, PSH, FHx, SHx, unless otherwise noted    ALLERGIES & MEDICATIONS  --------------------------------------------------------------------------------  Allergies    No Known Drug Allergies  Animal dander-Cat (Other)    Intolerances      Standing Inpatient Medications  acetaminophen   IVPB .. 1000 milliGRAM(s) IV Intermittent every 6 hours  amLODIPine   Tablet 5 milliGRAM(s) Oral once  amLODIPine   Tablet 10 milliGRAM(s) Oral daily  AQUAPHOR (petrolatum Ointment) 1 Application(s) Topical three times a day  carvedilol 6.25 milliGRAM(s) Oral every 12 hours  cloNIDine 0.1 milliGRAM(s) Oral three times a day  desmopressin Injectable 30 MICROGram(s) IV Push once  LORazepam     Tablet 1 milliGRAM(s) Oral once  pneumococcal  20 Vaccine (PREVNAR 20) 0.5 milliLiter(s) IntraMuscular once  senna 2 Tablet(s) Oral at bedtime  sodium chloride 0.9%. 1000 milliLiter(s) IV Continuous <Continuous>  triamcinolone 0.1% Cream 1 Application(s) Topical two times a day    PRN Inpatient Medications  acetaminophen     Tablet .. 650 milliGRAM(s) Oral every 6 hours PRN  HYDROmorphone  Injectable 1 milliGRAM(s) IV Push every 3 hours PRN  ondansetron Injectable 4 milliGRAM(s) IV Push every 8 hours PRN  polyethylene glycol 3350 17 Gram(s) Oral daily PRN      REVIEW OF SYSTEMS  --------------------------------------------------------------------------------  Gen: No fevers/chills  Skin: No rashes  Head/Eyes/Ears: Normal hearing,   Respiratory: No dyspnea, cough  CV: No chest pain  GI: Severe abdominal pain in bilateral Lower quadrants   : No dysuria, hematuria, Flank pain at the site of biopsy noted   MSK: No  edema  Heme: No easy bruising or bleeding  Psych: No significant depression  All other systems were reviewed and are negative, except as noted.    VITALS/PHYSICAL EXAM  --------------------------------------------------------------------------------  T(C): 37 (10-18-24 @ 08:29), Max: 37.3 (10-17-24 @ 19:20)  HR: 98 (10-18-24 @ 08:29) (70 - 104)  BP: 151/76 (10-18-24 @ 08:29) (113/72 - 160/85)  RR: 18 (10-18-24 @ 08:29) (16 - 20)  SpO2: 95% (10-18-24 @ 08:29) (95% - 100%)  Wt(kg): --        10-17-24 @ 07:01  -  10-18-24 @ 07:00  --------------------------------------------------------  IN: 0 mL / OUT: 0 mL / NET: 0 mL    PHYSICAL EXAM:  Gen: NAD, well-appearing  HEENT: PERRL, supple neck, clear oropharynx  Pulm: CTA B/L  CV: RRR, S1S2;  Back: No spinal or CVA tenderness  Abd: +BS, soft, Tender in the Lower left quadrants  : Tender to palpation at the site of biopsy  Extremities: no bilateral LE edema noted.   Neuro: No focal deficits, intact gait  Skin: Warm, without rashes    LABS/STUDIES  --------------------------------------------------------------------------------              8.7    8.24  >-----------<  48       [10-18-24 @ 08:50]              25.8     135  |  97  |  42  ----------------------------<  116      [10-18-24 @ 08:50]  4.1   |  20  |  3.54        Ca     10.1     [10-18-24 @ 08:50]    TPro  8.5  /  Alb  4.5  /  TBili  1.0  /  DBili  x   /  AST  85  /  ALT  117  /  AlkPhos  90  [10-18-24 @ 08:50]    PT/INR: PT 16.1 , INR 1.42       [10-18-24 @ 08:50]  PTT: 87.9       [10-18-24 @ 08:50]          [10-17-24 @ 07:19]    Creatinine Trend:  SCr 3.54 [10-18 @ 08:50]  SCr 3.00 [10-17 @ 07:19]  SCr 2.73 [10-16 @ 07:09]  SCr 2.82 [10-15 @ 17:49]  SCr 2.88 [10-15 @ 07:03]      HBsAg Nonreact      [10-15-24 @ 07:15]  HBcAb Nonreact      [10-15-24 @ 07:15]  HCV 0.06, Nonreact      [10-15-24 @ 07:15]    ANCA: cANCA Negative, pANCA Negative, atypical ANCA Indeterminate Method interference due to JENN Fluorescence      [10-15-24 @ 07:15]  MPO-ANCA <5.0, interpretation: Negative      [10-15-24 @ 07:15]  PR3-ANCA <5.0, interpretation: Negative      [10-15-24 @ 07:15]  anti-GBM <0.2      [10-15-24 @ 07:15]

## 2024-10-18 NOTE — PROGRESS NOTE ADULT - SUBJECTIVE AND OBJECTIVE BOX
TIFFANY QURESHINA  [id number]    INTERVAL HPI/OVERNIGHT EVENTS: s/p renal bx 10/17, ccb L renal hematoma    MEDICATIONS  (STANDING):  acetaminophen   IVPB .. 1000 milliGRAM(s) IV Intermittent every 6 hours  amLODIPine   Tablet 5 milliGRAM(s) Oral once  amLODIPine   Tablet 10 milliGRAM(s) Oral daily  AQUAPHOR (petrolatum Ointment) 1 Application(s) Topical three times a day  carvedilol 6.25 milliGRAM(s) Oral every 12 hours  cloNIDine 0.1 milliGRAM(s) Oral three times a day  desmopressin Injectable 30 MICROGram(s) IV Push once  LORazepam     Tablet 1 milliGRAM(s) Oral once  meningococcal Group B (BEXSERO) Vaccine 0.5 milliLiter(s) IntraMuscular once  meningococcal Groups A/C/Y and W-135 Vaccine (MENVEO) 0.5 milliLiter(s) IntraMuscular once  pneumococcal  20 Vaccine (PREVNAR 20) 0.5 milliLiter(s) IntraMuscular once  senna 2 Tablet(s) Oral at bedtime  sodium chloride 0.9%. 1000 milliLiter(s) (75 mL/Hr) IV Continuous <Continuous>  triamcinolone 0.1% Cream 1 Application(s) Topical two times a day    MEDICATIONS  (PRN):  acetaminophen     Tablet .. 650 milliGRAM(s) Oral every 6 hours PRN Mild Pain (1 - 3)  HYDROmorphone  Injectable 1 milliGRAM(s) IV Push every 3 hours PRN Severe Pain (7 - 10)  ondansetron Injectable 4 milliGRAM(s) IV Push every 8 hours PRN Nausea and/or Vomiting  polyethylene glycol 3350 17 Gram(s) Oral daily PRN Constipation      Allergies    No Known Drug Allergies  Animal dander-Cat (Other)    Intolerances        Review of Systems:   General: No fevers/chills, no fatigue  HEENT: No blurry vision, dysphagia, or odynophagia  CVS: No CP/palpitations  Resp: No SOB/wheezing  GI: +abdominal pain  MSK: No joint pain  Skin: +rashes  Neuro: No headaches      Vital Signs Last 24 Hrs  T(C): 37 (18 Oct 2024 08:29), Max: 37.3 (17 Oct 2024 19:20)  T(F): 98.6 (18 Oct 2024 08:29), Max: 99.2 (17 Oct 2024 19:20)  HR: 98 (18 Oct 2024 08:29) (70 - 104)  BP: 151/76 (18 Oct 2024 08:29) (113/72 - 160/85)  BP(mean): 94 (17 Oct 2024 18:55) (86 - 94)  RR: 18 (18 Oct 2024 08:29) (16 - 20)  SpO2: 95% (18 Oct 2024 08:29) (95% - 100%)    Parameters below as of 18 Oct 2024 08:29  Patient On (Oxygen Delivery Method): room air        Physical Exam:  General: No apparent distress  HEENT: EOMI, MMM, no oral ulcers  CVS: +S1/S2, RRR, no murmurs/rubs/gallops  Resp: CTA b/l. No crackles/wheezing  GI: Soft, NT/ND +BS  MSK: no swelling/warmth/erythema of the joints of the UE/LE  Neuro: AAOx3  Skin: +confluent, non blanching petechial rash on b/l LE only affecting calves, not feet or thighs. hyperpigmentation on b/l UE - improving     LABS:                        8.7    8.24  )-----------( 48       ( 18 Oct 2024 08:50 )             25.8     10-18    135  |  97  |  42[H]  ----------------------------<  116[H]  4.1   |  20[L]  |  3.54[H]    Ca    10.1      18 Oct 2024 08:50    TPro  8.5[H]  /  Alb  4.5  /  TBili  1.0  /  DBili  x   /  AST  85[H]  /  ALT  117[H]  /  AlkPhos  90  10-18    PT/INR - ( 18 Oct 2024 08:50 )   PT: 16.1 sec;   INR: 1.42 ratio         PTT - ( 18 Oct 2024 08:50 )  PTT:87.9 sec  Urinalysis Basic - ( 18 Oct 2024 08:50 )    Color: x / Appearance: x / SG: x / pH: x  Gluc: 116 mg/dL / Ketone: x  / Bili: x / Urobili: x   Blood: x / Protein: x / Nitrite: x   Leuk Esterase: x / RBC: x / WBC x   Sq Epi: x / Non Sq Epi: x / Bacteria: x          RADIOLOGY & ADDITIONAL TESTS:  < from: CT Abdomen and Pelvis No Cont (10.18.24 @ 04:52) >    IMPRESSION:  Left perinephric subcapsular hematoma measuring 4.0 x 6.8 x 12.9 cm with   associated mass effect and deformity of the left kidney.    < end of copied text >   TIFFANY QURESHINA  [id number]    INTERVAL HPI/OVERNIGHT EVENTS: s/p renal bx 10/17, ccb L renal hematoma    MEDICATIONS  (STANDING):  acetaminophen   IVPB .. 1000 milliGRAM(s) IV Intermittent every 6 hours  amLODIPine   Tablet 5 milliGRAM(s) Oral once  amLODIPine   Tablet 10 milliGRAM(s) Oral daily  AQUAPHOR (petrolatum Ointment) 1 Application(s) Topical three times a day  carvedilol 6.25 milliGRAM(s) Oral every 12 hours  cloNIDine 0.1 milliGRAM(s) Oral three times a day  desmopressin Injectable 30 MICROGram(s) IV Push once  LORazepam     Tablet 1 milliGRAM(s) Oral once  meningococcal Group B (BEXSERO) Vaccine 0.5 milliLiter(s) IntraMuscular once  meningococcal Groups A/C/Y and W-135 Vaccine (MENVEO) 0.5 milliLiter(s) IntraMuscular once  pneumococcal  20 Vaccine (PREVNAR 20) 0.5 milliLiter(s) IntraMuscular once  senna 2 Tablet(s) Oral at bedtime  sodium chloride 0.9%. 1000 milliLiter(s) (75 mL/Hr) IV Continuous <Continuous>  triamcinolone 0.1% Cream 1 Application(s) Topical two times a day    MEDICATIONS  (PRN):  acetaminophen     Tablet .. 650 milliGRAM(s) Oral every 6 hours PRN Mild Pain (1 - 3)  HYDROmorphone  Injectable 1 milliGRAM(s) IV Push every 3 hours PRN Severe Pain (7 - 10)  ondansetron Injectable 4 milliGRAM(s) IV Push every 8 hours PRN Nausea and/or Vomiting  polyethylene glycol 3350 17 Gram(s) Oral daily PRN Constipation      Allergies    No Known Drug Allergies  Animal dander-Cat (Other)    Intolerances    Review of Systems:   General: No fevers/chills, no fatigue  HEENT: No blurry vision, dysphagia, or odynophagia  CVS: No CP/palpitations  Resp: No SOB/wheezing  GI: +abdominal pain  MSK: No joint pain  Skin: +rashes  Neuro: No headaches    Vital Signs Last 24 Hrs  T(C): 37 (18 Oct 2024 08:29), Max: 37.3 (17 Oct 2024 19:20)  T(F): 98.6 (18 Oct 2024 08:29), Max: 99.2 (17 Oct 2024 19:20)  HR: 98 (18 Oct 2024 08:29) (70 - 104)  BP: 151/76 (18 Oct 2024 08:29) (113/72 - 160/85)  BP(mean): 94 (17 Oct 2024 18:55) (86 - 94)  RR: 18 (18 Oct 2024 08:29) (16 - 20)  SpO2: 95% (18 Oct 2024 08:29) (95% - 100%)    Parameters below as of 18 Oct 2024 08:29  Patient On (Oxygen Delivery Method): room air    Physical Exam:  General: No apparent distress  HEENT: EOMI, MMM, no oral ulcers  CVS: +S1/S2, RRR, no murmurs/rubs/gallops  Resp: CTA b/l. No crackles/wheezing  GI: Soft, + abdominal and back pain, +BS  MSK: no swelling/warmth/erythema of the joints of the UE/LE  Neuro: AAOx3  Skin: +confluent, non blanching petechial rash on b/l LE only affecting calves, not feet or thighs. hyperpigmentation on b/l UE - stable    LABS:                        8.7    8.24  )-----------( 48       ( 18 Oct 2024 08:50 )             25.8     10-18    135  |  97  |  42[H]  ----------------------------<  116[H]  4.1   |  20[L]  |  3.54[H]    Ca    10.1      18 Oct 2024 08:50    TPro  8.5[H]  /  Alb  4.5  /  TBili  1.0  /  DBili  x   /  AST  85[H]  /  ALT  117[H]  /  AlkPhos  90  10-18    PT/INR - ( 18 Oct 2024 08:50 )   PT: 16.1 sec;   INR: 1.42 ratio         PTT - ( 18 Oct 2024 08:50 )  PTT:87.9 sec  Urinalysis Basic - ( 18 Oct 2024 08:50 )    Color: x / Appearance: x / SG: x / pH: x  Gluc: 116 mg/dL / Ketone: x  / Bili: x / Urobili: x   Blood: x / Protein: x / Nitrite: x   Leuk Esterase: x / RBC: x / WBC x   Sq Epi: x / Non Sq Epi: x / Bacteria: x    RADIOLOGY & ADDITIONAL TESTS:  < from: CT Abdomen and Pelvis No Cont (10.18.24 @ 04:52) >    IMPRESSION:  Left perinephric subcapsular hematoma measuring 4.0 x 6.8 x 12.9 cm with   associated mass effect and deformity of the left kidney.    < end of copied text >

## 2024-10-18 NOTE — PROGRESS NOTE ADULT - PROBLEM SELECTOR PLAN 4
- positive APLS panel on repeat  - on Coumadin at home  - on Heparin gtt while inpatient ; on hold for left subcapsular hematoma  - of note ; for heparin gtt ; monitoring via Anti-Xa per protocol since  PTT not reliable iso lupus ac (goal of 0.4-0.6.) Pharmacy ([phone]) assisting with heparin gtt rates  - appreciate Rheumatology recs

## 2024-10-18 NOTE — PROGRESS NOTE ADULT - PROBLEM SELECTOR PLAN 1
Pt. 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 On review of Sonia TINSLEY, Scr 1.26 on 7/23/24, prior to that in early July it was normal at 0.9 mg/dl. Since late July he has had progressive worsening  Scr 2.99 on admission labs (10/14/24). UA with mild proteinuria   (0.5 gms)  but no hematuria (10/14/24).    Pt. likely has a systemic process ? 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 Rash and JORDAN since he started chemo in addition to Tagrisso    Scr 3.54 today. S/p renal biopsy on 10/17. Patient complained of LLQ and Left Flank Pain before biopsy, which worsened after. Overnight pain 12/10, with increasing doses of Dilaudid. Patient received 1U PRBC on 10/18. CT AP showed Left perinephric subcapsular hematoma measuring 4.0 x 6.8 x 12.9 cm with associated mass effect and deformity of the left kidney. Patient VSS. Heme/onc and rheum following.     Recommend: Pain control, Monitor for hematuria, Obtain US Renal with Doppler to r/o thrombosis, Serial imaging and CBC to r/o active bleeding; will follow up prelim read of bx.     MAC C5b-9 -[id number]; Factor B Complement Ag >58.8    Please ensure patient has a Quantiferon and Pneumococcal and Meningococcal vaccines    Continue to monitor urine output and labs. Dose medications as per eGFR.

## 2024-10-18 NOTE — PROGRESS NOTE ADULT - ATTENDING COMMENTS
JORDAN   Biopsy reviewed with DR Fleming and Dr Beckett pathology this afternoon  Prelim mild ATN, moderate arteriolar hylanosis with unremarkable gloms and no signs of Renal TMA  Negative IF staining  Pending EM  JORDAN may be ATN from pemetrexed?   Will defer to heme and Rheum if any treatment needed.   For his JORDAN during hospital stay give IV hydration overnight 1/2 100cc/hr overnight 12 hours and reevaluate  Repeat renal doppler for renal imaging of subcapsular hematoma and for renal vein thrombosis  sharon NP and coordinated care with interdisciplines  sharon pt and wife     Alaina Hernandes MD  Off: [phone]  contact me on teams    (After 5 pm or on weekends please page the on-call fellow/attending, can check [email or web address] for schedule. Login is melyssa olivas, schedule under [facility] medicine, psych, derm)

## 2024-10-18 NOTE — PROGRESS NOTE ADULT - SUBJECTIVE AND OBJECTIVE BOX
Patient is a 53y old  Male who presents with a chief complaint of kidney biospy (18 Oct 2024 13:30)      SUBJECTIVE / OVERNIGHT EVENTS:  Pt seen and examined. s/p left renal biopsy per IR on 10/17 ; now complicated by subcapsular hematoma. Noted to have Hb drop to 7 and is now s/p 1U prbc. Pt c/o severe left flank pain.    MEDICATIONS  (STANDING):  amLODIPine   Tablet 5 milliGRAM(s) Oral once  amLODIPine   Tablet 10 milliGRAM(s) Oral daily  AQUAPHOR (petrolatum Ointment) 1 Application(s) Topical three times a day  carvedilol 6.25 milliGRAM(s) Oral every 12 hours  cloNIDine 0.1 milliGRAM(s) Oral three times a day  desmopressin Injectable 30 MICROGram(s) IV Push once  dexAMETHasone  IVPB 40 milliGRAM(s) IV Intermittent daily  LORazepam     Tablet 1 milliGRAM(s) Oral once  senna 2 Tablet(s) Oral at bedtime  sodium chloride 0.45%. 1000 milliLiter(s) (100 mL/Hr) IV Continuous <Continuous>  triamcinolone 0.1% Cream 1 Application(s) Topical two times a day    MEDICATIONS  (PRN):  acetaminophen     Tablet .. 650 milliGRAM(s) Oral every 6 hours PRN Mild Pain (1 - 3)  HYDROmorphone  Injectable 1 milliGRAM(s) IV Push every 3 hours PRN Severe Pain (7 - 10)  ondansetron Injectable 4 milliGRAM(s) IV Push every 8 hours PRN Nausea and/or Vomiting  polyethylene glycol 3350 17 Gram(s) Oral daily PRN Constipation      Vital Signs Last 24 Hrs  T(C): 36.7 (18 Oct 2024 19:12), Max: 37.1 (18 Oct 2024 01:50)  T(F): 98 (18 Oct 2024 19:12), Max: 98.7 (18 Oct 2024 01:50)  HR: 86 (18 Oct 2024 19:12) (79 - 104)  BP: 136/74 (18 Oct 2024 19:12) (126/81 - 160/85)  BP(mean): --  RR: 20 (18 Oct 2024 19:12) (18 - 20)  SpO2: 98% (18 Oct 2024 19:12) (95% - 100%)    Parameters below as of 18 Oct 2024 19:12  Patient On (Oxygen Delivery Method): room air      CAPILLARY BLOOD GLUCOSE        I&O's Summary    17 Oct 2024 07:01  -  18 Oct 2024 07:00  --------------------------------------------------------  IN: 0 mL / OUT: 0 mL / NET: 0 mL    18 Oct 2024 07:01  -  18 Oct 2024 20:00  --------------------------------------------------------  IN: 240 mL / OUT: 350 mL / NET: -110 mL        PHYSICAL EXAM:  General: painful distress  HEENT: EOMI, MMM, no oral ulcers  CVS: +S1/S2, RRR, no murmurs/rubs/gallops  Resp: CTA b/l. No crackles/wheezing  GI: Soft, NT/ND +BS  MSK: no swelling/warmth/erythema of the joints of the UE/LE  Neuro: AAOx3  Skin: +confluent, non blanching petechial rash on b/l LE only affecting calves, not feet or thighs. hyperpigmentation on b/l UE - improving     LABS:                        8.7    8.24  )-----------( 48       ( 18 Oct 2024 08:50 )             25.8     10-18    135  |  97  |  42[H]  ----------------------------<  116[H]  4.1   |  20[L]  |  3.54[H]    Ca    10.1      18 Oct 2024 08:50    TPro  8.5[H]  /  Alb  4.5  /  TBili  1.0  /  DBili  x   /  AST  85[H]  /  ALT  117[H]  /  AlkPhos  90  10-18    PT/INR - ( 18 Oct 2024 08:50 )   PT: 16.1 sec;   INR: 1.42 ratio         PTT - ( 18 Oct 2024 08:50 )  PTT:87.9 sec      Urinalysis Basic - ( 18 Oct 2024 08:50 )    Color: x / Appearance: x / SG: x / pH: x  Gluc: 116 mg/dL / Ketone: x  / Bili: x / Urobili: x   Blood: x / Protein: x / Nitrite: x   Leuk Esterase: x / RBC: x / WBC x   Sq Epi: x / Non Sq Epi: x / Bacteria: x        RADIOLOGY & ADDITIONAL TESTS:    Imaging Personally Reviewed:  CT ABD/PELVIS 10/18  Left perinephric subcapsular hematoma measuring 4.0 x 6.8 x 12.9 cm with   associated mass effect and deformity of the left kidney.      Consultant(s) Notes Reviewed:  Rheum, Nephrology    Care Discussed with Consultants/Other Providers: Nephrology, Rheum, Heme

## 2024-10-19 DIAGNOSIS — S37.011A MINOR CONTUSION OF RIGHT KIDNEY, INITIAL ENCOUNTER: ICD-10-CM

## 2024-10-19 DIAGNOSIS — S37.012A MINOR CONTUSION OF LEFT KIDNEY, INITIAL ENCOUNTER: ICD-10-CM

## 2024-10-19 LAB
ADAMTS13 ACTIVITY: 150 % — HIGH (ref 40–130)
ALBUMIN SERPL ELPH-MCNC: 3.8 G/DL — SIGNIFICANT CHANGE UP (ref 3.3–5)
ALP SERPL-CCNC: 73 U/L — SIGNIFICANT CHANGE UP (ref 40–120)
ALT FLD-CCNC: 86 U/L — HIGH (ref 10–45)
ANION GAP SERPL CALC-SCNC: 16 MMOL/L — SIGNIFICANT CHANGE UP (ref 5–17)
APTT BLD: 70.9 SEC — HIGH (ref 24.5–35.6)
AST SERPL-CCNC: 53 U/L — HIGH (ref 10–40)
BILIRUB SERPL-MCNC: 0.6 MG/DL — SIGNIFICANT CHANGE UP (ref 0.2–1.2)
BLD GP AB SCN SERPL QL: NEGATIVE — SIGNIFICANT CHANGE UP
BUN SERPL-MCNC: 53 MG/DL — HIGH (ref 7–23)
CALCIUM SERPL-MCNC: 9.7 MG/DL — SIGNIFICANT CHANGE UP (ref 8.4–10.5)
CHLORIDE SERPL-SCNC: 96 MMOL/L — SIGNIFICANT CHANGE UP (ref 96–108)
CO2 SERPL-SCNC: 19 MMOL/L — LOW (ref 22–31)
CREAT SERPL-MCNC: 4.16 MG/DL — HIGH (ref 0.5–1.3)
EGFR: 16 ML/MIN/1.73M2 — LOW
G6PD RBC-CCNC: 15.9 U/G HGB — SIGNIFICANT CHANGE UP (ref 7–20.5)
GLUCOSE SERPL-MCNC: 156 MG/DL — HIGH (ref 70–99)
HCT VFR BLD CALC: 19.6 % — CRITICAL LOW (ref 39–50)
HCT VFR BLD CALC: 20.8 % — CRITICAL LOW (ref 39–50)
HGB BLD-MCNC: 6.8 G/DL — CRITICAL LOW (ref 13–17)
HGB BLD-MCNC: 7.2 G/DL — LOW (ref 13–17)
INR BLD: 1.57 RATIO — HIGH (ref 0.85–1.16)
MCHC RBC-ENTMCNC: 26.8 PG — LOW (ref 27–34)
MCHC RBC-ENTMCNC: 27.4 PG — SIGNIFICANT CHANGE UP (ref 27–34)
MCHC RBC-ENTMCNC: 34.6 GM/DL — SIGNIFICANT CHANGE UP (ref 32–36)
MCHC RBC-ENTMCNC: 34.7 GM/DL — SIGNIFICANT CHANGE UP (ref 32–36)
MCV RBC AUTO: 77.3 FL — LOW (ref 80–100)
MCV RBC AUTO: 79 FL — LOW (ref 80–100)
NRBC # BLD: 0 /100 WBCS — SIGNIFICANT CHANGE UP (ref 0–0)
NRBC # BLD: 0 /100 WBCS — SIGNIFICANT CHANGE UP (ref 0–0)
PLATELET # BLD AUTO: 32 K/UL — LOW (ref 150–400)
PLATELET # BLD AUTO: 54 K/UL — LOW (ref 150–400)
POTASSIUM SERPL-MCNC: 4.3 MMOL/L — SIGNIFICANT CHANGE UP (ref 3.5–5.3)
POTASSIUM SERPL-SCNC: 4.3 MMOL/L — SIGNIFICANT CHANGE UP (ref 3.5–5.3)
PROT SERPL-MCNC: 7.5 G/DL — SIGNIFICANT CHANGE UP (ref 6–8.3)
PROTHROM AB SERPL-ACNC: 17.8 SEC — HIGH (ref 9.9–13.4)
RBC # BLD: 2.48 M/UL — LOW (ref 4.2–5.8)
RBC # BLD: 2.69 M/UL — LOW (ref 4.2–5.8)
RBC # FLD: 16.5 % — HIGH (ref 10.3–14.5)
RBC # FLD: 17.2 % — HIGH (ref 10.3–14.5)
RH IG SCN BLD-IMP: POSITIVE — SIGNIFICANT CHANGE UP
SODIUM SERPL-SCNC: 131 MMOL/L — LOW (ref 135–145)
UFH PPP CHRO-ACNC: 0.05 IU/ML — LOW (ref 0.3–0.7)
WBC # BLD: 10.47 K/UL — SIGNIFICANT CHANGE UP (ref 3.8–10.5)
WBC # BLD: 12.86 K/UL — HIGH (ref 3.8–10.5)
WBC # FLD AUTO: 10.47 K/UL — SIGNIFICANT CHANGE UP (ref 3.8–10.5)
WBC # FLD AUTO: 12.86 K/UL — HIGH (ref 3.8–10.5)

## 2024-10-19 PROCEDURE — 99232 SBSQ HOSP IP/OBS MODERATE 35: CPT | Mod: GC

## 2024-10-19 PROCEDURE — 99233 SBSQ HOSP IP/OBS HIGH 50: CPT

## 2024-10-19 PROCEDURE — 99232 SBSQ HOSP IP/OBS MODERATE 35: CPT

## 2024-10-19 RX ORDER — CLONIDINE HYDROCHLORIDE 0.2 MG/1
0.1 TABLET ORAL THREE TIMES A DAY
Refills: 0 | Status: DISCONTINUED | OUTPATIENT
Start: 2024-10-19 | End: 2024-10-24

## 2024-10-19 RX ADMIN — CARVEDILOL 6.25 MILLIGRAM(S): 25 TABLET, FILM COATED ORAL at 05:53

## 2024-10-19 RX ADMIN — PETROLATUM 1 APPLICATION(S): 987.89 OINTMENT TOPICAL at 23:02

## 2024-10-19 RX ADMIN — CLONIDINE HYDROCHLORIDE 0.1 MILLIGRAM(S): 0.2 TABLET ORAL at 22:07

## 2024-10-19 RX ADMIN — DEXAMETHASONE 1.5 MG 100 MILLIGRAM(S): 1.5 TABLET ORAL at 05:53

## 2024-10-19 RX ADMIN — Medication 2 TABLET(S): at 22:07

## 2024-10-19 RX ADMIN — CLONIDINE HYDROCHLORIDE 0.1 MILLIGRAM(S): 0.2 TABLET ORAL at 05:53

## 2024-10-19 RX ADMIN — Medication 10 MILLIGRAM(S): at 05:53

## 2024-10-19 NOTE — PROGRESS NOTE ADULT - ASSESSMENT
53 M with thalassemia minor, sarcoidosis, APLS on Coumadin, metastatic NSCLC (last chemo 8/2024) presenting with worsening rash in his bilateral upper and lower extremities. He states that he had a biopsy of these lesions done that was consistent with lupus dermatitis. He has a history of metastatic NSCLC (diagnosed in 2021, follows with Dr. Hart) with EGFR L858F mutation, along with multiple brain metastases. He was started on Tagrisso, with subsequent scans showing stable disease/partial response. He underwent gamma knife radiation for progression of a brain metastasis in 10/2023. He had a repeat right supraclavicular LN biopsy in 4/2024 that was concerning for Tagrisso resistance so Carbo/Alimta was added to Tagrisso. Course was complicated by pancytopenia, requiring multiple transfusions and dermatitis, that was consistent with lupus dermitis. Over the past 2 weeks, he reports ongoing nausea, worsening dermatitis, HTN, and worsening renal function concerning for lupus nephritis. Hematology consulted for peripheral smear review due to concern for renal TMA and thrombocytopenia. APLS flare on differential for his worsening thrombocytopenia.    #NSCLC  #Thrombocytopenia   #Anemia  - CBC 10/17: Hb 7.8 (MCV 78.8), Plt 38  - baseline Hb~7-10, Plt  fluctuating within past year. -Pt had been on carboplatin+pemetrexed+ tagrisso--carbo/pem held since august for thrombocytopenia but pt had still been on tagrisso  - Tbili 0.6, retic 0.6, lower suspicion for hemolysis  - hepatitis neg, HIV neg  - Anemia likely 2/2 CKD and thalassemia minor  - peripheral smear 10/17 showing 0-2 schistocytes/hpf, few target cells, rare elliptocytes. No evidence of ongoing hemolysis.  - Thrombocytopenia ddx is broad including chemo/tagrisso AE, BM suppression iso lupus flare, renal TMA. No evidence of overt hemolysis.    #APLS  - positive APLS panel on repeat  -at home pt is on coumadin, currently being held and transitioned to heparin gtt fro renal biopsy  -monitoring via Anti-Xa per protocol  - APTT is elevated but this is not reliable since lupus anticoagulant prolongs PTT. Monitoring for sufficient time off anticoagulation for procedure can be done via anti-Xa level. Last Anti-Xa level 0.28 subtherapeutic.     Recommendations:  - Please continue to trend CBC, LDH, and haptoglobin  - transfuse for Hb<7 and Plt<50 given renal biopsy   - f/u renal biopsy results  - agree with decision to start steroids for possible APLS flare. Will assess CBC response  - rest of care per oncology team, primary onc Dr Hart    Seen and discussed with attending Dr Ariel Sandoval MD  PGY-4, Hematology-Oncology  Pager:[phone]  Available on Teams

## 2024-10-19 NOTE — PROGRESS NOTE ADULT - ATTENDING COMMENTS
53-yr-old man with h/o mNSCLC and APLS (on VKA) presented with rash (biopsy: Lupus), renal failure which is suspected to be lupus nephritis vs chemotherapy induced vs renal TMA. Patient's platelet counts are down-trending, HGB is low which raises the suspicion of primary or secondary TTP. However, peripheral smear is not suggestive of fragmentation hemolysis. Please obtain LDH, haptoglobin and [id number] level. Patient is currently on Heparin gtt; please monitor FXa level (PTT falsely elevated in LA positive state). Patient is planned for a kidney biopsy. Please hold heparin and repeat FXa level; safe to proceed with procedure when FXa level <0.2. Transfuse to keep platelet count >50K in the periprocedural period. We will follow up. 53-yr-old man with h/o mNSCLC and APLS (on VKA) presented with rash (biopsy: Lupus), renal failure which is suspected to be lupus nephritis vs chemotherapy induced vs renal TMA. Patient's platelet counts are down-trending, HGB is low which raises the suspicion of primary or secondary TTP. However, peripheral smear is not suggestive of fragmentation hemolysis. Please obtain LDH, haptoglobin and [id number] level. Patient is currently on Heparin gtt; please monitor FXa level (PTT falsely elevated in LA positive state). Patient had kidney (L) biopsy two days ago; developed hematoma iin the L flank. Off heparin.  Kidney biopsy prelim is negative for TMA. TCP persists, broad ddx including ITP due to APLA. Agree with a course of pulse dose steroid. Supportive transfusion as needed.

## 2024-10-19 NOTE — PROGRESS NOTE ADULT - PROBLEM SELECTOR PLAN 5
- Positive APLS panel on repeat  - on Coumadin at home  - on Heparin drip while inpatient; on hold for left subcapsular hematoma  - Of note ; for heparin drip; monitoring via Anti-Xa per protocol since PTT not reliable iso lupus ac (goal of 0.4-0.6.) Pharmacy ([phone]) assisting with heparin drip rates  - Appreciate Rheumatology recs

## 2024-10-19 NOTE — CHART NOTE - NSCHARTNOTEFT_GEN_A_CORE
Notified by radiology US duplex of the kidneys is showing "Status post renal biopsy with left renal lower pole subcapsular hematoma which measures 8.8 x 6.8 x 7.0 cm. There is loss of venous flow in the mid to lower pole may be secondary to compression from subcapsular hematoma, though venous thrombus is not ruled out. Elevated velocity of the main renal artery measuring up to 178.4 cm/s which is likely also secondary to compression from subcapsular hematoma." Pt and VSS at this time. Spoke to overnight hospitalist Dr. Navarrete; suspect loss of venous flow and elevated velocity of the main renal artery is likely 2/2 compression from the subcapsular hematoma. If it were to be a venous thrombus, would not start AC at this time as pt is pancytopenic w/ a left renal subcapsular hematoma. Is therefore recommending to monitor at this time and follow up with nephrology in the AM (the team who recommended the above test). Will continue to monitor pt overnight. Will endorse to day team in AM, attending to follow.    ICU Vital Signs Last 24 Hrs  T(C): 36.7 (18 Oct 2024 19:12), Max: 37.1 (18 Oct 2024 01:50)  T(F): 98 (18 Oct 2024 19:12), Max: 98.7 (18 Oct 2024 01:50)  HR: 86 (18 Oct 2024 19:12) (79 - 104)  BP: 136/74 (18 Oct 2024 19:12) (126/81 - 160/85)  BP(mean): --  ABP: --  ABP(mean): --  RR: 20 (18 Oct 2024 19:12) (18 - 20)  SpO2: 98% (18 Oct 2024 19:12) (95% - 100%)    O2 Parameters below as of 18 Oct 2024 19:12  Patient On (Oxygen Delivery Method): room air      Meng Centeno PA-C  Department of Medicine

## 2024-10-19 NOTE — PROGRESS NOTE ADULT - PROBLEM SELECTOR PLAN 1
CT: Left perinephric subcapsular hematoma measuring 4.0 x 6.8 x 12.9 cm with associated mass effect and deformity of the left kidney.  US Renal: Left renal subcapsular hematoma measures 8.8 x 6.8 x 7.0 cm. Venous flow in the left renal mid to lower pole not well seen, may be due to extrinsic compression. No evidence of renal vein thrombosis.  Likely contributing to worsened anemia, thrombocytopenia and renal function  10/19- Transfuse 1u pRBC and Plt. Repeat CBC post-transfusion.   Case discussed with nephrology, hematology, rheumatology, IR and MICU.  IR - Continue to trend CBC. Patient is hemodynamically stable at this time - no urgent need for embolization; if pt's clinical status changes or decompensates, will call back IR immediately for evaluation.

## 2024-10-19 NOTE — PROGRESS NOTE ADULT - SUBJECTIVE AND OBJECTIVE BOX
HEMATOLOGY ONCOLOGY CONSULT     Patient is a 53y old  Male who presents with a chief complaint of kidney biopsy (16 Oct 2024 14:49)      HPI:  53 M h/o DVT/PE/IVC filter, sarcoid,  skin SLE, APLS on Coumadin, NSCLC c brain met (s/p cyberknife) last chemo 8/2024 sent in for kidney biopsy. Stopped Coumadin 2 days ago. Daily nausea c one episode of vomiting (yellow-brown) daily x 3-4 weeks. +BMs. Altered taste since chemo 8/2024. +good urine output.  (14 Oct 2024 12:38)     Hematology consulted for concern of hemolysis and renal limited TMA.    Interval events:  - Pain in L flank improving  - Hb and plts continue to downtrend, requiring transfusions      PAST MEDICAL & SURGICAL HISTORY:  Antiphospholipid syndrome      Pulmonary embolism      Lung cancer      Sarcoidosis      Thalassemia minor      Calculus of gallbladder without cholecystitis without obstruction      S/P IVC filter      S/P shoulder surgery    Vital Signs Last 24 Hrs  T(C): 37 (19 Oct 2024 08:24), Max: 37 (19 Oct 2024 08:24)  T(F): 98.6 (19 Oct 2024 08:24), Max: 98.6 (19 Oct 2024 08:24)  HR: 67 (19 Oct 2024 08:24) (67 - 86)  BP: 110/59 (19 Oct 2024 08:24) (110/59 - 143/72)  BP(mean): --  RR: 18 (19 Oct 2024 08:24) (18 - 20)  SpO2: 95% (19 Oct 2024 08:24) (95% - 98%)    Parameters below as of 19 Oct 2024 08:24  Patient On (Oxygen Delivery Method): room air    PHYSICAL EXAM  General: adult in NAD  HEENT: clear oropharynx, anicteric sclera, pink conjunctiva  Neck: supple  CV: normal S1/S2 with no murmur rubs or gallops  Lungs: positive air movement b/l ant lungs,clear to auscultation, no wheezes, no rales  Abdomen: soft non-tender non-distended, no hepatosplenomegaly. Mild L flank tenderness  Ext: no clubbing cyanosis or edema  Skin: scaly rash b/l LE  Neuro: alert and oriented X 4, no focal deficits    acetaminophen     Tablet .. 650 milliGRAM(s) Oral every 6 hours PRN  amLODIPine   Tablet 10 milliGRAM(s) Oral daily  amLODIPine   Tablet 5 milliGRAM(s) Oral once  AQUAPHOR (petrolatum Ointment) 1 Application(s) Topical three times a day  carvedilol 6.25 milliGRAM(s) Oral every 12 hours  cloNIDine 0.1 milliGRAM(s) Oral three times a day  desmopressin Injectable 30 MICROGram(s) IV Push once  dexAMETHasone  IVPB 40 milliGRAM(s) IV Intermittent daily  HYDROmorphone  Injectable 1 milliGRAM(s) IV Push every 3 hours PRN  LORazepam     Tablet 1 milliGRAM(s) Oral once  ondansetron Injectable 4 milliGRAM(s) IV Push every 8 hours PRN  polyethylene glycol 3350 17 Gram(s) Oral daily PRN  senna 2 Tablet(s) Oral at bedtime  sodium chloride 0.45%. 1000 milliLiter(s) IV Continuous <Continuous>  triamcinolone 0.1% Cream 1 Application(s) Topical two times a day                              6.8    10.47 )-----------( 32       ( 19 Oct 2024 06:54 )             19.6       10-19    131[L]  |  96  |  53[H]  ----------------------------<  156[H]  4.3   |  19[L]  |  4.16[H]    Ca    9.7      19 Oct 2024 06:54    TPro  7.5  /  Alb  3.8  /  TBili  0.6  /  DBili  x   /  AST  53[H]  /  ALT  86[H]  /  AlkPhos  73  10-19              Urinalysis Basic - ( 19 Oct 2024 06:54 )    Color: x / Appearance: x / SG: x / pH: x  Gluc: 156 mg/dL / Ketone: x  / Bili: x / Urobili: x   Blood: x / Protein: x / Nitrite: x   Leuk Esterase: x / RBC: x / WBC x   Sq Epi: x / Non Sq Epi: x / Bacteria: x        PT/INR - ( 19 Oct 2024 06:54 )   PT: 17.8 sec;   INR: 1.57 ratio         PTT - ( 19 Oct 2024 06:54 )  PTT:70.9 sec    Lactate Trend  10-15 @ 17:49 Lactate:1.2             CAPILLARY BLOOD GLUCOSE            Culture Results:   No growth (10-14 @ 10:04)

## 2024-10-19 NOTE — PROGRESS NOTE ADULT - SUBJECTIVE AND OBJECTIVE BOX
[facility] DIVISION OF KIDNEY DISEASES AND HYPERTENSION -- FOLLOW UP NOTE  --------------------------------------------------------------------------------  Chief Complaint: JORDAN    24 hour events/subjective:  Feels fair but sono with increasing hematoma  Hg lower this am         PAST HISTORY  --------------------------------------------------------------------------------  No significant changes to PMH, PSH, FHx, SHx, unless otherwise noted    ALLERGIES & MEDICATIONS  --------------------------------------------------------------------------------  Allergies    No Known Drug Allergies  Animal dander-Cat (Other)    Intolerances      Standing Inpatient Medications  amLODIPine   Tablet 5 milliGRAM(s) Oral once  amLODIPine   Tablet 10 milliGRAM(s) Oral daily  AQUAPHOR (petrolatum Ointment) 1 Application(s) Topical three times a day  carvedilol 6.25 milliGRAM(s) Oral every 12 hours  cloNIDine 0.1 milliGRAM(s) Oral three times a day  desmopressin Injectable 30 MICROGram(s) IV Push once  dexAMETHasone  IVPB 40 milliGRAM(s) IV Intermittent daily  LORazepam     Tablet 1 milliGRAM(s) Oral once  senna 2 Tablet(s) Oral at bedtime  sodium chloride 0.45%. 1000 milliLiter(s) IV Continuous <Continuous>  triamcinolone 0.1% Cream 1 Application(s) Topical two times a day    PRN Inpatient Medications  acetaminophen     Tablet .. 650 milliGRAM(s) Oral every 6 hours PRN  HYDROmorphone  Injectable 1 milliGRAM(s) IV Push every 3 hours PRN  ondansetron Injectable 4 milliGRAM(s) IV Push every 8 hours PRN  polyethylene glycol 3350 17 Gram(s) Oral daily PRN      REVIEW OF SYSTEMS  --------------------------------------------------------------------------------  Gen: No weight changes, fatigue, fevers/chills, +weakness  Skin: No rashes  Head/Eyes/Ears/Mouth: No headache; Normal hearing; Normal vision w/o blurriness; No sinus pain/discomfort, sore throat  Respiratory: No dyspnea, cough, wheezing, hemoptysis  CV: No chest pain, PND, orthopnea  GI: No abdominal pain, diarrhea, constipation, nausea, vomiting, melena, hematochezia  : No increased frequency, dysuria, hematuria, nocturia  MSK: No joint pain/swelling; no back pain; no edema  Neuro: No dizziness/lightheadedness, weakness, seizures, numbness, tingling  Heme: No easy bruising or bleeding  Endo: No heat/cold intolerance  Psych: No significant nervousness, anxiety, stress, depression    All other systems were reviewed and are negative, except as noted.    VITALS/PHYSICAL EXAM  --------------------------------------------------------------------------------  T(C): 36.7 (10-19-24 @ 14:00), Max: 37 (10-19-24 @ 08:24)  HR: 74 (10-19-24 @ 14:00) (67 - 86)  BP: 117/66 (10-19-24 @ 14:00) (110/59 - 143/72)  RR: 18 (10-19-24 @ 14:00) (18 - 20)  SpO2: 97% (10-19-24 @ 14:00) (95% - 98%)  Wt(kg): --        10-18-24 @ 07:01  -  10-19-24 @ 07:00  --------------------------------------------------------  IN: 240 mL / OUT: 350 mL / NET: -110 mL      Physical Exam:  Gen: NAD, well-appearing  HEENT: PERRL, supple neck, clear oropharynx  Pulm: CTA B/L  CV: RRR, S1S2;  Back: No spinal or CVA tenderness  Abd: +BS, soft,    : no ecchmosis  Extremities: no bilateral LE edema noted.   Neuro: No focal deficits, intact gait  Skin: Warm, without rashes    LABS/STUDIES  --------------------------------------------------------------------------------              6.8    10.47 >-----------<  32       [10-19-24 @ 06:54]              19.6     131  |  96  |  53  ----------------------------<  156      [10-19-24 @ 06:54]  4.3   |  19  |  4.16        Ca     9.7     [10-19-24 @ 06:54]    TPro  7.5  /  Alb  3.8  /  TBili  0.6  /  DBili  x   /  AST  53  /  ALT  86  /  AlkPhos  73  [10-19-24 @ 06:54]    PT/INR: PT 17.8 , INR 1.57       [10-19-24 @ 06:54]  PTT: 70.9       [10-19-24 @ 06:54]      Creatinine Trend:  SCr 4.16 [10-19 @ 06:54]  SCr 3.54 [10-18 @ 08:50]  SCr 3.00 [10-17 @ 07:19]  SCr 2.73 [10-16 @ 07:09]  SCr 2.82 [10-15 @ 17:49]    Urinalysis - [10-19-24 @ 06:54]      Color  / Appearance  / SG  / pH       Gluc 156 / Ketone   / Bili  / Urobili        Blood  / Protein  / Leuk Est  / Nitrite       RBC  / WBC  / Hyaline  / Gran  / Sq Epi  / Non Sq Epi  / Bacteria       Iron 46, TIBC 314, %sat 15      [09-14-24 @ 08:47]  Ferritin 2763      [09-14-24 @ 08:47]  TSH 1.13      [09-14-24 @ 08:47]    HBsAg Nonreact      [10-15-24 @ 07:15]  HBcAb Nonreact      [10-15-24 @ 07:15]  HCV 0.06, Nonreact      [10-15-24 @ 07:15]    ANCA: cANCA Negative, pANCA Negative, atypical ANCA Indeterminate Method interference due to JENN Fluorescence      [10-15-24 @ 07:15]  MPO-ANCA <5.0, interpretation: Negative      [10-15-24 @ 07:15]  PR3-ANCA <5.0, interpretation: Negative      [10-15-24 @ 07:15]  anti-GBM <0.2      [10-15-24 @ 07:15]

## 2024-10-19 NOTE — PROGRESS NOTE ADULT - PROBLEM SELECTOR PLAN 4
well controlled  - c/w Amlodipine 10mg daily  - c/w Carvedilol 6.25mg BID  - c/w Clonidine 0.1mg TID

## 2024-10-19 NOTE — PROGRESS NOTE ADULT - ASSESSMENT
53 M h/o DVT/PE/IVC filter, sarcoid,  skin SLE, APLS on Coumadin, NSCLC last chemo 8/2024 sent in for kidney biopsy.

## 2024-10-19 NOTE — PROGRESS NOTE ADULT - PROBLEM SELECTOR PLAN 2
Goal Outcome Evaluation:                                               - JORDAN over the last few months possibly 2/2 underlying GN given positive serologies ( +JENN, +DsDNA, +APLS/Sjogren's)  -SCr was 2.99 on presentation, now uptrending  - Renal duplex negative for thrombosis  - s/p L renal biopsy 10/17, c/b subcapsular hematoma  - Renal reccs appreciated ; prelim pathology shows mild ATN, moderate arteriolar hyalinosis with unremarkable gloms and no signs of Renal TMA  - Per rheumatology, no indication for Solaris/systemic therapy at this time.   - Heme reccs Decadron 40mg IV q d x 4 days until 10/22

## 2024-10-19 NOTE — PROGRESS NOTE ADULT - PROBLEM SELECTOR PLAN 1
Pt. 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 On review of Sonia TINSLEY, Scr 1.26 on 7/23/24, prior to that in early July it was normal at 0.9 mg/dl. Since late July he has had progressive worsening  Scr 2.99 on admission labs (10/14/24). UA with mild proteinuria   (0.5 gms)  but no hematuria (10/14/24).    He has a remote H/O sarcoidosis for which he never required any treatment as well as Lupus ( at the time of APLS diagnosis several years ago ) which was only treated with hydroxychloroquine and never had any organ involvement. Rash and JORDAN since he started chemo in addition to Tagrisso     S/p renal biopsy on 10/17. Kidney biopsy with no e/o TMA or GN. Only some ATI  Kidney function owrsening with creatinine up to 4.1 mg/dl. Non oliguric  Biopsy complicated by subcapsular hematoma with decrease in venous flow to left mid/lower pole possibly as a result of compression. Also, noted to have drop in Hg  -Needs MICU evaluation as well as IR evaluation  -pRBC/Platelets transfusion  -Continue to monitor urine output and labs. Dose medications as per eGFR.    HTN: BP at goal. Maintain current meds     Rainer Thomas MD  O: [phone]  Contact me on teams

## 2024-10-19 NOTE — PROGRESS NOTE ADULT - NSPROGADDITIONALINFOA_GEN_ALL_CORE
Time-based billing (NON-critical care).     59 minutes spent on total encounter. The necessity of the time spent during the encounter on this date of service was due to:     - Reviewing, and interpreting labs, testing, and imaging.  - Independently obtaining a review of systems and performing a physical exam  - Reviewing prior records and where necessary, outpatient records.  - Counselling and educating patient regarding interpretation of aforementioned items and plan of care.      d/w ACP

## 2024-10-19 NOTE — PROGRESS NOTE ADULT - PROBLEM SELECTOR PLAN 3
- MRI brain with no new or enlarging metastases; stable right temporal lobe, left Meckel's cave and right occipital lesions  - c/w Zofran PRN  -  c/o severe abd pain ; CT Abd/pelvis showing left subcapsular hematoma  - on IV Diluadid to 1mg IVP Q3 PRN - pain now improved  - Bowel regimen

## 2024-10-19 NOTE — PROGRESS NOTE ADULT - SUBJECTIVE AND OBJECTIVE BOX
[facility] Division of Hospital Medicine  Kassie Sr DO MS Teams PREFERRED      SUBJECTIVE / OVERNIGHT EVENTS: Patient eating breakfast - appears comfortable; denies pain, N/V, abdominal pain, dysuria, diarrhea, constipation.  ADDITIONAL REVIEW OF SYSTEMS:    MEDICATIONS  (STANDING):  amLODIPine   Tablet 5 milliGRAM(s) Oral once  amLODIPine   Tablet 10 milliGRAM(s) Oral daily  AQUAPHOR (petrolatum Ointment) 1 Application(s) Topical three times a day  carvedilol 6.25 milliGRAM(s) Oral every 12 hours  cloNIDine 0.1 milliGRAM(s) Oral three times a day  desmopressin Injectable 30 MICROGram(s) IV Push once  dexAMETHasone  IVPB 40 milliGRAM(s) IV Intermittent daily  LORazepam     Tablet 1 milliGRAM(s) Oral once  senna 2 Tablet(s) Oral at bedtime  sodium chloride 0.45%. 1000 milliLiter(s) (100 mL/Hr) IV Continuous <Continuous>  triamcinolone 0.1% Cream 1 Application(s) Topical two times a day    MEDICATIONS  (PRN):  acetaminophen     Tablet .. 650 milliGRAM(s) Oral every 6 hours PRN Mild Pain (1 - 3)  HYDROmorphone  Injectable 1 milliGRAM(s) IV Push every 3 hours PRN Severe Pain (7 - 10)  ondansetron Injectable 4 milliGRAM(s) IV Push every 8 hours PRN Nausea and/or Vomiting  polyethylene glycol 3350 17 Gram(s) Oral daily PRN Constipation      I&O's Summary    18 Oct 2024 07:01  -  19 Oct 2024 07:00  --------------------------------------------------------  IN: 240 mL / OUT: 350 mL / NET: -110 mL        PHYSICAL EXAM:  Vital Signs Last 24 Hrs  T(C): 36.7 (19 Oct 2024 14:00), Max: 37 (19 Oct 2024 08:24)  T(F): 98 (19 Oct 2024 14:00), Max: 98.6 (19 Oct 2024 08:24)  HR: 74 (19 Oct 2024 14:00) (67 - 86)  BP: 117/66 (19 Oct 2024 14:00) (110/59 - 143/72)  BP(mean): --  RR: 18 (19 Oct 2024 14:00) (18 - 20)  SpO2: 97% (19 Oct 2024 14:00) (95% - 98%)    Parameters below as of 19 Oct 2024 14:00  Patient On (Oxygen Delivery Method): room air    CONSTITUTIONAL: NAD  EYES: Conjunctiva and sclera clear  ENMT: Moist oral mucosa, no pharyngeal injection or exudates   NECK: Supple, midline  RESPIRATORY: Normal respiratory effort; lungs are clear to auscultation bilaterally  CARDIOVASCULAR: Regular rate and rhythm, normal S1 and S2   ABDOMEN: Nontender to palpation, no rebound/guarding  PSYCH: A+O to person, place, and time; affect appropriate        LABS:                        6.8    10.47 )-----------( 32       ( 19 Oct 2024 06:54 )             19.6     10-19    131[L]  |  96  |  53[H]  ----------------------------<  156[H]  4.3   |  19[L]  |  4.16[H]    Ca    9.7      19 Oct 2024 06:54    TPro  7.5  /  Alb  3.8  /  TBili  0.6  /  DBili  x   /  AST  53[H]  /  ALT  86[H]  /  AlkPhos  73  10-19    PT/INR - ( 19 Oct 2024 06:54 )   PT: 17.8 sec;   INR: 1.57 ratio         PTT - ( 19 Oct 2024 06:54 )  PTT:70.9 sec      Urinalysis Basic - ( 19 Oct 2024 06:54 )    Color: x / Appearance: x / SG: x / pH: x  Gluc: 156 mg/dL / Ketone: x  / Bili: x / Urobili: x   Blood: x / Protein: x / Nitrite: x   Leuk Esterase: x / RBC: x / WBC x   Sq Epi: x / Non Sq Epi: x / Bacteria: x          RADIOLOGY & ADDITIONAL TESTS:  Results Reviewed:   Imaging Personally Reviewed:  Electrocardiogram Personally Reviewed:    COORDINATION OF CARE:  Care Discussed with Consultants/Other Providers [Y/N]: Y  Prior or Outpatient Records Reviewed [Y/N]: Y

## 2024-10-20 LAB
ADD ON TEST-SPECIMEN IN LAB: SIGNIFICANT CHANGE UP
ALBUMIN SERPL ELPH-MCNC: 3.8 G/DL — SIGNIFICANT CHANGE UP (ref 3.3–5)
ALP SERPL-CCNC: 71 U/L — SIGNIFICANT CHANGE UP (ref 40–120)
ALT FLD-CCNC: 67 U/L — HIGH (ref 10–45)
ANION GAP SERPL CALC-SCNC: 17 MMOL/L — SIGNIFICANT CHANGE UP (ref 5–17)
AST SERPL-CCNC: 36 U/L — SIGNIFICANT CHANGE UP (ref 10–40)
BILIRUB SERPL-MCNC: 0.4 MG/DL — SIGNIFICANT CHANGE UP (ref 0.2–1.2)
BUN SERPL-MCNC: 72 MG/DL — HIGH (ref 7–23)
CALCIUM SERPL-MCNC: 9.7 MG/DL — SIGNIFICANT CHANGE UP (ref 8.4–10.5)
CHLORIDE SERPL-SCNC: 98 MMOL/L — SIGNIFICANT CHANGE UP (ref 96–108)
CO2 SERPL-SCNC: 17 MMOL/L — LOW (ref 22–31)
CREAT SERPL-MCNC: 4.63 MG/DL — HIGH (ref 0.5–1.3)
DSDNA AB SER-ACNC: 7 IU/ML — HIGH
EGFR: 14 ML/MIN/1.73M2 — LOW
GLUCOSE SERPL-MCNC: 135 MG/DL — HIGH (ref 70–99)
HAPTOGLOB SERPL-MCNC: 287 MG/DL — HIGH (ref 34–200)
HCT VFR BLD CALC: 19.9 % — CRITICAL LOW (ref 39–50)
HCT VFR BLD CALC: 23.3 % — LOW (ref 39–50)
HGB BLD-MCNC: 7 G/DL — CRITICAL LOW (ref 13–17)
HGB BLD-MCNC: 8 G/DL — LOW (ref 13–17)
LDH SERPL L TO P-CCNC: 316 U/L — HIGH (ref 50–242)
MCHC RBC-ENTMCNC: 26.6 PG — LOW (ref 27–34)
MCHC RBC-ENTMCNC: 26.9 PG — LOW (ref 27–34)
MCHC RBC-ENTMCNC: 34.3 GM/DL — SIGNIFICANT CHANGE UP (ref 32–36)
MCHC RBC-ENTMCNC: 35.2 GM/DL — SIGNIFICANT CHANGE UP (ref 32–36)
MCV RBC AUTO: 76.5 FL — LOW (ref 80–100)
MCV RBC AUTO: 77.4 FL — LOW (ref 80–100)
NRBC # BLD: 0 /100 WBCS — SIGNIFICANT CHANGE UP (ref 0–0)
NRBC # BLD: 0 /100 WBCS — SIGNIFICANT CHANGE UP (ref 0–0)
PLATELET # BLD AUTO: 30 K/UL — LOW (ref 150–400)
PLATELET # BLD AUTO: 52 K/UL — LOW (ref 150–400)
POTASSIUM SERPL-MCNC: 4.2 MMOL/L — SIGNIFICANT CHANGE UP (ref 3.5–5.3)
POTASSIUM SERPL-SCNC: 4.2 MMOL/L — SIGNIFICANT CHANGE UP (ref 3.5–5.3)
PROT SERPL-MCNC: 7.4 G/DL — SIGNIFICANT CHANGE UP (ref 6–8.3)
RBC # BLD: 2.6 M/UL — LOW (ref 4.2–5.8)
RBC # BLD: 3.01 M/UL — LOW (ref 4.2–5.8)
RBC # FLD: 16.6 % — HIGH (ref 10.3–14.5)
RBC # FLD: 16.9 % — HIGH (ref 10.3–14.5)
SODIUM SERPL-SCNC: 132 MMOL/L — LOW (ref 135–145)
WBC # BLD: 10.84 K/UL — HIGH (ref 3.8–10.5)
WBC # BLD: 11.51 K/UL — HIGH (ref 3.8–10.5)
WBC # FLD AUTO: 10.84 K/UL — HIGH (ref 3.8–10.5)
WBC # FLD AUTO: 11.51 K/UL — HIGH (ref 3.8–10.5)

## 2024-10-20 PROCEDURE — 99233 SBSQ HOSP IP/OBS HIGH 50: CPT | Mod: GC

## 2024-10-20 PROCEDURE — 99233 SBSQ HOSP IP/OBS HIGH 50: CPT

## 2024-10-20 PROCEDURE — 99232 SBSQ HOSP IP/OBS MODERATE 35: CPT

## 2024-10-20 RX ORDER — GLYCERIN/PROPYLENE GLYCOL 0.6 %-0.6%
1 DROPPERETTE, SINGLE-USE DROP DISPENSER OPHTHALMIC (EYE) ONCE
Refills: 0 | Status: COMPLETED | OUTPATIENT
Start: 2024-10-20 | End: 2024-10-20

## 2024-10-20 RX ORDER — GLYCERIN/PROPYLENE GLYCOL 0.6 %-0.6%
1 DROPPERETTE, SINGLE-USE DROP DISPENSER OPHTHALMIC (EYE) EVERY 4 HOURS
Refills: 0 | Status: DISCONTINUED | OUTPATIENT
Start: 2024-10-20 | End: 2024-10-20

## 2024-10-20 RX ORDER — GLYCERIN/PROPYLENE GLYCOL 0.6 %-0.6%
1 DROPPERETTE, SINGLE-USE DROP DISPENSER OPHTHALMIC (EYE) EVERY 4 HOURS
Refills: 0 | Status: DISCONTINUED | OUTPATIENT
Start: 2024-10-20 | End: 2024-10-24

## 2024-10-20 RX ADMIN — Medication 1 DROP(S): at 00:56

## 2024-10-20 RX ADMIN — Medication 1 APPLICATION(S): at 05:45

## 2024-10-20 RX ADMIN — DEXAMETHASONE 1.5 MG 100 MILLIGRAM(S): 1.5 TABLET ORAL at 05:45

## 2024-10-20 RX ADMIN — PETROLATUM 1 APPLICATION(S): 987.89 OINTMENT TOPICAL at 05:45

## 2024-10-20 RX ADMIN — CLONIDINE HYDROCHLORIDE 0.1 MILLIGRAM(S): 0.2 TABLET ORAL at 05:45

## 2024-10-20 RX ADMIN — Medication 10 MILLIGRAM(S): at 05:44

## 2024-10-20 RX ADMIN — Medication 1 DROP(S): at 12:22

## 2024-10-20 RX ADMIN — Medication 2 TABLET(S): at 21:31

## 2024-10-20 RX ADMIN — CARVEDILOL 6.25 MILLIGRAM(S): 25 TABLET, FILM COATED ORAL at 05:45

## 2024-10-20 RX ADMIN — CLONIDINE HYDROCHLORIDE 0.1 MILLIGRAM(S): 0.2 TABLET ORAL at 21:31

## 2024-10-20 NOTE — PROGRESS NOTE ADULT - SUBJECTIVE AND OBJECTIVE BOX
[facility] Division of Hospital Medicine  Kassie Sr DO  MS Teams PREFERRED      SUBJECTIVE / OVERNIGHT EVENTS: No acute events overnight. Patient reports he did not sleep well due to noise from alarm/his room neighbor. He also notes a lot of secretions from his eyes that started overnight - improved with artificial tears.   He reports good urine output; denies chest pain, dyspnea, N/V, abdominal pain, diarrhea.    ADDITIONAL REVIEW OF SYSTEMS:    MEDICATIONS  (STANDING):  amLODIPine   Tablet 10 milliGRAM(s) Oral daily  AQUAPHOR (petrolatum Ointment) 1 Application(s) Topical three times a day  carvedilol 6.25 milliGRAM(s) Oral every 12 hours  cloNIDine 0.1 milliGRAM(s) Oral three times a day  desmopressin Injectable 30 MICROGram(s) IV Push once  dexAMETHasone  IVPB 40 milliGRAM(s) IV Intermittent daily  LORazepam     Tablet 1 milliGRAM(s) Oral once  senna 2 Tablet(s) Oral at bedtime  sodium chloride 0.45%. 1000 milliLiter(s) (100 mL/Hr) IV Continuous <Continuous>  triamcinolone 0.1% Cream 1 Application(s) Topical two times a day    MEDICATIONS  (PRN):  acetaminophen     Tablet .. 650 milliGRAM(s) Oral every 6 hours PRN Mild Pain (1 - 3)  artificial tears (preservative free) Ophthalmic Solution 1 Drop(s) Both EYES every 4 hours PRN Dry Eyes  HYDROmorphone  Injectable 1 milliGRAM(s) IV Push every 3 hours PRN Severe Pain (7 - 10)  ondansetron Injectable 4 milliGRAM(s) IV Push every 8 hours PRN Nausea and/or Vomiting  polyethylene glycol 3350 17 Gram(s) Oral daily PRN Constipation      I&O's Summary    19 Oct 2024 07:01  -  20 Oct 2024 07:00  --------------------------------------------------------  IN: 1065 mL / OUT: 0 mL / NET: 1065 mL        PHYSICAL EXAM:  Vital Signs Last 24 Hrs  T(C): 37.1 (20 Oct 2024 08:05), Max: 37.3 (20 Oct 2024 04:38)  T(F): 98.8 (20 Oct 2024 08:05), Max: 99.1 (20 Oct 2024 04:38)  HR: 68 (20 Oct 2024 08:05) (68 - 78)  BP: 110/67 (20 Oct 2024 08:05) (110/67 - 133/69)  BP(mean): --  RR: 18 (20 Oct 2024 08:05) (18 - 20)  SpO2: 95% (20 Oct 2024 08:05) (95% - 98%)    Parameters below as of 20 Oct 2024 08:05  Patient On (Oxygen Delivery Method): room air    CONSTITUTIONAL: NAD  EYES: Conjunctiva and sclera clear  ENMT: Moist oral mucosa, no pharyngeal injection or exudates   NECK: Supple, midline  RESPIRATORY: Normal respiratory effort; lungs are clear to auscultation bilaterally  CARDIOVASCULAR: Regular rate and rhythm, normal S1 and S2   ABDOMEN: Nontender to palpation, no rebound/guarding  PSYCH: A+O to person, place, and time; affect appropriate          LABS:                        7.0    10.84 )-----------( 30       ( 20 Oct 2024 06:56 )             19.9     10-20    132[L]  |  98  |  72[H]  ----------------------------<  135[H]  4.2   |  17[L]  |  4.63[H]    Ca    9.7      20 Oct 2024 06:52    TPro  7.4  /  Alb  3.8  /  TBili  0.4  /  DBili  x   /  AST  36  /  ALT  67[H]  /  AlkPhos  71  10-20    PT/INR - ( 19 Oct 2024 06:54 )   PT: 17.8 sec;   INR: 1.57 ratio         PTT - ( 19 Oct 2024 06:54 )  PTT:70.9 sec      Urinalysis Basic - ( 20 Oct 2024 06:52 )    Color: x / Appearance: x / SG: x / pH: x  Gluc: 135 mg/dL / Ketone: x  / Bili: x / Urobili: x   Blood: x / Protein: x / Nitrite: x   Leuk Esterase: x / RBC: x / WBC x   Sq Epi: x / Non Sq Epi: x / Bacteria: x          RADIOLOGY & ADDITIONAL TESTS:  Results Reviewed:   Imaging Personally Reviewed:  Electrocardiogram Personally Reviewed:    COORDINATION OF CARE:  Care Discussed with Consultants/Other Providers [Y/N]: Y  Prior or Outpatient Records Reviewed [Y/N]: Y

## 2024-10-20 NOTE — CHART NOTE - NSCHARTNOTEFT_GEN_A_CORE
IR Follow-Up     morning labs and vitals reviewed   hgb 7.2 --> 7.0 ; HR 71, /67  no plan for angiogram today at this time   platelets dropped from 54 to 30 ; consider giving more platelets     d/w Dr. Rick      --  Elian Salas DO/LEX/MANOJ, PGY-6 Chief Resident  Vascular and Interventional Radiology   Available on [employer] Teams    For EMERGENT inquiries/questions:  IR Pager ([facility]): [phone]  IR Pager ([facility]): [phone] ; [phone]    For non-emergent consults/questions:   Please place a sunrise order "Consult- Interventional Radiology" with an appropriate callback number    For questions about scheduling during appropriate work hours, call IR :  [facility]: [phone]  [facility]: [phone]    For outpatient IR booking:  [facility]: [phone]  [facility]: [phone]

## 2024-10-20 NOTE — PROGRESS NOTE ADULT - ATTENDING COMMENTS
Patient seen and examined at bedside. Agree with assessment and plan as above. No evidence of TMA or hemolysis. EM pending on kidney biopsy to assess for endothelial injury. Thrombocytopenia is more commonly a/w APS  than AIHA although they can coexist. However, in this case it appears that the etiology is multifactorial including his underlying APS, chemotherapy, renal injury and expanding hematoma. Once his hematoma is stabilized, we can consider IVIg if he continues to drop his cell counts. Alternately, if there is endothelia injury, perhaps there is role for eculizumab. Bone marrow biopsy will also be helpful if he remains refractory to steroids. Will continue to follow.

## 2024-10-20 NOTE — PROGRESS NOTE ADULT - PROBLEM SELECTOR PLAN 5
- Positive APLS panel on repeat  - on Coumadin at home  - on Heparin drip while inpatient; on hold for left subcapsular hematoma  - Of note; for heparin drip; requires monitoring via Anti-Xa per protocol since PTT not reliable iso lupus ac (goal of 0.4-0.6.) Pharmacy ([phone]) assisting with heparin drip rates  - Appreciate Rheumatology recs

## 2024-10-20 NOTE — PROGRESS NOTE ADULT - PROBLEM SELECTOR PLAN 2
- JORDAN over the last few months possibly 2/2 underlying GN given positive serologies ( +JENN, +DsDNA, +APLS/Sjogren's)  -SCr was 2.99 on presentation, now uptrending, possible secondary to compressive effect of hematoma.   - Renal duplex negative for thrombosis  - s/p L renal biopsy 10/17, c/b subcapsular hematoma  - Renal reccs appreciated ; prelim pathology shows mild ATN, moderate arteriolar hyalinosis with unremarkable gloms and no signs of Renal TMA  - Per rheumatology, no indication for Solaris/systemic therapy at this time.   - Heme reccs Decadron 40mg IV q d x 4 days until 10/22

## 2024-10-20 NOTE — PROGRESS NOTE ADULT - SUBJECTIVE AND OBJECTIVE BOX
[facility] DIVISION OF KIDNEY DISEASES AND HYPERTENSION -- FOLLOW UP NOTE  --------------------------------------------------------------------------------  Chief Complaint: yessica    24 hour events/subjective:  nONE        PAST HISTORY  --------------------------------------------------------------------------------  No significant changes to PMH, PSH, FHx, SHx, unless otherwise noted    ALLERGIES & MEDICATIONS  --------------------------------------------------------------------------------  Allergies    No Known Drug Allergies  Animal dander-Cat (Other)    Intolerances      Standing Inpatient Medications  amLODIPine   Tablet 10 milliGRAM(s) Oral daily  AQUAPHOR (petrolatum Ointment) 1 Application(s) Topical three times a day  carvedilol 6.25 milliGRAM(s) Oral every 12 hours  cloNIDine 0.1 milliGRAM(s) Oral three times a day  desmopressin Injectable 30 MICROGram(s) IV Push once  dexAMETHasone  IVPB 40 milliGRAM(s) IV Intermittent daily  LORazepam     Tablet 1 milliGRAM(s) Oral once  senna 2 Tablet(s) Oral at bedtime  sodium chloride 0.45%. 1000 milliLiter(s) IV Continuous <Continuous>  triamcinolone 0.1% Cream 1 Application(s) Topical two times a day    PRN Inpatient Medications  acetaminophen     Tablet .. 650 milliGRAM(s) Oral every 6 hours PRN  artificial tears (preservative free) Ophthalmic Solution 1 Drop(s) Both EYES every 4 hours PRN  HYDROmorphone  Injectable 1 milliGRAM(s) IV Push every 3 hours PRN  ondansetron Injectable 4 milliGRAM(s) IV Push every 8 hours PRN  polyethylene glycol 3350 17 Gram(s) Oral daily PRN      REVIEW OF SYSTEMS  --------------------------------------------------------------------------------  Gen: No weight changes, fatigue, fevers/chills, weakness  Skin: No rashes  Head/Eyes/Ears/Mouth: No headache; Normal hearing; Normal vision w/o blurriness; No sinus pain/discomfort, sore throat  Respiratory: No dyspnea, cough, wheezing, hemoptysis  CV: No chest pain, PND, orthopnea  GI: No abdominal pain, diarrhea, constipation, nausea, vomiting, melena, hematochezia  : No increased frequency, dysuria, hematuria, nocturia  MSK: No joint pain/swelling; no back pain; no edema  Neuro: No dizziness/lightheadedness, weakness, seizures, numbness, tingling  Heme: No easy bruising or bleeding  Endo: No heat/cold intolerance  Psych: No significant nervousness, anxiety, stress, depression    All other systems were reviewed and are negative, except as noted.    VITALS/PHYSICAL EXAM  --------------------------------------------------------------------------------  T(C): 37.1 (10-20-24 @ 08:05), Max: 37.3 (10-20-24 @ 04:38)  HR: 68 (10-20-24 @ 08:05) (68 - 78)  BP: 110/67 (10-20-24 @ 08:05) (110/67 - 133/69)  RR: 18 (10-20-24 @ 08:05) (18 - 20)  SpO2: 95% (10-20-24 @ 08:05) (95% - 98%)  Wt(kg): --        10-19-24 @ 07:01  -  10-20-24 @ 07:00  --------------------------------------------------------  IN: 1065 mL / OUT: 0 mL / NET: 1065 mL      Physical Exam:  Gen: NAD, well-appearing  HEENT: PERRL, supple neck, clear oropharynx  Pulm: CTA B/L  CV: RRR, S1S2;  Back: No spinal or CVA tenderness  Abd: +BS, soft,    : no ecchmosis  Extremities: no bilateral LE edema noted.   Neuro: No focal deficits, intact gait  Skin: Warm, without rashes      LABS/STUDIES  --------------------------------------------------------------------------------              7.0    10.84 >-----------<  30       [10-20-24 @ 06:56]              19.9     132  |  98  |  72  ----------------------------<  135      [10-20-24 @ 06:52]  4.2   |  17  |  4.63        Ca     9.7     [10-20-24 @ 06:52]    TPro  7.4  /  Alb  3.8  /  TBili  0.4  /  DBili  x   /  AST  36  /  ALT  67  /  AlkPhos  71  [10-20-24 @ 06:52]    PT/INR: PT 17.8 , INR 1.57       [10-19-24 @ 06:54]  PTT: 70.9       [10-19-24 @ 06:54]          [10-20-24 @ 06:52]    Creatinine Trend:  SCr 4.63 [10-20 @ 06:52]  SCr 4.16 [10-19 @ 06:54]  SCr 3.54 [10-18 @ 08:50]  SCr 3.00 [10-17 @ 07:19]  SCr 2.73 [10-16 @ 07:09]    Urinalysis - [10-20-24 @ 06:52]      Color  / Appearance  / SG  / pH       Gluc 135 / Ketone   / Bili  / Urobili        Blood  / Protein  / Leuk Est  / Nitrite       RBC  / WBC  / Hyaline  / Gran  / Sq Epi  / Non Sq Epi  / Bacteria       Iron 46, TIBC 314, %sat 15      [09-14-24 @ 08:47]  Ferritin 2763      [09-14-24 @ 08:47]  TSH 1.13      [09-14-24 @ 08:47]    HBsAg Nonreact      [10-15-24 @ 07:15]  HBcAb Nonreact      [10-15-24 @ 07:15]  HCV 0.06, Nonreact      [10-15-24 @ 07:15]    ANCA: cANCA Negative, pANCA Negative, atypical ANCA Indeterminate Method interference due to JENN Fluorescence      [10-15-24 @ 07:15]  MPO-ANCA <5.0, interpretation: Negative      [10-15-24 @ 07:15]  PR3-ANCA <5.0, interpretation: Negative      [10-15-24 @ 07:15]  anti-GBM <0.2      [10-15-24 @ 07:15]

## 2024-10-20 NOTE — PROGRESS NOTE ADULT - NSPROGADDITIONALINFOA_GEN_ALL_CORE
Time-based billing (NON-critical care).     58 minutes spent on total encounter. The necessity of the time spent during the encounter on this date of service was due to:     - Reviewing, and interpreting labs, testing, and imaging.  - Independently obtaining a review of systems and performing a physical exam  - Reviewing prior records and where necessary, outpatient records.  - Counselling and educating patient regarding interpretation of aforementioned items and plan of care.      d/w ACP

## 2024-10-20 NOTE — PROGRESS NOTE ADULT - SUBJECTIVE AND OBJECTIVE BOX
TIFFANY ESCAMILLA  [id number]    INTERVAL HPI/OVERNIGHT EVENTS:   Patients cytopenia worsening.  IR procedure cancelled, Low concern for active bleeding.  JORDAN worsening.    MEDICATIONS  (STANDING):  acetaminophen   IVPB .. 1000 milliGRAM(s) IV Intermittent every 6 hours  amLODIPine   Tablet 5 milliGRAM(s) Oral once  amLODIPine   Tablet 10 milliGRAM(s) Oral daily  AQUAPHOR (petrolatum Ointment) 1 Application(s) Topical three times a day  carvedilol 6.25 milliGRAM(s) Oral every 12 hours  cloNIDine 0.1 milliGRAM(s) Oral three times a day  desmopressin Injectable 30 MICROGram(s) IV Push once  LORazepam     Tablet 1 milliGRAM(s) Oral once  meningococcal Group B (BEXSERO) Vaccine 0.5 milliLiter(s) IntraMuscular once  meningococcal Groups A/C/Y and W-135 Vaccine (MENVEO) 0.5 milliLiter(s) IntraMuscular once  pneumococcal  20 Vaccine (PREVNAR 20) 0.5 milliLiter(s) IntraMuscular once  senna 2 Tablet(s) Oral at bedtime  sodium chloride 0.9%. 1000 milliLiter(s) (75 mL/Hr) IV Continuous <Continuous>  triamcinolone 0.1% Cream 1 Application(s) Topical two times a day    MEDICATIONS  (PRN):  acetaminophen     Tablet .. 650 milliGRAM(s) Oral every 6 hours PRN Mild Pain (1 - 3)  HYDROmorphone  Injectable 1 milliGRAM(s) IV Push every 3 hours PRN Severe Pain (7 - 10)  ondansetron Injectable 4 milliGRAM(s) IV Push every 8 hours PRN Nausea and/or Vomiting  polyethylene glycol 3350 17 Gram(s) Oral daily PRN Constipation      Allergies    No Known Drug Allergies  Animal dander-Cat (Other)    Intolerances        Review of Systems:   General: No fevers/chills, no fatigue  HEENT: No blurry vision, dysphagia, or odynophagia  CVS: No CP/palpitations  Resp: No SOB/wheezing  GI: +abdominal pain  MSK: No joint pain  Skin: +rashes  Neuro: No headaches      Vital Signs Last 24 Hrs  T(C): 37.1 (20 Oct 2024 08:05), Max: 37.3 (20 Oct 2024 04:38)  T(F): 98.8 (20 Oct 2024 08:05), Max: 99.1 (20 Oct 2024 04:38)  HR: 68 (20 Oct 2024 08:05) (68 - 78)  BP: 110/67 (20 Oct 2024 08:05) (110/67 - 133/69)  BP(mean): --  RR: 18 (20 Oct 2024 08:05) (18 - 20)  SpO2: 95% (20 Oct 2024 08:05) (95% - 98%)    Parameters below as of 20 Oct 2024 08:05  Patient On (Oxygen Delivery Method): room air      Physical Exam:  General: No apparent distress  HEENT: EOMI, MMM, no oral ulcers  CVS: +S1/S2, RRR, no murmurs/rubs/gallops  Resp: CTA b/l. No crackles/wheezing  GI: Soft, NT/ND +BS  MSK: no swelling/warmth/erythema of the joints of the UE/LE  Neuro: AAOx3  Skin: +confluent, non blanching petechial rash on b/l LE only affecting calves, not feet or thighs. hyperpigmentation on b/l UE - improving       LABS:  cret                        7.0    10.84 )-----------( 30       ( 20 Oct 2024 06:56 )             19.9     10-20    132[L]  |  98  |  72[H]  ----------------------------<  135[H]  4.2   |  17[L]  |  4.63[H]    Ca    9.7      20 Oct 2024 06:52    TPro  7.4  /  Alb  3.8  /  TBili  0.4  /  DBili  x   /  AST  36  /  ALT  67[H]  /  AlkPhos  71  10-20    PT/INR - ( 19 Oct 2024 06:54 )   PT: 17.8 sec;   INR: 1.57 ratio         PTT - ( 19 Oct 2024 06:54 )  PTT:70.9 sec        _RADIOLOGY & ADDITIONAL TESTS:  < from: CT Abdomen and Pelvis No Cont (10.18.24 @ 04:52) >    IMPRESSION:  Left perinephric subcapsular hematoma measuring 4.0 x 6.8 x 12.9 cm with   associated mass effect and deformity of the left kidney.    < end of copied text >

## 2024-10-20 NOTE — PROGRESS NOTE ADULT - ASSESSMENT
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 Rheumatology consulted to evaluate for SLE.    #NSCLC   #Cytopenia: Anemia and thrombocytopenia   -Cytopenia worsening likely 2/2 Recent chemo meds(tagrisso) vs APLS flare    #JORDAN   -Drug induced vs now new renal hematoma with mass effect  -JORDAN worsening: new onset since july 2024,   -Renal bx: prelim renal bx shows "very mild tubular injury, moderate arteriolar hyalinosis, 20% IFTA, 0/19 GS, unremarkable glomeruli, no TMA, negative IF" and pending EM. Therefore, pt does not have TMA or lupus nephritis. Possibility of drug induced ATN       #APS   #Cytopenia without TMA, hemolysis  -Holding AC due to hematoma. On dexamethasone 40mg (10/18- )for ?APLS flare  -Pt diagnosed with APS in 2002 from DVT and PE, s/p IVC filter. Pt did not have DVT/PE after being started on coumadin. Per chart review, Beta2 glycoprotein and anticardiolipin positive in 2016.  -Positive APS :  -No features of hemolysis: Elevated hapto. LDH elevated but also can be in cancer  -peripheral smear 10/17 showing 0-2 schistocytes/hpf, few target cells, rare elliptocytes. No evidence of ongoing hemolysis.  -Kidney bx: ATI without TMA    #Rash  #H/o positive JENN(2021)  -dsDNA : 6, normal complements, Negative JACQUES  -Questionable history of SLE per chart review, pt had high titer dsDNA in 2021 (476) and was briefly on Plaquenil. Pt denies any clinical symptoms of SLE and did not follow up further with rheumatologist.  Positive JENN and LE like rash can be a part of malignancy. Dermatomyositis can cause similar rash indistinguishable from lupus on bx  - Skin bx outpt showed dermatitis/connective tissue disease that may be lupus, but had negative staining.  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 Rheumatology consulted to evaluate for SLE.    #NSCLC   #Cytopenia: Anemia and thrombocytopenia   -Cytopenia worsening likely 2/2 Recent chemo meds(tagrisso) vs APLS flare    #JORDAN   -Drug induced vs now new renal hematoma with mass effect  -JORDAN worsening: new onset since july 2024,   -Renal bx: prelim renal bx shows "very mild tubular injury, moderate arteriolar hyalinosis, 20% IFTA, 0/19 GS, unremarkable glomeruli, no TMA, negative IF" and pending EM. Therefore, pt does not have TMA or lupus nephritis. Possibility of drug induced ATN       #APS   #Cytopenia without TMA, hemolysis  -Holding AC due to hematoma. On dexamethasone 40mg (10/18- )for ?APLS flare  -Pt diagnosed with APS in 2002 from DVT and PE, s/p IVC filter. Pt did not have DVT/PE after being started on coumadin. Per chart review, Beta2 glycoprotein and anticardiolipin positive in 2016.  -Positive APS :  -No features of hemolysis: Elevated hapto. LDH elevated but also can be in cancer  -peripheral smear 10/17 showing 0-2 schistocytes/hpf, few target cells, rare elliptocytes. No evidence of ongoing hemolysis.  -Kidney bx: ATI without TMA    #Rash  #H/o positive JENN(2021)  -dsDNA : 6, normal complements, Negative JACQUES  -Questionable history of SLE per chart review, pt had high titer dsDNA in 2021 (476) and was briefly on Plaquenil. Pt denies any clinical symptoms of SLE and did not follow up further with rheumatologist.  Positive JENN and LE like rash can be a part of malignancy. Dermatomyositis can cause similar rash indistinguishable from lupus on bx  - Skin bx outpt showed dermatitis/connective tissue disease that may be lupus, but had negative staining.  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 Rheumatology consulted to evaluate for SLE.    #NSCLC   #Cytopenia: Anemia and thrombocytopenia   -Cytopenias  likely 2/2 Recent chemo meds(tagrisso) vs APLS flare    #JORDAN   -Drug induced vs now new renal hematoma with mass effect  -JORDAN worsening: new onset since july 2024,   -Renal bx: prelim renal bx shows "very mild tubular injury, moderate arteriolar hyalinosis, 20% IFTA, 0/19 GS, unremarkable glomeruli, no TMA, negative IF" and pending EM. Therefore, pt does not have TMA or lupus nephritis. Possibility of drug induced ATN .  Pending full bx read      #APS   #Cytopenia without TMA, hemolysis  ?Atypical HUS elevated BC59   -Holding AC due to hematoma. On dexamethasone 40mg (10/18- )for ?APLS flare  -Pt diagnosed with APS in 2002 from DVT and PE, s/p IVC filter. Pt did not have DVT/PE after being started on coumadin. Per chart review, Beta2 glycoprotein and anticardiolipin positive in 2016.  -Positive ACL+. Atypical HUS work up positive BC5-9 increased   -No features of hemolysis: Elevated hapto. LDH elevated but also can be in cancer  -peripheral smear 10/17 showing 0-2 schistocytes/hpf, few target cells, rare elliptocytes. No evidence of ongoing hemolysis.  -Kidney bx: ATI without TMA    #Rash  #H/o positive JENN(2021)  -dsDNA : 6, normal complements, Negative JACQUES  -Questionable history of SLE per chart review, pt had high titer dsDNA in 2021 (476) and was briefly on Plaquenil. Pt denies any clinical symptoms of SLE and did not follow up further with rheumatologist.  Positive JENN and LE like rash can be a part of malignancy. Dermatomyositis can cause similar rash indistinguishable from lupus on bx  - Skin bx outpt showed dermatitis/connective tissue disease that may be lupus, but had negative staining.  -s/p renal bx 10/17, no TMA or lupus, but showed APS.    Impression : Patients cytopenia appears to be stable. So far work up including peripheral smear and kidney biospy not suggestive of TMA process. If there is concern for autoimmune systemic process ,  current steroid dose that is started for presumed APS flare should suffice. However, if there is a concern for complement mediated systemic process other options to discuss would be role of IVIg and eculizumab. But given that patient is hemodynamically stable, cytopenia appears to be stable. Would continue to monitor on steroids.       Recommendations:  -Consider Bone marrow Biospy  -FU complements and repeat dsDNA  -FU complete Renal bx   -Appreciate heme and nephrology recs    D/w Dr. Etienne Brar MD, PGY-4  Rheumatology Fellow  Reachable on TEAMS

## 2024-10-20 NOTE — PROGRESS NOTE ADULT - PROBLEM SELECTOR PLAN 1
CT: Left perinephric subcapsular hematoma measuring 4.0 x 6.8 x 12.9 cm with associated mass effect and deformity of the left kidney.  US Renal: Left renal subcapsular hematoma measures 8.8 x 6.8 x 7.0 cm. Venous flow in the left renal mid to lower pole not well seen, may be due to extrinsic compression. No evidence of renal vein thrombosis.  Likely contributing to worsened anemia, thrombocytopenia and renal function  10/19- Transfuse 1u pRBC and Plt - still downtrending, will transfuse additional unit pRBC and platelets 10/20.  Case discussed with nephrology, hematology, rheumatology, IR   Discussed with MICU on 10/19 - No urgent need for consult at this time.  IR - Continue to trend CBC. Patient is hemodynamically stable at this time - no urgent need for embolization, also not recommended for drainage as hematoma may tamponade, may be too thick to drain and/or rebleed if tamponade releases ; if pt's clinical status changes or decompensates, call back IR immediately for evaluation.   Consult urology for further recommendations.  Repeat renal US today for interval follow-up of hematoma

## 2024-10-20 NOTE — PROGRESS NOTE ADULT - PROBLEM SELECTOR PLAN 1
Pt. 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 On review of Sonia TINSLEY, Scr 1.26 on 7/23/24, prior to that in early July it was normal at 0.9 mg/dl. Since late July he has had progressive worsening  Scr 2.99 on admission labs (10/14/24). UA with mild proteinuria   (0.5 gms)  but no hematuria (10/14/24).    He has a remote H/O sarcoidosis for which he never required any treatment as well as Lupus ( at the time of APLS diagnosis several years ago ) which was only treated with hydroxychloroquine and never had any organ involvement. Rash and JORDAN since he started chemo in addition to Tagrisso    S/p renal biopsy on 10/17. Kidney biopsy with no e/o TMA or GN. Only some ATI. EM pending  Kidney function continues to worse . Non oliguric  Biopsy complicated by subcapsular hematoma with decrease in venous flow to left mid/lower pole possibly as a result of compression. Consider repeat scan   -pRBC/Platelets transfusion an BM biopsy per heme  -Continue to monitor urine output and labs. Dose medications as per eGFR.    HTN: BP at goal. Maintain current meds     Rainer Thomas MD  O: [phone]  Contact me on teams

## 2024-10-21 ENCOUNTER — RESULT REVIEW (OUTPATIENT)
Age: 54
End: 2024-10-21

## 2024-10-21 ENCOUNTER — APPOINTMENT (OUTPATIENT)
Dept: DERMATOLOGY | Facility: CLINIC | Age: 54
End: 2024-10-21

## 2024-10-21 ENCOUNTER — NON-APPOINTMENT (OUTPATIENT)
Age: 54
End: 2024-10-21

## 2024-10-21 LAB
ALBUMIN SERPL ELPH-MCNC: 3.9 G/DL — SIGNIFICANT CHANGE UP (ref 3.3–5)
ALP SERPL-CCNC: 77 U/L — SIGNIFICANT CHANGE UP (ref 40–120)
ALT FLD-CCNC: 78 U/L — HIGH (ref 10–45)
ANION GAP SERPL CALC-SCNC: 18 MMOL/L — HIGH (ref 5–17)
AST SERPL-CCNC: 43 U/L — HIGH (ref 10–40)
BILIRUB SERPL-MCNC: 0.4 MG/DL — SIGNIFICANT CHANGE UP (ref 0.2–1.2)
BUN SERPL-MCNC: 83 MG/DL — HIGH (ref 7–23)
C3 SERPL-MCNC: 169 MG/DL — HIGH (ref 81–157)
C4 SERPL-MCNC: 17 MG/DL — SIGNIFICANT CHANGE UP (ref 13–39)
CALCIUM SERPL-MCNC: 9.9 MG/DL — SIGNIFICANT CHANGE UP (ref 8.4–10.5)
CHLORIDE SERPL-SCNC: 99 MMOL/L — SIGNIFICANT CHANGE UP (ref 96–108)
CO2 SERPL-SCNC: 17 MMOL/L — LOW (ref 22–31)
CREAT SERPL-MCNC: 4.59 MG/DL — HIGH (ref 0.5–1.3)
EGFR: 15 ML/MIN/1.73M2 — LOW
GLUCOSE SERPL-MCNC: 129 MG/DL — HIGH (ref 70–99)
HCT VFR BLD CALC: 23.5 % — LOW (ref 39–50)
HGB BLD-MCNC: 8 G/DL — LOW (ref 13–17)
MCHC RBC-ENTMCNC: 26.5 PG — LOW (ref 27–34)
MCHC RBC-ENTMCNC: 34 GM/DL — SIGNIFICANT CHANGE UP (ref 32–36)
MCV RBC AUTO: 77.8 FL — LOW (ref 80–100)
NRBC # BLD: 0 /100 WBCS — SIGNIFICANT CHANGE UP (ref 0–0)
PLATELET # BLD AUTO: 33 K/UL — LOW (ref 150–400)
POTASSIUM SERPL-MCNC: 4.1 MMOL/L — SIGNIFICANT CHANGE UP (ref 3.5–5.3)
POTASSIUM SERPL-SCNC: 4.1 MMOL/L — SIGNIFICANT CHANGE UP (ref 3.5–5.3)
PROT SERPL-MCNC: 7.7 G/DL — SIGNIFICANT CHANGE UP (ref 6–8.3)
RBC # BLD: 3.02 M/UL — LOW (ref 4.2–5.8)
RBC # FLD: 17 % — HIGH (ref 10.3–14.5)
SODIUM SERPL-SCNC: 134 MMOL/L — LOW (ref 135–145)
WBC # BLD: 9.3 K/UL — SIGNIFICANT CHANGE UP (ref 3.8–10.5)
WBC # FLD AUTO: 9.3 K/UL — SIGNIFICANT CHANGE UP (ref 3.8–10.5)

## 2024-10-21 PROCEDURE — 99233 SBSQ HOSP IP/OBS HIGH 50: CPT

## 2024-10-21 PROCEDURE — 88360 TUMOR IMMUNOHISTOCHEM/MANUAL: CPT | Mod: 26

## 2024-10-21 PROCEDURE — 99232 SBSQ HOSP IP/OBS MODERATE 35: CPT | Mod: GC

## 2024-10-21 PROCEDURE — 88291 CYTO/MOLECULAR REPORT: CPT | Mod: 59

## 2024-10-21 PROCEDURE — 88189 FLOWCYTOMETRY/READ 16 & >: CPT

## 2024-10-21 PROCEDURE — 88313 SPECIAL STAINS GROUP 2: CPT | Mod: 26

## 2024-10-21 PROCEDURE — 85097 BONE MARROW INTERPRETATION: CPT

## 2024-10-21 PROCEDURE — G0452: CPT | Mod: 26

## 2024-10-21 PROCEDURE — 88342 IMHCHEM/IMCYTCHM 1ST ANTB: CPT | Mod: 26,59

## 2024-10-21 PROCEDURE — 88341 IMHCHEM/IMCYTCHM EA ADD ANTB: CPT | Mod: 26,59

## 2024-10-21 PROCEDURE — 88305 TISSUE EXAM BY PATHOLOGIST: CPT | Mod: 26

## 2024-10-21 PROCEDURE — 38222 DX BONE MARROW BX & ASPIR: CPT | Mod: RT

## 2024-10-21 PROCEDURE — 93975 VASCULAR STUDY: CPT | Mod: 26

## 2024-10-21 PROCEDURE — 99233 SBSQ HOSP IP/OBS HIGH 50: CPT | Mod: GC

## 2024-10-21 RX ORDER — ACETAMINOPHEN 500 MG
1000 TABLET ORAL ONCE
Refills: 0 | Status: COMPLETED | OUTPATIENT
Start: 2024-10-21 | End: 2024-10-21

## 2024-10-21 RX ADMIN — PETROLATUM 1 APPLICATION(S): 987.89 OINTMENT TOPICAL at 21:20

## 2024-10-21 RX ADMIN — DEXAMETHASONE 1.5 MG 100 MILLIGRAM(S): 1.5 TABLET ORAL at 05:19

## 2024-10-21 RX ADMIN — CLONIDINE HYDROCHLORIDE 0.1 MILLIGRAM(S): 0.2 TABLET ORAL at 13:38

## 2024-10-21 RX ADMIN — Medication 1 APPLICATION(S): at 17:39

## 2024-10-21 RX ADMIN — Medication 400 MILLIGRAM(S): at 22:33

## 2024-10-21 RX ADMIN — Medication 2 TABLET(S): at 21:20

## 2024-10-21 RX ADMIN — CARVEDILOL 6.25 MILLIGRAM(S): 25 TABLET, FILM COATED ORAL at 17:37

## 2024-10-21 RX ADMIN — PETROLATUM 1 APPLICATION(S): 987.89 OINTMENT TOPICAL at 13:38

## 2024-10-21 RX ADMIN — CLONIDINE HYDROCHLORIDE 0.1 MILLIGRAM(S): 0.2 TABLET ORAL at 21:20

## 2024-10-21 NOTE — PROGRESS NOTE ADULT - NSPROGADDITIONALINFOA_GEN_ALL_CORE
time spent reviewing prior charts, meds, discussing plan with patient= 65 min     d/w Medicine ACP Janine

## 2024-10-21 NOTE — PROGRESS NOTE ADULT - PROBLEM SELECTOR PLAN 2
- JORDAN over the last few months possibly 2/2 underlying GN given positive serologies ( +JENN, +DsDNA, +APLS/Sjogren's)  -SCr was 2.99 on presentation, now up trending, 2/2 to compressive effect of hematoma.   - s/p L renal biopsy 10/17 PRelin- ATI pending EM, c/b subcapsular hematoma, Scr plateauing   - Per rheumatology, no indication for Solaris/systemic therapy at this time.   - Heme reccs Decadron 40mg IV q d x 4 days until 10/22, plan for BM bx today 10/21

## 2024-10-21 NOTE — PROGRESS NOTE ADULT - PROBLEM SELECTOR PLAN 3
resolved   - MRI brain with no new or enlarging metastases; stable right temporal lobe, left Meckel's cave and right occipital lesions  - c/w Zofran PRN  -  c/o severe abd pain ; CT Abd/pelvis showing left subcapsular hematoma  - on IV Diluadid to 1mg IVP Q3 PRN - pain now improved  - Bowel regimen

## 2024-10-21 NOTE — PROGRESS NOTE ADULT - ATTENDING COMMENTS
Alaina Hernandes MD  Off: [phone]  contact me on teams    (After 5 pm or on weekends please page the on-call fellow/attending, can check [email or web address] for schedule. Login is melyssa olivas, schedule under [facility] medicine, psych, derm)

## 2024-10-21 NOTE — PROCEDURE NOTE - GENERAL PROCEDURE NAME
Left radial and left brachial clipped prepped with ChloraPrep and draped. Wet prep elapsed drying time: 5 mins.
KHANH. US guided kidney parenchyma bx
Bone marrow biopsy/aspirate

## 2024-10-21 NOTE — PROGRESS NOTE ADULT - PROBLEM SELECTOR PLAN 5
- Positive APLS panel on repeat  - on Coumadin at home  - on Heparin drip while inpatient; on hold for left subcapsular hematoma  - Of note; for heparin drip; requires monitoring via Anti-Xa per protocol since PTT not reliable iso lupus ac (goal of 0.4-0.6.) Pharmacy ([phone]) assisting with heparin drip rates  - Appreciate Rheumatology recs

## 2024-10-21 NOTE — PROGRESS NOTE ADULT - SUBJECTIVE AND OBJECTIVE BOX
Hematology Follow-up    INTERVAL HPI/OVERNIGHT EVENTS:  Patient S&E at bedside. No o/n events, patient resting comfortably. No complaints at this time. Patient denies fever, chills, dizziness, weakness, CP, palpitations, SOB, cough, N/V/D/C, dysuria, changes in bowel movements, LE edema.    VITAL SIGNS:  T(F): 98.3 (10-21-24 @ 05:08)  HR: 66 (10-21-24 @ 05:08)  BP: 114/68 (10-21-24 @ 05:08)  RR: 18 (10-21-24 @ 05:08)  SpO2: 98% (10-21-24 @ 05:08)  Wt(kg): --    PHYSICAL EXAM:    Constitutional: AAOx3, NAD,   Eyes: PERRL, EOMI, sclera non-icteric  Neck: supple, no masses, no JVD  Respiratory: CTA b/l, good air entry b/l, no wheezing, rhonchi, rales, with normal respiratory effort and no intercostal retractions  Cardiovascular: RRR, normal S1S2, no M/R/G  Gastrointestinal: soft, NTND, no masses palpable, BS normal in all four quadrants, no HSM  Extremities:  no c/c/e  Neurological: Grossly intact  Skin: Normal temperature    MEDICATIONS  (STANDING):  amLODIPine   Tablet 10 milliGRAM(s) Oral daily  AQUAPHOR (petrolatum Ointment) 1 Application(s) Topical three times a day  carvedilol 6.25 milliGRAM(s) Oral every 12 hours  cloNIDine 0.1 milliGRAM(s) Oral three times a day  desmopressin Injectable 30 MICROGram(s) IV Push once  dexAMETHasone  IVPB 40 milliGRAM(s) IV Intermittent daily  LORazepam     Tablet 1 milliGRAM(s) Oral once  senna 2 Tablet(s) Oral at bedtime  sodium chloride 0.45%. 1000 milliLiter(s) (100 mL/Hr) IV Continuous <Continuous>  triamcinolone 0.1% Cream 1 Application(s) Topical two times a day    MEDICATIONS  (PRN):  acetaminophen     Tablet .. 650 milliGRAM(s) Oral every 6 hours PRN Mild Pain (1 - 3)  artificial tears (preservative free) Ophthalmic Solution 1 Drop(s) Both EYES every 4 hours PRN Dry Eyes  HYDROmorphone  Injectable 1 milliGRAM(s) IV Push every 3 hours PRN Severe Pain (7 - 10)  ondansetron Injectable 4 milliGRAM(s) IV Push every 8 hours PRN Nausea and/or Vomiting  polyethylene glycol 3350 17 Gram(s) Oral daily PRN Constipation      No Known Drug Allergies  Animal dander-Cat (Other)      LABS:                        8.0    9.30  )-----------( 33       ( 21 Oct 2024 07:22 )             23.5     10-21    134[L]  |  99  |  83[H]  ----------------------------<  129[H]  4.1   |  17[L]  |  4.59[H]    Ca    9.9      21 Oct 2024 07:21    TPro  7.7  /  Alb  3.9  /  TBili  0.4  /  DBili  x   /  AST  43[H]  /  ALT  78[H]  /  AlkPhos  77  10-21       Urinalysis Basic - ( 21 Oct 2024 07:21 )    Color: x / Appearance: x / SG: x / pH: x  Gluc: 129 mg/dL / Ketone: x  / Bili: x / Urobili: x   Blood: x / Protein: x / Nitrite: x   Leuk Esterase: x / RBC: x / WBC x   Sq Epi: x / Non Sq Epi: x / Bacteria: x        RADIOLOGY & ADDITIONAL TESTS:  Studies reviewed.   General Hematology Follow-up    INTERVAL HPI/OVERNIGHT EVENTS:  Patient S&E at bedside, no overnight events.     VITAL SIGNS:  T(F): 98.3 (10-21-24 @ 05:08)  HR: 66 (10-21-24 @ 05:08)  BP: 114/68 (10-21-24 @ 05:08)  RR: 18 (10-21-24 @ 05:08)  SpO2: 98% (10-21-24 @ 05:08)  Wt(kg): --    PHYSICAL EXAM:    Constitutional: AAOx3, NAD,   Eyes: PERRL, EOMI, sclera non-icteric  Neck: supple, no masses, no JVD  Respiratory: CTA b/l, good air entry b/l, no wheezing, rhonchi, rales, with normal respiratory effort and no intercostal retractions  Cardiovascular: RRR, normal S1S2, no M/R/G  Gastrointestinal: soft, NTND, no masses palpable, BS normal in all four quadrants, no HSM  Extremities:  no c/c/e  Neurological: Grossly intact  Skin: Normal temperature    MEDICATIONS  (STANDING):  amLODIPine   Tablet 10 milliGRAM(s) Oral daily  AQUAPHOR (petrolatum Ointment) 1 Application(s) Topical three times a day  carvedilol 6.25 milliGRAM(s) Oral every 12 hours  cloNIDine 0.1 milliGRAM(s) Oral three times a day  desmopressin Injectable 30 MICROGram(s) IV Push once  dexAMETHasone  IVPB 40 milliGRAM(s) IV Intermittent daily  LORazepam     Tablet 1 milliGRAM(s) Oral once  senna 2 Tablet(s) Oral at bedtime  sodium chloride 0.45%. 1000 milliLiter(s) (100 mL/Hr) IV Continuous <Continuous>  triamcinolone 0.1% Cream 1 Application(s) Topical two times a day    MEDICATIONS  (PRN):  acetaminophen     Tablet .. 650 milliGRAM(s) Oral every 6 hours PRN Mild Pain (1 - 3)  artificial tears (preservative free) Ophthalmic Solution 1 Drop(s) Both EYES every 4 hours PRN Dry Eyes  HYDROmorphone  Injectable 1 milliGRAM(s) IV Push every 3 hours PRN Severe Pain (7 - 10)  ondansetron Injectable 4 milliGRAM(s) IV Push every 8 hours PRN Nausea and/or Vomiting  polyethylene glycol 3350 17 Gram(s) Oral daily PRN Constipation      No Known Drug Allergies  Animal dander-Cat (Other)      LABS:                        8.0    9.30  )-----------( 33       ( 21 Oct 2024 07:22 )             23.5     10-21    134[L]  |  99  |  83[H]  ----------------------------<  129[H]  4.1   |  17[L]  |  4.59[H]    Ca    9.9      21 Oct 2024 07:21    TPro  7.7  /  Alb  3.9  /  TBili  0.4  /  DBili  x   /  AST  43[H]  /  ALT  78[H]  /  AlkPhos  77  10-21       Urinalysis Basic - ( 21 Oct 2024 07:21 )    Color: x / Appearance: x / SG: x / pH: x  Gluc: 129 mg/dL / Ketone: x  / Bili: x / Urobili: x   Blood: x / Protein: x / Nitrite: x   Leuk Esterase: x / RBC: x / WBC x   Sq Epi: x / Non Sq Epi: x / Bacteria: x        RADIOLOGY & ADDITIONAL TESTS:  Studies reviewed.

## 2024-10-21 NOTE — PROGRESS NOTE ADULT - SUBJECTIVE AND OBJECTIVE BOX
[facility] Division of Kidney Diseases & Hypertension  FOLLOW UP NOTE  --------------------------------------------------------------------------------  Chief Complaint:    24 hour events/subjective:    pt denies pain     PAST HISTORY  --------------------------------------------------------------------------------  No significant changes to PMH, PSH, FHx, SHx, unless otherwise noted    ALLERGIES & MEDICATIONS  --------------------------------------------------------------------------------  Allergies    No Known Drug Allergies  Animal dander-Cat (Other)    Intolerances      Standing Inpatient Medications  amLODIPine   Tablet 10 milliGRAM(s) Oral daily  AQUAPHOR (petrolatum Ointment) 1 Application(s) Topical three times a day  carvedilol 6.25 milliGRAM(s) Oral every 12 hours  cloNIDine 0.1 milliGRAM(s) Oral three times a day  desmopressin Injectable 30 MICROGram(s) IV Push once  dexAMETHasone  IVPB 40 milliGRAM(s) IV Intermittent daily  LORazepam     Tablet 1 milliGRAM(s) Oral once  senna 2 Tablet(s) Oral at bedtime  sodium chloride 0.45%. 1000 milliLiter(s) IV Continuous <Continuous>  triamcinolone 0.1% Cream 1 Application(s) Topical two times a day    PRN Inpatient Medications  acetaminophen     Tablet .. 650 milliGRAM(s) Oral every 6 hours PRN  artificial tears (preservative free) Ophthalmic Solution 1 Drop(s) Both EYES every 4 hours PRN  HYDROmorphone  Injectable 1 milliGRAM(s) IV Push every 3 hours PRN  ondansetron Injectable 4 milliGRAM(s) IV Push every 8 hours PRN  polyethylene glycol 3350 17 Gram(s) Oral daily PRN      REVIEW OF SYSTEMS  --------------------------------------------------------------------------------    All other systems were reviewed and are negative, except as noted.    VITALS/PHYSICAL EXAM  --------------------------------------------------------------------------------  T(C): 36.8 (10-21-24 @ 05:08), Max: 36.8 (10-21-24 @ 05:08)  HR: 66 (10-21-24 @ 05:08) (65 - 69)  BP: 114/68 (10-21-24 @ 05:08) (114/68 - 134/64)  RR: 18 (10-21-24 @ 05:08) (18 - 20)  SpO2: 98% (10-21-24 @ 05:08) (98% - 100%)  Wt(kg): --        10-20-24 @ 07:01  -  10-21-24 @ 07:00  --------------------------------------------------------  IN: 1005 mL / OUT: 0 mL / NET: 1005 mL      Physical Exam:  	Gen: NAD, well-appearing  	Pulm: CTA B/L  	CV: RRR, S1S2  	Abd: +BS, soft  	UE: Warm  	LE: Warm, no edema  	Neuro: No focal deficits, intact gait  	Psych: Normal affect and mood  	Skin: Warm    LABS/STUDIES  --------------------------------------------------------------------------------              8.0    9.30  >-----------<  33       [10-21-24 @ 07:22]              23.5     134  |  99  |  83  ----------------------------<  129      [10-21-24 @ 07:21]  4.1   |  17  |  4.59        Ca     9.9     [10-21-24 @ 07:21]    TPro  7.7  /  Alb  3.9  /  TBili  0.4  /  DBili  x   /  AST  43  /  ALT  78  /  AlkPhos  77  [10-21-24 @ 07:21]              [10-20-24 @ 06:52]    Creatinine Trend:  SCr 4.59 [10-21 @ 07:21]  SCr 4.63 [10-20 @ 06:52]  SCr 4.16 [10-19 @ 06:54]  SCr 3.54 [10-18 @ 08:50]  SCr 3.00 [10-17 @ 07:19]    Urinalysis - [10-21-24 @ 07:21]      Color  / Appearance  / SG  / pH       Gluc 129 / Ketone   / Bili  / Urobili        Blood  / Protein  / Leuk Est  / Nitrite       RBC  / WBC  / Hyaline  / Gran  / Sq Epi  / Non Sq Epi  / Bacteria         HBsAg Nonreact      [10-15-24 @ 07:15]  HBcAb Nonreact      [10-15-24 @ 07:15]  HCV 0.06, Nonreact      [10-15-24 @ 07:15]    dsDNA 7      [10-19-24 @ 18:50]  C3 Complement 169      [10-19-24 @ 18:50]  C4 Complement 17      [10-19-24 @ 18:50]  ANCA: cANCA Negative, pANCA Negative, atypical ANCA Indeterminate Method interference due to JENN Fluorescence      [10-15-24 @ 07:15]  MPO-ANCA <5.0, interpretation: Negative      [10-15-24 @ 07:15]  PR3-ANCA <5.0, interpretation: Negative      [10-15-24 @ 07:15]  anti-GBM <0.2      [10-15-24 @ 07:15]

## 2024-10-21 NOTE — PROGRESS NOTE ADULT - PROBLEM SELECTOR PLAN 1
Pt. 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 On review of Sonia TINSLEY, Scr 1.26 on 7/23/24, prior to that in early July it was normal at 0.9 mg/dl. Since late July he has had progressive worsening  Scr 2.99 on admission labs (10/14/24). UA with mild proteinuria   (0.5 gms)  but no hematuria (10/14/24).    He has a remote H/O sarcoidosis for which he never required any treatment as well as Lupus ( at the time of APLS diagnosis several years ago ) which was only treated with hydroxychloroquine and never had any organ involvement. Rash and JORDAN since he started chemo in addition to Tagrisso    S/p renal biopsy on 10/17. Kidney biopsy with no e/o TMA or GN. Only some ATI. EM pending  Renal function appears to be plateau'ing today  Continue to trend  Biopsy complicated by subcapsular hematoma with decrease in venous flow to left mid/lower pole possibly as a result of compression. Trend h/h and imaging  -pRBC/Platelets transfusion an BM biopsy per heme  -Continue to monitor urine output and labs. Dose medications as per eGFR.    HTN: BP at goal. Maintain current meds

## 2024-10-21 NOTE — PROGRESS NOTE ADULT - SUBJECTIVE AND OBJECTIVE BOX
Patient is a 53y old  Male who presents with a chief complaint of kidney biospy (21 Oct 2024 13:16)      SUBJECTIVE / OVERNIGHT EVENTS: Patient seen and examined at bedside. States he feels well, denies any back pain. No events overnight.     ROS:  All other review of systems negative    Allergies    No Known Drug Allergies  Animal dander-Cat (Other)    Intolerances        MEDICATIONS  (STANDING):  amLODIPine   Tablet 10 milliGRAM(s) Oral daily  AQUAPHOR (petrolatum Ointment) 1 Application(s) Topical three times a day  carvedilol 6.25 milliGRAM(s) Oral every 12 hours  cloNIDine 0.1 milliGRAM(s) Oral three times a day  desmopressin Injectable 30 MICROGram(s) IV Push once  dexAMETHasone  IVPB 40 milliGRAM(s) IV Intermittent daily  LORazepam     Tablet 1 milliGRAM(s) Oral once  senna 2 Tablet(s) Oral at bedtime  sodium chloride 0.45%. 1000 milliLiter(s) (100 mL/Hr) IV Continuous <Continuous>  triamcinolone 0.1% Cream 1 Application(s) Topical two times a day    MEDICATIONS  (PRN):  acetaminophen     Tablet .. 650 milliGRAM(s) Oral every 6 hours PRN Mild Pain (1 - 3)  artificial tears (preservative free) Ophthalmic Solution 1 Drop(s) Both EYES every 4 hours PRN Dry Eyes  HYDROmorphone  Injectable 1 milliGRAM(s) IV Push every 3 hours PRN Severe Pain (7 - 10)  ondansetron Injectable 4 milliGRAM(s) IV Push every 8 hours PRN Nausea and/or Vomiting  polyethylene glycol 3350 17 Gram(s) Oral daily PRN Constipation      Vital Signs Last 24 Hrs  T(C): 36.4 (21 Oct 2024 13:40), Max: 36.8 (21 Oct 2024 05:08)  T(F): 97.6 (21 Oct 2024 13:40), Max: 98.3 (21 Oct 2024 05:08)  HR: 73 (21 Oct 2024 13:40) (65 - 73)  BP: 135/84 (21 Oct 2024 13:40) (114/68 - 135/84)  BP(mean): --  RR: 18 (21 Oct 2024 13:40) (18 - 20)  SpO2: 99% (21 Oct 2024 13:40) (98% - 100%)    Parameters below as of 21 Oct 2024 13:40  Patient On (Oxygen Delivery Method): room air      CAPILLARY BLOOD GLUCOSE        I&O's Summary    20 Oct 2024 07:01  -  21 Oct 2024 07:00  --------------------------------------------------------  IN: 1005 mL / OUT: 0 mL / NET: 1005 mL        PHYSICAL EXAM:  GENERAL: NAD, well-developed  HEAD:  Atraumatic, Normocephalic  EYES: EOMI, PERRLA, conjunctiva and sclera clear  NECK: Supple, No JVD  CHEST/LUNG: Clear to auscultation bilaterally; No wheeze  HEART: Regular rate and rhythm; No murmurs, rubs, or gallops  ABDOMEN: Soft, Nontender, Nondistended; Bowel sounds present  EXTREMITIES:  2+ Peripheral Pulses, No clubbing, cyanosis, or edema  NEUROLOGY: AAOx3, non-focal      LABS:                        8.0    9.30  )-----------( 33       ( 21 Oct 2024 07:22 )             23.5     10-21    134[L]  |  99  |  83[H]  ----------------------------<  129[H]  4.1   |  17[L]  |  4.59[H]    Ca    9.9      21 Oct 2024 07:21    TPro  7.7  /  Alb  3.9  /  TBili  0.4  /  DBili  x   /  AST  43[H]  /  ALT  78[H]  /  AlkPhos  77  10-21          Urinalysis Basic - ( 21 Oct 2024 07:21 )    Color: x / Appearance: x / SG: x / pH: x  Gluc: 129 mg/dL / Ketone: x  / Bili: x / Urobili: x   Blood: x / Protein: x / Nitrite: x   Leuk Esterase: x / RBC: x / WBC x   Sq Epi: x / Non Sq Epi: x / Bacteria: x        RADIOLOGY & ADDITIONAL TESTS:      Care Discussed with Consultants/Other Providers: Medicine ACP and Nephrology

## 2024-10-21 NOTE — PROGRESS NOTE ADULT - ASSESSMENT
53 M with thalassemia minor, sarcoidosis, APLS on Coumadin, metastatic NSCLC (last chemo 8/2024) presenting with worsening rash in his bilateral upper and lower extremities. He states that he had a biopsy of these lesions done that was consistent with lupus dermatitis. He has a history of metastatic NSCLC (diagnosed in 2021, follows with Dr. Hart) with EGFR L858F mutation, along with multiple brain metastases. He was started on Tagrisso, with subsequent scans showing stable disease/partial response. He underwent gamma knife radiation for progression of a brain metastasis in 10/2023. He had a repeat right supraclavicular LN biopsy in 4/2024 that was concerning for Tagrisso resistance so Carbo/Alimta was added to Tagrisso. Course was complicated by pancytopenia, requiring multiple transfusions and dermatitis, that was consistent with lupus dermitis. Over the past 2 weeks, he reports ongoing nausea, worsening dermatitis, HTN, and worsening renal function concerning for lupus nephritis. Hematology consulted for peripheral smear review due to concern for renal TMA and thrombocytopenia. APLS flare on differential for his worsening thrombocytopenia.    #NSCLC  #Thrombocytopenia   #Anemia  - CBC 10/17: Hb 7.8 (MCV 78.8), Plt 38  - baseline Hb~7-10, Plt  fluctuating within past year. -Pt had been on carboplatin+pemetrexed+ tagrisso--carbo/pem held since august for thrombocytopenia but pt had still been on tagrisso  - Tbili 0.6, retic 0.6, lower suspicion for hemolysis  - hepatitis neg, HIV neg  - Anemia likely 2/2 CKD and thalassemia minor  - peripheral smear 10/17 showing 0-2 schistocytes/hpf, few target cells, rare elliptocytes. No evidence of ongoing hemolysis.  - Thrombocytopenia ddx is broad including chemo/tagrisso AE, BM suppression iso lupus flare, renal TMA. No evidence of overt hemolysis.    #APLS  - positive APLS panel on repeat  -at home pt is on coumadin, currently being held and transitioned to heparin gtt fro renal biopsy  -monitoring via Anti-Xa per protocol  - APTT is elevated but this is not reliable since lupus anticoagulant prolongs PTT. Monitoring for sufficient time off anticoagulation for procedure can be done via anti-Xa level. Last Anti-Xa level 0.28 subtherapeutic.     Recommendations:  - Please continue to trend CBC, LDH, and haptoglobin  - transfuse for Hb<7 and Plt<50 given renal biopsy   - agree with decision to start steroids for possible APLS flare. Will assess CBC response  - Will consider inpatient BMBx   - Recommend repeat imaging to monitor for hematoma prior to considering restarting AC   - rest of care per oncology team, primary onc Dr Hart      **INCOMPLETE NOTE***    Note not finalized until signed by attending.   Please do not hesitate to page with questions.     Teresa Chaparro MD  Hematology/Oncology Fellow PGY5  Available on [employer] Teams   Pager: [phone]  For weekends and evenings (5 pm - 8 am), please page fellow on call 53 M with thalassemia minor, sarcoidosis, APLS on Coumadin, metastatic NSCLC (last chemo 8/2024) presenting with worsening rash in his bilateral upper and lower extremities. He states that he had a biopsy of these lesions done that was consistent with lupus dermatitis. He has a history of metastatic NSCLC (diagnosed in 2021, follows with Dr. Hart) with EGFR L858F mutation, along with multiple brain metastases. He was started on Tagrisso, with subsequent scans showing stable disease/partial response. He underwent gamma knife radiation for progression of a brain metastasis in 10/2023. He had a repeat right supraclavicular LN biopsy in 4/2024 that was concerning for Tagrisso resistance so Carbo/Alimta was added to Tagrisso. Course was complicated by pancytopenia, requiring multiple transfusions and dermatitis, that was consistent with lupus dermitis. Over the past 2 weeks, he reports ongoing nausea, worsening dermatitis, HTN, and worsening renal function concerning for lupus nephritis. Hematology consulted for peripheral smear review due to concern for renal TMA and thrombocytopenia. APLS flare on differential for his worsening thrombocytopenia.    #NSCLC  #Thrombocytopenia   #Anemia  - CBC 10/17: Hb 7.8 (MCV 78.8), Plt 38  - baseline Hb~7-10, Plt  fluctuating within past year. -Pt had been on carboplatin+pemetrexed+ tagrisso--carbo/pem held since august for thrombocytopenia but pt had still been on tagrisso  - Tbili 0.6, retic 0.6, lower suspicion for hemolysis  - hepatitis neg, HIV neg  - Anemia likely 2/2 CKD and thalassemia minor  - peripheral smear 10/17 showing 0-2 schistocytes/hpf, few target cells, rare elliptocytes. No evidence of ongoing hemolysis.  - Thrombocytopenia ddx is broad including  BM suppression iso lupus flare, vs chemo/tagrisso AE, renal TMA. No evidence of overt hemolysis.    #APLS  - positive APLS panel on repeat  -at home pt is on coumadin, currently being held and transitioned to heparin gtt fro renal biopsy  -monitoring via Anti-Xa per protocol  - APTT is elevated but this is not reliable since lupus anticoagulant prolongs PTT. Monitoring for sufficient time off anticoagulation for procedure can be done via anti-Xa level. Last Anti-Xa level 0.28 subtherapeutic.     Recommendations:  - Please continue to trend CBC, LDH, and haptoglobin  - transfuse for Hb<7 and Plt<50 given renal biopsy   - agree with decision to start steroids for possible APLS flare. Will assess CBC response  - Will consider inpatient BMBx to evaluate for bone marrow infiltration/aplastic anemia   - Recommend repeat imaging to monitor for hematoma prior to considering restarting AC   - rest of care per oncology team, primary onc Dr Hart    Note not finalized until signed by attending.   Please do not hesitate to page with questions.     Teresa Chaparro MD  Hematology/Oncology Fellow PGY5  Available on [employer] Teams   Pager: [phone]  For weekends and evenings (5 pm - 8 am), please page fellow on call

## 2024-10-21 NOTE — PROGRESS NOTE ADULT - PROBLEM SELECTOR PLAN 1
CT: Left perinephric subcapsular hematoma measuring 4.0 x 6.8 x 12.9 cm with associated mass effect and deformity of the left kidney.  US Renal: Left renal subcapsular hematoma measures 8.8 x 6.8 x 7.0 cm. Venous flow in the left renal mid to lower pole not well seen, may be due to extrinsic compression. No evidence of renal vein thrombosis.  Likely contributing to worsened anemia, thrombocytopenia and renal function  renal u/s: worsening hematoma, however H/H stable currently, hemodynamically table, w/o any complaints.  will repeat renal duplex Wednesday 10/23

## 2024-10-21 NOTE — CHART NOTE - NSCHARTNOTEFT_GEN_A_CORE
Assessment:   -  This is a 53y Male s/p renal bx on 10/17/24 in IR, course complicated by patient c/o back pain and h/h drop.  10/18 CT obtained demonstrating perinephric subcapsular hematoma.  IR following.         PLAN:   -  h/h 8/23   .   no blood transfusions today. vitals stable.  IR will sign off, reconsult prn.         Sadaf Jalili, IR PA-C, available on TEAMS or IR callback [phone]

## 2024-10-21 NOTE — PROGRESS NOTE ADULT - ATTENDING COMMENTS
Patient seen on hospital day 7 in management of ACLS and renal disease. he is status post left renal biopsy past wee. We are planning for bone marrow aspiration and biopsy today. Patient is in agreement with plan of procedure.

## 2024-10-22 LAB
ALBUMIN SERPL ELPH-MCNC: 3.6 G/DL — SIGNIFICANT CHANGE UP (ref 3.3–5)
ALP SERPL-CCNC: 75 U/L — SIGNIFICANT CHANGE UP (ref 40–120)
ALT FLD-CCNC: 81 U/L — HIGH (ref 10–45)
ANION GAP SERPL CALC-SCNC: 17 MMOL/L — SIGNIFICANT CHANGE UP (ref 5–17)
AST SERPL-CCNC: 53 U/L — HIGH (ref 10–40)
BILIRUB SERPL-MCNC: 0.4 MG/DL — SIGNIFICANT CHANGE UP (ref 0.2–1.2)
BUN SERPL-MCNC: 93 MG/DL — HIGH (ref 7–23)
CALCIUM SERPL-MCNC: 9.3 MG/DL — SIGNIFICANT CHANGE UP (ref 8.4–10.5)
CHLORIDE SERPL-SCNC: 102 MMOL/L — SIGNIFICANT CHANGE UP (ref 96–108)
CLOSURE TME COLL+EPINEP BLD: 32 K/UL — LOW (ref 150–400)
CMV DNA CSF QL NAA+PROBE: SIGNIFICANT CHANGE UP IU/ML
CMV DNA SPEC NAA+PROBE-LOG#: SIGNIFICANT CHANGE UP LOG10IU/ML
CO2 SERPL-SCNC: 17 MMOL/L — LOW (ref 22–31)
CREAT SERPL-MCNC: 4.42 MG/DL — HIGH (ref 0.5–1.3)
EGFR: 15 ML/MIN/1.73M2 — LOW
FERRITIN SERPL-MCNC: 3684 NG/ML — HIGH (ref 30–400)
FOLATE SERPL-MCNC: 6.1 NG/ML — SIGNIFICANT CHANGE UP
GAMMA INTERFERON BACKGROUND BLD IA-ACNC: 0.02 IU/ML — SIGNIFICANT CHANGE UP
GLUCOSE SERPL-MCNC: 126 MG/DL — HIGH (ref 70–99)
HCT VFR BLD CALC: 20.5 % — CRITICAL LOW (ref 39–50)
HCT VFR BLD CALC: 22.2 % — LOW (ref 39–50)
HGB BLD-MCNC: 6.9 G/DL — CRITICAL LOW (ref 13–17)
HGB BLD-MCNC: 7.7 G/DL — LOW (ref 13–17)
HIV 1+2 AB+HIV1 P24 AG SERPL QL IA: SIGNIFICANT CHANGE UP
IRON SATN MFR SERPL: 27 % — SIGNIFICANT CHANGE UP (ref 16–55)
IRON SATN MFR SERPL: 70 UG/DL — SIGNIFICANT CHANGE UP (ref 45–165)
M TB IFN-G BLD-IMP: ABNORMAL
M TB IFN-G CD4+ BCKGRND COR BLD-ACNC: 0 IU/ML — SIGNIFICANT CHANGE UP
M TB IFN-G CD4+CD8+ BCKGRND COR BLD-ACNC: 0 IU/ML — SIGNIFICANT CHANGE UP
MCHC RBC-ENTMCNC: 26.3 PG — LOW (ref 27–34)
MCHC RBC-ENTMCNC: 27 PG — SIGNIFICANT CHANGE UP (ref 27–34)
MCHC RBC-ENTMCNC: 33.7 GM/DL — SIGNIFICANT CHANGE UP (ref 32–36)
MCHC RBC-ENTMCNC: 34.7 GM/DL — SIGNIFICANT CHANGE UP (ref 32–36)
MCV RBC AUTO: 77.9 FL — LOW (ref 80–100)
MCV RBC AUTO: 78.2 FL — LOW (ref 80–100)
NRBC # BLD: 0 /100 WBCS — SIGNIFICANT CHANGE UP (ref 0–0)
NRBC # BLD: 0 /100 WBCS — SIGNIFICANT CHANGE UP (ref 0–0)
PLATELET # BLD AUTO: 32 K/UL — LOW (ref 150–400)
PLATELET # BLD AUTO: 37 K/UL — LOW (ref 150–400)
POTASSIUM SERPL-MCNC: 4.1 MMOL/L — SIGNIFICANT CHANGE UP (ref 3.5–5.3)
POTASSIUM SERPL-SCNC: 4.1 MMOL/L — SIGNIFICANT CHANGE UP (ref 3.5–5.3)
PROT SERPL-MCNC: 6.9 G/DL — SIGNIFICANT CHANGE UP (ref 6–8.3)
QUANT TB PLUS MITOGEN MINUS NIL: 0.04 IU/ML — SIGNIFICANT CHANGE UP
RBC # BLD: 2.62 M/UL — LOW (ref 4.2–5.8)
RBC # BLD: 2.85 M/UL — LOW (ref 4.2–5.8)
RBC # FLD: 17.2 % — HIGH (ref 10.3–14.5)
RBC # FLD: 17.3 % — HIGH (ref 10.3–14.5)
SODIUM SERPL-SCNC: 136 MMOL/L — SIGNIFICANT CHANGE UP (ref 135–145)
TIBC SERPL-MCNC: 257 UG/DL — SIGNIFICANT CHANGE UP (ref 220–430)
UIBC SERPL-MCNC: 187 UG/DL — SIGNIFICANT CHANGE UP (ref 110–370)
VIT B12 SERPL-MCNC: 1023 PG/ML — SIGNIFICANT CHANGE UP (ref 232–1245)
WBC # BLD: 7.46 K/UL — SIGNIFICANT CHANGE UP (ref 3.8–10.5)
WBC # BLD: 8.11 K/UL — SIGNIFICANT CHANGE UP (ref 3.8–10.5)
WBC # FLD AUTO: 7.46 K/UL — SIGNIFICANT CHANGE UP (ref 3.8–10.5)
WBC # FLD AUTO: 8.11 K/UL — SIGNIFICANT CHANGE UP (ref 3.8–10.5)

## 2024-10-22 PROCEDURE — 99233 SBSQ HOSP IP/OBS HIGH 50: CPT

## 2024-10-22 PROCEDURE — 99232 SBSQ HOSP IP/OBS MODERATE 35: CPT | Mod: GC

## 2024-10-22 RX ADMIN — CARVEDILOL 6.25 MILLIGRAM(S): 25 TABLET, FILM COATED ORAL at 05:21

## 2024-10-22 RX ADMIN — CLONIDINE HYDROCHLORIDE 0.1 MILLIGRAM(S): 0.2 TABLET ORAL at 21:04

## 2024-10-22 RX ADMIN — Medication 1 APPLICATION(S): at 05:22

## 2024-10-22 RX ADMIN — Medication 2 TABLET(S): at 21:04

## 2024-10-22 RX ADMIN — CLONIDINE HYDROCHLORIDE 0.1 MILLIGRAM(S): 0.2 TABLET ORAL at 13:40

## 2024-10-22 RX ADMIN — Medication 10 MILLIGRAM(S): at 05:21

## 2024-10-22 RX ADMIN — PETROLATUM 1 APPLICATION(S): 987.89 OINTMENT TOPICAL at 05:22

## 2024-10-22 RX ADMIN — CLONIDINE HYDROCHLORIDE 0.1 MILLIGRAM(S): 0.2 TABLET ORAL at 05:21

## 2024-10-22 RX ADMIN — DEXAMETHASONE 1.5 MG 100 MILLIGRAM(S): 1.5 TABLET ORAL at 05:21

## 2024-10-22 NOTE — PROGRESS NOTE ADULT - PROBLEM SELECTOR PLAN 3
resolved   - MRI brain with no new or enlarging metastases; stable right temporal lobe, left Meckel's cave and right occipital lesions  - c/w Zofran PRN  -  c/o severe abd pain ; CT Abd/pelvis showing left subcapsular hematoma  - d/c'ed Diluadid

## 2024-10-22 NOTE — PROGRESS NOTE ADULT - SUBJECTIVE AND OBJECTIVE BOX
Patient is a 53y old  Male who presents with a chief complaint of kidney biospy (21 Oct 2024 14:05)      SUBJECTIVE / OVERNIGHT EVENTS: Patient seen and examined at bedside. States he feels well, denies any back pain. No events overnight.     ROS:  All other review of systems negative    Allergies    No Known Drug Allergies  Animal dander-Cat (Other)    Intolerances        MEDICATIONS  (STANDING):  amLODIPine   Tablet 10 milliGRAM(s) Oral daily  AQUAPHOR (petrolatum Ointment) 1 Application(s) Topical three times a day  carvedilol 6.25 milliGRAM(s) Oral every 12 hours  cloNIDine 0.1 milliGRAM(s) Oral three times a day  desmopressin Injectable 30 MICROGram(s) IV Push once  LORazepam     Tablet 1 milliGRAM(s) Oral once  senna 2 Tablet(s) Oral at bedtime  sodium chloride 0.45%. 1000 milliLiter(s) (100 mL/Hr) IV Continuous <Continuous>  triamcinolone 0.1% Cream 1 Application(s) Topical two times a day    MEDICATIONS  (PRN):  acetaminophen     Tablet .. 650 milliGRAM(s) Oral every 6 hours PRN Mild Pain (1 - 3)  artificial tears (preservative free) Ophthalmic Solution 1 Drop(s) Both EYES every 4 hours PRN Dry Eyes  HYDROmorphone  Injectable 1 milliGRAM(s) IV Push every 3 hours PRN Severe Pain (7 - 10)  ondansetron Injectable 4 milliGRAM(s) IV Push every 8 hours PRN Nausea and/or Vomiting  polyethylene glycol 3350 17 Gram(s) Oral daily PRN Constipation      Vital Signs Last 24 Hrs  T(C): 36.7 (22 Oct 2024 04:47), Max: 36.8 (21 Oct 2024 16:00)  T(F): 98.1 (22 Oct 2024 04:47), Max: 98.3 (21 Oct 2024 16:00)  HR: 64 (22 Oct 2024 04:47) (63 - 73)  BP: 133/72 (22 Oct 2024 04:47) (128/69 - 140/70)  BP(mean): --  RR: 18 (22 Oct 2024 04:47) (18 - 18)  SpO2: 96% (22 Oct 2024 04:47) (96% - 99%)    Parameters below as of 22 Oct 2024 04:47  Patient On (Oxygen Delivery Method): room air      CAPILLARY BLOOD GLUCOSE        I&O's Summary      PHYSICAL EXAM:  GENERAL: NAD, well-developed  HEAD:  Atraumatic, Normocephalic  EYES: EOMI, PERRLA, conjunctiva and sclera clear  NECK: Supple, No JVD  CHEST/LUNG: Clear to auscultation bilaterally; No wheeze  HEART: Regular rate and rhythm; No murmurs, rubs, or gallops  ABDOMEN: Soft, Nontender, Nondistended; Bowel sounds present  EXTREMITIES:  2+ Peripheral Pulses, No clubbing, cyanosis, or edema  NEUROLOGY: AAOx3, non-focal  PSYCH: calm  SKIN: No rashes or lesions    LABS:                        7.7    8.11  )-----------( 32       ( 22 Oct 2024 08:52 )             22.2     10-22    136  |  102  |  93[H]  ----------------------------<  126[H]  4.1   |  17[L]  |  4.42[H]    Ca    9.3      22 Oct 2024 07:13    TPro  6.9  /  Alb  3.6  /  TBili  0.4  /  DBili  x   /  AST  53[H]  /  ALT  81[H]  /  AlkPhos  75  10-22          Urinalysis Basic - ( 22 Oct 2024 07:13 )    Color: x / Appearance: x / SG: x / pH: x  Gluc: 126 mg/dL / Ketone: x  / Bili: x / Urobili: x   Blood: x / Protein: x / Nitrite: x   Leuk Esterase: x / RBC: x / WBC x   Sq Epi: x / Non Sq Epi: x / Bacteria: x        RADIOLOGY & ADDITIONAL TESTS:      Care Discussed with Consultants/Other Providers: Medicine ACP and Nephrology

## 2024-10-22 NOTE — PROGRESS NOTE ADULT - NSPROGADDITIONALINFOA_GEN_ALL_CORE
time spent reviewing prior charts, meds, discussing plan with patient= 60 min     d/w Medicine aCP Janine

## 2024-10-22 NOTE — PROGRESS NOTE ADULT - ATTENDING COMMENTS
Alaina Hernandes MD  Off: [phone]  contact me on teams    (After 5 pm or on weekends please page the on-call fellow/attending, can check [email or web address] for schedule. Login is melyssa olivas, schedule under [facility] medicine, psych, derm)

## 2024-10-22 NOTE — PROGRESS NOTE ADULT - PROBLEM SELECTOR PLAN 2
- JORDAN over the last few months possibly 2/2 underlying GN given positive serologies ( +JENN, +DsDNA, +APLS/Sjogren's)  -SCr was 2.99 on presentation, now up trending, 2/2 to compressive effect of hematoma.   - s/p L renal biopsy 10/17 PRelin- ATI pending EM, c/b subcapsular hematoma, Scr plateauing   - Per rheumatology, no indication for Solaris/systemic therapy at this time.   - Heme reccs Decadron 40mg IV q d x 4 days until 10/22, s/p BM bx  10/21  - SCr 22.2 today, d/w patient unable to start Lovenox until Crcl >30  - if h/h is stable and repeat u/s renal is stable then start hepp gtt w/ bridge to warfarin until crcl>30

## 2024-10-22 NOTE — PROGRESS NOTE ADULT - PROBLEM SELECTOR PLAN 1
CT: Left perinephric subcapsular hematoma measuring 4.0 x 6.8 x 12.9 cm with associated mass effect and deformity of the left kidney.  US Renal: renal u/s: worsening hematoma, however H/H stable currently, hemodynamically table, w/o any complaints.  will repeat renal duplex Wednesday 10/23  repeat h/h 7.7 this morning

## 2024-10-22 NOTE — PROGRESS NOTE ADULT - PROBLEM SELECTOR PLAN 1
Pt. 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 On review of Sonia TINSLEY, Scr 1.26 on 7/23/24, prior to that in early July it was normal at 0.9 mg/dl. Since late July he has had progressive worsening  Scr 2.99 on admission labs (10/14/24). UA with mild proteinuria   (0.5 gms)  but no hematuria (10/14/24).    He has a remote H/O sarcoidosis for which he never required any treatment as well as Lupus ( at the time of APLS diagnosis several years ago ) which was only treated with hydroxychloroquine and never had any organ involvement. Rash and JORDAN since he started chemo in addition to Tagrisso    S/p renal biopsy on 10/17. Kidney biopsy with no e/o TMA or GN. Only some ATI. EM does not show any TMA  Renal function appears to be showing signs of improvement today  Continue to trend  Biopsy complicated by subcapsular hematoma with decrease in venous flow to left mid/lower pole possibly as a result of compression. Trend h/h and imaging  -pRBC/Platelets transfusion an BM biopsy per heme  -Continue to monitor urine output and labs. Dose medications as per eGFR.    Imaging tomorrow and then timing of anticoag per heme for dc plan    HTN: BP at goal. Maintain current meds    Outpt Followup with Dr Fleming and all the other specialists

## 2024-10-22 NOTE — PROGRESS NOTE ADULT - SUBJECTIVE AND OBJECTIVE BOX
[facility] Division of Kidney Diseases & Hypertension  FOLLOW UP NOTE  --------------------------------------------------------------------------------  Chief Complaint:    24 hour events/subjective:    feels well    PAST HISTORY  --------------------------------------------------------------------------------  No significant changes to PMH, PSH, FHx, SHx, unless otherwise noted    ALLERGIES & MEDICATIONS  --------------------------------------------------------------------------------  Allergies    No Known Drug Allergies  Animal dander-Cat (Other)    Intolerances      Standing Inpatient Medications  amLODIPine   Tablet 10 milliGRAM(s) Oral daily  AQUAPHOR (petrolatum Ointment) 1 Application(s) Topical three times a day  carvedilol 6.25 milliGRAM(s) Oral every 12 hours  cloNIDine 0.1 milliGRAM(s) Oral three times a day  desmopressin Injectable 30 MICROGram(s) IV Push once  LORazepam     Tablet 1 milliGRAM(s) Oral once  senna 2 Tablet(s) Oral at bedtime  sodium chloride 0.45%. 1000 milliLiter(s) IV Continuous <Continuous>  triamcinolone 0.1% Cream 1 Application(s) Topical two times a day    PRN Inpatient Medications  acetaminophen     Tablet .. 650 milliGRAM(s) Oral every 6 hours PRN  artificial tears (preservative free) Ophthalmic Solution 1 Drop(s) Both EYES every 4 hours PRN  ondansetron Injectable 4 milliGRAM(s) IV Push every 8 hours PRN  polyethylene glycol 3350 17 Gram(s) Oral daily PRN      REVIEW OF SYSTEMS  --------------------------------------------------------------------------------  Gen: No weight changes, fatigue, fevers/chills, weakness  Skin: No rashes  Head/Eyes/Ears/Mouth: No headache; Normal hearing; Normal vision w/o blurriness; No sinus pain/discomfort, sore throat  Respiratory: No dyspnea, cough, wheezing, hemoptysis  CV: No chest pain, PND, orthopnea  GI: No abdominal pain, diarrhea, constipation, nausea, vomiting, melena, hematochezia  : No increased frequency, dysuria, hematuria, nocturia  MSK: No joint pain/swelling; no back pain; no edema  Neuro: No dizziness/lightheadedness, weakness, seizures, numbness, tingling  Heme: No easy bruising or bleeding  Endo: No heat/cold intolerance  Psych: No significant nervousness, anxiety, stress, depression    All other systems were reviewed and are negative, except as noted.    VITALS/PHYSICAL EXAM  --------------------------------------------------------------------------------  T(C): 36.6 (10-22-24 @ 12:38), Max: 36.8 (10-21-24 @ 16:00)  HR: 67 (10-22-24 @ 12:38) (63 - 73)  BP: 130/72 (10-22-24 @ 12:38) (128/69 - 140/70)  RR: 18 (10-22-24 @ 12:38) (18 - 18)  SpO2: 98% (10-22-24 @ 12:38) (96% - 99%)  Wt(kg): --        Physical Exam:  	Gen: NAD  	Pulm: CTA B/L  	CV: RRR, S1S2  	Abd: +BS, soft  	: No suprapubic tenderness  	UE: Warm,   	LE: Warm,  no edema  	Neuro: No focal deficits, intact gait  	Psych: Normal affect and mood  	Skin: Warm,  	Vascular access:    LABS/STUDIES  --------------------------------------------------------------------------------              7.7    8.11  >-----------<  32       [10-22-24 @ 08:52]              22.2     136  |  102  |  93  ----------------------------<  126      [10-22-24 @ 07:13]  4.1   |  17  |  4.42        Ca     9.3     [10-22-24 @ 07:13]    TPro  6.9  /  Alb  3.6  /  TBili  0.4  /  DBili  x   /  AST  53  /  ALT  81  /  AlkPhos  75  [10-22-24 @ 07:13]    Creatinine Trend:  SCr 4.42 [10-22 @ 07:13]  SCr 4.59 [10-21 @ 07:21]  SCr 4.63 [10-20 @ 06:52]  SCr 4.16 [10-19 @ 06:54]  SCr 3.54 [10-18 @ 08:50]    Urinalysis - [10-22-24 @ 07:13]      Color  / Appearance  / SG  / pH       Gluc 126 / Ketone   / Bili  / Urobili        Blood  / Protein  / Leuk Est  / Nitrite       RBC  / WBC  / Hyaline  / Gran  / Sq Epi  / Non Sq Epi  / Bacteria       Iron 70, TIBC 257, %sat 27      [10-22-24 @ 07:16]  Ferritin 3684      [10-22-24 @ 07:14]      dsDNA 7      [10-19-24 @ 18:50]  C3 Complement 169      [10-19-24 @ 18:50]  C4 Complement 17      [10-19-24 @ 18:50]

## 2024-10-22 NOTE — PROGRESS NOTE ADULT - PROBLEM SELECTOR PLAN 5
- Positive APLS panel on repeat  - on Coumadin at home  - Of note; for heparin drip; requires monitoring via Anti-Xa per protocol since PTT not reliable iso lupus ac (goal of 0.4-0.6.) Pharmacy ([phone]) assisting with heparin drip rates  - SCr 22.2 today, d/w patient unable to start Lovenox until Crcl >30  - if h/h is stable and repeat u/s renal is stable then start hepp gtt w/ bridge to warfarin until crcl>30

## 2024-10-23 LAB
ANION GAP SERPL CALC-SCNC: 16 MMOL/L — SIGNIFICANT CHANGE UP (ref 5–17)
ANION GAP SERPL CALC-SCNC: 19 MMOL/L — HIGH (ref 5–17)
APTT BLD: 43.8 SEC — HIGH (ref 24.5–35.6)
BLD GP AB SCN SERPL QL: NEGATIVE — SIGNIFICANT CHANGE UP
BUN SERPL-MCNC: 90 MG/DL — HIGH (ref 7–23)
BUN SERPL-MCNC: 91 MG/DL — HIGH (ref 7–23)
CALCIUM SERPL-MCNC: 9.4 MG/DL — SIGNIFICANT CHANGE UP (ref 8.4–10.5)
CALCIUM SERPL-MCNC: 9.4 MG/DL — SIGNIFICANT CHANGE UP (ref 8.4–10.5)
CHLORIDE SERPL-SCNC: 100 MMOL/L — SIGNIFICANT CHANGE UP (ref 96–108)
CHLORIDE SERPL-SCNC: 101 MMOL/L — SIGNIFICANT CHANGE UP (ref 96–108)
CO2 SERPL-SCNC: 15 MMOL/L — LOW (ref 22–31)
CO2 SERPL-SCNC: 17 MMOL/L — LOW (ref 22–31)
CREAT SERPL-MCNC: 3.84 MG/DL — HIGH (ref 0.5–1.3)
CREAT SERPL-MCNC: 4 MG/DL — HIGH (ref 0.5–1.3)
EBV DNA SERPL NAA+PROBE-ACNC: SIGNIFICANT CHANGE UP IU/ML
EBVPCR LOG: SIGNIFICANT CHANGE UP LOG10IU/ML
EGFR: 17 ML/MIN/1.73M2 — LOW
EGFR: 18 ML/MIN/1.73M2 — LOW
GLUCOSE SERPL-MCNC: 107 MG/DL — HIGH (ref 70–99)
GLUCOSE SERPL-MCNC: 125 MG/DL — HIGH (ref 70–99)
HCT VFR BLD CALC: 22.6 % — LOW (ref 39–50)
HCT VFR BLD CALC: 24.8 % — LOW (ref 39–50)
HGB BLD-MCNC: 7.7 G/DL — LOW (ref 13–17)
HGB BLD-MCNC: 8.3 G/DL — LOW (ref 13–17)
INR BLD: 1.05 RATIO — SIGNIFICANT CHANGE UP (ref 0.85–1.16)
INR BLD: 1.08 RATIO — SIGNIFICANT CHANGE UP (ref 0.85–1.16)
MCHC RBC-ENTMCNC: 26.3 PG — LOW (ref 27–34)
MCHC RBC-ENTMCNC: 26.9 PG — LOW (ref 27–34)
MCHC RBC-ENTMCNC: 33.5 GM/DL — SIGNIFICANT CHANGE UP (ref 32–36)
MCHC RBC-ENTMCNC: 34.1 GM/DL — SIGNIFICANT CHANGE UP (ref 32–36)
MCV RBC AUTO: 78.7 FL — LOW (ref 80–100)
MCV RBC AUTO: 79 FL — LOW (ref 80–100)
NRBC # BLD: 0 /100 WBCS — SIGNIFICANT CHANGE UP (ref 0–0)
NRBC # BLD: 0 /100 WBCS — SIGNIFICANT CHANGE UP (ref 0–0)
PLATELET # BLD AUTO: 17 K/UL — CRITICAL LOW (ref 150–400)
PLATELET # BLD AUTO: 22 K/UL — LOW (ref 150–400)
POTASSIUM SERPL-MCNC: 3.9 MMOL/L — SIGNIFICANT CHANGE UP (ref 3.5–5.3)
POTASSIUM SERPL-MCNC: 4 MMOL/L — SIGNIFICANT CHANGE UP (ref 3.5–5.3)
POTASSIUM SERPL-SCNC: 3.9 MMOL/L — SIGNIFICANT CHANGE UP (ref 3.5–5.3)
POTASSIUM SERPL-SCNC: 4 MMOL/L — SIGNIFICANT CHANGE UP (ref 3.5–5.3)
PROTHROM AB SERPL-ACNC: 12.1 SEC — SIGNIFICANT CHANGE UP (ref 9.9–13.4)
PROTHROM AB SERPL-ACNC: 12.4 SEC — SIGNIFICANT CHANGE UP (ref 9.9–13.4)
RBC # BLD: 2.86 M/UL — LOW (ref 4.2–5.8)
RBC # BLD: 3.15 M/UL — LOW (ref 4.2–5.8)
RBC # FLD: 17.2 % — HIGH (ref 10.3–14.5)
RBC # FLD: 17.5 % — HIGH (ref 10.3–14.5)
RH IG SCN BLD-IMP: POSITIVE — SIGNIFICANT CHANGE UP
SODIUM SERPL-SCNC: 134 MMOL/L — LOW (ref 135–145)
SODIUM SERPL-SCNC: 134 MMOL/L — LOW (ref 135–145)
UFH PPP CHRO-ACNC: 0.06 IU/ML — LOW (ref 0.3–0.7)
WBC # BLD: 8.58 K/UL — SIGNIFICANT CHANGE UP (ref 3.8–10.5)
WBC # BLD: 9.07 K/UL — SIGNIFICANT CHANGE UP (ref 3.8–10.5)
WBC # FLD AUTO: 8.58 K/UL — SIGNIFICANT CHANGE UP (ref 3.8–10.5)
WBC # FLD AUTO: 9.07 K/UL — SIGNIFICANT CHANGE UP (ref 3.8–10.5)

## 2024-10-23 PROCEDURE — 99233 SBSQ HOSP IP/OBS HIGH 50: CPT

## 2024-10-23 PROCEDURE — 99231 SBSQ HOSP IP/OBS SF/LOW 25: CPT | Mod: GC

## 2024-10-23 PROCEDURE — 93975 VASCULAR STUDY: CPT | Mod: 26

## 2024-10-23 PROCEDURE — 99232 SBSQ HOSP IP/OBS MODERATE 35: CPT | Mod: GC

## 2024-10-23 RX ORDER — ACETAMINOPHEN 500 MG
1000 TABLET ORAL ONCE
Refills: 0 | Status: COMPLETED | OUTPATIENT
Start: 2024-10-23 | End: 2024-10-23

## 2024-10-23 RX ORDER — TRAMADOL HYDROCHLORIDE 50 MG/1
50 TABLET, COATED ORAL EVERY 6 HOURS
Refills: 0 | Status: DISCONTINUED | OUTPATIENT
Start: 2024-10-23 | End: 2024-10-24

## 2024-10-23 RX ORDER — HYDROMORPHONE HCL/0.9% NACL/PF 6 MG/30 ML
1 PATIENT CONTROLLED ANALGESIA SYRINGE INTRAVENOUS ONCE
Refills: 0 | Status: DISCONTINUED | OUTPATIENT
Start: 2024-10-23 | End: 2024-10-23

## 2024-10-23 RX ORDER — HEPARIN SODIUM 10000 [USP'U]/ML
800 INJECTION INTRAVENOUS; SUBCUTANEOUS
Qty: 25000 | Refills: 0 | Status: DISCONTINUED | OUTPATIENT
Start: 2024-10-23 | End: 2024-10-24

## 2024-10-23 RX ADMIN — Medication 1 MILLIGRAM(S): at 21:09

## 2024-10-23 RX ADMIN — CARVEDILOL 6.25 MILLIGRAM(S): 25 TABLET, FILM COATED ORAL at 18:43

## 2024-10-23 RX ADMIN — Medication 1 MILLIGRAM(S): at 17:30

## 2024-10-23 RX ADMIN — Medication 400 MILLIGRAM(S): at 09:00

## 2024-10-23 RX ADMIN — CARVEDILOL 6.25 MILLIGRAM(S): 25 TABLET, FILM COATED ORAL at 05:20

## 2024-10-23 RX ADMIN — Medication 650 MILLIGRAM(S): at 15:07

## 2024-10-23 RX ADMIN — Medication 1 MILLIGRAM(S): at 20:39

## 2024-10-23 RX ADMIN — CLONIDINE HYDROCHLORIDE 0.1 MILLIGRAM(S): 0.2 TABLET ORAL at 15:09

## 2024-10-23 RX ADMIN — PETROLATUM 1 APPLICATION(S): 987.89 OINTMENT TOPICAL at 21:24

## 2024-10-23 RX ADMIN — CLONIDINE HYDROCHLORIDE 0.1 MILLIGRAM(S): 0.2 TABLET ORAL at 20:39

## 2024-10-23 RX ADMIN — CLONIDINE HYDROCHLORIDE 0.1 MILLIGRAM(S): 0.2 TABLET ORAL at 05:19

## 2024-10-23 RX ADMIN — Medication 650 MILLIGRAM(S): at 15:40

## 2024-10-23 RX ADMIN — HEPARIN SODIUM 8 UNIT(S)/HR: 10000 INJECTION INTRAVENOUS; SUBCUTANEOUS at 21:02

## 2024-10-23 RX ADMIN — Medication 10 MILLIGRAM(S): at 05:20

## 2024-10-23 NOTE — PROGRESS NOTE ADULT - SUBJECTIVE AND OBJECTIVE BOX
[facility] Division of Kidney Diseases & Hypertension  FOLLOW UP NOTE  --------------------------------------------------------------------------------  Chief Complaint:    24 hour events/subjective:    doing well  no pain    PAST HISTORY  --------------------------------------------------------------------------------  No significant changes to PMH, PSH, FHx, SHx, unless otherwise noted    ALLERGIES & MEDICATIONS  --------------------------------------------------------------------------------  Allergies    No Known Drug Allergies  Animal dander-Cat (Other)    Intolerances      Standing Inpatient Medications  acetaminophen   IVPB .. 1000 milliGRAM(s) IV Intermittent once  amLODIPine   Tablet 10 milliGRAM(s) Oral daily  AQUAPHOR (petrolatum Ointment) 1 Application(s) Topical three times a day  carvedilol 6.25 milliGRAM(s) Oral every 12 hours  cloNIDine 0.1 milliGRAM(s) Oral three times a day  desmopressin Injectable 30 MICROGram(s) IV Push once  LORazepam     Tablet 1 milliGRAM(s) Oral once  senna 2 Tablet(s) Oral at bedtime  sodium chloride 0.45%. 1000 milliLiter(s) IV Continuous <Continuous>  triamcinolone 0.1% Cream 1 Application(s) Topical two times a day    PRN Inpatient Medications  acetaminophen     Tablet .. 650 milliGRAM(s) Oral every 6 hours PRN  artificial tears (preservative free) Ophthalmic Solution 1 Drop(s) Both EYES every 4 hours PRN  ondansetron Injectable 4 milliGRAM(s) IV Push every 8 hours PRN  polyethylene glycol 3350 17 Gram(s) Oral daily PRN      REVIEW OF SYSTEMS  --------------------------------------------------------------------------------  Gen: No weight changes, fatigue, fevers/chills, weakness  Skin: No rashes  Head/Eyes/Ears/Mouth: No headache; Normal hearing; Normal vision w/o blurriness; No sinus pain/discomfort, sore throat  Respiratory: No dyspnea, cough, wheezing, hemoptysis  CV: No chest pain, PND, orthopnea  GI: No abdominal pain, diarrhea, constipation, nausea, vomiting, melena, hematochezia  : No increased frequency, dysuria, hematuria, nocturia  MSK: No joint pain/swelling; no back pain; no edema  Neuro: No dizziness/lightheadedness, weakness, seizures, numbness, tingling  Heme: No easy bruising or bleeding  Endo: No heat/cold intolerance  Psych: No significant nervousness, anxiety, stress, depression    All other systems were reviewed and are negative, except as noted.    VITALS/PHYSICAL EXAM  --------------------------------------------------------------------------------  T(C): 36.7 (10-23-24 @ 12:09), Max: 37.3 (10-23-24 @ 04:31)  HR: 63 (10-23-24 @ 12:09) (59 - 80)  BP: 145/79 (10-23-24 @ 12:09) (122/72 - 159/62)  RR: 18 (10-23-24 @ 12:09) (18 - 18)  SpO2: 99% (10-23-24 @ 12:09) (98% - 99%)  Wt(kg): --        10-22-24 @ 07:01  -  10-23-24 @ 07:00  --------------------------------------------------------  IN: 300 mL / OUT: 0 mL / NET: 300 mL    10-23-24 @ 07:01  -  10-23-24 @ 13:29  --------------------------------------------------------  IN: 360 mL / OUT: 0 mL / NET: 360 mL      Physical Exam:  	Gen: NAD  	Pulm: CTA B/L  	CV: RRR, S1S2  	Abd: +BS, soft,   	UE: Warm, FROM,   	LE: Warm, FROM, no edema  	Neuro: No focal deficits,  	Psych: Normal affect and mood  	Skin: Warm      LABS/STUDIES  --------------------------------------------------------------------------------              7.7    9.07  >-----------<  22       [10-23-24 @ 07:13]              22.6     134  |  100  |  91  ----------------------------<  125      [10-23-24 @ 07:13]  4.0   |  15  |  4.00        Ca     9.4     [10-23-24 @ 07:13]    TPro  6.9  /  Alb  3.6  /  TBili  0.4  /  DBili  x   /  AST  53  /  ALT  81  /  AlkPhos  75  [10-22-24 @ 07:13]          Creatinine Trend:  SCr 4.00 [10-23 @ 07:13]  SCr 4.42 [10-22 @ 07:13]  SCr 4.59 [10-21 @ 07:21]  SCr 4.63 [10-20 @ 06:52]  SCr 4.16 [10-19 @ 06:54]    Urinalysis - [10-23-24 @ 07:13]      Color  / Appearance  / SG  / pH       Gluc 125 / Ketone   / Bili  / Urobili        Blood  / Protein  / Leuk Est  / Nitrite       RBC  / WBC  / Hyaline  / Gran  / Sq Epi  / Non Sq Epi  / Bacteria       Iron 70, TIBC 257, %sat 27      [10-22-24 @ 07:16]  Ferritin 3684      [10-22-24 @ 07:14]    HIV Nonreact      [10-22-24 @ 07:16]    dsDNA 7      [10-19-24 @ 18:50]  C3 Complement 169      [10-19-24 @ 18:50]  C4 Complement 17      [10-19-24 @ 18:50]

## 2024-10-23 NOTE — PROGRESS NOTE ADULT - TIME BILLING
time spent reviewing prior charts, meds, discussing plan with patient, family, consultants= 85 minutes

## 2024-10-23 NOTE — PROGRESS NOTE ADULT - ATTENDING COMMENTS
Alaina Hernandes MD  Off: [phone]  contact me on teams    (After 5 pm or on weekends please page the on-call fellow/attending, can check [email or web address] for schedule. Login is melyssa olivas, schedule under [facility] medicine, psych, derm)

## 2024-10-23 NOTE — PROGRESS NOTE ADULT - PROBLEM SELECTOR PLAN 1
CT: Left perinephric subcapsular hematoma measuring 4.0 x 6.8 x 12.9 cm with associated mass effect and deformity of the left kidney.  US Renal: renal u/s: worsening hematoma, however H/H stable currently, hemodynamically table, w/o any complaints.  Repeat duplex 10/23 with improved resistive indices, d/w Radiology, rec serial duplex tomorrow 10/24  repeat h/h 7.7 this morning, stable from yesterday

## 2024-10-23 NOTE — PROGRESS NOTE ADULT - PROBLEM SELECTOR PLAN 5
- Positive APLS panel on repeat  - on Coumadin at home  - Of note; for heparin drip; requires monitoring via Anti-Xa per protocol since PTT not reliable iso lupus ac (goal of 0.4-0.6.) Pharmacy ([phone]) assisting with heparin drip rates  - CrCl 24 today, d/w patient unable to start Lovenox until Crcl >30  - see above

## 2024-10-23 NOTE — PROGRESS NOTE ADULT - PROBLEM SELECTOR PLAN 3
resolved   - MRI brain with no new or enlarging metastases; stable right temporal lobe, left Meckel's cave and right occipital lesions  - c/w Zofran PRN  -  c/o severe abd pain ; CT Abd/pelvis showing left subcapsular hematoma---now having recurrent abdominal pain, wife states similar pain as prior to renal biopsy, requiring dilaudid for pain control, unclear etiology, serial renal dopplers, monitor labs/vitals

## 2024-10-23 NOTE — PROGRESS NOTE ADULT - SUBJECTIVE AND OBJECTIVE BOX
Madelin Looney MD  Division of Hospital Medicine  Reachable on MS Teams    PROGRESS NOTE:     Patient is a 53y old  Male who presents with a chief complaint of kidney biospy (23 Oct 2024 15:50)      SUBJECTIVE / OVERNIGHT EVENTS: No acute events overnight, no complaints this AM, however throughout the day had worsening abdominal pain. Asking when he can be discharged     ADDITIONAL REVIEW OF SYSTEMS:    MEDICATIONS  (STANDING):  amLODIPine   Tablet 10 milliGRAM(s) Oral daily  AQUAPHOR (petrolatum Ointment) 1 Application(s) Topical three times a day  carvedilol 6.25 milliGRAM(s) Oral every 12 hours  cloNIDine 0.1 milliGRAM(s) Oral three times a day  desmopressin Injectable 30 MICROGram(s) IV Push once  heparin  Infusion 800 Unit(s)/Hr (8 mL/Hr) IV Continuous <Continuous>  HYDROmorphone  Injectable 1 milliGRAM(s) IV Push once  LORazepam     Tablet 1 milliGRAM(s) Oral once  senna 2 Tablet(s) Oral at bedtime  sodium chloride 0.45%. 1000 milliLiter(s) (100 mL/Hr) IV Continuous <Continuous>  triamcinolone 0.1% Cream 1 Application(s) Topical two times a day    MEDICATIONS  (PRN):  acetaminophen     Tablet .. 650 milliGRAM(s) Oral every 6 hours PRN Mild Pain (1 - 3)  artificial tears (preservative free) Ophthalmic Solution 1 Drop(s) Both EYES every 4 hours PRN Dry Eyes  ondansetron Injectable 4 milliGRAM(s) IV Push every 8 hours PRN Nausea and/or Vomiting  polyethylene glycol 3350 17 Gram(s) Oral daily PRN Constipation  traMADol 50 milliGRAM(s) Oral every 6 hours PRN Moderate Pain (4 - 6)      CAPILLARY BLOOD GLUCOSE        I&O's Summary    22 Oct 2024 07:01  -  23 Oct 2024 07:00  --------------------------------------------------------  IN: 300 mL / OUT: 0 mL / NET: 300 mL    23 Oct 2024 07:01  -  23 Oct 2024 20:19  --------------------------------------------------------  IN: 360 mL / OUT: 0 mL / NET: 360 mL        PHYSICAL EXAM:  Vital Signs Last 24 Hrs  T(C): 37.2 (23 Oct 2024 19:42), Max: 37.3 (23 Oct 2024 04:31)  T(F): 98.9 (23 Oct 2024 19:42), Max: 99.2 (23 Oct 2024 04:31)  HR: 58 (23 Oct 2024 19:42) (58 - 80)  BP: 159/82 (23 Oct 2024 19:42) (145/79 - 159/82)  BP(mean): --  RR: 18 (23 Oct 2024 19:42) (18 - 18)  SpO2: 99% (23 Oct 2024 19:42) (98% - 99%)    Parameters below as of 23 Oct 2024 19:42  Patient On (Oxygen Delivery Method): room air      GENERAL: NAD, well-developed  HEAD:  Atraumatic, Normocephalic  CHEST/LUNG: Clear to auscultation bilaterally; No wheeze  HEART: Regular rate and rhythm; No murmurs, rubs, or gallops  ABDOMEN: Soft, Nontender, Nondistended; Bowel sounds present  EXTREMITIES:  2+ Peripheral Pulses, No clubbing, cyanosis, or edema  NEUROLOGY: AAOx3, non-focal  PSYCH: calm  SKIN: No rashes or lesions      LABS:                        8.3    8.58  )-----------( 17       ( 23 Oct 2024 19:28 )             24.8     10-23    134[L]  |  101  |  90[H]  ----------------------------<  107[H]  3.9   |  17[L]  |  3.84[H]    Ca    9.4      23 Oct 2024 19:28    TPro  6.9  /  Alb  3.6  /  TBili  0.4  /  DBili  x   /  AST  53[H]  /  ALT  81[H]  /  AlkPhos  75  10-22    PT/INR - ( 23 Oct 2024 19:28 )   PT: 12.1 sec;   INR: 1.05 ratio         PTT - ( 23 Oct 2024 19:28 )  PTT:43.8 sec      Urinalysis Basic - ( 23 Oct 2024 19:28 )    Color: x / Appearance: x / SG: x / pH: x  Gluc: 107 mg/dL / Ketone: x  / Bili: x / Urobili: x   Blood: x / Protein: x / Nitrite: x   Leuk Esterase: x / RBC: x / WBC x   Sq Epi: x / Non Sq Epi: x / Bacteria: x          RADIOLOGY & ADDITIONAL TESTS:  Results Reviewed:   Imaging Personally Reviewed:  Electrocardiogram Personally Reviewed:    COORDINATION OF CARE:  Care Discussed with Consultants/Other Providers [Y/N]: Radiology, Hematology  Prior or Outpatient Records Reviewed [Y/N]:

## 2024-10-23 NOTE — PROGRESS NOTE ADULT - SUBJECTIVE AND OBJECTIVE BOX
TIFFANY LIZAMAFRANCINE  [id number]    INTERVAL HPI/OVERNIGHT EVENTS: Pt reports L flank pain. Had renal US today    MEDICATIONS  (STANDING):  amLODIPine   Tablet 10 milliGRAM(s) Oral daily  AQUAPHOR (petrolatum Ointment) 1 Application(s) Topical three times a day  carvedilol 6.25 milliGRAM(s) Oral every 12 hours  cloNIDine 0.1 milliGRAM(s) Oral three times a day  desmopressin Injectable 30 MICROGram(s) IV Push once  LORazepam     Tablet 1 milliGRAM(s) Oral once  senna 2 Tablet(s) Oral at bedtime  sodium chloride 0.45%. 1000 milliLiter(s) (100 mL/Hr) IV Continuous <Continuous>  triamcinolone 0.1% Cream 1 Application(s) Topical two times a day    MEDICATIONS  (PRN):  acetaminophen     Tablet .. 650 milliGRAM(s) Oral every 6 hours PRN Mild Pain (1 - 3)  artificial tears (preservative free) Ophthalmic Solution 1 Drop(s) Both EYES every 4 hours PRN Dry Eyes  ondansetron Injectable 4 milliGRAM(s) IV Push every 8 hours PRN Nausea and/or Vomiting  polyethylene glycol 3350 17 Gram(s) Oral daily PRN Constipation      Allergies    No Known Drug Allergies  Animal dander-Cat (Other)    Intolerances        Review of Systems:   General: No fevers/chills, no fatigue  HEENT: No blurry vision, dysphagia, or odynophagia  CVS: No CP/palpitations  Resp: No SOB/wheezing  GI: No N/V/C/D  MSK: No joint pains  Skin: No new rashes  Neuro: No headaches      Vital Signs Last 24 Hrs  T(C): 36.7 (23 Oct 2024 12:09), Max: 37.3 (23 Oct 2024 04:31)  T(F): 98.1 (23 Oct 2024 12:09), Max: 99.2 (23 Oct 2024 04:31)  HR: 63 (23 Oct 2024 12:09) (59 - 80)  BP: 145/79 (23 Oct 2024 12:09) (122/72 - 159/62)  BP(mean): --  RR: 18 (23 Oct 2024 12:09) (18 - 18)  SpO2: 99% (23 Oct 2024 12:09) (98% - 99%)    Parameters below as of 23 Oct 2024 12:09  Patient On (Oxygen Delivery Method): room air        Physical Exam:  General: NAD  HEENT: EOMI, MMM  Cardio: +S1/S2, RRR  Resp: CTA b/l  GI: +BS, soft, NT/ND  MSK: No synovitis  Neuro: AAOx3  Psych: wnl    LABS:                        7.7    9.07  )-----------( 22       ( 23 Oct 2024 07:13 )             22.6     10-23    134[L]  |  100  |  91[H]  ----------------------------<  125[H]  4.0   |  15[L]  |  4.00[H]    Ca    9.4      23 Oct 2024 07:13    TPro  6.9  /  Alb  3.6  /  TBili  0.4  /  DBili  x   /  AST  53[H]  /  ALT  81[H]  /  AlkPhos  75  10-22      Urinalysis Basic - ( 23 Oct 2024 07:13 )    Color: x / Appearance: x / SG: x / pH: x  Gluc: 125 mg/dL / Ketone: x  / Bili: x / Urobili: x   Blood: x / Protein: x / Nitrite: x   Leuk Esterase: x / RBC: x / WBC x   Sq Epi: x / Non Sq Epi: x / Bacteria: x          RADIOLOGY & ADDITIONAL TESTS:  < from: US Duplex Kidneys (US Doppler Renal) (10.21.24 @ 11:00) >  IMPRESSION:  *  Redemonstrated left subcapsular hematoma now increased in size and   measuring 11.6 x 5 x 5.1 cm, previously 8.8 x 6.8 x 7.0 cm  *  Redemonstrated increased resistive indices and systolic velocities on   the left compared to the right likely secondary to parenchymal   compression by large hematoma. Limited evaluation of venous flow in the   left kidney may be secondary to extrinsic compression by hematoma.  *  Recommend close follow-up and/or contrast enhanced cross-sectional   imaging for further evaluation.    < end of copied text >

## 2024-10-23 NOTE — PROGRESS NOTE ADULT - PROBLEM SELECTOR PLAN 7
Bicytopenia with thrombocytopenia and anemia   s/p 1u platelets on 10/17, 1u on 10/19 and 10/20  -Thrombocytopenia may be 2/2 chemo/Tagrisso AE, BM suppression iso lupus flare,   - Hepatitis neg, HIV neg  - Anemia likely 2/2 CKD and thalassemia minor  -Peripheral smear 10/17 showing 0-2 schistocytes/hpf, few target cells, rare elliptocytes. No evidence of ongoing hemolysis.    Dispo: pending repeat u/s renal 10/23, stable h/h and resumption of AC (see above)
Bicytopenia with thrombocytopenia and anemia   s/p 1u platelets on 10/17, 1u on 10/19 and 10/20  -Thrombocytopenia may be 2/2 chemo/Tagrisso AE, BM suppression iso lupus flare,   - Hepatitis neg, HIV neg  - Anemia likely 2/2 CKD and thalassemia minor  -Peripheral smear 10/17 showing 0-2 schistocytes/hpf, few target cells, rare elliptocytes. No evidence of ongoing hemolysis.  - Monitor H/H in setting of subcapsular hematoma
Bicytopenia with thrombocytopenia and anemia   s/p 1u platelets on 10/17, 1u on 10/19 and 10/20  -Thrombocytopenia may be 2/2 chemo/Tagrisso AE, BM suppression iso lupus flare,   - Hepatitis neg, HIV neg  - Anemia likely 2/2 CKD and thalassemia minor  -Peripheral smear 10/17 showing 0-2 schistocytes/hpf, few target cells, rare elliptocytes. No evidence of ongoing hemolysis.  -s/p 1U platelets 10/23, follow up AM platelets     Dispo: pending repeat u/s renal 10/24, stable h/h and resumption of AC (see above)
- Plt 48 ; s/p 1u platelets on 10/17, 1u on 10/19  -Thrombocytopenia may be 2/2 chemo/tagrisso AE, BM suppression iso lupus flare,   - Hepatitis neg, HIV neg  - Anemia likely 2/2 CKD and thalassemia minor  -Peripheral smear 10/17 showing 0-2 schistocytes/hpf, few target cells, rare elliptocytes. No evidence of ongoing hemolysis.  - Monitor H/H in setting of subcapsular hematoma
Bicytopenia with thrombocytopenia and anemia   s/p 1u platelets on 10/17, 1u on 10/19 and 10/20  -Thrombocytopenia may be 2/2 chemo/Tagrisso AE, BM suppression iso lupus flare,   - Hepatitis neg, HIV neg  - Anemia likely 2/2 CKD and thalassemia minor  -Peripheral smear 10/17 showing 0-2 schistocytes/hpf, few target cells, rare elliptocytes. No evidence of ongoing hemolysis.  - given worsening hematoma will give 1 unit PLT 10/21

## 2024-10-23 NOTE — PROGRESS NOTE ADULT - PROBLEM SELECTOR PLAN 6
- Pt with advanced NSCLC with progression on single agent Tagrisso   - Had been on combination of Tagrisso and carboplatin/ Alimta with last treatment few months ago  - Stopped due to low blood counts (Grade 4 thrombocytopenia).   - Heme-Onc following ; plan for BM bx 10/21
- Pt with advanced NSCLC with progression on single agent Tagrisso   - Had been on combination of Tagrisso and carboplatin/ Alimta with last treatment few months ago  - Stopped due to low blood counts (Grade 4 thrombocytopenia).   - Heme -Onc following ; reccs appreciated  - Peripheral smear 10/17 showing 0-2 schistocytes/hpf, rare elliptocytes. No evidence of ongoing hemolysis.
- Pt with advanced NSCLC with progression on single agent Tagrisso   - Had been on combination of Tagrisso and carboplatin/ Alimta with last treatment few months ago  - Stopped due to low blood counts (Grade 4 thrombocytopenia).   - Heme-Onc following ; s/p BM bx
- Plt 48 ; s/p 1u platelets on 10/17  -Thrombocytopenia may be 2/2 chemo/tagrisso AE, BM suppression iso lupus flare,   - hepatitis neg, HIV neg  - Anemia likely 2/2 CKD and thalassemia minor  - peripheral smear 10/17 showing 0-2 schistocytes/hpf, few target cells, rare elliptocytes. No evidence of ongoing hemolysis.  - monitor H/H in setting of subcapsular hematoma
- Pt with advanced NSCLC with progression on single agent Tagrisso   - Had been on combination of Tagrisso and carboplatin/ Alimta with last treatment few months ago  - Stopped due to low blood counts (Grade 4 thrombocytopenia).   - Heme-Onc following ; s/p BM bx, follow up results. Patient's wife requesting update from here, have passed along to heme team
- Pt with advanced NSCLC with progression on single agent Tagrisso   - Had been on combination of Tagrisso and carboplatin/ Alimta with last treatment few months ago  - Stopped due to low blood counts (Grade 4 thrombocytopenia).   - Heme-Onc following ; reccs appreciated  - Peripheral smear 10/17 showing 0-2 schistocytes/hpf, rare elliptocytes. No evidence of ongoing hemolysis.

## 2024-10-23 NOTE — PROGRESS NOTE ADULT - PROBLEM SELECTOR PROBLEM 6
Metastatic non-small cell lung cancer
Thrombocytopenia
Metastatic non-small cell lung cancer

## 2024-10-23 NOTE — PROGRESS NOTE ADULT - ATTENDING COMMENTS
Patient seen for follow up with Rheumatology team. 53 year old with previous APS (PE, IVC filter placed and treated with on coumadin), sarcoid and more recent non-small cell lung cancer treated with Tagrisso (tyrosine kinase inhibitor used for EGFR+ tumors) presented with JORDAN. Biopsy ruled out TMA or lupus nephritis but complicated by large hematoma necessitating discontinuation of anti-coagulation. Biopsy showed tubular injury, moderate arteriolar hyalinosis, 20% IFTA and no glomerular injury. There is no history consistent with clinical SLE. Anti-CL positive and positive sC5b-9.  We are following for APS. Unable to resume anticoagulation yet due to hematoma. From our point of view there is no indication for steroids. U/S kidney was performed today to track hematoma size. We will continue to follow

## 2024-10-23 NOTE — PROGRESS NOTE ADULT - PROBLEM SELECTOR PROBLEM 3
HTN (hypertension)
Nausea with vomiting
HTN (hypertension)
Nausea with vomiting
HTN (hypertension)
HTN (hypertension)
Nausea with vomiting

## 2024-10-23 NOTE — PROGRESS NOTE ADULT - ASSESSMENT
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 Rheumatology consulted to evaluate for SLE.    #NSCLC   #Cytopenia: Anemia and thrombocytopenia   -Cytopenias likely 2/2 Recent chemo meds(tagrisso)   -s/p BM Bx    #JORDAN   -Drug induced vs now new renal hematoma with mass effect  -JORDAN worsening: new onset since July 2024  -Renal bx: prelim renal bx shows "very mild tubular injury, moderate arteriolar hyalinosis, 20% IFTA, 0/19 GS, unremarkable glomeruli, no TMA, negative IF" and EM without subendothelial or subepithelial deposits. Therefore, pt does not have TMA or lupus nephritis. Possibility of drug induced ATN .      #APS   #Cytopenia without TMA, hemolysis  ?Atypical HUS elevated BC59   -Holding AC due to hematoma. S/p dexamethasone 40mg per heme  -Pt diagnosed with APS in 2002 from DVT and PE, s/p IVC filter. Pt did not have DVT/PE after being started on coumadin. Per chart review, Beta2 glycoprotein and anticardiolipin positive in 2016.  -Positive ACL+. Atypical HUS work up positive BC5-9 increased   -No features of hemolysis: Elevated hapto. LDH elevated but also can be in cancer  -peripheral smear 10/17 showing 0-2 schistocytes/hpf, few target cells, rare elliptocytes. No evidence of ongoing hemolysis.  -Kidney bx: ATI without TMA    #Rash  #H/o positive JENN(2021)  -dsDNA : 6, normal complements, Negative JACQUES  -Questionable history of SLE per chart review, pt had high titer dsDNA in 2021 (476) and was briefly on Plaquenil. Pt denies any clinical symptoms of SLE and did not follow up further with rheumatologist.  Positive JENN and LE like rash can be a part of malignancy  - Skin bx outpt showed dermatitis/connective tissue disease that may be lupus, but had negative staining.      Impression : So far work up including peripheral smear and kidney biopsy not suggestive of TMA process. No LN on biopsy      Recommendations:  -F/u BM bx  -Monitor renal hematoma  -Once H&H stable and hematoma regressing, pt needs to be on AC for APS    D/w Dr. Isai Jay MD, PGY-4  Rheumatology Fellow

## 2024-10-23 NOTE — PROGRESS NOTE ADULT - PROBLEM SELECTOR PLAN 2
- JORDAN over the last few months possibly 2/2 underlying GN given positive serologies ( +JENN, +DsDNA, +APLS/Sjogren's)  -SCr was 2.99 on presentation, now up trending, 2/2 to compressive effect of hematoma.   - s/p L renal biopsy 10/17 PRelin- ATI pending EM, c/b subcapsular hematoma, Scr plateauing   - Per rheumatology, no indication for Solaris/systemic therapy at this time.   - Heme reccs Decadron 40mg IV q d x 4 days until 10/22, s/p BM bx  10/21 f/u results  - CrCl 24 today, d/w patient unable to start Lovenox until Crcl >30  - since h/h is stable and repeat u/s renal is stable, PM CBC stable so hep gtt started after 1U platelets transfused per discussion with Hematology. Monitor anti-Xa levels, if CBC stable tomorrow can start bridge to warfarin. Once CrCl>30 can switch hep to lovenox

## 2024-10-23 NOTE — PROGRESS NOTE ADULT - PROBLEM SELECTOR PROBLEM 4
HTN (hypertension)
Antiphospholipid syndrome
HTN (hypertension)

## 2024-10-23 NOTE — PROGRESS NOTE ADULT - PROBLEM SELECTOR PROBLEM 5
Metastatic non-small cell lung cancer
Antiphospholipid syndrome
Metastatic non-small cell lung cancer
Metastatic non-small cell lung cancer
Antiphospholipid syndrome
Metastatic non-small cell lung cancer
Antiphospholipid syndrome

## 2024-10-23 NOTE — PROGRESS NOTE ADULT - PROBLEM SELECTOR PLAN 1
Pt. 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 On review of Sonia TINSLEY, Scr 1.26 on 7/23/24, prior to that in early July it was normal at 0.9 mg/dl. Since late July he has had progressive worsening  Scr 2.99 on admission labs (10/14/24). UA with mild proteinuria   (0.5 gms)  but no hematuria (10/14/24).    He has a remote H/O sarcoidosis for which he never required any treatment as well as Lupus ( at the time of APLS diagnosis several years ago ) which was only treated with hydroxychloroquine and never had any organ involvement. Rash and JORDAN since he started chemo in addition to Tagrisso    S/p renal biopsy on 10/17. Kidney biopsy with no e/o TMA or GN. Only some ATI. EM does not show any TMA  Renal function appears to be showing signs of improvement today  Continue to trend outpt   Biopsy complicated by subcapsular hematoma with decrease in venous flow to left mid/lower pole possibly as a result of compression. Trend h/h and imaging today  -s/p BM biopsy per heme  -Continue to monitor urine output and labs. Dose medications as per eGFR.    Imaging then timing of anticoag per heme for dc plan    HTN: BP at goal. Maintain current meds    Outpt Followup with Dr Fleming and all the other specialists

## 2024-10-23 NOTE — PROGRESS NOTE ADULT - ASSESSMENT
53 M h/o DVT/PE/IVC filter, sarcoid,  skin SLE, APLS on Coumadin, NSCLC last chemo 8/2024 sent in for kidney biopsy c/b subcapsular hematoma

## 2024-10-24 ENCOUNTER — NON-APPOINTMENT (OUTPATIENT)
Age: 54
End: 2024-10-24

## 2024-10-24 ENCOUNTER — TRANSCRIPTION ENCOUNTER (OUTPATIENT)
Age: 54
End: 2024-10-24

## 2024-10-24 VITALS
DIASTOLIC BLOOD PRESSURE: 71 MMHG | RESPIRATION RATE: 18 BRPM | TEMPERATURE: 98 F | HEART RATE: 73 BPM | OXYGEN SATURATION: 98 % | SYSTOLIC BLOOD PRESSURE: 124 MMHG

## 2024-10-24 LAB
ALBUMIN SERPL ELPH-MCNC: 3.8 G/DL — SIGNIFICANT CHANGE UP (ref 3.3–5)
ALP SERPL-CCNC: 83 U/L — SIGNIFICANT CHANGE UP (ref 40–120)
ALT FLD-CCNC: 76 U/L — HIGH (ref 10–45)
ANION GAP SERPL CALC-SCNC: 16 MMOL/L — SIGNIFICANT CHANGE UP (ref 5–17)
ANISOCYTOSIS BLD QL: SLIGHT — SIGNIFICANT CHANGE UP
AST SERPL-CCNC: 33 U/L — SIGNIFICANT CHANGE UP (ref 10–40)
BASO STIPL BLD QL SMEAR: PRESENT — SIGNIFICANT CHANGE UP
BASOPHILS # BLD AUTO: 0 K/UL — SIGNIFICANT CHANGE UP (ref 0–0.2)
BASOPHILS NFR BLD AUTO: 0 % — SIGNIFICANT CHANGE UP (ref 0–2)
BILIRUB SERPL-MCNC: 0.7 MG/DL — SIGNIFICANT CHANGE UP (ref 0.2–1.2)
BUN SERPL-MCNC: 88 MG/DL — HIGH (ref 7–23)
CALCIUM SERPL-MCNC: 9.5 MG/DL — SIGNIFICANT CHANGE UP (ref 8.4–10.5)
CHLORIDE SERPL-SCNC: 101 MMOL/L — SIGNIFICANT CHANGE UP (ref 96–108)
CO2 SERPL-SCNC: 18 MMOL/L — LOW (ref 22–31)
CREAT SERPL-MCNC: 3.81 MG/DL — HIGH (ref 0.5–1.3)
DACRYOCYTES BLD QL SMEAR: SLIGHT — SIGNIFICANT CHANGE UP
DNA PLOIDY SPEC FC-IMP: SIGNIFICANT CHANGE UP
EGFR: 18 ML/MIN/1.73M2 — LOW
EOSINOPHIL # BLD AUTO: 0.08 K/UL — SIGNIFICANT CHANGE UP (ref 0–0.5)
EOSINOPHIL NFR BLD AUTO: 0.9 % — SIGNIFICANT CHANGE UP (ref 0–6)
FACT X ACT/NOR PPP: 97 % — SIGNIFICANT CHANGE UP (ref 70–170)
GLUCOSE SERPL-MCNC: 118 MG/DL — HIGH (ref 70–99)
HCT VFR BLD CALC: 24.4 % — LOW (ref 39–50)
HEMATOPATHOLOGY REPORT: SIGNIFICANT CHANGE UP
HGB BLD-MCNC: 8.3 G/DL — LOW (ref 13–17)
LYMPHOCYTES # BLD AUTO: 0.87 K/UL — LOW (ref 1–3.3)
LYMPHOCYTES # BLD AUTO: 9.6 % — LOW (ref 13–44)
MAGNESIUM SERPL-MCNC: 2 MG/DL — SIGNIFICANT CHANGE UP (ref 1.6–2.6)
MANUAL SMEAR VERIFICATION: SIGNIFICANT CHANGE UP
MCHC RBC-ENTMCNC: 26.7 PG — LOW (ref 27–34)
MCHC RBC-ENTMCNC: 34 GM/DL — SIGNIFICANT CHANGE UP (ref 32–36)
MCV RBC AUTO: 78.5 FL — LOW (ref 80–100)
MICROCYTES BLD QL: SLIGHT — SIGNIFICANT CHANGE UP
MONOCYTES # BLD AUTO: 0.39 K/UL — SIGNIFICANT CHANGE UP (ref 0–0.9)
MONOCYTES NFR BLD AUTO: 4.3 % — SIGNIFICANT CHANGE UP (ref 2–14)
NEUTROPHILS # BLD AUTO: 7.74 K/UL — HIGH (ref 1.8–7.4)
NEUTROPHILS NFR BLD AUTO: 85.2 % — HIGH (ref 43–77)
PHOSPHATE SERPL-MCNC: 3.7 MG/DL — SIGNIFICANT CHANGE UP (ref 2.5–4.5)
PLAT MORPH BLD: NORMAL — SIGNIFICANT CHANGE UP
PLATELET # BLD AUTO: 38 K/UL — LOW (ref 150–400)
POIKILOCYTOSIS BLD QL AUTO: SLIGHT — SIGNIFICANT CHANGE UP
POLYCHROMASIA BLD QL SMEAR: SLIGHT — SIGNIFICANT CHANGE UP
POTASSIUM SERPL-MCNC: 4.1 MMOL/L — SIGNIFICANT CHANGE UP (ref 3.5–5.3)
POTASSIUM SERPL-SCNC: 4.1 MMOL/L — SIGNIFICANT CHANGE UP (ref 3.5–5.3)
PROT SERPL-MCNC: 7.4 G/DL — SIGNIFICANT CHANGE UP (ref 6–8.3)
RBC # BLD: 3.11 M/UL — LOW (ref 4.2–5.8)
RBC # FLD: 17.2 % — HIGH (ref 10.3–14.5)
RBC BLD AUTO: ABNORMAL
SCHISTOCYTES BLD QL AUTO: SLIGHT — SIGNIFICANT CHANGE UP
SODIUM SERPL-SCNC: 135 MMOL/L — SIGNIFICANT CHANGE UP (ref 135–145)
UFH PPP CHRO-ACNC: 0.19 IU/ML — LOW (ref 0.3–0.7)
UFH PPP CHRO-ACNC: 0.27 IU/ML — LOW (ref 0.3–0.7)
WBC # BLD: 9.09 K/UL — SIGNIFICANT CHANGE UP (ref 3.8–10.5)
WBC # FLD AUTO: 9.09 K/UL — SIGNIFICANT CHANGE UP (ref 3.8–10.5)

## 2024-10-24 PROCEDURE — 82085 ASSAY OF ALDOLASE: CPT

## 2024-10-24 PROCEDURE — 83615 LACTATE (LD) (LDH) ENZYME: CPT

## 2024-10-24 PROCEDURE — 86480 TB TEST CELL IMMUN MEASURE: CPT

## 2024-10-24 PROCEDURE — 85025 COMPLETE CBC W/AUTO DIFF WBC: CPT

## 2024-10-24 PROCEDURE — 81450 HL NEO GSAP 5-50DNA/DNA&RNA: CPT

## 2024-10-24 PROCEDURE — 86901 BLOOD TYPING SEROLOGIC RH(D): CPT

## 2024-10-24 PROCEDURE — 88184 FLOWCYTOMETRY/ TC 1 MARKER: CPT

## 2024-10-24 PROCEDURE — 83550 IRON BINDING TEST: CPT

## 2024-10-24 PROCEDURE — 87086 URINE CULTURE/COLONY COUNT: CPT

## 2024-10-24 PROCEDURE — 88237 TISSUE CULTURE BONE MARROW: CPT

## 2024-10-24 PROCEDURE — 81264 IGK REARRANGEABN CLONAL POP: CPT

## 2024-10-24 PROCEDURE — P9100: CPT

## 2024-10-24 PROCEDURE — P9073: CPT

## 2024-10-24 PROCEDURE — 88346 IMFLUOR 1ST 1ANTB STAIN PX: CPT

## 2024-10-24 PROCEDURE — 88185 FLOWCYTOMETRY/TC ADD-ON: CPT

## 2024-10-24 PROCEDURE — 88264 CHROMOSOME ANALYSIS 20-25: CPT

## 2024-10-24 PROCEDURE — 85397 CLOTTING FUNCT ACTIVITY: CPT

## 2024-10-24 PROCEDURE — 99285 EMERGENCY DEPT VISIT HI MDM: CPT | Mod: 25

## 2024-10-24 PROCEDURE — 86225 DNA ANTIBODY NATIVE: CPT

## 2024-10-24 PROCEDURE — 88342 IMHCHEM/IMCYTCHM 1ST ANTB: CPT

## 2024-10-24 PROCEDURE — 88313 SPECIAL STAINS GROUP 2: CPT

## 2024-10-24 PROCEDURE — 83540 ASSAY OF IRON: CPT

## 2024-10-24 PROCEDURE — 80048 BASIC METABOLIC PNL TOTAL CA: CPT

## 2024-10-24 PROCEDURE — 82955 ASSAY OF G6PD ENZYME: CPT

## 2024-10-24 PROCEDURE — 85520 HEPARIN ASSAY: CPT

## 2024-10-24 PROCEDURE — 83605 ASSAY OF LACTIC ACID: CPT

## 2024-10-24 PROCEDURE — 74018 RADEX ABDOMEN 1 VIEW: CPT

## 2024-10-24 PROCEDURE — 86036 ANCA SCREEN EACH ANTIBODY: CPT

## 2024-10-24 PROCEDURE — P9037: CPT

## 2024-10-24 PROCEDURE — 85097 BONE MARROW INTERPRETATION: CPT

## 2024-10-24 PROCEDURE — 36430 TRANSFUSION BLD/BLD COMPNT: CPT

## 2024-10-24 PROCEDURE — 74176 CT ABD & PELVIS W/O CONTRAST: CPT | Mod: MC

## 2024-10-24 PROCEDURE — 99232 SBSQ HOSP IP/OBS MODERATE 35: CPT | Mod: GC

## 2024-10-24 PROCEDURE — 88341 IMHCHEM/IMCYTCHM EA ADD ANTB: CPT

## 2024-10-24 PROCEDURE — P9040: CPT

## 2024-10-24 PROCEDURE — 88305 TISSUE EXAM BY PATHOLOGIST: CPT

## 2024-10-24 PROCEDURE — 85730 THROMBOPLASTIN TIME PARTIAL: CPT

## 2024-10-24 PROCEDURE — 82607 VITAMIN B-12: CPT

## 2024-10-24 PROCEDURE — 93975 VASCULAR STUDY: CPT | Mod: 26

## 2024-10-24 PROCEDURE — 82550 ASSAY OF CK (CPK): CPT

## 2024-10-24 PROCEDURE — 50200 RENAL BIOPSY PERQ: CPT

## 2024-10-24 PROCEDURE — 85045 AUTOMATED RETICULOCYTE COUNT: CPT

## 2024-10-24 PROCEDURE — 86923 COMPATIBILITY TEST ELECTRIC: CPT

## 2024-10-24 PROCEDURE — 86162 COMPLEMENT TOTAL (CH50): CPT

## 2024-10-24 PROCEDURE — 82728 ASSAY OF FERRITIN: CPT

## 2024-10-24 PROCEDURE — 81451 HL NEO GSAP 5-50 RNA ALYS: CPT

## 2024-10-24 PROCEDURE — 88360 TUMOR IMMUNOHISTOCHEM/MANUAL: CPT

## 2024-10-24 PROCEDURE — 93975 VASCULAR STUDY: CPT

## 2024-10-24 PROCEDURE — 81261 IGH GENE REARRANGE AMP METH: CPT

## 2024-10-24 PROCEDURE — 83735 ASSAY OF MAGNESIUM: CPT

## 2024-10-24 PROCEDURE — 81340 TRB@ GENE REARRANGE AMPLIFY: CPT

## 2024-10-24 PROCEDURE — 88348 ELECTRON MICROSCOPY DX: CPT

## 2024-10-24 PROCEDURE — 88280 CHROMOSOME KARYOTYPE STUDY: CPT

## 2024-10-24 PROCEDURE — 83516 IMMUNOASSAY NONANTIBODY: CPT

## 2024-10-24 PROCEDURE — 85610 PROTHROMBIN TIME: CPT

## 2024-10-24 PROCEDURE — 85260 CLOT FACTOR X STUART-POWER: CPT

## 2024-10-24 PROCEDURE — 84100 ASSAY OF PHOSPHORUS: CPT

## 2024-10-24 PROCEDURE — 86147 CARDIOLIPIN ANTIBODY EA IG: CPT

## 2024-10-24 PROCEDURE — 83690 ASSAY OF LIPASE: CPT

## 2024-10-24 PROCEDURE — 86160 COMPLEMENT ANTIGEN: CPT

## 2024-10-24 PROCEDURE — 80074 ACUTE HEPATITIS PANEL: CPT

## 2024-10-24 PROCEDURE — 36415 COLL VENOUS BLD VENIPUNCTURE: CPT

## 2024-10-24 PROCEDURE — 76942 ECHO GUIDE FOR BIOPSY: CPT

## 2024-10-24 PROCEDURE — 85027 COMPLETE CBC AUTOMATED: CPT

## 2024-10-24 PROCEDURE — A9585: CPT

## 2024-10-24 PROCEDURE — 86704 HEP B CORE ANTIBODY TOTAL: CPT

## 2024-10-24 PROCEDURE — 70553 MRI BRAIN STEM W/O & W/DYE: CPT | Mod: MC

## 2024-10-24 PROCEDURE — 96374 THER/PROPH/DIAG INJ IV PUSH: CPT

## 2024-10-24 PROCEDURE — 87205 SMEAR GRAM STAIN: CPT

## 2024-10-24 PROCEDURE — 81001 URINALYSIS AUTO W/SCOPE: CPT

## 2024-10-24 PROCEDURE — 88350 IMFLUOR EA ADDL 1ANTB STN PX: CPT

## 2024-10-24 PROCEDURE — 87799 DETECT AGENT NOS DNA QUANT: CPT

## 2024-10-24 PROCEDURE — 83010 ASSAY OF HAPTOGLOBIN QUANT: CPT

## 2024-10-24 PROCEDURE — 86161 COMPLEMENT/FUNCTION ACTIVITY: CPT

## 2024-10-24 PROCEDURE — 99239 HOSP IP/OBS DSCHRG MGMT >30: CPT

## 2024-10-24 PROCEDURE — 85049 AUTOMATED PLATELET COUNT: CPT

## 2024-10-24 PROCEDURE — 87389 HIV-1 AG W/HIV-1&-2 AB AG IA: CPT

## 2024-10-24 PROCEDURE — 86850 RBC ANTIBODY SCREEN: CPT

## 2024-10-24 PROCEDURE — 80053 COMPREHEN METABOLIC PANEL: CPT

## 2024-10-24 PROCEDURE — 82746 ASSAY OF FOLIC ACID SERUM: CPT

## 2024-10-24 PROCEDURE — 86900 BLOOD TYPING SEROLOGIC ABO: CPT

## 2024-10-24 PROCEDURE — 99238 HOSP IP/OBS DSCHRG MGMT 30/<: CPT | Mod: GC

## 2024-10-24 PROCEDURE — 87637 SARSCOV2&INF A&B&RSV AMP PRB: CPT

## 2024-10-24 RX ORDER — HYDROMORPHONE HCL/0.9% NACL/PF 6 MG/30 ML
1 PATIENT CONTROLLED ANALGESIA SYRINGE INTRAVENOUS ONCE
Refills: 0 | Status: DISCONTINUED | OUTPATIENT
Start: 2024-10-24 | End: 2024-10-24

## 2024-10-24 RX ORDER — ENOXAPARIN SODIUM 40MG/0.4ML
0.8 SYRINGE (ML) SUBCUTANEOUS
Qty: 11.2 | Refills: 0
Start: 2024-10-24 | End: 2024-11-06

## 2024-10-24 RX ORDER — CLONIDINE HYDROCHLORIDE 0.2 MG/1
1 TABLET ORAL
Qty: 28 | Refills: 0
Start: 2024-10-24 | End: 2024-11-06

## 2024-10-24 RX ORDER — INFLUENZ VIR VAC TV P-SURF2003 15MCG/.5ML
0.5 SYRINGE (ML) INTRAMUSCULAR ONCE
Refills: 0 | Status: DISCONTINUED | OUTPATIENT
Start: 2024-10-24 | End: 2024-10-24

## 2024-10-24 RX ORDER — ENOXAPARIN SODIUM 40MG/0.4ML
80 SYRINGE (ML) SUBCUTANEOUS EVERY 24 HOURS
Refills: 0 | Status: DISCONTINUED | OUTPATIENT
Start: 2024-10-24 | End: 2024-10-24

## 2024-10-24 RX ORDER — SENNA 187 MG
2 TABLET ORAL
Qty: 0 | Refills: 0 | DISCHARGE
Start: 2024-10-24

## 2024-10-24 RX ORDER — AMLODIPINE BESYLATE 10 MG
1 TABLET ORAL
Qty: 0 | Refills: 0 | DISCHARGE
Start: 2024-10-24

## 2024-10-24 RX ORDER — POLYETHYLENE GLYCOL 3350 17 G/17G
17 POWDER, FOR SOLUTION ORAL
Qty: 0 | Refills: 0 | DISCHARGE
Start: 2024-10-24

## 2024-10-24 RX ORDER — TRAMADOL HYDROCHLORIDE 50 MG/1
1 TABLET, COATED ORAL
Qty: 20 | Refills: 0
Start: 2024-10-24 | End: 2024-10-28

## 2024-10-24 RX ADMIN — CLONIDINE HYDROCHLORIDE 0.1 MILLIGRAM(S): 0.2 TABLET ORAL at 05:23

## 2024-10-24 RX ADMIN — Medication 80 MILLIGRAM(S): at 15:19

## 2024-10-24 RX ADMIN — Medication 1 MILLIGRAM(S): at 03:49

## 2024-10-24 RX ADMIN — CARVEDILOL 6.25 MILLIGRAM(S): 25 TABLET, FILM COATED ORAL at 05:23

## 2024-10-24 RX ADMIN — CLONIDINE HYDROCHLORIDE 0.1 MILLIGRAM(S): 0.2 TABLET ORAL at 15:20

## 2024-10-24 RX ADMIN — Medication 1 APPLICATION(S): at 05:31

## 2024-10-24 RX ADMIN — Medication 1 MILLIGRAM(S): at 03:19

## 2024-10-24 RX ADMIN — PETROLATUM 1 APPLICATION(S): 987.89 OINTMENT TOPICAL at 05:31

## 2024-10-24 RX ADMIN — Medication 10 MILLIGRAM(S): at 05:24

## 2024-10-24 RX ADMIN — HEPARIN SODIUM 10 UNIT(S)/HR: 10000 INJECTION INTRAVENOUS; SUBCUTANEOUS at 04:17

## 2024-10-24 NOTE — PROVIDER CONTACT NOTE (CRITICAL VALUE NOTIFICATION) - NAME OF MD/NP/PA/DO NOTIFIED:
Sona, NP
Melanie Magallanes NP
MICHELLE Centeno
provider Sanford Carmichael
ACP Janine Leos
Brad Hankins

## 2024-10-24 NOTE — PROGRESS NOTE ADULT - ASSESSMENT
53 M with thalassemia minor, sarcoidosis, APLS on Coumadin, metastatic NSCLC (last chemo 8/2024) presenting with worsening rash in his bilateral upper and lower extremities. He states that he had a biopsy of these lesions done that was consistent with lupus dermatitis. He has a history of metastatic NSCLC (diagnosed in 2021, follows with Dr. Hart) with EGFR L858F mutation, along with multiple brain metastases. He was started on Tagrisso, with subsequent scans showing stable disease/partial response. He underwent gamma knife radiation for progression of a brain metastasis in 10/2023. He had a repeat right supraclavicular LN biopsy in 4/2024 that was concerning for Tagrisso resistance so Carbo/Alimta was added to Tagrisso. Course was complicated by pancytopenia, requiring multiple transfusions and dermatitis, that was consistent with lupus dermitis. Over the past 2 weeks, he reports ongoing nausea, worsening dermatitis, HTN, and worsening renal function concerning for lupus nephritis. Hematology consulted for peripheral smear review due to concern for renal TMA and thrombocytopenia. APLS flare on differential for his worsening thrombocytopenia.    #NSCLC  #Thrombocytopenia   #Anemia  - CBC 10/17: Hb 7.8 (MCV 78.8), Plt 38  - baseline Hb~7-10, Plt  fluctuating within past year. -Pt had been on carboplatin+pemetrexed+ tagrisso--carbo/pem held since august for thrombocytopenia but pt had still been on tagrisso  - Tbili 0.6, retic 0.6, lower suspicion for hemolysis  - hepatitis neg, HIV neg  - Anemia likely 2/2 CKD and thalassemia minor  - peripheral smear 10/17 showing 0-2 schistocytes/hpf, few target cells, rare elliptocytes. No evidence of ongoing hemolysis.  - Thrombocytopenia ddx is broad including BM suppression iso lupus flare, vs chemo/tagrisso AE, renal TMA. No evidence of overt hemolysis.    #APLS  - positive APLS panel on repeat  -at home pt is on coumadin, was held in setting of renal biopsy and transitioned to heparin ggt, currently on lovenox to coumadin bridge.     Recommendations:  - Please continue to trend CBC, LDH, and haptoglobin  - transfuse for Hb<7 and Plt<50 given renal biopsy   - c/w steroid taper as informed by primary oncologist Dr. Hart   - BM biopsy reviewed: no evidence on BM infiltration or malignancy.   - Transitioning from lovenox to coumadin for underlying APLS.   - C/w care per primary team, discharge planning   - Patient to follow up with Dr. Hart on discharge at [facility] on 10/29/2024    Note not finalized until signed by attending.   Please do not hesitate to page with questions.     Teresa Chaparro MD  Hematology/Oncology Fellow PGY5  Available on Microsoft Teams   Pager: [phone]  For weekends and evenings (5 pm - 8 am), please page fellow on call.

## 2024-10-24 NOTE — PROVIDER CONTACT NOTE (CRITICAL VALUE NOTIFICATION) - TEST AND RESULT REPORTED:
hemoglobin 7 and hematocrit 20.7
Platelets 17
aptt 142.5
Hgb 6.8; HCT 19.6
Hgb 7.0 HCT 19.9
patients HCT 20.8, HGB 7.2
Hg 6.0, Hct 20.5

## 2024-10-24 NOTE — DISCHARGE NOTE NURSING/CASE MANAGEMENT/SOCIAL WORK - NSDCFUADDAPPT_GEN_ALL_CORE_FT
APPTS ARE READY TO BE MADE: [x ] YES    Best Family or Patient Contact (if needed):    Additional Information about above appointments (if needed):    1: Please  follow up with PCP on Monday 10/28/2024 for   CBC BMP , PT/INR check      2: Please follow up with DR Fleming  in 1-2 weeks   3: Please follow up with DR Hart  4 weeks      Other comments or requests:

## 2024-10-24 NOTE — PROGRESS NOTE ADULT - PROVIDER SPECIALTY LIST ADULT
Heme/Onc
Hospitalist
Nephrology
Rheumatology
Nephrology
Heme/Onc
Hospitalist
Intervent Radiology
Nephrology
Rheumatology
Heme/Onc
Rheumatology
Hospitalist
Nephrology
Hospitalist

## 2024-10-24 NOTE — PROGRESS NOTE ADULT - PROBLEM SELECTOR PLAN 1
Pt. 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 On review of Sonia TINSLEY, Scr 1.26 on 7/23/24, prior to that in early July it was normal at 0.9 mg/dl. Since late July he has had progressive worsening  Scr 2.99 on admission labs (10/14/24). UA with mild proteinuria   (0.5 gms)  but no hematuria (10/14/24).    He has a remote H/O sarcoidosis for which he never required any treatment as well as Lupus ( at the time of APLS diagnosis several years ago ) which was only treated with hydroxychloroquine and never had any organ involvement. Rash and JORDAN since he started chemo in addition to Tagrisso    S/p renal biopsy on 10/17. Kidney biopsy with no e/o TMA or GN. Only some ATI. EM does not show any TMA  Renal function continues to show signs of improvement   Continue to trend outpt   Biopsy complicated by subcapsular hematoma with decrease in venous flow to left mid/lower pole possibly as a result of compression. Imaging and monitoring of h/h as outpt  -s/p BM biopsy per heme  -No objection to lovenox as bridge to coumadin as outpt  -Agree with steroid to continue and taper as outpt  -Followup with onc, heme, rheum  Outpt Followup with Dr Fleming

## 2024-10-24 NOTE — CHART NOTE - NSCHARTNOTESELECT_GEN_ALL_CORE
Event Note
hospitalist discharge note
oozing from prior IV site/Event Note
Event Note
IR Fellow
IR note

## 2024-10-24 NOTE — PATIENT PROFILE ADULT - PATIENT'S SEXUAL ORIENTATION
Detail Level: Detailed
Recommendations (Free Text): Apply Clorox bleach with q tip to affected nails
Heterosexual

## 2024-10-24 NOTE — PROGRESS NOTE ADULT - SUBJECTIVE AND OBJECTIVE BOX
[facility] Division of Kidney Diseases & Hypertension  FOLLOW UP NOTE  --------------------------------------------------------------------------------  Chief Complaint:    24 hour events/subjective:    pt seen with wife   feels better, no abd pain    PAST HISTORY  --------------------------------------------------------------------------------  No significant changes to PMH, PSH, FHx, SHx, unless otherwise noted    ALLERGIES & MEDICATIONS  --------------------------------------------------------------------------------  Allergies    No Known Drug Allergies  Animal dander-Cat (Other)    Intolerances      Standing Inpatient Medications  amLODIPine   Tablet 10 milliGRAM(s) Oral daily  AQUAPHOR (petrolatum Ointment) 1 Application(s) Topical three times a day  carvedilol 6.25 milliGRAM(s) Oral every 12 hours  cloNIDine 0.1 milliGRAM(s) Oral three times a day  desmopressin Injectable 30 MICROGram(s) IV Push once  enoxaparin Injectable 80 milliGRAM(s) SubCutaneous every 24 hours  influenza   Vaccine 0.5 milliLiter(s) IntraMuscular once  senna 2 Tablet(s) Oral at bedtime  sodium chloride 0.45%. 1000 milliLiter(s) IV Continuous <Continuous>  triamcinolone 0.1% Cream 1 Application(s) Topical two times a day    PRN Inpatient Medications  acetaminophen     Tablet .. 650 milliGRAM(s) Oral every 6 hours PRN  artificial tears (preservative free) Ophthalmic Solution 1 Drop(s) Both EYES every 4 hours PRN  ondansetron Injectable 4 milliGRAM(s) IV Push every 8 hours PRN  polyethylene glycol 3350 17 Gram(s) Oral daily PRN  traMADol 50 milliGRAM(s) Oral every 6 hours PRN      REVIEW OF SYSTEMS  --------------------------------------------------------------------------------  Gen: No weight changes, fatigue, fevers/chills, weakness  Skin: No rashes  Head/Eyes/Ears/Mouth: No headache; Normal hearing; Normal vision w/o blurriness; No sinus pain/discomfort, sore throat  Respiratory: No dyspnea, cough, wheezing, hemoptysis  CV: No chest pain, PND, orthopnea  GI: No abdominal pain, diarrhea, constipation, nausea, vomiting, melena, hematochezia  : No increased frequency, dysuria, hematuria, nocturia  MSK: No joint pain/swelling; no back pain; no edema  Neuro: No dizziness/lightheadedness, weakness, seizures, numbness, tingling  Heme: No easy bruising or bleeding  Endo: No heat/cold intolerance  Psych: No significant nervousness, anxiety, stress, depression    All other systems were reviewed and are negative, except as noted.    VITALS/PHYSICAL EXAM  --------------------------------------------------------------------------------  T(C): 36.5 (10-24-24 @ 09:38), Max: 37.2 (10-23-24 @ 19:42)  HR: 73 (10-24-24 @ 09:38) (58 - 85)  BP: 124/71 (10-24-24 @ 09:38) (124/71 - 159/82)  RR: 18 (10-24-24 @ 09:38) (18 - 18)  SpO2: 98% (10-24-24 @ 09:38) (98% - 99%)  Wt(kg): --        10-23-24 @ 07:01  -  10-24-24 @ 07:00  --------------------------------------------------------  IN: 360 mL / OUT: 0 mL / NET: 360 mL      Physical Exam:  	Gen: NAD  	UE: Warm,   	LE: Warm,   	Neuro: No focal deficits, intact gait  	Psych: Normal affect and mood  	Skin: Warm,rash  	Vascular access:    LABS/STUDIES  --------------------------------------------------------------------------------              8.3    9.09  >-----------<  38       [10-24-24 @ 03:32]              24.4     135  |  101  |  88  ----------------------------<  118      [10-24-24 @ 03:32]  4.1   |  18  |  3.81        Ca     9.5     [10-24-24 @ 03:32]      Mg     2.0     [10-24-24 @ 03:32]      Phos  3.7     [10-24-24 @ 03:32]    TPro  7.4  /  Alb  3.8  /  TBili  0.7  /  DBili  x   /  AST  33  /  ALT  76  /  AlkPhos  83  [10-24-24 @ 03:32]    PT/INR: PT 12.1 , INR 1.05       [10-23-24 @ 19:28]  PTT: 43.8       [10-23-24 @ 19:28]      Creatinine Trend:  SCr 3.81 [10-24 @ 03:32]  SCr 3.84 [10-23 @ 19:28]  SCr 4.00 [10-23 @ 07:13]  SCr 4.42 [10-22 @ 07:13]  SCr 4.59 [10-21 @ 07:21]    Urinalysis - [10-24-24 @ 03:32]      Color  / Appearance  / SG  / pH       Gluc 118 / Ketone   / Bili  / Urobili        Blood  / Protein  / Leuk Est  / Nitrite       RBC  / WBC  / Hyaline  / Gran  / Sq Epi  / Non Sq Epi  / Bacteria       Iron 70, TIBC 257, %sat 27      [10-22-24 @ 07:16]  Ferritin 3684      [10-22-24 @ 07:14]    HIV Nonreact      [10-22-24 @ 07:16]    dsDNA 7      [10-19-24 @ 18:50]  C3 Complement 169      [10-19-24 @ 18:50]  C4 Complement 17      [10-19-24 @ 18:50]

## 2024-10-24 NOTE — PROGRESS NOTE ADULT - PROBLEM SELECTOR PROBLEM 1
JORDAN (acute kidney injury)
Renal hematoma, left
JORDAN (acute kidney injury)
Renal hematoma, left
JORDAN (acute kidney injury)
Renal hematoma, left

## 2024-10-24 NOTE — DISCHARGE NOTE PROVIDER - CARE PROVIDER_API CALL
Kiersten Fleming  Nephrology  [address] [place] [address], [address]  [place] [address]  Phone: [phone]  Fax: [phone]  Follow Up Time:     Kyle Hart  Medical Oncology  [address] [place] [id number]  Phone: [phone]  Fax: [phone]  Follow Up Time:

## 2024-10-24 NOTE — DISCHARGE NOTE PROVIDER - NSDCFUSCHEDAPPT_GEN_ALL_CORE_FT
Kyle Hart  [facility]  [address]  Scheduled Appointment: 10/29/2024     Jed Kauffman  [facility]  [address]  Scheduled Appointment: 11/05/2024

## 2024-10-24 NOTE — DISCHARGE NOTE PROVIDER - NSDCCPCAREPLAN_GEN_ALL_CORE_FT
PRINCIPAL DISCHARGE DIAGNOSIS  Diagnosis: Renal hematoma  Assessment and Plan of Treatment: #Renal hematoma, left.    CT: Left perinephric subcapsular hematoma measuring 4.0 x 6.8 x 12.9 cm with associated mass effect and deformity of the left kidney.  US Renal: renal u/s: worsening hematoma, however H/H stable currently, hemodynamically table, w/o any complaints.  Repeat duplex 10/24 stable, h/h stable  - Discharge  on Lovenox to warfarin bridge (warfarin 5 mg ) he has INR machine at home, he will f/u w/ repeat CBC/BMP and INR on Monday  10/28, advised to return to ED if symptoms/ abd pain worsens.         SECONDARY DISCHARGE DIAGNOSES  Diagnosis: Antiphospholipid syndrome  Assessment and Plan of Treatment: c/w Lovenox and coumadin Bridge    Please follow up with PCP    Diagnosis: HTN (hypertension)  Assessment and Plan of Treatment: Follow up with your medical doctor to establish long term blood pressure treatment goals.      Diagnosis: Nausea with vomiting  Assessment and Plan of Treatment: Resolved       Diagnosis: JORDAN (acute kidney injury)  Assessment and Plan of Treatment: Avoid taking (NSAIDs) - (ex: Ibuprofen, Advil, Celebrex, Naprosyn)  Avoid taking any nephrotoxic agents (can harm kidneys) - Intravenous contrast for diagnostic testing, combination cold medications.  Have all medications adjusted for your renal function by your Health Care Provider.  Blood pressure control is important.  Take all medication as prescribed.

## 2024-10-24 NOTE — DISCHARGE NOTE PROVIDER - HOSPITAL COURSE
HPI:  53 M h/o DVT/PE/IVC filter, sarcoid,  skin SLE, APLS on Coumadin, NSCLC c brain met (s/p cyberknife) last chemo 8/2024 sent in for kidney biopsy. Stopped Coumadin 2 days ago. Daily nausea c one episode of vomiting (yellow-brown) daily x 3-4 weeks. +BMs. Altered taste since chemo 8/2024. +good urine output.  (14 Oct 2024 12:38)    Hospital Course:  53 M h/o DVT/PE/IVC filter, sarcoid,  skin SLE, APLS on Coumadin, NSCLC last chemo 8/2024 sent in for kidney biopsy c/b subcapsular hematoma        #Renal hematoma, left.    CT: Left perinephric subcapsular hematoma measuring 4.0 x 6.8 x 12.9 cm with associated mass effect and deformity of the left kidney.  US Renal: renal u/s: worsening hematoma, however H/H stable currently, hemodynamically table, w/o any complaints.  Repeat duplex 10/24 stable, h/h stable  - Discharge  on Lovenox to warfarin bridge (warfarin 5 mg ) he has INR machine at home, he will f/u w/ repeat CBC/BMP and INR on Monday  10/28, advised to return to ED if symptoms/ abd pain worsens.     # JORDAN (acute kidney injury).   JORDAN over the last few months possibly 2/2 underlying GN given positive serologies ( +JENN, +DsDNA, +APLS/Sjogren's)  -SCr was 2.99 on presentation, now up trending, 2/2 to compressive effect of hematoma.   - s/p L renal biopsy 10/17 PRelin- ATI pending EM, c/b subcapsular hematoma, Scr plateauing   - Per rheumatology, no indication for Solaris/systemic therapy at this time.   - Heme reccs Decadron 40mg IV q d x 4 days until 10/22, s/p BM bx 10/21 thus far wnl  - CrCl 26 today, d/w Dr. Hernandes (nephrology) ok for d/c home w/ lovenox with current crcl given SCr is improving     # Nausea with vomiting. resolved   - MRI brain with no new or enlarging metastases; stable right temporal lobe, left Meckel's cave and right occipital lesions  - c/w Zofran PRN  - abd improving d/c home on PRN tramadol.      # HTN (hypertension).  well controlled  - c/w Amlodipine 10mg daily  - c/w Carvedilol 6.25mg BID  - c/w Clonidine 0.1mg TID.      # Thrombocytopenia. Bicytopenia with thrombocytopenia and anemia   s/p 1u platelets on 10/17, 1u on 10/19 and 10/20  -Thrombocytopenia may be 2/2 chemo/Tagrisso AE, BM suppression iso lupus flare,   - Hepatitis neg, HIV neg  - Anemia likely 2/2 CKD and thalassemia minor  -Peripheral smear 10/17 showing 0-2 schistocytes/hpf, few target cells, rare elliptocytes. No evidence of ongoing hemolysis.  - d/c on Lovenox to warfarin bridge (warfarin 5 mg ) he has INR machine at home, he will f/u w/ repeat CBC/BMP and INR on Monday  10/28, advised to return to ED if symptoms/ abd pain worsens.       Important Medication Changes and Reason:c/w Lovenox , Enoxaparin  daily  x 4 days    c/w Clonidine  0.1 mg twice daily  , the doses  needs to be tapered  by Nephrologist  , Please Monitor  your BP at Home       Active or Pending Issues Requiring Follow-up:  1: Please  follow up with PCP on Monday 10/28/2024 for   CBC BMP , PT/INR check      2: Please follow up with DR Fleming  in 1-2 weeks   3: Please follow up with DR Hart  4 weeks    Advanced Directives:   [x ] Full code  [ ] DNR  [ ] Hospice    Discharge Diagnoses:  Renal Hematoma    JORDAN  HTN  Nausea vomiting resolved   Thrombocytopenia          HPI:  53 M h/o DVT/PE/IVC filter, sarcoid,  skin SLE, APLS on Coumadin, NSCLC c brain met (s/p cyberknife) last chemo 8/2024 sent in for kidney biopsy. Stopped Coumadin 2 days ago. Daily nausea c one episode of vomiting (yellow-brown) daily x 3-4 weeks. +BMs. Altered taste since chemo 8/2024. +good urine output.  (14 Oct 2024 12:38)    Hospital Course:  53 M h/o DVT/PE/IVC filter, sarcoid,  skin SLE, APLS on Coumadin, NSCLC last chemo 8/2024 sent in for kidney biopsy c/b subcapsular hematoma        #Renal hematoma, left.    CT: Left perinephric subcapsular hematoma measuring 4.0 x 6.8 x 12.9 cm with associated mass effect and deformity of the left kidney.  US Renal: renal u/s: worsening hematoma, however H/H stable currently, hemodynamically table, w/o any complaints.  Repeat duplex 10/24 stable, h/h stable  - Discharge  on Lovenox to warfarin bridge (warfarin 5 mg ) he has INR machine at home, he will f/u w/ repeat CBC/BMP and INR on Monday  10/28, advised to return to ED if symptoms/ abd pain worsens.     # JORDAN (acute kidney injury).   JORDAN over the last few months possibly 2/2 underlying GN given positive serologies ( +JENN, +DsDNA, +APLS/Sjogren's)  -SCr was 2.99 on presentation, now up trending, 2/2 to compressive effect of hematoma.   - s/p L renal biopsy 10/17 PRelin- ATI pending EM, c/b subcapsular hematoma, Scr plateauing   - Per rheumatology, no indication for Solaris/systemic therapy at this time.   - Heme reccs Decadron 40mg IV q d x 4 days until 10/22, s/p BM bx 10/21 thus far wnl  - CrCl 26 today, d/w Dr. Hernandes (nephrology) ok for d/c home w/ lovenox with current crcl given SCr is improving     # Nausea with vomiting. resolved   - MRI brain with no new or enlarging metastases; stable right temporal lobe, left Meckel's cave and right occipital lesions  - c/w Zofran PRN  - abd improving d/c home on PRN tramadol.      # HTN (hypertension).  well controlled  - c/w Amlodipine 10mg daily  -   - c/w Clonidine 0.1mg BID       # Thrombocytopenia. Bicytopenia with thrombocytopenia and anemia   s/p 1u platelets on 10/17, 1u on 10/19 and 10/20  -Thrombocytopenia may be 2/2 chemo/Tagrisso AE, BM suppression iso lupus flare,   - Hepatitis neg, HIV neg  - Anemia likely 2/2 CKD and thalassemia minor  -Peripheral smear 10/17 showing 0-2 schistocytes/hpf, few target cells, rare elliptocytes. No evidence of ongoing hemolysis.  - d/c on Lovenox to warfarin bridge (warfarin 5 mg ) he has INR machine at home, he will f/u w/ repeat CBC/BMP and INR on Monday  10/28, advised to return to ED if symptoms/ abd pain worsens.       Important Medication Changes and Reason:  c/w Lovenox , Enoxaparin  daily  x 4 days    c/w Clonidine  0.1 mg twice daily  , the doses  needs to be tapered  by Nephrologist  , Please Monitor  your BP at Home     Holding Coreg   for d/c      Active or Pending Issues Requiring Follow-up:  1: Please  follow up with PCP on Monday 10/28/2024 for   CBC BMP , PT/INR check      2: Please follow up with DR Fleming  in 1-2 weeks   3: Please follow up with DR Hart  4 weeks    Advanced Directives:   [x ] Full code  [ ] DNR  [ ] Hospice    Discharge Diagnoses:  Renal Hematoma    JORDAN  HTN  Nausea vomiting resolved   Thrombocytopenia          HPI:  53 M h/o DVT/PE/IVC filter, sarcoid,  skin SLE, APLS on Coumadin, NSCLC c brain met (s/p cyberknife) last chemo 8/2024 sent in for kidney biopsy. Stopped Coumadin 2 days ago. Daily nausea c one episode of vomiting (yellow-brown) daily x 3-4 weeks. +BMs. Altered taste since chemo 8/2024. +good urine output.  (14 Oct 2024 12:38)    Hospital Course:  53 M h/o DVT/PE/IVC filter, sarcoid,  skin SLE, APLS on Coumadin, NSCLC last chemo 8/2024 sent in for kidney biopsy c/b subcapsular hematoma        #Renal hematoma, left.    CT: Left perinephric subcapsular hematoma measuring 4.0 x 6.8 x 12.9 cm with associated mass effect and deformity of the left kidney.  US Renal: renal u/s: worsening hematoma, however H/H stable currently, hemodynamically table, w/o any complaints.  Repeat duplex 10/24 stable, h/h stable  - Discharge  on Lovenox to warfarin bridge (warfarin 5 mg ) he has INR machine at home, he will f/u w/ repeat CBC/BMP and INR on Monday  10/28, advised to return to ED if symptoms/ abd pain worsens.     # JORDAN (acute kidney injury).   JORDAN over the last few months possibly 2/2 underlying GN given positive serologies ( +JENN, +DsDNA, +APLS/Sjogren's)  -SCr was 2.99 on presentation, now up trending, 2/2 to compressive effect of hematoma.   - s/p L renal biopsy 10/17 PRelin- ATI pending EM, c/b subcapsular hematoma, Scr plateauing   - Per rheumatology, no indication for Solaris/systemic therapy at this time.   - Heme reccs Decadron 40mg IV q d x 4 days until 10/22, s/p BM bx 10/21 thus far wnl  - CrCl 26 today, d/w Dr. Hernandes (nephrology) ok for d/c home w/ lovenox with current crcl given SCr is improving     # Nausea with vomiting. resolved   - MRI brain with no new or enlarging metastases; stable right temporal lobe, left Meckel's cave and right occipital lesions  - c/w Zofran PRN  - abd improving d/c home on PRN tramadol.      # HTN (hypertension).  well controlled  - c/w Amlodipine 10mg daily  - c/w coreg on d/c   - c/w Clonidine 0.1mg BID, f/u with Nephrology outpt in ~1 week to see if can come off slowly. was only started inpatient for renal biopsy.       # Thrombocytopenia. Bicytopenia with thrombocytopenia and anemia   s/p 1u platelets on 10/17, 1u on 10/19 and 10/20  -Thrombocytopenia may be 2/2 chemo/Tagrisso AE, BM suppression iso lupus flare,   - Hepatitis neg, HIV neg  - Anemia likely 2/2 CKD and thalassemia minor  -Peripheral smear 10/17 showing 0-2 schistocytes/hpf, few target cells, rare elliptocytes. No evidence of ongoing hemolysis.  - d/c on Lovenox to warfarin bridge (warfarin 5 mg ) he has INR machine at home, he will f/u w/ repeat CBC/BMP and INR on Monday  10/28, advised to return to ED if symptoms/ abd pain worsens.       Important Medication Changes and Reason:  c/w Lovenox , Enoxaparin  daily  x 4 days    c/w Clonidine  0.1 mg twice daily  , the doses  needs to be tapered  by Nephrologist  , Please Monitor  your BP at Home     Holding Coreg   for d/c      Active or Pending Issues Requiring Follow-up:  1: Please  follow up with PCP on Monday 10/28/2024 for   CBC BMP , PT/INR check      2: Please follow up with DR Fleming  in 1-2 weeks   3: Please follow up with DR Hart  4 weeks    Advanced Directives:   [x ] Full code  [ ] DNR  [ ] Hospice    Discharge Diagnoses:  Renal Hematoma    JORDAN  HTN  Nausea vomiting resolved   Thrombocytopenia

## 2024-10-24 NOTE — PROGRESS NOTE ADULT - ATTENDING COMMENTS
Alaina Hernandes MD  Off: [phone]  contact me on teams    (After 5 pm or on weekends please page the on-call fellow/attending, can check [email or web address] for schedule. Login is melyssa olivas, schedule under [facility] medicine, psych, derm)

## 2024-10-24 NOTE — PROGRESS NOTE ADULT - ATTENDING COMMENTS
The patient has no visible bruise at site of bone marrow biopsy or at the site of the kidney biopsy. Bone marrow findings are normal. Follow up in [facility] with Dr Washington next week. We expect rise in platelet counts over time. Rheumatology will also follow up on the patient with history APLS that may be associated with thrmobcytopenia.

## 2024-10-24 NOTE — PHARMACOTHERAPY INTERVENTION NOTE - COMMENTS
Rheumatology note stated for patient to start vaccinations for H influenza, meningococcal and pneumococcal coverage after renal biopsy done. Dr Thomaosn contacted me to advise on which vaccines to order and if they can be administered together. After researching and consulting with ID pharmacist Rolf BOONE recommended ActHIB, Prevnar 20 and Menveo and Bexsero Vaccines which could all be given at the same time to Dr. Thomason. Awaiting orders to be entered.    Aleksandra Mckoy, PharmD, BCPS, BCGP  Transitions of care Pharmacist  Available on Teams (preferred)    
Consulted by NP Munira Medrano about patient's heparin titration based off of antiXa levels. It was subtherapeutic and we would have needed new antiXa levels drawn because last level was around 3 am this morning. After letting her know that we could only make adjustments once the new labs were available, she stated that they would prefer to switch to lovenox. Recommended Lovenox 80 mg daily based off of creatinine clearance of 25 mL/min to be started within 2 hours of heparin drip discontinuation. Recommendation was accepted, order currently active.    Aleksandra Mckoy, PharmD, BCPS, BCGP  Transitions of care Pharmacist  Available on Teams (preferred)

## 2024-10-24 NOTE — DISCHARGE NOTE PROVIDER - NSDCMRMEDTOKEN_GEN_ALL_CORE_FT
amLODIPine 10 mg oral tablet: 1 tab(s) orally once a day  cloNIDine 0.1 mg oral tablet: 1 tab(s) orally 2 times a day  Coumadin 5 mg oral tablet: 1 tab(s) orally once a day  enoxaparin 80 mg/0.8 mL injectable solution: 0.8 milliliter(s) subcutaneously once a day  polyethylene glycol 3350 oral powder for reconstitution: 17 gram(s) orally once a day As needed Constipation  predniSONE 10 mg oral tablet: 5 tab(s) orally once a day Taper by 10 mg every  7 days  to 10 mg  daily    Please follow up with DR Barbour  upon completion of  Predisone   course  senna leaf extract oral tablet: 2 tab(s) orally once a day (at bedtime)  Tagrisso 40 mg oral tablet: 1 tab(s) orally once a day  traMADol 50 mg oral tablet: 1 tab(s) orally every 6 hours as needed for Moderate Pain (4 - 6) MDD: 4 tabs

## 2024-10-24 NOTE — PROVIDER CONTACT NOTE (CRITICAL VALUE NOTIFICATION) - ACTION/TREATMENT ORDERED:
provider aware. as per provider they will look into the order and make the adjustment needed. patient safety maintained
1 unit Platlets and 1 units PRBCs; post cbc ordered when transfusion complete.
Transfused 1 unit of platelets. Recheck values in AM.
provider made aware
no new orders at this time. will reach out to hospitalist
Platlets and Packed red cells ordered; cbc post transfusion; will continue to monitor H&H
CBC reordered

## 2024-10-24 NOTE — DISCHARGE NOTE PROVIDER - CARE PROVIDERS DIRECT ADDRESSES
[email or web address]

## 2024-10-24 NOTE — DISCHARGE NOTE NURSING/CASE MANAGEMENT/SOCIAL WORK - FINANCIAL ASSISTANCE
[facility] provides services at a reduced cost to those who are determined to be eligible through [facility]’s financial assistance program. Information regarding [facility]’s financial assistance program can be found by going to [email or web address] or by calling [phone].

## 2024-10-24 NOTE — PROVIDER CONTACT NOTE (CRITICAL VALUE NOTIFICATION) - BACKGROUND
Lung CA; Lupus; sarcoidosisi, thalessemia, PE, antiphospholipid syndrome
Lung CA; Lupus; sarcoidosisi, thalessemia, PE, antiphospholipid syndrome
Pt admitted for acute renal failure
pt admitted for acute renal failure
dx: worsening kidney function
lupus antibody; lung CA, sarcoidosis, PE, antiphospholipid synrome
pt admitted for Acute renal failure

## 2024-10-24 NOTE — PROVIDER CONTACT NOTE (CRITICAL VALUE NOTIFICATION) - ASSESSMENT
Pt A&O x4, VSS. Denies discomfort or distress
Pt is A&Ox4, no complaints of pain or discomfort. No s/s of bleeding
pt A&Ox4, VSS, pt denies any chest pain or SOB, heparin drip patient specific
pt is a&xox4
vss
pt stable no s/s of distress.
vss

## 2024-10-24 NOTE — DISCHARGE NOTE NURSING/CASE MANAGEMENT/SOCIAL WORK - PATIENT PORTAL LINK FT
You can access the FollowMyHealth Patient Portal offered by [facility] by registering at the following website: [email or web address]. By joining [facility]’s FollowMyHealth portal, you will also be able to view your health information using other applications (apps) compatible with our system.

## 2024-10-24 NOTE — DISCHARGE NOTE NURSING/CASE MANAGEMENT/SOCIAL WORK - NSPROMEDSBROUGHTTOHOSP_GEN_A_NUR
November 4, 2022    Priya Islas  [address]  [place] [address]             [facility] [place]  [facility]  [address] [facility] [place] [address]  Phone: [phone]  Fax: [phone]   November 4, 2022     Patient: Priya Islas   YOB: 1998   Date of Visit: 11/4/2022       To Whom it May Concern:    Priya Islas was seen in my clinic on 11/4/2022. She may return to work on 11/6/22.    Please excuse her from any classes or work missed.    If you have any questions or concerns, please don't hesitate to call.    Sincerely,         Blanca Hernandez NP     
no

## 2024-10-24 NOTE — DISCHARGE NOTE PROVIDER - NSDCFUADDAPPT_GEN_ALL_CORE_FT
APPTS ARE READY TO BE MADE: [x ] YES    Best Family or Patient Contact (if needed):    Additional Information about above appointments (if needed):    1: Please  follow up with PCP on Monday 10/28/2024 for   CBC BMP , PT/INR check      2: Please follow up with DR Fleming  in 1-2 weeks   3: Please follow up with DR Hart  4 weeks      Other comments or requests:    APPTS ARE READY TO BE MADE: [x ] YES    Best Family or Patient Contact (if needed):    Additional Information about above appointments (if needed):    1: Please  follow up with PCP on Monday 10/28/2024 for   CBC BMP , PT/INR check      2: Please follow up with DR Fleming  in 1-2 weeks   3: Please follow up with DR Hart  4 weeks      Other comments or requests:   Patient refused to provide their date-of-birth; unable to proceed with referral information.    Prior to outreaching the patient, it was visible that the patient has secured a follow up appointment which was not scheduled by our team. Patient is scheduled on 10/31 at 2:30P at [facility] with Dr. Bhupinder George

## 2024-10-24 NOTE — PROVIDER CONTACT NOTE (CRITICAL VALUE NOTIFICATION) - SITUATION
Hg 6.0, Hct 20.5
Pt has low HGB and HCT
patients HCT 20.8, HGB 7.2
pt went for kidney biopsy 10/17. pt hemoglobin 7 and hematocrit 20.7
aptt 142.5
Patient has Low hemoglobin and hematocrit
Platelets 17

## 2024-10-24 NOTE — PROGRESS NOTE ADULT - SUBJECTIVE AND OBJECTIVE BOX
Hematology Follow-up    INTERVAL HPI/OVERNIGHT EVENTS:  Patient seen and evaluated at bedside, accompanied by wife. Reports to be feeling better, started on lovenox; pending discharge.     VITAL SIGNS:  T(F): 97.7 (10-24-24 @ 09:38)  HR: 73 (10-24-24 @ 09:38)  BP: 124/71 (10-24-24 @ 09:38)  RR: 18 (10-24-24 @ 09:38)  SpO2: 98% (10-24-24 @ 09:38)  Wt(kg): --    PHYSICAL EXAM:  General: adult in NAD  HEENT: clear oropharynx, anicteric sclera, pink conjunctiva  Neck: supple  CV: normal S1/S2 with no murmur rubs or gallops  Lungs: positive air movement b/l ant lungs,clear to auscultation  Abdomen: soft non-tender non-distended, no hepatosplenomegaly. Mild L flank tenderness  Ext: no clubbing cyanosis or edema  Skin: scaly rash b/l LE  Neuro: alert and oriented X 4, no focal deficits    MEDICATIONS  (STANDING):  amLODIPine   Tablet 10 milliGRAM(s) Oral daily  AQUAPHOR (petrolatum Ointment) 1 Application(s) Topical three times a day  carvedilol 6.25 milliGRAM(s) Oral every 12 hours  cloNIDine 0.1 milliGRAM(s) Oral three times a day  desmopressin Injectable 30 MICROGram(s) IV Push once  enoxaparin Injectable 80 milliGRAM(s) SubCutaneous every 24 hours  influenza   Vaccine 0.5 milliLiter(s) IntraMuscular once  senna 2 Tablet(s) Oral at bedtime  sodium chloride 0.45%. 1000 milliLiter(s) (100 mL/Hr) IV Continuous <Continuous>  triamcinolone 0.1% Cream 1 Application(s) Topical two times a day    MEDICATIONS  (PRN):  acetaminophen     Tablet .. 650 milliGRAM(s) Oral every 6 hours PRN Mild Pain (1 - 3)  artificial tears (preservative free) Ophthalmic Solution 1 Drop(s) Both EYES every 4 hours PRN Dry Eyes  ondansetron Injectable 4 milliGRAM(s) IV Push every 8 hours PRN Nausea and/or Vomiting  polyethylene glycol 3350 17 Gram(s) Oral daily PRN Constipation  traMADol 50 milliGRAM(s) Oral every 6 hours PRN Moderate Pain (4 - 6)      No Known Drug Allergies  Animal dander-Cat (Other)      LABS:                        8.3    9.09  )-----------( 38       ( 24 Oct 2024 03:32 )             24.4     10-24    135  |  101  |  88[H]  ----------------------------<  118[H]  4.1   |  18[L]  |  3.81[H]    Ca    9.5      24 Oct 2024 03:32  Phos  3.7     10-24  Mg     2.0     10-24    TPro  7.4  /  Alb  3.8  /  TBili  0.7  /  DBili  x   /  AST  33  /  ALT  76[H]  /  AlkPhos  83  10-24    PT/INR - ( 23 Oct 2024 19:28 )   PT: 12.1 sec;   INR: 1.05 ratio         PTT - ( 23 Oct 2024 19:28 )  PTT:43.8 sec   Urinalysis Basic - ( 24 Oct 2024 03:32 )    Color: x / Appearance: x / SG: x / pH: x  Gluc: 118 mg/dL / Ketone: x  / Bili: x / Urobili: x   Blood: x / Protein: x / Nitrite: x   Leuk Esterase: x / RBC: x / WBC x   Sq Epi: x / Non Sq Epi: x / Bacteria: x        RADIOLOGY & ADDITIONAL TESTS:  Studies reviewed.

## 2024-10-24 NOTE — CHART NOTE - NSCHARTNOTEFT_GEN_A_CORE
53 M h/o DVT/PE/IVC filter, sarcoid,  skin SLE, APLS on Coumadin, NSCLC last chemo 8/2024 sent in for kidney biopsy c/b subcapsular hematoma    PAtient states his pain is improving. NO events overnight    #Renal hematoma, left.    CT: Left perinephric subcapsular hematoma measuring 4.0 x 6.8 x 12.9 cm with associated mass effect and deformity of the left kidney.  US Renal: renal u/s: worsening hematoma, however H/H stable currently, hemodynamically table, w/o any complaints.  Repeat duplex 10/24 stable, h/h stable  - d/c on Lovenox to warfarin bridge (warfarin 5 mg ) he has INR machine at home, he will f/u w/ repeat CBC/BMP and INR on Monday  10/28, advised to return to ED if symptoms/ abd pain worsens.     # JORDAN (acute kidney injury).   ·  Plan: - JORDAN over the last few months possibly 2/2 underlying GN given positive serologies ( +JENN, +DsDNA, +APLS/Sjogren's)  -SCr was 2.99 on presentation, now up trending, 2/2 to compressive effect of hematoma.   - s/p L renal biopsy 10/17 PRelin- ATI pending EM, c/b subcapsular hematoma, Scr plateauing   - Per rheumatology, no indication for [employer]/systemic therapy at this time.   - Heme reccs Decadron 40mg IV q d x 4 days until 10/22, s/p BM bx 10/21 thus far wnl  - CrCl 26 today, d/w Dr. Hernandes (nephrology) ok for d/c home w/ lovenox with current crcl given SCr is improving     # Nausea with vomiting.   ·  Plan: resolved   - MRI brain with no new or enlarging metastases; stable right temporal lobe, left Meckel's cave and right occipital lesions  - c/w Zofran PRN  - abd improving d/c home on PRN tramadol.      # HTN (hypertension).   ·  Plan: well controlled  - c/w Amlodipine 10mg daily  - c/w Carvedilol 6.25mg BID  - c/w Clonidine 0.1mg TID.      # Thrombocytopenia.   ·  Plan: Bicytopenia with thrombocytopenia and anemia   s/p 1u platelets on 10/17, 1u on 10/19 and 10/20  -Thrombocytopenia may be 2/2 chemo/Tagrisso AE, BM suppression iso lupus flare,   - Hepatitis neg, HIV neg  - Anemia likely 2/2 CKD and thalassemia minor  -Peripheral smear 10/17 showing 0-2 schistocytes/hpf, few target cells, rare elliptocytes. No evidence of ongoing hemolysis.  - d/c on Lovenox to warfarin bridge (warfarin 5 mg ) he has INR machine at home, he will f/u w/ repeat CBC/BMP and INR on Monday  10/28, advised to return to ED if symptoms/ abd pain worsens.     medically stable for d/c pending final rec from [facility] regarding outpt prednisone dose   Time spent 40 min     d/w Medicine ACP Isaias

## 2024-10-24 NOTE — DISCHARGE NOTE NURSING/CASE MANAGEMENT/SOCIAL WORK - NSDCPEFALRISK_GEN_ALL_CORE
For information on Fall & Injury Prevention, visit: [email or web address] OR  [email or web address] OR  https://www.cdc.gov/steadi/patient.html

## 2024-10-25 LAB
AH50 ACT/NOR SER IA: 82 — SIGNIFICANT CHANGE UP
C3 SERPL-MCNC: 172 MG/DL — SIGNIFICANT CHANGE UP (ref 75–175)
C4 SERPL-MCNC: 31 MG/DL — SIGNIFICANT CHANGE UP (ref 14–40)
C4.FUNCTIONAL SERPL-ACNC: 4.2 MCG/ML — SIGNIFICANT CHANGE UP
CH50 SERPL-ACNC: 65 U/ML — SIGNIFICANT CHANGE UP (ref 30–75)
COMP FACT B SERPL-MCNC: >58.8 MG/DL — HIGH (ref 15.2–42.3)
COMP FACT BB SERPL-MCNC: 1.9 MCG/ML — HIGH
COMP FACT H SERPL-MCNC: 40.4 MG/DL — SIGNIFICANT CHANGE UP (ref 18.5–40.8)
IMMUNOLOGIST REVIEW: SIGNIFICANT CHANGE UP
PRELIM TEST AFFECT FURTHER TEST SPEC: NO — SIGNIFICANT CHANGE UP
SC5B9 SERPL IA-MCNC: 526 NG/ML — HIGH

## 2024-10-28 ENCOUNTER — RX RENEWAL (OUTPATIENT)
Age: 54
End: 2024-10-28

## 2024-10-28 DIAGNOSIS — N18.32 CHRONIC KIDNEY DISEASE, STAGE 3B: ICD-10-CM

## 2024-10-28 LAB — ONKOSIGHT MYELOID SEQUENCE: NORMAL — SIGNIFICANT CHANGE UP

## 2024-10-29 ENCOUNTER — LABORATORY RESULT (OUTPATIENT)
Age: 54
End: 2024-10-29

## 2024-10-29 ENCOUNTER — RESULT REVIEW (OUTPATIENT)
Age: 54
End: 2024-10-29

## 2024-10-29 ENCOUNTER — APPOINTMENT (OUTPATIENT)
Dept: HEMATOLOGY ONCOLOGY | Facility: CLINIC | Age: 54
End: 2024-10-29

## 2024-10-29 ENCOUNTER — OUTPATIENT (OUTPATIENT)
Dept: OUTPATIENT SERVICES | Facility: HOSPITAL | Age: 54
LOS: 1 days | End: 2024-10-29
Payer: COMMERCIAL

## 2024-10-29 ENCOUNTER — NON-APPOINTMENT (OUTPATIENT)
Age: 54
End: 2024-10-29

## 2024-10-29 ENCOUNTER — APPOINTMENT (OUTPATIENT)
Dept: MRI IMAGING | Facility: IMAGING CENTER | Age: 54
End: 2024-10-29
Payer: COMMERCIAL

## 2024-10-29 DIAGNOSIS — Z95.828 PRESENCE OF OTHER VASCULAR IMPLANTS AND GRAFTS: Chronic | ICD-10-CM

## 2024-10-29 DIAGNOSIS — Z00.8 ENCOUNTER FOR OTHER GENERAL EXAMINATION: ICD-10-CM

## 2024-10-29 LAB
BASOPHILS # BLD AUTO: 0.01 K/UL — SIGNIFICANT CHANGE UP (ref 0–0.2)
BASOPHILS NFR BLD AUTO: 0.1 % — SIGNIFICANT CHANGE UP (ref 0–2)
EOSINOPHIL # BLD AUTO: 0.04 K/UL — SIGNIFICANT CHANGE UP (ref 0–0.5)
EOSINOPHIL NFR BLD AUTO: 0.4 % — SIGNIFICANT CHANGE UP (ref 0–6)
HCT VFR BLD CALC: 23.3 % — LOW (ref 39–50)
HGB BLD-MCNC: 7.9 G/DL — LOW (ref 13–17)
IMM GRANULOCYTES NFR BLD AUTO: 1.1 % — HIGH (ref 0–0.9)
LYMPHOCYTES # BLD AUTO: 0.37 K/UL — LOW (ref 1–3.3)
LYMPHOCYTES # BLD AUTO: 3.6 % — LOW (ref 13–44)
MCHC RBC-ENTMCNC: 27 PG — SIGNIFICANT CHANGE UP (ref 27–34)
MCHC RBC-ENTMCNC: 33.9 G/DL — SIGNIFICANT CHANGE UP (ref 32–36)
MCV RBC AUTO: 79.5 FL — LOW (ref 80–100)
MONOCYTES # BLD AUTO: 0.65 K/UL — SIGNIFICANT CHANGE UP (ref 0–0.9)
MONOCYTES NFR BLD AUTO: 6.3 % — SIGNIFICANT CHANGE UP (ref 2–14)
NEUTROPHILS # BLD AUTO: 9.19 K/UL — HIGH (ref 1.8–7.4)
NEUTROPHILS NFR BLD AUTO: 88.5 % — HIGH (ref 43–77)
NRBC # BLD: 0 /100 WBCS — SIGNIFICANT CHANGE UP (ref 0–0)
PLATELET # BLD AUTO: 20 K/UL — CRITICAL LOW (ref 150–400)
RBC # BLD: 2.93 M/UL — LOW (ref 4.2–5.8)
RBC # FLD: 17.3 % — HIGH (ref 10.3–14.5)
WBC # BLD: 10.37 K/UL — SIGNIFICANT CHANGE UP (ref 3.8–10.5)
WBC # FLD AUTO: 10.37 K/UL — SIGNIFICANT CHANGE UP (ref 3.8–10.5)

## 2024-10-29 PROCEDURE — C8902: CPT

## 2024-10-29 PROCEDURE — 74185 MRA ABD W OR W/O CNTRST: CPT | Mod: 26

## 2024-10-31 ENCOUNTER — APPOINTMENT (OUTPATIENT)
Dept: INFUSION THERAPY | Facility: HOSPITAL | Age: 54
End: 2024-10-31

## 2024-10-31 ENCOUNTER — APPOINTMENT (OUTPATIENT)
Dept: HEMATOLOGY ONCOLOGY | Facility: CLINIC | Age: 54
End: 2024-10-31
Payer: COMMERCIAL

## 2024-10-31 VITALS
WEIGHT: 173.06 LBS | TEMPERATURE: 97.5 F | BODY MASS INDEX: 25.56 KG/M2 | RESPIRATION RATE: 16 BRPM | SYSTOLIC BLOOD PRESSURE: 145 MMHG | OXYGEN SATURATION: 99 % | HEART RATE: 76 BPM | DIASTOLIC BLOOD PRESSURE: 81 MMHG

## 2024-10-31 DIAGNOSIS — C79.31 SECONDARY MALIGNANT NEOPLASM OF BRAIN: ICD-10-CM

## 2024-10-31 DIAGNOSIS — D49.1 NEOPLASM OF UNSPECIFIED BEHAVIOR OF RESPIRATORY SYSTEM: ICD-10-CM

## 2024-10-31 DIAGNOSIS — D68.9 COAGULATION DEFECT, UNSPECIFIED: ICD-10-CM

## 2024-10-31 DIAGNOSIS — D69.6 THROMBOCYTOPENIA, UNSPECIFIED: ICD-10-CM

## 2024-10-31 PROBLEM — D64.9 ANEMIA: Status: ACTIVE | Noted: 2024-10-31

## 2024-10-31 PROCEDURE — 99215 OFFICE O/P EST HI 40 MIN: CPT

## 2024-11-01 RX ORDER — ELTROMBOPAG OLAMINE 50 MG/1
50 TABLET, FILM COATED ORAL
Qty: 30 | Refills: 3 | Status: ACTIVE | COMMUNITY
Start: 2024-10-31 | End: 1900-01-01

## 2024-11-04 ENCOUNTER — OFFICE (OUTPATIENT)
Dept: URBAN - METROPOLITAN AREA CLINIC 109 | Facility: CLINIC | Age: 54
Setting detail: OPHTHALMOLOGY
End: 2024-11-04
Payer: COMMERCIAL

## 2024-11-04 DIAGNOSIS — H43.392: ICD-10-CM

## 2024-11-04 DIAGNOSIS — H35.712: ICD-10-CM

## 2024-11-04 DIAGNOSIS — H02.401: ICD-10-CM

## 2024-11-04 PROCEDURE — 92134 CPTRZ OPH DX IMG PST SGM RTA: CPT | Performed by: OPHTHALMOLOGY

## 2024-11-04 PROCEDURE — 92014 COMPRE OPH EXAM EST PT 1/>: CPT | Performed by: OPHTHALMOLOGY

## 2024-11-04 ASSESSMENT — KERATOMETRY
OS_AXISANGLE_DEGREES: 171
OD_K2POWER_DIOPTERS: 48.25
OS_K1POWER_DIOPTERS: 47.25
OS_K2POWER_DIOPTERS: 47.75
OD_K1POWER_DIOPTERS: 47.25
OD_AXISANGLE_DEGREES: 002

## 2024-11-04 ASSESSMENT — REFRACTION_MANIFEST
OS_AXIS: 85
OS_VA1: 20/20
OD_VA1: 20/20
OS_CYLINDER: -3.75
OD_SPHERE: +0.25
OD_CYLINDER: -4.00
OS_SPHERE: PLANO
OD_AXIS: 90

## 2024-11-04 ASSESSMENT — CONFRONTATIONAL VISUAL FIELD TEST (CVF)
OS_FINDINGS: FULL
OD_FINDINGS: FULL

## 2024-11-04 ASSESSMENT — REFRACTION_AUTOREFRACTION
OS_AXIS: 83
OD_CYLINDER: -3.25
OS_CYLINDER: -3.75
OD_SPHERE: -0.50
OS_SPHERE: -0.50
OD_AXIS: 89

## 2024-11-04 ASSESSMENT — VISUAL ACUITY
OD_BCVA: 20/200
OS_BCVA: 20/25-2

## 2024-11-04 ASSESSMENT — TONOMETRY: OS_IOP_MMHG: 10

## 2024-11-05 ENCOUNTER — NON-APPOINTMENT (OUTPATIENT)
Age: 54
End: 2024-11-05

## 2024-11-05 ENCOUNTER — APPOINTMENT (OUTPATIENT)
Dept: GASTROENTEROLOGY | Facility: CLINIC | Age: 54
End: 2024-11-05
Payer: COMMERCIAL

## 2024-11-05 ENCOUNTER — OFFICE (OUTPATIENT)
Dept: URBAN - METROPOLITAN AREA CLINIC 88 | Facility: CLINIC | Age: 54
Setting detail: OPHTHALMOLOGY
End: 2024-11-05
Payer: COMMERCIAL

## 2024-11-05 VITALS
DIASTOLIC BLOOD PRESSURE: 83 MMHG | OXYGEN SATURATION: 99 % | HEIGHT: 69 IN | SYSTOLIC BLOOD PRESSURE: 147 MMHG | HEART RATE: 71 BPM | TEMPERATURE: 97.5 F | WEIGHT: 173 LBS | BODY MASS INDEX: 25.62 KG/M2

## 2024-11-05 DIAGNOSIS — R10.816 EPIGASTRIC ABDOMINAL TENDERNESS: ICD-10-CM

## 2024-11-05 DIAGNOSIS — H35.712: ICD-10-CM

## 2024-11-05 LAB
ALDOLASE SERPL-CCNC: 4.9 U/L
AMYLASE/CREAT SERPL: 83 U/L
CHROM ANALY OVERALL INTERP SPEC-IMP: SIGNIFICANT CHANGE UP
LPL SERPL-CCNC: 53 U/L

## 2024-11-05 PROCEDURE — 92235 FLUORESCEIN ANGRPH MLTIFRAME: CPT | Performed by: OPHTHALMOLOGY

## 2024-11-05 PROCEDURE — 92014 COMPRE OPH EXAM EST PT 1/>: CPT | Performed by: OPHTHALMOLOGY

## 2024-11-05 PROCEDURE — 92134 CPTRZ OPH DX IMG PST SGM RTA: CPT | Performed by: OPHTHALMOLOGY

## 2024-11-05 PROCEDURE — 99204 OFFICE O/P NEW MOD 45 MIN: CPT

## 2024-11-05 RX ORDER — SUCRALFATE 1 G/10ML
1 SUSPENSION ORAL 3 TIMES DAILY
Qty: 450 | Refills: 1 | Status: ACTIVE | COMMUNITY
Start: 2024-11-05 | End: 1900-01-01

## 2024-11-05 RX ORDER — PANTOPRAZOLE 40 MG/1
40 TABLET, DELAYED RELEASE ORAL
Qty: 45 | Refills: 1 | Status: ACTIVE | COMMUNITY
Start: 2024-11-05 | End: 1900-01-01

## 2024-11-05 RX ORDER — CLONIDINE HYDROCHLORIDE 0.3 MG/1
TABLET ORAL
Refills: 0 | Status: ACTIVE | COMMUNITY

## 2024-11-05 RX ORDER — OMEPRAZOLE 20 MG/1
20 CAPSULE, DELAYED RELEASE ORAL
Refills: 0 | Status: ACTIVE | COMMUNITY

## 2024-11-05 RX ORDER — FAMOTIDINE 10 MG/1
10 TABLET, FILM COATED ORAL
Refills: 0 | Status: ACTIVE | COMMUNITY

## 2024-11-05 ASSESSMENT — REFRACTION_AUTOREFRACTION
OD_CYLINDER: -3.25
OD_AXIS: 89
OS_SPHERE: -0.50
OS_AXIS: 83
OD_SPHERE: -0.50
OS_CYLINDER: -3.75

## 2024-11-05 ASSESSMENT — CONFRONTATIONAL VISUAL FIELD TEST (CVF)
OD_FINDINGS: FULL
OS_FINDINGS: FULL

## 2024-11-05 ASSESSMENT — KERATOMETRY
OD_K2POWER_DIOPTERS: 48.25
OS_K1POWER_DIOPTERS: 47.25
OD_K1POWER_DIOPTERS: 47.25
OD_AXISANGLE_DEGREES: 002
OS_K2POWER_DIOPTERS: 47.75
OS_AXISANGLE_DEGREES: 171

## 2024-11-05 ASSESSMENT — VISUAL ACUITY
OD_BCVA: 20/200
OS_BCVA: 20/25-2

## 2024-11-05 ASSESSMENT — TONOMETRY
OD_IOP_MMHG: 12
OS_IOP_MMHG: 11

## 2024-11-06 DIAGNOSIS — C34.90 MALIGNANT NEOPLASM OF UNSPECIFIED PART OF UNSPECIFIED BRONCHUS OR LUNG: ICD-10-CM

## 2024-11-06 DIAGNOSIS — N17.9 ACUTE KIDNEY FAILURE, UNSPECIFIED: ICD-10-CM

## 2024-11-07 ENCOUNTER — RESULT REVIEW (OUTPATIENT)
Age: 54
End: 2024-11-07

## 2024-11-07 ENCOUNTER — APPOINTMENT (OUTPATIENT)
Dept: HEMATOLOGY ONCOLOGY | Facility: CLINIC | Age: 54
End: 2024-11-07

## 2024-11-07 ENCOUNTER — APPOINTMENT (OUTPATIENT)
Dept: INFUSION THERAPY | Facility: HOSPITAL | Age: 54
End: 2024-11-07

## 2024-11-07 DIAGNOSIS — D68.61 ANTIPHOSPHOLIPID SYNDROME: ICD-10-CM

## 2024-11-07 DIAGNOSIS — D64.9 ANEMIA, UNSPECIFIED: ICD-10-CM

## 2024-11-07 LAB
ANISOCYTOSIS BLD QL: SLIGHT — SIGNIFICANT CHANGE UP
BASOPHILS # BLD AUTO: 0.01 K/UL — SIGNIFICANT CHANGE UP (ref 0–0.2)
BASOPHILS NFR BLD AUTO: 0.3 % — SIGNIFICANT CHANGE UP (ref 0–2)
DACRYOCYTES BLD QL SMEAR: SLIGHT — SIGNIFICANT CHANGE UP
EOSINOPHIL # BLD AUTO: 0.09 K/UL — SIGNIFICANT CHANGE UP (ref 0–0.5)
EOSINOPHIL NFR BLD AUTO: 3.1 % — SIGNIFICANT CHANGE UP (ref 0–6)
HCT VFR BLD CALC: 20.3 % — CRITICAL LOW (ref 39–50)
HGB BLD-MCNC: 6.7 G/DL — CRITICAL LOW (ref 13–17)
HYPOCHROMIA BLD QL: SLIGHT — SIGNIFICANT CHANGE UP
IMM GRANULOCYTES NFR BLD AUTO: 2.7 % — HIGH (ref 0–0.9)
LYMPHOCYTES # BLD AUTO: 0.53 K/UL — LOW (ref 1–3.3)
LYMPHOCYTES # BLD AUTO: 18 % — SIGNIFICANT CHANGE UP (ref 13–44)
MCHC RBC-ENTMCNC: 26.6 PG — LOW (ref 27–34)
MCHC RBC-ENTMCNC: 33 G/DL — SIGNIFICANT CHANGE UP (ref 32–36)
MCV RBC AUTO: 80.6 FL — SIGNIFICANT CHANGE UP (ref 80–100)
MONOCYTES # BLD AUTO: 0.3 K/UL — SIGNIFICANT CHANGE UP (ref 0–0.9)
MONOCYTES NFR BLD AUTO: 10.2 % — SIGNIFICANT CHANGE UP (ref 2–14)
NEUTROPHILS # BLD AUTO: 1.94 K/UL — SIGNIFICANT CHANGE UP (ref 1.8–7.4)
NEUTROPHILS NFR BLD AUTO: 65.7 % — SIGNIFICANT CHANGE UP (ref 43–77)
NRBC # BLD: 0 /100 WBCS — SIGNIFICANT CHANGE UP (ref 0–0)
PLAT MORPH BLD: NORMAL — SIGNIFICANT CHANGE UP
PLATELET # BLD AUTO: 77 K/UL — LOW (ref 150–400)
POIKILOCYTOSIS BLD QL AUTO: SLIGHT — SIGNIFICANT CHANGE UP
RBC # BLD: 2.52 M/UL — LOW (ref 4.2–5.8)
RBC # FLD: 17.7 % — HIGH (ref 10.3–14.5)
RBC BLD AUTO: ABNORMAL
WBC # BLD: 2.95 K/UL — LOW (ref 3.8–10.5)
WBC # FLD AUTO: 2.95 K/UL — LOW (ref 3.8–10.5)

## 2024-11-14 ENCOUNTER — APPOINTMENT (OUTPATIENT)
Dept: INFUSION THERAPY | Facility: HOSPITAL | Age: 54
End: 2024-11-14

## 2024-11-14 ENCOUNTER — RESULT REVIEW (OUTPATIENT)
Age: 54
End: 2024-11-14

## 2024-11-14 ENCOUNTER — APPOINTMENT (OUTPATIENT)
Dept: HEMATOLOGY ONCOLOGY | Facility: CLINIC | Age: 54
End: 2024-11-14

## 2024-11-14 LAB
ANISOCYTOSIS BLD QL: SLIGHT — SIGNIFICANT CHANGE UP
BASOPHILS # BLD AUTO: 0.03 K/UL — SIGNIFICANT CHANGE UP (ref 0–0.2)
BASOPHILS NFR BLD AUTO: 1 % — SIGNIFICANT CHANGE UP (ref 0–2)
DACRYOCYTES BLD QL SMEAR: SLIGHT — SIGNIFICANT CHANGE UP
ELLIPTOCYTES BLD QL SMEAR: SLIGHT — SIGNIFICANT CHANGE UP
EOSINOPHIL # BLD AUTO: 0.17 K/UL — SIGNIFICANT CHANGE UP (ref 0–0.5)
EOSINOPHIL NFR BLD AUTO: 5 % — SIGNIFICANT CHANGE UP (ref 0–6)
HCT VFR BLD CALC: 22.6 % — LOW (ref 39–50)
HGB BLD-MCNC: 7.3 G/DL — LOW (ref 13–17)
HYPOCHROMIA BLD QL: SIGNIFICANT CHANGE UP
LG PLATELETS BLD QL AUTO: SLIGHT — SIGNIFICANT CHANGE UP
LYMPHOCYTES # BLD AUTO: 0.67 K/UL — LOW (ref 1–3.3)
LYMPHOCYTES # BLD AUTO: 20 % — SIGNIFICANT CHANGE UP (ref 13–44)
MACROCYTES BLD QL: SLIGHT — SIGNIFICANT CHANGE UP
MCHC RBC-ENTMCNC: 25.9 PG — LOW (ref 27–34)
MCHC RBC-ENTMCNC: 32.3 G/DL — SIGNIFICANT CHANGE UP (ref 32–36)
MCV RBC AUTO: 80.1 FL — SIGNIFICANT CHANGE UP (ref 80–100)
METAMYELOCYTES # FLD: 1 % — HIGH (ref 0–0)
MONOCYTES # BLD AUTO: 0.5 K/UL — SIGNIFICANT CHANGE UP (ref 0–0.9)
MONOCYTES NFR BLD AUTO: 15 % — HIGH (ref 2–14)
MYELOCYTES NFR BLD: 2 % — HIGH (ref 0–0)
NEUTROPHILS # BLD AUTO: 1.88 K/UL — SIGNIFICANT CHANGE UP (ref 1.8–7.4)
NEUTROPHILS NFR BLD AUTO: 56 % — SIGNIFICANT CHANGE UP (ref 43–77)
NRBC # BLD: 1 /100 WBCS — HIGH (ref 0–0)
NRBC # BLD: SIGNIFICANT CHANGE UP /100 WBCS (ref 0–0)
PLAT MORPH BLD: ABNORMAL
PLATELET # BLD AUTO: 145 K/UL — LOW (ref 150–400)
POIKILOCYTOSIS BLD QL AUTO: SLIGHT — SIGNIFICANT CHANGE UP
POLYCHROMASIA BLD QL SMEAR: SLIGHT — SIGNIFICANT CHANGE UP
RBC # BLD: 2.82 M/UL — LOW (ref 4.2–5.8)
RBC # FLD: 18 % — HIGH (ref 10.3–14.5)
RBC BLD AUTO: ABNORMAL
SCHISTOCYTES BLD QL AUTO: SLIGHT — SIGNIFICANT CHANGE UP
TARGETS BLD QL SMEAR: SLIGHT — SIGNIFICANT CHANGE UP
WBC # BLD: 3.36 K/UL — LOW (ref 3.8–10.5)
WBC # FLD AUTO: 3.36 K/UL — LOW (ref 3.8–10.5)

## 2024-11-21 ENCOUNTER — APPOINTMENT (OUTPATIENT)
Dept: INFUSION THERAPY | Facility: HOSPITAL | Age: 54
End: 2024-11-21

## 2024-11-21 ENCOUNTER — RESULT REVIEW (OUTPATIENT)
Age: 54
End: 2024-11-21

## 2024-11-21 ENCOUNTER — APPOINTMENT (OUTPATIENT)
Dept: HEMATOLOGY ONCOLOGY | Facility: CLINIC | Age: 54
End: 2024-11-21

## 2024-11-21 LAB
BASOPHILS # BLD AUTO: 0.01 K/UL — SIGNIFICANT CHANGE UP (ref 0–0.2)
BASOPHILS NFR BLD AUTO: 0.3 % — SIGNIFICANT CHANGE UP (ref 0–2)
EOSINOPHIL # BLD AUTO: 0.05 K/UL — SIGNIFICANT CHANGE UP (ref 0–0.5)
EOSINOPHIL NFR BLD AUTO: 1.5 % — SIGNIFICANT CHANGE UP (ref 0–6)
HCT VFR BLD CALC: 24.6 % — LOW (ref 39–50)
HGB BLD-MCNC: 7.5 G/DL — LOW (ref 13–17)
IMM GRANULOCYTES NFR BLD AUTO: 1.8 % — HIGH (ref 0–0.9)
LYMPHOCYTES # BLD AUTO: 0.58 K/UL — LOW (ref 1–3.3)
LYMPHOCYTES # BLD AUTO: 17.2 % — SIGNIFICANT CHANGE UP (ref 13–44)
MCHC RBC-ENTMCNC: 25.1 PG — LOW (ref 27–34)
MCHC RBC-ENTMCNC: 30.5 G/DL — LOW (ref 32–36)
MCV RBC AUTO: 82.3 FL — SIGNIFICANT CHANGE UP (ref 80–100)
MONOCYTES # BLD AUTO: 0.63 K/UL — SIGNIFICANT CHANGE UP (ref 0–0.9)
MONOCYTES NFR BLD AUTO: 18.7 % — HIGH (ref 2–14)
NEUTROPHILS # BLD AUTO: 2.04 K/UL — SIGNIFICANT CHANGE UP (ref 1.8–7.4)
NEUTROPHILS NFR BLD AUTO: 60.5 % — SIGNIFICANT CHANGE UP (ref 43–77)
NRBC # BLD: 0 /100 WBCS — SIGNIFICANT CHANGE UP (ref 0–0)
PLATELET # BLD AUTO: 163 K/UL — SIGNIFICANT CHANGE UP (ref 150–400)
RBC # BLD: 2.99 M/UL — LOW (ref 4.2–5.8)
RBC # FLD: 17.6 % — HIGH (ref 10.3–14.5)
WBC # BLD: 3.37 K/UL — LOW (ref 3.8–10.5)
WBC # FLD AUTO: 3.37 K/UL — LOW (ref 3.8–10.5)

## 2024-11-25 ENCOUNTER — OFFICE (OUTPATIENT)
Dept: URBAN - METROPOLITAN AREA CLINIC 88 | Facility: CLINIC | Age: 54
Setting detail: OPHTHALMOLOGY
End: 2024-11-25
Payer: COMMERCIAL

## 2024-11-25 DIAGNOSIS — H35.712: ICD-10-CM

## 2024-11-25 PROCEDURE — 92012 INTRM OPH EXAM EST PATIENT: CPT | Performed by: OPHTHALMOLOGY

## 2024-11-25 PROCEDURE — 92134 CPTRZ OPH DX IMG PST SGM RTA: CPT | Performed by: OPHTHALMOLOGY

## 2024-11-25 ASSESSMENT — CONFRONTATIONAL VISUAL FIELD TEST (CVF)
OD_FINDINGS: FULL
OS_FINDINGS: FULL

## 2024-11-25 ASSESSMENT — TONOMETRY
OS_IOP_MMHG: 10
OD_IOP_MMHG: 10

## 2024-11-25 ASSESSMENT — KERATOMETRY
OD_AXISANGLE_DEGREES: 002
OS_K2POWER_DIOPTERS: 47.75
OS_K1POWER_DIOPTERS: 47.25
OD_K1POWER_DIOPTERS: 47.25
OS_AXISANGLE_DEGREES: 171
OD_K2POWER_DIOPTERS: 48.25

## 2024-11-25 ASSESSMENT — REFRACTION_AUTOREFRACTION
OS_CYLINDER: -3.75
OD_CYLINDER: -3.25
OD_SPHERE: -0.50
OS_SPHERE: -0.50
OS_AXIS: 83
OD_AXIS: 89

## 2024-11-25 ASSESSMENT — VISUAL ACUITY
OD_BCVA: 20/250
OS_BCVA: 20/30

## 2024-11-30 ENCOUNTER — RESULT REVIEW (OUTPATIENT)
Age: 54
End: 2024-11-30

## 2024-11-30 ENCOUNTER — APPOINTMENT (OUTPATIENT)
Dept: HEMATOLOGY ONCOLOGY | Facility: CLINIC | Age: 54
End: 2024-11-30

## 2024-11-30 ENCOUNTER — APPOINTMENT (OUTPATIENT)
Dept: INFUSION THERAPY | Facility: HOSPITAL | Age: 54
End: 2024-11-30

## 2024-11-30 LAB
BASOPHILS # BLD AUTO: 0.01 K/UL — SIGNIFICANT CHANGE UP (ref 0–0.2)
BASOPHILS NFR BLD AUTO: 0.2 % — SIGNIFICANT CHANGE UP (ref 0–2)
EOSINOPHIL # BLD AUTO: 0.07 K/UL — SIGNIFICANT CHANGE UP (ref 0–0.5)
EOSINOPHIL NFR BLD AUTO: 1.6 % — SIGNIFICANT CHANGE UP (ref 0–6)
HCT VFR BLD CALC: 25.4 % — LOW (ref 39–50)
HGB BLD-MCNC: 8 G/DL — LOW (ref 13–17)
IMM GRANULOCYTES NFR BLD AUTO: 0.7 % — SIGNIFICANT CHANGE UP (ref 0–0.9)
LYMPHOCYTES # BLD AUTO: 0.7 K/UL — LOW (ref 1–3.3)
LYMPHOCYTES # BLD AUTO: 15.9 % — SIGNIFICANT CHANGE UP (ref 13–44)
MCHC RBC-ENTMCNC: 24.8 PG — LOW (ref 27–34)
MCHC RBC-ENTMCNC: 31.5 G/DL — LOW (ref 32–36)
MCV RBC AUTO: 78.6 FL — LOW (ref 80–100)
MONOCYTES # BLD AUTO: 0.59 K/UL — SIGNIFICANT CHANGE UP (ref 0–0.9)
MONOCYTES NFR BLD AUTO: 13.4 % — SIGNIFICANT CHANGE UP (ref 2–14)
NEUTROPHILS # BLD AUTO: 2.99 K/UL — SIGNIFICANT CHANGE UP (ref 1.8–7.4)
NEUTROPHILS NFR BLD AUTO: 68.2 % — SIGNIFICANT CHANGE UP (ref 43–77)
NRBC # BLD: 0 /100 WBCS — SIGNIFICANT CHANGE UP (ref 0–0)
PLATELET # BLD AUTO: 159 K/UL — SIGNIFICANT CHANGE UP (ref 150–400)
RBC # BLD: 3.23 M/UL — LOW (ref 4.2–5.8)
RBC # FLD: 17.2 % — HIGH (ref 10.3–14.5)
WBC # BLD: 4.39 K/UL — SIGNIFICANT CHANGE UP (ref 3.8–10.5)
WBC # FLD AUTO: 4.39 K/UL — SIGNIFICANT CHANGE UP (ref 3.8–10.5)

## 2024-12-02 ENCOUNTER — OUTPATIENT (OUTPATIENT)
Dept: OUTPATIENT SERVICES | Facility: HOSPITAL | Age: 54
LOS: 1 days | Discharge: HOME | End: 2024-12-02

## 2024-12-02 DIAGNOSIS — Z95.828 PRESENCE OF OTHER VASCULAR IMPLANTS AND GRAFTS: Chronic | ICD-10-CM

## 2024-12-02 DIAGNOSIS — Z98.890 OTHER SPECIFIED POSTPROCEDURAL STATES: Chronic | ICD-10-CM

## 2024-12-02 DIAGNOSIS — D64.9 ANEMIA, UNSPECIFIED: ICD-10-CM

## 2024-12-02 DIAGNOSIS — I10 ESSENTIAL (PRIMARY) HYPERTENSION: ICD-10-CM

## 2024-12-02 RX ORDER — CLONIDINE HYDROCHLORIDE 0.1 MG/1
0.1 TABLET ORAL DAILY
Qty: 90 | Refills: 1 | Status: ACTIVE | COMMUNITY
Start: 2024-12-02 | End: 1900-01-01

## 2024-12-07 ENCOUNTER — APPOINTMENT (OUTPATIENT)
Dept: HEMATOLOGY ONCOLOGY | Facility: CLINIC | Age: 54
End: 2024-12-07

## 2024-12-07 ENCOUNTER — APPOINTMENT (OUTPATIENT)
Dept: INFUSION THERAPY | Facility: HOSPITAL | Age: 54
End: 2024-12-07

## 2024-12-09 DIAGNOSIS — K31.84 GASTROPARESIS: ICD-10-CM

## 2024-12-12 ENCOUNTER — NON-APPOINTMENT (OUTPATIENT)
Age: 54
End: 2024-12-12

## 2024-12-13 ENCOUNTER — APPOINTMENT (OUTPATIENT)
Dept: HEMATOLOGY ONCOLOGY | Facility: CLINIC | Age: 54
End: 2024-12-13

## 2024-12-14 ENCOUNTER — APPOINTMENT (OUTPATIENT)
Dept: HEMATOLOGY ONCOLOGY | Facility: CLINIC | Age: 54
End: 2024-12-14

## 2024-12-14 ENCOUNTER — APPOINTMENT (OUTPATIENT)
Dept: INFUSION THERAPY | Facility: HOSPITAL | Age: 54
End: 2024-12-14

## 2024-12-14 ENCOUNTER — RESULT REVIEW (OUTPATIENT)
Age: 54
End: 2024-12-14

## 2024-12-14 LAB
BASOPHILS # BLD AUTO: 0.01 K/UL — SIGNIFICANT CHANGE UP (ref 0–0.2)
BASOPHILS NFR BLD AUTO: 0.2 % — SIGNIFICANT CHANGE UP (ref 0–2)
EOSINOPHIL # BLD AUTO: 0.02 K/UL — SIGNIFICANT CHANGE UP (ref 0–0.5)
EOSINOPHIL NFR BLD AUTO: 0.4 % — SIGNIFICANT CHANGE UP (ref 0–6)
HCT VFR BLD CALC: 25.1 % — LOW (ref 39–50)
HGB BLD-MCNC: 7.8 G/DL — LOW (ref 13–17)
IMM GRANULOCYTES NFR BLD AUTO: 1.7 % — HIGH (ref 0–0.9)
LYMPHOCYTES # BLD AUTO: 0.71 K/UL — LOW (ref 1–3.3)
LYMPHOCYTES # BLD AUTO: 13.7 % — SIGNIFICANT CHANGE UP (ref 13–44)
MCHC RBC-ENTMCNC: 22.9 PG — LOW (ref 27–34)
MCHC RBC-ENTMCNC: 31.1 G/DL — LOW (ref 32–36)
MCV RBC AUTO: 73.6 FL — LOW (ref 80–100)
MONOCYTES # BLD AUTO: 0.48 K/UL — SIGNIFICANT CHANGE UP (ref 0–0.9)
MONOCYTES NFR BLD AUTO: 9.3 % — SIGNIFICANT CHANGE UP (ref 2–14)
NEUTROPHILS # BLD AUTO: 3.86 K/UL — SIGNIFICANT CHANGE UP (ref 1.8–7.4)
NEUTROPHILS NFR BLD AUTO: 74.7 % — SIGNIFICANT CHANGE UP (ref 43–77)
NRBC # BLD: 0 /100 WBCS — SIGNIFICANT CHANGE UP (ref 0–0)
NRBC BLD-RTO: 0 /100 WBCS — SIGNIFICANT CHANGE UP (ref 0–0)
PLATELET # BLD AUTO: 214 K/UL — SIGNIFICANT CHANGE UP (ref 150–400)
RBC # BLD: 3.41 M/UL — LOW (ref 4.2–5.8)
RBC # FLD: 17.4 % — HIGH (ref 10.3–14.5)
WBC # BLD: 5.17 K/UL — SIGNIFICANT CHANGE UP (ref 3.8–10.5)
WBC # FLD AUTO: 5.17 K/UL — SIGNIFICANT CHANGE UP (ref 3.8–10.5)

## 2024-12-17 ENCOUNTER — APPOINTMENT (OUTPATIENT)
Dept: GASTROENTEROLOGY | Facility: AMBULATORY MEDICAL SERVICES | Age: 54
End: 2024-12-17

## 2024-12-17 DIAGNOSIS — C34.90 MALIGNANT NEOPLASM OF UNSPECIFIED PART OF UNSPECIFIED BRONCHUS OR LUNG: ICD-10-CM

## 2024-12-17 DIAGNOSIS — C79.31 SECONDARY MALIGNANT NEOPLASM OF BRAIN: ICD-10-CM

## 2024-12-27 ENCOUNTER — RX RENEWAL (OUTPATIENT)
Age: 54
End: 2024-12-27

## 2025-01-04 ENCOUNTER — APPOINTMENT (OUTPATIENT)
Dept: INFUSION THERAPY | Facility: HOSPITAL | Age: 55
End: 2025-01-04

## 2025-01-04 ENCOUNTER — RESULT REVIEW (OUTPATIENT)
Age: 55
End: 2025-01-04

## 2025-01-10 ENCOUNTER — APPOINTMENT (OUTPATIENT)
Dept: NEPHROLOGY | Facility: CLINIC | Age: 55
End: 2025-01-10

## 2025-01-10 ENCOUNTER — LABORATORY RESULT (OUTPATIENT)
Age: 55
End: 2025-01-10

## 2025-01-10 DIAGNOSIS — D64.9 ANEMIA, UNSPECIFIED: ICD-10-CM

## 2025-01-13 LAB
FERRITIN SERPL-MCNC: 1242 NG/ML
INR PPP: 2.39 RATIO
IRON SATN MFR SERPL: 19 %
IRON SERPL-MCNC: 61 UG/DL
PT BLD: 28.2 SEC
TIBC SERPL-MCNC: 324 UG/DL
TRANSFERRIN SERPL-MCNC: 223 MG/DL
UIBC SERPL-MCNC: 263 UG/DL

## 2025-01-15 ENCOUNTER — RX RENEWAL (OUTPATIENT)
Age: 55
End: 2025-01-15

## 2025-01-19 LAB
ALBUMIN SERPL ELPH-MCNC: 4.8 G/DL
ALP BLD-CCNC: 58 U/L
ALT SERPL-CCNC: 17 U/L
AST SERPL-CCNC: 18 U/L
BILIRUB DIRECT SERPL-MCNC: 0.1 MG/DL
BILIRUB INDIRECT SERPL-MCNC: 0.1 MG/DL
BILIRUB SERPL-MCNC: 0.2 MG/DL
PROT SERPL-MCNC: 7.7 G/DL

## 2025-01-21 ENCOUNTER — OFFICE (OUTPATIENT)
Dept: URBAN - METROPOLITAN AREA CLINIC 88 | Facility: CLINIC | Age: 55
Setting detail: OPHTHALMOLOGY
End: 2025-01-21
Payer: COMMERCIAL

## 2025-01-21 ENCOUNTER — TRANSCRIPTION ENCOUNTER (OUTPATIENT)
Age: 55
End: 2025-01-21

## 2025-01-21 DIAGNOSIS — H43.392: ICD-10-CM

## 2025-01-21 DIAGNOSIS — H35.712: ICD-10-CM

## 2025-01-21 PROCEDURE — 99214 OFFICE O/P EST MOD 30 MIN: CPT | Mod: 25 | Performed by: OPHTHALMOLOGY

## 2025-01-21 PROCEDURE — 67028 INJECTION EYE DRUG: CPT | Mod: LT | Performed by: OPHTHALMOLOGY

## 2025-01-21 PROCEDURE — 92134 CPTRZ OPH DX IMG PST SGM RTA: CPT | Performed by: OPHTHALMOLOGY

## 2025-01-21 ASSESSMENT — CONFRONTATIONAL VISUAL FIELD TEST (CVF)
OD_FINDINGS: FULL
OS_FINDINGS: FULL

## 2025-01-21 ASSESSMENT — TONOMETRY: OS_IOP_MMHG: 13

## 2025-01-22 DIAGNOSIS — R70.0 ELEVATED ERYTHROCYTE SEDIMENTATION RATE: ICD-10-CM

## 2025-01-22 PROBLEM — H33.8: Status: ACTIVE | Noted: 2025-01-21

## 2025-01-24 ASSESSMENT — REFRACTION_AUTOREFRACTION
OS_AXIS: 83
OS_SPHERE: -0.50
OD_CYLINDER: -3.25
OS_CYLINDER: -3.75
OD_SPHERE: -0.50
OD_AXIS: 89

## 2025-01-24 ASSESSMENT — KERATOMETRY
OD_K2POWER_DIOPTERS: 48.25
OD_AXISANGLE_DEGREES: 002
OS_K1POWER_DIOPTERS: 47.25
OS_K2POWER_DIOPTERS: 47.75
OS_AXISANGLE_DEGREES: 171
OD_K1POWER_DIOPTERS: 47.25

## 2025-01-24 ASSESSMENT — VISUAL ACUITY
OD_BCVA: 20/250
OS_BCVA: 20/40-

## 2025-01-29 ENCOUNTER — RX RENEWAL (OUTPATIENT)
Age: 55
End: 2025-01-29

## 2025-02-03 ENCOUNTER — APPOINTMENT (OUTPATIENT)
Dept: NEPHROLOGY | Facility: CLINIC | Age: 55
End: 2025-02-03

## 2025-02-04 ENCOUNTER — OFFICE (OUTPATIENT)
Dept: URBAN - METROPOLITAN AREA CLINIC 88 | Facility: CLINIC | Age: 55
Setting detail: OPHTHALMOLOGY
End: 2025-02-04
Payer: COMMERCIAL

## 2025-02-04 DIAGNOSIS — H35.712: ICD-10-CM

## 2025-02-04 PROCEDURE — 67210 TREATMENT OF RETINAL LESION: CPT | Mod: LT | Performed by: OPHTHALMOLOGY

## 2025-02-04 ASSESSMENT — KERATOMETRY
OS_K1POWER_DIOPTERS: 47.25
OS_K2POWER_DIOPTERS: 47.75
OS_AXISANGLE_DEGREES: 171
OD_K1POWER_DIOPTERS: 47.25
OD_AXISANGLE_DEGREES: 002
OD_K2POWER_DIOPTERS: 48.25

## 2025-02-04 ASSESSMENT — REFRACTION_AUTOREFRACTION
OS_CYLINDER: -3.75
OS_SPHERE: -0.50
OD_CYLINDER: -3.25
OD_SPHERE: -0.50
OS_AXIS: 83
OD_AXIS: 89

## 2025-02-04 ASSESSMENT — CONFRONTATIONAL VISUAL FIELD TEST (CVF)
OD_FINDINGS: FULL
OS_FINDINGS: FULL

## 2025-02-04 ASSESSMENT — TONOMETRY: OS_IOP_MMHG: 11

## 2025-02-04 ASSESSMENT — VISUAL ACUITY
OD_BCVA: 20/250
OS_BCVA: 20/30-

## 2025-02-05 ENCOUNTER — NON-APPOINTMENT (OUTPATIENT)
Age: 55
End: 2025-02-05

## 2025-02-06 ENCOUNTER — OFFICE (OUTPATIENT)
Dept: URBAN - METROPOLITAN AREA CLINIC 88 | Facility: CLINIC | Age: 55
Setting detail: OPHTHALMOLOGY
End: 2025-02-06
Payer: COMMERCIAL

## 2025-02-06 DIAGNOSIS — H43.392: ICD-10-CM

## 2025-02-06 DIAGNOSIS — H33.8: ICD-10-CM

## 2025-02-06 DIAGNOSIS — H25.12: ICD-10-CM

## 2025-02-06 DIAGNOSIS — H35.712: ICD-10-CM

## 2025-02-06 PROCEDURE — 92250 FUNDUS PHOTOGRAPHY W/I&R: CPT | Performed by: OPHTHALMOLOGY

## 2025-02-06 PROCEDURE — 76512 OPH US DX B-SCAN: CPT | Mod: LT | Performed by: OPHTHALMOLOGY

## 2025-02-06 PROCEDURE — 99024 POSTOP FOLLOW-UP VISIT: CPT | Performed by: OPHTHALMOLOGY

## 2025-02-06 ASSESSMENT — TONOMETRY
OS_IOP_MMHG: 10
OD_IOP_MMHG: 11

## 2025-02-06 ASSESSMENT — CONFRONTATIONAL VISUAL FIELD TEST (CVF)
OD_FINDINGS: FULL
OS_FINDINGS: FULL

## 2025-02-12 ASSESSMENT — KERATOMETRY
OS_AXISANGLE_DEGREES: 171
OS_K2POWER_DIOPTERS: 47.75
OD_K1POWER_DIOPTERS: 47.25
OD_AXISANGLE_DEGREES: 002
OS_K1POWER_DIOPTERS: 47.25
OD_K2POWER_DIOPTERS: 48.25

## 2025-02-12 ASSESSMENT — REFRACTION_AUTOREFRACTION
OD_AXIS: 89
OS_CYLINDER: -3.75
OD_CYLINDER: -3.25
OD_SPHERE: -0.50
OS_SPHERE: -0.50
OS_AXIS: 83

## 2025-02-12 ASSESSMENT — VISUAL ACUITY
OD_BCVA: 20/300
OS_BCVA: 20/30

## 2025-02-17 ENCOUNTER — APPOINTMENT (OUTPATIENT)
Dept: MRI IMAGING | Facility: CLINIC | Age: 55
End: 2025-02-17
Payer: COMMERCIAL

## 2025-02-17 ENCOUNTER — OUTPATIENT (OUTPATIENT)
Dept: OUTPATIENT SERVICES | Facility: HOSPITAL | Age: 55
LOS: 1 days | End: 2025-02-17
Payer: COMMERCIAL

## 2025-02-17 DIAGNOSIS — Z98.890 OTHER SPECIFIED POSTPROCEDURAL STATES: Chronic | ICD-10-CM

## 2025-02-17 DIAGNOSIS — Z95.828 PRESENCE OF OTHER VASCULAR IMPLANTS AND GRAFTS: Chronic | ICD-10-CM

## 2025-02-17 DIAGNOSIS — C79.31 SECONDARY MALIGNANT NEOPLASM OF BRAIN: ICD-10-CM

## 2025-02-17 PROCEDURE — 70553 MRI BRAIN STEM W/O & W/DYE: CPT

## 2025-02-17 PROCEDURE — 70553 MRI BRAIN STEM W/O & W/DYE: CPT | Mod: 26

## 2025-02-17 PROCEDURE — A9585: CPT

## 2025-02-19 ENCOUNTER — APPOINTMENT (OUTPATIENT)
Dept: NEPHROLOGY | Facility: CLINIC | Age: 55
End: 2025-02-19
Payer: COMMERCIAL

## 2025-02-19 VITALS
OXYGEN SATURATION: 98 % | DIASTOLIC BLOOD PRESSURE: 94 MMHG | BODY MASS INDEX: 25.62 KG/M2 | HEIGHT: 69 IN | TEMPERATURE: 97.9 F | SYSTOLIC BLOOD PRESSURE: 172 MMHG | WEIGHT: 173 LBS | HEART RATE: 69 BPM

## 2025-02-19 DIAGNOSIS — N17.9 ACUTE KIDNEY FAILURE, UNSPECIFIED: ICD-10-CM

## 2025-02-19 DIAGNOSIS — N18.32 CHRONIC KIDNEY DISEASE, STAGE 3B: ICD-10-CM

## 2025-02-19 PROCEDURE — 99215 OFFICE O/P EST HI 40 MIN: CPT | Mod: GC

## 2025-02-19 PROCEDURE — G2211 COMPLEX E/M VISIT ADD ON: CPT | Mod: NC,GC

## 2025-02-24 NOTE — ASSESSMENT
[FreeTextEntry1] : This is a 53-year-old gentleman with a history of metastatic adenocarcinoma treated with immunotherapy and chemotherapy.  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  The patient developed severe pancytopenia with a white count of 400 low platelet count and severe anemia with a hematocrit of 21.  The patient was hospitalized at that point and received multiple transfusions and was started on Epogen and Neupogen.  Concomitant with this he was found to have an elevated BUN and creatinine which has steadily increased to a level now where it is 3.  I obtain 24-hour urines for creatinine clearance and based on that calculation his renal function is ranging 25 to 30% in addition a renal ultrasound was normal and renal artery Dopplers failed to reveal any evidence of renal artery stenosis my impression is that he probably has interstitial renal disease secondary to his therapies.  If his creatinine continues to rise he will need a kidney biopsy and hopefully his renal function will stable stabilize if not there is a possibility that he will need dialysis 36.4

## 2025-02-27 ENCOUNTER — APPOINTMENT (OUTPATIENT)
Dept: RADIATION ONCOLOGY | Facility: CLINIC | Age: 55
End: 2025-02-27
Payer: COMMERCIAL

## 2025-02-27 DIAGNOSIS — C79.31 SECONDARY MALIGNANT NEOPLASM OF BRAIN: ICD-10-CM

## 2025-02-27 PROCEDURE — 99213 OFFICE O/P EST LOW 20 MIN: CPT | Mod: 93

## 2025-02-28 PROBLEM — C79.31 METASTATIC CANCER TO BRAIN: Status: ACTIVE | Noted: 2025-02-28

## 2025-03-03 ENCOUNTER — RX RENEWAL (OUTPATIENT)
Age: 55
End: 2025-03-03

## 2025-03-05 ENCOUNTER — OFFICE (OUTPATIENT)
Dept: URBAN - METROPOLITAN AREA CLINIC 88 | Facility: CLINIC | Age: 55
Setting detail: OPHTHALMOLOGY
End: 2025-03-05
Payer: COMMERCIAL

## 2025-03-05 DIAGNOSIS — H33.8: ICD-10-CM

## 2025-03-05 DIAGNOSIS — H35.712: ICD-10-CM

## 2025-03-05 DIAGNOSIS — H43.392: ICD-10-CM

## 2025-03-05 PROCEDURE — 99214 OFFICE O/P EST MOD 30 MIN: CPT | Mod: 24 | Performed by: OPHTHALMOLOGY

## 2025-03-05 PROCEDURE — 92250 FUNDUS PHOTOGRAPHY W/I&R: CPT | Performed by: OPHTHALMOLOGY

## 2025-03-05 ASSESSMENT — KERATOMETRY
OS_K2POWER_DIOPTERS: 47.75
OD_AXISANGLE_DEGREES: 002
OD_K1POWER_DIOPTERS: 47.25
OS_K1POWER_DIOPTERS: 47.25
OD_K2POWER_DIOPTERS: 48.25
OS_AXISANGLE_DEGREES: 171

## 2025-03-05 ASSESSMENT — VISUAL ACUITY
OS_BCVA: 20/30-
OD_BCVA: 20/450

## 2025-03-05 ASSESSMENT — REFRACTION_AUTOREFRACTION
OS_CYLINDER: -3.75
OD_AXIS: 89
OD_CYLINDER: -3.25
OS_AXIS: 83
OS_SPHERE: -0.50
OD_SPHERE: -0.50

## 2025-03-05 ASSESSMENT — CONFRONTATIONAL VISUAL FIELD TEST (CVF)
OS_FINDINGS: FULL
OD_FINDINGS: FULL

## 2025-03-05 ASSESSMENT — TONOMETRY: OD_IOP_MMHG: 10

## 2025-03-07 DIAGNOSIS — C80.1 SECONDARY MALIGNANT NEOPLASM OF OTHER PARTS OF NERVOUS SYSTEM: ICD-10-CM

## 2025-03-07 DIAGNOSIS — N17.9 ACUTE KIDNEY FAILURE, UNSPECIFIED: ICD-10-CM

## 2025-03-07 DIAGNOSIS — C79.49 SECONDARY MALIGNANT NEOPLASM OF OTHER PARTS OF NERVOUS SYSTEM: ICD-10-CM

## 2025-03-11 ENCOUNTER — LABORATORY RESULT (OUTPATIENT)
Age: 55
End: 2025-03-11

## 2025-03-12 ENCOUNTER — NON-APPOINTMENT (OUTPATIENT)
Age: 55
End: 2025-03-12

## 2025-03-12 LAB
ALBUMIN SERPL ELPH-MCNC: 4.8 G/DL
ALP BLD-CCNC: 55 U/L
ALT SERPL-CCNC: 22 U/L
ANION GAP SERPL CALC-SCNC: 16 MMOL/L
AST SERPL-CCNC: 17 U/L
BASOPHILS # BLD AUTO: 0 K/UL
BASOPHILS NFR BLD AUTO: 0 %
BILIRUB SERPL-MCNC: 0.2 MG/DL
BUN SERPL-MCNC: 39 MG/DL
CALCIUM SERPL-MCNC: 9.4 MG/DL
CHLORIDE SERPL-SCNC: 107 MMOL/L
CO2 SERPL-SCNC: 20 MMOL/L
CREAT SERPL-MCNC: 3.2 MG/DL
EGFRCR SERPLBLD CKD-EPI 2021: 22 ML/MIN/1.73M2
EOSINOPHIL # BLD AUTO: 0.13 K/UL
EOSINOPHIL NFR BLD AUTO: 2.8 %
GLUCOSE SERPL-MCNC: 84 MG/DL
HCT VFR BLD CALC: 28.4 %
HGB BLD-MCNC: 8.5 G/DL
INR PPP: 1.97 RATIO
LYMPHOCYTES # BLD AUTO: 1.01 K/UL
LYMPHOCYTES NFR BLD AUTO: 22 %
MAN DIFF?: NORMAL
MCHC RBC-ENTMCNC: 20.6 PG
MCHC RBC-ENTMCNC: 29.9 G/DL
MCV RBC AUTO: 68.8 FL
MONOCYTES # BLD AUTO: 0.46 K/UL
MONOCYTES NFR BLD AUTO: 10.1 %
NEUTROPHILS # BLD AUTO: 2.94 K/UL
NEUTROPHILS NFR BLD AUTO: 64.2 %
PLATELET # BLD AUTO: 145 K/UL
POTASSIUM SERPL-SCNC: 4.5 MMOL/L
PROT SERPL-MCNC: 7.8 G/DL
PT BLD: 23.1 SEC
RBC # BLD: 4.13 M/UL
RBC # FLD: 18.2 %
SODIUM SERPL-SCNC: 143 MMOL/L
WBC # FLD AUTO: 4.58 K/UL

## 2025-04-03 ENCOUNTER — APPOINTMENT (OUTPATIENT)
Dept: GASTROENTEROLOGY | Facility: CLINIC | Age: 55
End: 2025-04-03

## 2025-04-09 ENCOUNTER — OFFICE (OUTPATIENT)
Dept: URBAN - METROPOLITAN AREA CLINIC 88 | Facility: CLINIC | Age: 55
Setting detail: OPHTHALMOLOGY
End: 2025-04-09
Payer: COMMERCIAL

## 2025-04-09 DIAGNOSIS — H43.392: ICD-10-CM

## 2025-04-09 DIAGNOSIS — H33.8: ICD-10-CM

## 2025-04-09 DIAGNOSIS — H35.712: ICD-10-CM

## 2025-04-09 PROCEDURE — 99024 POSTOP FOLLOW-UP VISIT: CPT | Performed by: OPHTHALMOLOGY

## 2025-04-09 PROCEDURE — 92250 FUNDUS PHOTOGRAPHY W/I&R: CPT | Performed by: OPHTHALMOLOGY

## 2025-04-09 ASSESSMENT — REFRACTION_AUTOREFRACTION
OS_CYLINDER: -3.75
OS_AXIS: 83
OS_SPHERE: -0.50
OD_SPHERE: -0.50
OD_AXIS: 89
OD_CYLINDER: -3.25

## 2025-04-09 ASSESSMENT — KERATOMETRY
OD_AXISANGLE_DEGREES: 002
OD_K1POWER_DIOPTERS: 47.25
OD_K2POWER_DIOPTERS: 48.25
OS_K2POWER_DIOPTERS: 47.75
OS_AXISANGLE_DEGREES: 171
OS_K1POWER_DIOPTERS: 47.25

## 2025-04-09 ASSESSMENT — VISUAL ACUITY
OS_BCVA: 20/20
OD_BCVA: 20/350

## 2025-04-09 ASSESSMENT — CONFRONTATIONAL VISUAL FIELD TEST (CVF)
OS_FINDINGS: FULL
OD_FINDINGS: FULL

## 2025-04-09 ASSESSMENT — TONOMETRY: OD_IOP_MMHG: 10

## 2025-04-24 ENCOUNTER — APPOINTMENT (OUTPATIENT)
Dept: MRI IMAGING | Facility: CLINIC | Age: 55
End: 2025-04-24

## 2025-04-25 ENCOUNTER — RX RENEWAL (OUTPATIENT)
Age: 55
End: 2025-04-25

## 2025-04-25 DIAGNOSIS — R09.89 OTHER SPECIFIED SYMPTOMS AND SIGNS INVOLVING THE CIRCULATORY AND RESPIRATORY SYSTEMS: ICD-10-CM

## 2025-04-25 RX ORDER — AZITHROMYCIN 250 MG/1
250 TABLET, FILM COATED ORAL
Qty: 1 | Refills: 0 | Status: ACTIVE | COMMUNITY
Start: 2025-04-25 | End: 1900-01-01

## 2025-04-30 ENCOUNTER — APPOINTMENT (OUTPATIENT)
Dept: RADIATION ONCOLOGY | Facility: CLINIC | Age: 55
End: 2025-04-30

## 2025-05-22 ENCOUNTER — OFFICE (OUTPATIENT)
Dept: URBAN - METROPOLITAN AREA CLINIC 115 | Facility: CLINIC | Age: 55
Setting detail: OPHTHALMOLOGY
End: 2025-05-22
Payer: COMMERCIAL

## 2025-05-22 DIAGNOSIS — H35.712: ICD-10-CM

## 2025-05-22 PROCEDURE — 92134 CPTRZ OPH DX IMG PST SGM RTA: CPT | Performed by: OPHTHALMOLOGY

## 2025-05-22 PROCEDURE — 92012 INTRM OPH EXAM EST PATIENT: CPT | Performed by: OPHTHALMOLOGY

## 2025-05-22 ASSESSMENT — CONFRONTATIONAL VISUAL FIELD TEST (CVF)
OS_FINDINGS: FULL
OD_FINDINGS: FULL

## 2025-05-22 ASSESSMENT — KERATOMETRY
OD_K2POWER_DIOPTERS: 48.25
OD_K1POWER_DIOPTERS: 47.25
OS_AXISANGLE_DEGREES: 171
OS_K2POWER_DIOPTERS: 47.75
OS_K1POWER_DIOPTERS: 47.25
OD_AXISANGLE_DEGREES: 002

## 2025-05-22 ASSESSMENT — REFRACTION_AUTOREFRACTION
OD_SPHERE: -0.50
OD_CYLINDER: -3.25
OD_AXIS: 89
OS_CYLINDER: -3.75
OS_SPHERE: -0.50
OS_AXIS: 83

## 2025-05-22 ASSESSMENT — VISUAL ACUITY
OS_BCVA: 20/30-2
OD_BCVA: 20/350

## 2025-05-22 ASSESSMENT — TONOMETRY: OS_IOP_MMHG: 10

## 2025-06-06 ENCOUNTER — APPOINTMENT (OUTPATIENT)
Dept: NEPHROLOGY | Facility: CLINIC | Age: 55
End: 2025-06-06
Payer: COMMERCIAL

## 2025-06-06 ENCOUNTER — NON-APPOINTMENT (OUTPATIENT)
Age: 55
End: 2025-06-06

## 2025-06-06 VITALS
WEIGHT: 190 LBS | SYSTOLIC BLOOD PRESSURE: 124 MMHG | TEMPERATURE: 98.1 F | OXYGEN SATURATION: 98 % | BODY MASS INDEX: 28.06 KG/M2 | HEART RATE: 98 BPM | DIASTOLIC BLOOD PRESSURE: 87 MMHG

## 2025-06-06 PROCEDURE — 99214 OFFICE O/P EST MOD 30 MIN: CPT | Mod: GC

## 2025-06-06 PROCEDURE — G2211 COMPLEX E/M VISIT ADD ON: CPT | Mod: NC,GC

## 2025-06-09 LAB
ALBUMIN SERPL ELPH-MCNC: 4.8 G/DL
ANION GAP SERPL CALC-SCNC: 16 MMOL/L
APPEARANCE: CLEAR
BACTERIA: NEGATIVE /HPF
BILIRUBIN URINE: NEGATIVE
BLOOD URINE: NEGATIVE
BUN SERPL-MCNC: 35 MG/DL
C3 SERPL-MCNC: 139 MG/DL
C4 SERPL-MCNC: 20 MG/DL
CALCIUM SERPL-MCNC: 9.8 MG/DL
CAST: 4 /LPF
CHLORIDE SERPL-SCNC: 104 MMOL/L
CO2 SERPL-SCNC: 20 MMOL/L
COLOR: YELLOW
CREAT SERPL-MCNC: 3.19 MG/DL
CREAT SPEC-SCNC: 139 MG/DL
CREAT/PROT UR: 0.3 RATIO
DSDNA AB SER-ACNC: 35 IU/ML
EGFRCR SERPLBLD CKD-EPI 2021: 23 ML/MIN/1.73M2
EPITHELIAL CELLS: 0 /HPF
GLUCOSE QUALITATIVE U: NEGATIVE MG/DL
GLUCOSE SERPL-MCNC: 76 MG/DL
KETONES URINE: NEGATIVE MG/DL
LEUKOCYTE ESTERASE URINE: NEGATIVE
MICROSCOPIC-UA: NORMAL
NITRITE URINE: NEGATIVE
PH URINE: 5.5
PHOSPHATE SERPL-MCNC: 3 MG/DL
POTASSIUM SERPL-SCNC: 4.4 MMOL/L
PROT UR-MCNC: 47 MG/DL
PROTEIN URINE: 30 MG/DL
RED BLOOD CELLS URINE: 1 /HPF
SODIUM SERPL-SCNC: 140 MMOL/L
SPECIFIC GRAVITY URINE: 1.02
UROBILINOGEN URINE: 0.2 MG/DL
WHITE BLOOD CELLS URINE: 0 /HPF

## 2025-06-12 ENCOUNTER — RX RENEWAL (OUTPATIENT)
Age: 55
End: 2025-06-12

## 2025-06-13 ENCOUNTER — APPOINTMENT (OUTPATIENT)
Dept: ORTHOPEDIC SURGERY | Facility: CLINIC | Age: 55
End: 2025-06-13

## 2025-06-16 ENCOUNTER — NON-APPOINTMENT (OUTPATIENT)
Age: 55
End: 2025-06-16

## 2025-06-18 ENCOUNTER — RX RENEWAL (OUTPATIENT)
Age: 55
End: 2025-06-18

## 2025-08-12 ENCOUNTER — RX RENEWAL (OUTPATIENT)
Age: 55
End: 2025-08-12

## 2025-09-19 ENCOUNTER — APPOINTMENT (OUTPATIENT)
Dept: ULTRASOUND IMAGING | Facility: IMAGING CENTER | Age: 55
End: 2025-09-19
Payer: COMMERCIAL

## 2025-09-19 ENCOUNTER — APPOINTMENT (OUTPATIENT)
Dept: INTERNAL MEDICINE | Facility: CLINIC | Age: 55
End: 2025-09-19
Payer: COMMERCIAL

## 2025-09-19 ENCOUNTER — LABORATORY RESULT (OUTPATIENT)
Age: 55
End: 2025-09-19

## 2025-09-19 VITALS — SYSTOLIC BLOOD PRESSURE: 140 MMHG | DIASTOLIC BLOOD PRESSURE: 80 MMHG

## 2025-09-19 VITALS — WEIGHT: 189 LBS | HEIGHT: 69 IN | BODY MASS INDEX: 27.99 KG/M2

## 2025-09-19 DIAGNOSIS — R60.0 LOCALIZED EDEMA: ICD-10-CM

## 2025-09-19 DIAGNOSIS — I10 ESSENTIAL (PRIMARY) HYPERTENSION: ICD-10-CM

## 2025-09-19 DIAGNOSIS — D68.61 ANTIPHOSPHOLIPID SYNDROME: ICD-10-CM

## 2025-09-19 DIAGNOSIS — C79.31 SECONDARY MALIGNANT NEOPLASM OF BRAIN: ICD-10-CM

## 2025-09-19 DIAGNOSIS — N18.32 CHRONIC KIDNEY DISEASE, STAGE 3B: ICD-10-CM

## 2025-09-19 PROCEDURE — 99215 OFFICE O/P EST HI 40 MIN: CPT

## 2025-09-19 PROCEDURE — 36415 COLL VENOUS BLD VENIPUNCTURE: CPT

## 2025-09-19 PROCEDURE — G2211 COMPLEX E/M VISIT ADD ON: CPT | Mod: NC

## 2025-09-19 PROCEDURE — 93970 EXTREMITY STUDY: CPT | Mod: 26

## 2025-09-21 LAB
ALBUMIN SERPL ELPH-MCNC: 4.8 G/DL
ALP BLD-CCNC: 48 U/L
ALT SERPL-CCNC: 30 U/L
ANION GAP SERPL CALC-SCNC: 14 MMOL/L
APPEARANCE: CLEAR
AST SERPL-CCNC: 35 U/L
BACTERIA: NEGATIVE /HPF
BASOPHILS # BLD AUTO: 0 K/UL
BASOPHILS NFR BLD AUTO: 0 %
BILIRUB SERPL-MCNC: 0.4 MG/DL
BILIRUBIN URINE: NEGATIVE
BLOOD URINE: ABNORMAL
BUN SERPL-MCNC: 35 MG/DL
CALCIUM SERPL-MCNC: 9.9 MG/DL
CAST: 2 /LPF
CHLORIDE SERPL-SCNC: 104 MMOL/L
CHOLEST SERPL-MCNC: 184 MG/DL
CO2 SERPL-SCNC: 21 MMOL/L
COLOR: YELLOW
CREAT SERPL-MCNC: 2.88 MG/DL
EGFRCR SERPLBLD CKD-EPI 2021: 25 ML/MIN/1.73M2
EOSINOPHIL # BLD AUTO: 0 K/UL
EOSINOPHIL NFR BLD AUTO: 0 %
EPITHELIAL CELLS: 0 /HPF
ESTIMATED AVERAGE GLUCOSE: 114 MG/DL
FOLATE SERPL-MCNC: 12.3 NG/ML
GLUCOSE QUALITATIVE U: NEGATIVE MG/DL
GLUCOSE SERPL-MCNC: 95 MG/DL
HBA1C MFR BLD HPLC: 5.6 %
HCT VFR BLD CALC: 29.5 %
HDLC SERPL-MCNC: 44 MG/DL
HGB BLD-MCNC: 9.1 G/DL
INR PPP: 2.27 RATIO
KETONES URINE: NEGATIVE MG/DL
LDLC SERPL-MCNC: 100 MG/DL
LEUKOCYTE ESTERASE URINE: ABNORMAL
LYMPHOCYTES # BLD AUTO: 0.33 K/UL
LYMPHOCYTES NFR BLD AUTO: 7.2 %
MAN DIFF?: NORMAL
MCHC RBC-ENTMCNC: 21.1 PG
MCHC RBC-ENTMCNC: 30.8 G/DL
MCV RBC AUTO: 68.3 FL
MEV IGG FLD QL IA: 37.1 AU/ML
MEV IGG+IGM SER-IMP: POSITIVE
MICROSCOPIC-UA: NORMAL
MONOCYTES # BLD AUTO: 0.42 K/UL
MONOCYTES NFR BLD AUTO: 9 %
MUV AB SER-ACNC: POSITIVE
MUV IGG SER QL IA: 79.3 AU/ML
NEUTROPHILS # BLD AUTO: 3.87 K/UL
NEUTROPHILS NFR BLD AUTO: 83.8 %
NITRITE URINE: NEGATIVE
NONHDLC SERPL-MCNC: 140 MG/DL
PH URINE: 5.5
PLATELET # BLD AUTO: 175 K/UL
POTASSIUM SERPL-SCNC: 4.8 MMOL/L
PROT SERPL-MCNC: 7.3 G/DL
PROTEIN URINE: 100 MG/DL
PSA SERPL-MCNC: 2.63 NG/ML
PSA SERPL-MCNC: 2.63 NG/ML
PT BLD: 26.1 SEC
RBC # BLD: 4.32 M/UL
RBC # FLD: 18.1 %
RED BLOOD CELLS URINE: 0 /HPF
RUBV IGG FLD-ACNC: 28.9 INDEX
RUBV IGG SER-IMP: POSITIVE
SODIUM SERPL-SCNC: 139 MMOL/L
SPECIFIC GRAVITY URINE: 1.02
T4 FREE SERPL-MCNC: 1.4 NG/DL
TRIGL SERPL-MCNC: 233 MG/DL
TSH SERPL-ACNC: 1.34 UIU/ML
URATE SERPL-MCNC: 8.6 MG/DL
UROBILINOGEN URINE: 0.2 MG/DL
VIT B12 SERPL-MCNC: 377 PG/ML
VZV AB TITR SER: POSITIVE
VZV IGG SER IF-ACNC: 12.3 S/CO
WBC # FLD AUTO: 4.62 K/UL
WHITE BLOOD CELLS URINE: 0 /HPF